# Patient Record
Sex: MALE | Race: WHITE | HISPANIC OR LATINO | Employment: OTHER | URBAN - METROPOLITAN AREA
[De-identification: names, ages, dates, MRNs, and addresses within clinical notes are randomized per-mention and may not be internally consistent; named-entity substitution may affect disease eponyms.]

---

## 2020-10-08 ENCOUNTER — HOSPITAL ENCOUNTER (EMERGENCY)
Facility: HOSPITAL | Age: 70
Discharge: HOME | End: 2020-10-09
Attending: EMERGENCY MEDICINE
Payer: MEDICAID

## 2020-10-08 DIAGNOSIS — J96.01 ACUTE RESPIRATORY FAILURE WITH HYPOXIA: ICD-10-CM

## 2020-10-08 DIAGNOSIS — R07.9 CHEST PAIN: ICD-10-CM

## 2020-10-08 DIAGNOSIS — J18.9 PNEUMONIA OF BOTH LOWER LOBES DUE TO INFECTIOUS ORGANISM: ICD-10-CM

## 2020-10-08 DIAGNOSIS — U07.1 COVID-19: Primary | ICD-10-CM

## 2020-10-08 DIAGNOSIS — E87.1 HYPONATREMIA: ICD-10-CM

## 2020-10-08 LAB
ALBUMIN SERPL BCP-MCNC: 3.3 G/DL (ref 3.5–5.2)
ALLENS TEST: ABNORMAL
ALP SERPL-CCNC: 56 U/L (ref 55–135)
ALT SERPL W/O P-5'-P-CCNC: 46 U/L (ref 10–44)
ANION GAP SERPL CALC-SCNC: 10 MMOL/L (ref 8–16)
AST SERPL-CCNC: 34 U/L (ref 10–40)
BASOPHILS # BLD AUTO: 0.01 K/UL (ref 0–0.2)
BASOPHILS NFR BLD: 0.2 % (ref 0–1.9)
BILIRUB SERPL-MCNC: 0.4 MG/DL (ref 0.1–1)
BILIRUB UR QL STRIP: NEGATIVE
BNP SERPL-MCNC: 29 PG/ML (ref 0–99)
BUN SERPL-MCNC: 18 MG/DL (ref 8–23)
CALCIUM SERPL-MCNC: 8.2 MG/DL (ref 8.7–10.5)
CHLORIDE SERPL-SCNC: 93 MMOL/L (ref 95–110)
CLARITY UR: CLEAR
CO2 SERPL-SCNC: 29 MMOL/L (ref 23–29)
COLOR UR: YELLOW
CREAT SERPL-MCNC: 0.9 MG/DL (ref 0.5–1.4)
DELSYS: ABNORMAL
DIFFERENTIAL METHOD: ABNORMAL
EOSINOPHIL # BLD AUTO: 0 K/UL (ref 0–0.5)
EOSINOPHIL NFR BLD: 0.2 % (ref 0–8)
ERYTHROCYTE [DISTWIDTH] IN BLOOD BY AUTOMATED COUNT: 14.5 % (ref 11.5–14.5)
EST. GFR  (AFRICAN AMERICAN): >60 ML/MIN/1.73 M^2
EST. GFR  (NON AFRICAN AMERICAN): >60 ML/MIN/1.73 M^2
FIO2: 21
GLUCOSE SERPL-MCNC: 105 MG/DL (ref 70–110)
GLUCOSE UR QL STRIP: NEGATIVE
HCO3 UR-SCNC: 26.5 MMOL/L (ref 24–28)
HCT VFR BLD AUTO: 46.5 % (ref 40–54)
HCV AB SERPL QL IA: NEGATIVE
HGB BLD-MCNC: 15.5 G/DL (ref 14–18)
HGB UR QL STRIP: NEGATIVE
IMM GRANULOCYTES # BLD AUTO: 0.05 K/UL (ref 0–0.04)
IMM GRANULOCYTES NFR BLD AUTO: 1 % (ref 0–0.5)
KETONES UR QL STRIP: NEGATIVE
LACTATE SERPL-SCNC: 1.3 MMOL/L (ref 0.5–2.2)
LEUKOCYTE ESTERASE UR QL STRIP: NEGATIVE
LYMPHOCYTES # BLD AUTO: 0.8 K/UL (ref 1–4.8)
LYMPHOCYTES NFR BLD: 16.1 % (ref 18–48)
MCH RBC QN AUTO: 30.6 PG (ref 27–31)
MCHC RBC AUTO-ENTMCNC: 33.3 G/DL (ref 32–36)
MCV RBC AUTO: 92 FL (ref 82–98)
MODE: ABNORMAL
MONOCYTES # BLD AUTO: 0.5 K/UL (ref 0.3–1)
MONOCYTES NFR BLD: 9.3 % (ref 4–15)
NEUTROPHILS # BLD AUTO: 3.6 K/UL (ref 1.8–7.7)
NEUTROPHILS NFR BLD: 73.2 % (ref 38–73)
NITRITE UR QL STRIP: NEGATIVE
NRBC BLD-RTO: 0 /100 WBC
PCO2 BLDA: 28.1 MMHG (ref 35–45)
PH SMN: 7.58 [PH] (ref 7.35–7.45)
PH UR STRIP: 7 [PH] (ref 5–8)
PLATELET # BLD AUTO: 131 K/UL (ref 150–350)
PMV BLD AUTO: 9.8 FL (ref 9.2–12.9)
PO2 BLDA: 48 MMHG (ref 80–100)
POC BE: 5 MMOL/L
POC SATURATED O2: 90 % (ref 95–100)
POTASSIUM SERPL-SCNC: 3.7 MMOL/L (ref 3.5–5.1)
PROT SERPL-MCNC: 7.1 G/DL (ref 6–8.4)
PROT UR QL STRIP: NEGATIVE
RBC # BLD AUTO: 5.06 M/UL (ref 4.6–6.2)
SAMPLE: ABNORMAL
SARS-COV-2 RDRP RESP QL NAA+PROBE: POSITIVE
SITE: ABNORMAL
SODIUM SERPL-SCNC: 132 MMOL/L (ref 136–145)
SP GR UR STRIP: 1.01 (ref 1–1.03)
TROPONIN I SERPL DL<=0.01 NG/ML-MCNC: 0.01 NG/ML (ref 0–0.03)
URN SPEC COLLECT METH UR: NORMAL
UROBILINOGEN UR STRIP-ACNC: NEGATIVE EU/DL
WBC # BLD AUTO: 4.96 K/UL (ref 3.9–12.7)

## 2020-10-08 PROCEDURE — 87040 BLOOD CULTURE FOR BACTERIA: CPT

## 2020-10-08 PROCEDURE — 99900035 HC TECH TIME PER 15 MIN (STAT)

## 2020-10-08 PROCEDURE — 25000003 PHARM REV CODE 250: Performed by: EMERGENCY MEDICINE

## 2020-10-08 PROCEDURE — 85025 COMPLETE CBC W/AUTO DIFF WBC: CPT

## 2020-10-08 PROCEDURE — 96367 TX/PROPH/DG ADDL SEQ IV INF: CPT

## 2020-10-08 PROCEDURE — 96365 THER/PROPH/DIAG IV INF INIT: CPT

## 2020-10-08 PROCEDURE — 96375 TX/PRO/DX INJ NEW DRUG ADDON: CPT

## 2020-10-08 PROCEDURE — 99291 CRITICAL CARE FIRST HOUR: CPT | Mod: 25

## 2020-10-08 PROCEDURE — 93010 ELECTROCARDIOGRAM REPORT: CPT | Mod: ,,, | Performed by: INTERNAL MEDICINE

## 2020-10-08 PROCEDURE — 63600175 PHARM REV CODE 636 W HCPCS: Performed by: EMERGENCY MEDICINE

## 2020-10-08 PROCEDURE — 82803 BLOOD GASES ANY COMBINATION: CPT

## 2020-10-08 PROCEDURE — 80053 COMPREHEN METABOLIC PANEL: CPT

## 2020-10-08 PROCEDURE — 96361 HYDRATE IV INFUSION ADD-ON: CPT

## 2020-10-08 PROCEDURE — 93010 EKG 12-LEAD: ICD-10-PCS | Mod: ,,, | Performed by: INTERNAL MEDICINE

## 2020-10-08 PROCEDURE — 86803 HEPATITIS C AB TEST: CPT

## 2020-10-08 PROCEDURE — 84484 ASSAY OF TROPONIN QUANT: CPT

## 2020-10-08 PROCEDURE — 81003 URINALYSIS AUTO W/O SCOPE: CPT

## 2020-10-08 PROCEDURE — U0002 COVID-19 LAB TEST NON-CDC: HCPCS

## 2020-10-08 PROCEDURE — 83605 ASSAY OF LACTIC ACID: CPT

## 2020-10-08 PROCEDURE — 83880 ASSAY OF NATRIURETIC PEPTIDE: CPT

## 2020-10-08 PROCEDURE — 93005 ELECTROCARDIOGRAM TRACING: CPT

## 2020-10-08 PROCEDURE — 36600 WITHDRAWAL OF ARTERIAL BLOOD: CPT

## 2020-10-08 RX ORDER — AMLODIPINE BESYLATE 5 MG/1
5 TABLET ORAL DAILY
COMMUNITY

## 2020-10-08 RX ORDER — BISOPROLOL FUMARATE 5 MG/1
5 TABLET, FILM COATED ORAL DAILY
Status: ON HOLD | COMMUNITY
End: 2022-01-11 | Stop reason: HOSPADM

## 2020-10-08 RX ORDER — ROSUVASTATIN CALCIUM 20 MG/1
20 TABLET, COATED ORAL DAILY
COMMUNITY

## 2020-10-08 RX ORDER — HYDROCHLOROTHIAZIDE 12.5 MG/1
12.5 CAPSULE ORAL DAILY
Status: ON HOLD | COMMUNITY
End: 2022-01-18 | Stop reason: HOSPADM

## 2020-10-08 RX ADMIN — SODIUM CHLORIDE 1000 ML: 0.9 INJECTION, SOLUTION INTRAVENOUS at 10:10

## 2020-10-08 RX ADMIN — AZITHROMYCIN MONOHYDRATE 500 MG: 500 INJECTION, POWDER, LYOPHILIZED, FOR SOLUTION INTRAVENOUS at 11:10

## 2020-10-08 RX ADMIN — CEFTRIAXONE 1 G: 1 INJECTION, SOLUTION INTRAVENOUS at 11:10

## 2020-10-09 VITALS
HEIGHT: 65 IN | BODY MASS INDEX: 30.81 KG/M2 | OXYGEN SATURATION: 94 % | RESPIRATION RATE: 20 BRPM | DIASTOLIC BLOOD PRESSURE: 57 MMHG | WEIGHT: 184.94 LBS | HEART RATE: 54 BPM | TEMPERATURE: 99 F | SYSTOLIC BLOOD PRESSURE: 119 MMHG

## 2020-10-09 PROCEDURE — 63600175 PHARM REV CODE 636 W HCPCS: Performed by: EMERGENCY MEDICINE

## 2020-10-09 PROCEDURE — 25000003 PHARM REV CODE 250: Performed by: EMERGENCY MEDICINE

## 2020-10-09 RX ORDER — DEXAMETHASONE SODIUM PHOSPHATE 4 MG/ML
6 INJECTION, SOLUTION INTRA-ARTICULAR; INTRALESIONAL; INTRAMUSCULAR; INTRAVENOUS; SOFT TISSUE
Status: COMPLETED | OUTPATIENT
Start: 2020-10-09 | End: 2020-10-09

## 2020-10-09 RX ORDER — IBUPROFEN 800 MG/1
800 TABLET ORAL
Status: COMPLETED | OUTPATIENT
Start: 2020-10-09 | End: 2020-10-09

## 2020-10-09 RX ORDER — AZITHROMYCIN 250 MG/1
250 TABLET, FILM COATED ORAL DAILY
Qty: 6 TABLET | Refills: 0 | Status: ON HOLD | OUTPATIENT
Start: 2020-10-09 | End: 2022-01-11 | Stop reason: HOSPADM

## 2020-10-09 RX ORDER — IBUPROFEN 800 MG/1
800 TABLET ORAL EVERY 6 HOURS PRN
Qty: 20 TABLET | Refills: 0 | Status: ON HOLD | OUTPATIENT
Start: 2020-10-09 | End: 2022-01-11 | Stop reason: HOSPADM

## 2020-10-09 RX ORDER — DEXAMETHASONE 6 MG/1
6 TABLET ORAL
Qty: 5 TABLET | Refills: 0 | Status: SHIPPED | OUTPATIENT
Start: 2020-10-09 | End: 2020-10-14

## 2020-10-09 RX ORDER — ONDANSETRON 4 MG/1
4 TABLET, FILM COATED ORAL EVERY 6 HOURS
Qty: 30 TABLET | Refills: 0 | Status: ON HOLD | OUTPATIENT
Start: 2020-10-09 | End: 2022-01-18 | Stop reason: HOSPADM

## 2020-10-09 RX ADMIN — DEXAMETHASONE SODIUM PHOSPHATE 6 MG: 4 INJECTION, SOLUTION INTRAMUSCULAR; INTRAVENOUS at 02:10

## 2020-10-09 RX ADMIN — IBUPROFEN 800 MG: 800 TABLET, FILM COATED ORAL at 12:10

## 2020-10-09 NOTE — ED PROVIDER NOTES
SCRIBE #1 NOTE: I, Lizzy Díaz, am scribing for, and in the presence of, Enriqueta Solis Do, MD. I have scribed the entire note.      History      Chief Complaint   Patient presents with    Shortness of Breath     States began this morning with fever.  States COVID 19 positive for about 1 week.  Also lower abdominal pain and difficulty urinating.       Review of patient's allergies indicates:  No Known Allergies     HPI   HPI    10/8/2020, 10:05 PM   History obtained from the patient and nephew      History of Present Illness: Lizz Alas is a 70 y.o. male patient who presents to the Emergency Department for evaluation of shortness of breath which onset this morning. Symptoms are constant and moderate in severity. No mitigating or exacerbating factors reported. Patient tested positive for COVID-19 x7 days ago, after being in contact with his nephew who tested positive a couple weeks prior. Patient presents here with his brother in whom he lives with also experiencing similar symptoms with a COVID diagnosis x1 week ago. Associated sxs include low grade fever. In addition, patient reports difficulty urinating and decrease in urine output today.  Patient denies any N/V, diarrhea, constipation, dysuria, hematuria, chest pain, palpitations, leg pain/swelling, and all other sxs at this time. No further complaints or concerns at this time.       Arrival mode: Personal vehicle      PCP: Primary Doctor No       Past Medical History:  Past Medical History:   Diagnosis Date    Hyperlipemia     Hypertension        Past Surgical History:  Past Surgical History:   Procedure Laterality Date    FRACTURE SURGERY      HERNIA REPAIR         Family History:  History reviewed. No pertinent family history.    Social History:  Social History     Tobacco Use    Smoking status: Current Every Day Smoker     Packs/day: 1.50   Substance and Sexual Activity    Alcohol use: Yes     Comment: occassional    Drug use: Unknown    Sexual  activity: Unknown       ROS   Review of Systems   Constitutional: Positive for fever.   HENT: Negative for sore throat.    Respiratory: Positive for shortness of breath.    Cardiovascular: Negative for chest pain, palpitations and leg swelling.   Gastrointestinal: Negative for constipation, diarrhea, nausea and vomiting.   Genitourinary: Positive for decreased urine volume and difficulty urinating. Negative for dysuria and hematuria.   Musculoskeletal: Negative for back pain.   Skin: Negative for rash.   Neurological: Negative for weakness.   Hematological: Does not bruise/bleed easily.   All other systems reviewed and are negative.    Physical Exam      Initial Vitals [10/08/20 2133]   BP Pulse Resp Temp SpO2   (!) 110/57 77 20 98.3 °F (36.8 °C) (!) 87 %      MAP       --          Physical Exam  Nursing Notes and Vital Signs Reviewed.  Constitutional: Patient is in no acute distress. Well-developed and well-nourished. Appears stated age.  Head: Atraumatic. Normocephalic.  Eyes: PERRL. EOM intact. Conjunctivae are not pale. No scleral icterus.  ENT: Mucous membranes are moist.   Neck: Supple. Full ROM.   Cardiovascular: Regular rate. Regular rhythm. No murmurs, rubs, or gallops. Distal pulses are 2+ and symmetric.  Pulmonary/Chest: No respiratory distress. Clear to auscultation bilaterally. No wheezing or rales.  Abdominal: There is mild suprapubic tenderness.  No rebound, guarding, or rigidity. Good bowel sounds.  Genitourinary: The bladder is distended. No CVA tenderness  Musculoskeletal: Moves all extremities. No obvious deformities. No edema.   Skin: Warm and dry.  Neurological:  Alert, awake, and appropriate.  Normal speech.  No acute focal neurological deficits are appreciated.  Psychiatric: Normal affect. Good eye contact. Appropriate in content.    ED Course    Critical Care    Date/Time: 10/9/2020 4:06 AM  Performed by: Enriqueta Solis Do, MD  Authorized by: Enriqueta Solis Do, MD   Direct patient critical  "care time: 20 minutes  Additional history critical care time: 10 minutes  Ordering / reviewing critical care time: 5 minutes  Documentation critical care time: 5 minutes  Consulting other physicians critical care time: 5 minutes  Total critical care time (exclusive of procedural time) : 45 minutes  Critical care time was exclusive of separately billable procedures and treating other patients and teaching time.  Critical care was necessary to treat or prevent imminent or life-threatening deterioration of the following conditions: Pneumonia, Hyponatremia, Acute respiratory failure with hypoxia.  Critical care was time spent personally by me on the following activities: blood draw for specimens, discussions with primary provider, interpretation of cardiac output measurements, evaluation of patient's response to treatment, discussions with consultants, examination of patient, obtaining history from patient or surrogate, ordering and performing treatments and interventions, ordering and review of laboratory studies, ordering and review of radiographic studies, pulse oximetry, re-evaluation of patient's condition and review of old charts.        ED Vital Signs:  Vitals:    10/08/20 2133 10/08/20 2150 10/08/20 2158 10/08/20 2213   BP: (!) 110/57 124/60     Pulse: 77 68 69 67   Resp: 20 (!) 21  18   Temp: 98.3 °F (36.8 °C)      TempSrc: Oral      SpO2: (!) 87% (!) 89%  (!) 92%   Weight: 83.9 kg (184 lb 15.5 oz)      Height: 5' 5" (1.651 m)       10/08/20 2217 10/08/20 2220 10/08/20 2230 10/08/20 2300   BP: 124/60 124/61 132/66 120/64   Pulse: 65 65 65 64   Resp: 19 (!) 21 (!) 21 (!) 24   Temp:       TempSrc:       SpO2: 97% 95% 95% (!) 94%   Weight:       Height:        10/08/20 2330 10/09/20 0000 10/09/20 0150 10/09/20 0200   BP: 124/77 (!) 110/57  (!) 97/53   Pulse: 73 70  (!) 57   Resp: (!) 22 18  20   Temp: 100.1 °F (37.8 °C)  98.6 °F (37 °C)    TempSrc: Oral  Oral    SpO2: (!) 94% 95%  95%   Weight:       Height:     "    10/09/20 0300   BP: (!) 119/57   Pulse: (!) 54   Resp: 20   Temp: 98.9 °F (37.2 °C)   TempSrc: Oral   SpO2: (!) 94%   Weight:    Height:        Abnormal Lab Results:  Labs Reviewed   CBC W/ AUTO DIFFERENTIAL - Abnormal; Notable for the following components:       Result Value    Platelets 131 (*)     Immature Granulocytes 1.0 (*)     Immature Grans (Abs) 0.05 (*)     Lymph # 0.8 (*)     Gran% 73.2 (*)     Lymph% 16.1 (*)     All other components within normal limits   COMPREHENSIVE METABOLIC PANEL - Abnormal; Notable for the following components:    Sodium 132 (*)     Chloride 93 (*)     Calcium 8.2 (*)     Albumin 3.3 (*)     ALT 46 (*)     All other components within normal limits   SARS-COV-2 RNA AMPLIFICATION, QUAL - Abnormal; Notable for the following components:    SARS-CoV-2 RNA, Amplification, Qual Positive (*)     All other components within normal limits   ISTAT PROCEDURE - Abnormal; Notable for the following components:    POC PH 7.582 (*)     POC PCO2 28.1 (*)     POC PO2 48 (*)     POC SATURATED O2 90 (*)     All other components within normal limits   CULTURE, BLOOD   CULTURE, BLOOD   LACTIC ACID, PLASMA   URINALYSIS, REFLEX TO URINE CULTURE    Narrative:     Specimen Source->Urine   B-TYPE NATRIURETIC PEPTIDE   TROPONIN I   HEPATITIS C ANTIBODY        All Lab Results:  Results for orders placed or performed during the hospital encounter of 10/08/20   CBC auto differential   Result Value Ref Range    WBC 4.96 3.90 - 12.70 K/uL    RBC 5.06 4.60 - 6.20 M/uL    Hemoglobin 15.5 14.0 - 18.0 g/dL    Hematocrit 46.5 40.0 - 54.0 %    Mean Corpuscular Volume 92 82 - 98 fL    Mean Corpuscular Hemoglobin 30.6 27.0 - 31.0 pg    Mean Corpuscular Hemoglobin Conc 33.3 32.0 - 36.0 g/dL    RDW 14.5 11.5 - 14.5 %    Platelets 131 (L) 150 - 350 K/uL    MPV 9.8 9.2 - 12.9 fL    Immature Granulocytes 1.0 (H) 0.0 - 0.5 %    Gran # (ANC) 3.6 1.8 - 7.7 K/uL    Immature Grans (Abs) 0.05 (H) 0.00 - 0.04 K/uL    Lymph # 0.8  (L) 1.0 - 4.8 K/uL    Mono # 0.5 0.3 - 1.0 K/uL    Eos # 0.0 0.0 - 0.5 K/uL    Baso # 0.01 0.00 - 0.20 K/uL    nRBC 0 0 /100 WBC    Gran% 73.2 (H) 38.0 - 73.0 %    Lymph% 16.1 (L) 18.0 - 48.0 %    Mono% 9.3 4.0 - 15.0 %    Eosinophil% 0.2 0.0 - 8.0 %    Basophil% 0.2 0.0 - 1.9 %    Differential Method Automated    Comprehensive metabolic panel   Result Value Ref Range    Sodium 132 (L) 136 - 145 mmol/L    Potassium 3.7 3.5 - 5.1 mmol/L    Chloride 93 (L) 95 - 110 mmol/L    CO2 29 23 - 29 mmol/L    Glucose 105 70 - 110 mg/dL    BUN, Bld 18 8 - 23 mg/dL    Creatinine 0.9 0.5 - 1.4 mg/dL    Calcium 8.2 (L) 8.7 - 10.5 mg/dL    Total Protein 7.1 6.0 - 8.4 g/dL    Albumin 3.3 (L) 3.5 - 5.2 g/dL    Total Bilirubin 0.4 0.1 - 1.0 mg/dL    Alkaline Phosphatase 56 55 - 135 U/L    AST 34 10 - 40 U/L    ALT 46 (H) 10 - 44 U/L    Anion Gap 10 8 - 16 mmol/L    eGFR if African American >60 >60 mL/min/1.73 m^2    eGFR if non African American >60 >60 mL/min/1.73 m^2   Lactic acid, plasma #1   Result Value Ref Range    Lactate (Lactic Acid) 1.3 0.5 - 2.2 mmol/L   Urinalysis, Reflex to Urine Culture Urine, Clean Catch    Specimen: Urine   Result Value Ref Range    Specimen UA Urine, Catheterized     Color, UA Yellow Yellow, Straw, Azra    Appearance, UA Clear Clear    pH, UA 7.0 5.0 - 8.0    Specific Gravity, UA 1.015 1.005 - 1.030    Protein, UA Negative Negative    Glucose, UA Negative Negative    Ketones, UA Negative Negative    Bilirubin (UA) Negative Negative    Occult Blood UA Negative Negative    Nitrite, UA Negative Negative    Urobilinogen, UA Negative <2.0 EU/dL    Leukocytes, UA Negative Negative   Brain natriuretic peptide   Result Value Ref Range    BNP 29 0 - 99 pg/mL   Troponin I   Result Value Ref Range    Troponin I 0.010 0.000 - 0.026 ng/mL   Hepatitis C antibody   Result Value Ref Range    Hepatitis C Ab Negative Negative   COVID-19 Rapid Screening   Result Value Ref Range    SARS-CoV-2 RNA, Amplification, Qual  Positive (A) Negative   ISTAT PROCEDURE   Result Value Ref Range    POC PH 7.582 (HH) 7.35 - 7.45    POC PCO2 28.1 (LL) 35 - 45 mmHg    POC PO2 48 (LL) 80 - 100 mmHg    POC HCO3 26.5 24 - 28 mmol/L    POC BE 5 -2 to 2 mmol/L    POC SATURATED O2 90 (L) 95 - 100 %    Sample ARTERIAL     Site RR     Allens Test Pass     DelSys Room Air     Mode SPONT     FiO2 21          Imaging Results:  Imaging Results          X-Ray Chest AP Portable (Final result)  Result time 10/08/20 22:30:42    Final result by Ivan Montes MD (10/08/20 22:30:42)                 Impression:      As above.      Electronically signed by: Ivan Montes  Date:    10/08/2020  Time:    22:30             Narrative:    EXAMINATION:  XR CHEST AP PORTABLE    CLINICAL HISTORY:  Sepsis;    TECHNIQUE:  Single frontal view of the chest was performed.    COMPARISON:  None    FINDINGS:  Bilateral subsolid peripheral opacities.  These may raise concern for COVID-19 in the appropriate clinical circumstance although developing atelectasis or other infection could appear similar.  Clinical correlation is advised.  No pleural fluid.  No pneumothorax.    The cardiac silhouette is normal in size. The hilar and mediastinal contours are unremarkable.    Bones are intact.                                    The EKG was ordered, reviewed, and independently interpreted by the ED provider.  Interpretation time: 21:51  Rate: 72 BPM  Rhythm: premature atrial contractions (PAC)  Interpretation: Incomplete RBBB. No STEMI.    The Emergency Provider reviewed the vital signs and test results, which are outlined above.    ED Discussion     3:01 AM: Reassessed pt at this time. Patient was given a liter of fluids in the ED with a posiitve response. Home oxygen was ordered and brought to the patient in the ED. Patient will be put on COVID-19 monitoring program. Discussed with pt and nephew all pertinent ED information and results. Discussed pt dx and plan of tx. Gave pt and nephew  all f/u and return to the ED instructions. All questions and concerns were addressed at this time. Pt and nephew express understanding of information and instructions, and is comfortable with plan to discharge. Pt is stable for discharge.    I discussed with patient and/or family/caretaker that evaluation in the ED does not suggest any emergent or life threatening medical conditions requiring immediate intervention beyond what was provided in the ED, and I believe patient is safe for discharge.  Regardless, an unremarkable evaluation in the ED does not preclude the development or presence of a serious of life threatening condition. As such, patient was instructed to return immediately for any worsening or change in current symptoms.           ED Medication(s):  Medications   sodium chloride 0.9% bolus 1,000 mL (0 mLs Intravenous Stopped 10/9/20 0016)   cefTRIAXone (ROCEPHIN) 1 g/50 mL D5W IVPB (0 g Intravenous Stopped 10/8/20 2337)   azithromycin 500 mg in dextrose 5 % 250 mL IVPB (ready to mix system) (0 mg Intravenous Stopped 10/9/20 0056)   ibuprofen tablet 800 mg (800 mg Oral Given 10/9/20 0023)   dexamethasone injection 6 mg (6 mg Intravenous Given 10/9/20 0222)     Discharge Medication List as of 10/9/2020  3:00 AM      START taking these medications    Details   azithromycin (Z-AJAY) 250 MG tablet Take 1 tablet (250 mg total) by mouth once daily. Take first 2 tablets together, then 1 every day until finished., Starting Fri 10/9/2020, Print      dexAMETHasone (DECADRON) 6 MG tablet Take 1 tablet (6 mg total) by mouth daily with breakfast. for 5 days, Starting Fri 10/9/2020, Until Wed 10/14/2020, Print      !! ibuprofen (ADVIL,MOTRIN) 800 MG tablet Take 1 tablet (800 mg total) by mouth every 6 (six) hours as needed for Pain., Starting Fri 10/9/2020, Print      !! ibuprofen (ADVIL,MOTRIN) 800 MG tablet Take 1 tablet (800 mg total) by mouth every 6 (six) hours as needed for Pain., Starting Fri 10/9/2020, Print       ondansetron (ZOFRAN) 4 MG tablet Take 1 tablet (4 mg total) by mouth every 6 (six) hours. For nausea, Starting Fri 10/9/2020, Print       !! - Potential duplicate medications found. Please discuss with provider.          Follow-up Information     Please follow up.    Contact information:  Follow-up your doctor 1-2 days for recheck                   Medical Decision Making    Medical Decision Making:   Clinical Tests:   Lab Tests: Ordered and Reviewed  Radiological Study: Ordered and Reviewed  Medical Tests: Ordered and Reviewed           Scribe Attestation:   Scribe #1: I performed the above scribed service and the documentation accurately describes the services I performed. I attest to the accuracy of the note.    Attending:   Physician Attestation Statement for Scribe #1: I, Enriqueta Solis Do, MD, personally performed the services described in this documentation, as scribed by Lizzy Díaz, in my presence, and it is both accurate and complete.          Clinical Impression       ICD-10-CM ICD-9-CM   1. COVID-19  U07.1 079.89   2. Chest pain  R07.9 786.50   3. Pneumonia of both lower lobes due to infectious organism  J18.9 486   4. Hyponatremia  E87.1 276.1   5. Acute respiratory failure with hypoxia  J96.01 518.81       Disposition:   Disposition: Discharged  Condition: Stable         Enriqueta Solis Do, MD  10/09/20 0522

## 2020-10-09 NOTE — DISCHARGE INSTRUCTIONS
Return for any worsening fever, shortness of breath, weakness confusion or for any concerns or complications at all.  Take the Zithromax antibiotic for the pneumonia.  You can take ibuprofen 800 mg every 6-8 hours for fever body aches and you can also take Tylenol 650 mg every 4-6 hours as needed for fever body aches..  Zofran has been prescribed for nausea and vomiting

## 2020-10-09 NOTE — RESPIRATORY THERAPY
Home Oxygen Evaluation    Date Performed: 10/9/2020    1) Patient's Home O2 Sat on room air, while at rest: 90        If O2 sats on room air at rest are 88% or below, patient qualifies. No additional testing needed. Document N/A in steps 2 and 3. If 89% or above, complete steps 2.      2) Patient's O2 Sat on room air while exercisin        If O2 sats on room air while exercising remain 89% or above patient does not qualify, no further testing needed Document N/A in step 3. If O2 sats on room air while exercising are 88% or below, continue to step 3.      3) Patient's O2 Sat while exercising on O2: 92 at 2 LPM         (Must show improvement from #2 for patients to qualify)    If O2 sats improve on oxygen, patient qualifies for portable oxygen. If not, the patient does not qualify.

## 2020-10-14 LAB
BACTERIA BLD CULT: NORMAL
BACTERIA BLD CULT: NORMAL

## 2021-12-27 ENCOUNTER — HOSPITAL ENCOUNTER (INPATIENT)
Facility: HOSPITAL | Age: 71
LOS: 15 days | Discharge: HOME | DRG: 870 | End: 2022-01-11
Attending: EMERGENCY MEDICINE | Admitting: EMERGENCY MEDICINE
Payer: MEDICAID

## 2021-12-27 DIAGNOSIS — R78.81 BACTEREMIA DUE TO GRAM-NEGATIVE BACTERIA: ICD-10-CM

## 2021-12-27 DIAGNOSIS — I48.0 PAROXYSMAL ATRIAL FIBRILLATION: Chronic | ICD-10-CM

## 2021-12-27 DIAGNOSIS — K83.09 ACUTE CHOLANGITIS: Primary | ICD-10-CM

## 2021-12-27 DIAGNOSIS — N17.9 AKI (ACUTE KIDNEY INJURY): ICD-10-CM

## 2021-12-27 DIAGNOSIS — R65.21 SEPTIC SHOCK: ICD-10-CM

## 2021-12-27 DIAGNOSIS — N17.9 ACUTE KIDNEY INJURY: ICD-10-CM

## 2021-12-27 DIAGNOSIS — J96.01 ACUTE HYPOXEMIC RESPIRATORY FAILURE: ICD-10-CM

## 2021-12-27 DIAGNOSIS — R65.20 SEVERE SEPSIS WITH ACUTE ORGAN DYSFUNCTION: ICD-10-CM

## 2021-12-27 DIAGNOSIS — E87.8 ELECTROLYTE IMBALANCE: ICD-10-CM

## 2021-12-27 DIAGNOSIS — I49.8 BIGEMINY: ICD-10-CM

## 2021-12-27 DIAGNOSIS — A41.9 SEPTIC SHOCK: ICD-10-CM

## 2021-12-27 DIAGNOSIS — A41.9 SEVERE SEPSIS WITH ACUTE ORGAN DYSFUNCTION: ICD-10-CM

## 2021-12-27 DIAGNOSIS — R10.13 EPIGASTRIC ABDOMINAL PAIN: ICD-10-CM

## 2021-12-27 DIAGNOSIS — F10.10 ALCOHOL ABUSE: Chronic | ICD-10-CM

## 2021-12-27 DIAGNOSIS — Z72.0 TOBACCO USE: Chronic | ICD-10-CM

## 2021-12-27 PROBLEM — I48.91 ATRIAL FIBRILLATION: Status: ACTIVE | Noted: 2021-12-27

## 2021-12-27 PROBLEM — K13.79 OTHER LESIONS OF ORAL MUCOSA: Status: ACTIVE | Noted: 2021-10-04

## 2021-12-27 PROBLEM — C04.8: Status: ACTIVE | Noted: 2021-10-04

## 2021-12-27 PROBLEM — K80.50 CHOLEDOCHOLITHIASIS: Status: ACTIVE | Noted: 2021-12-27

## 2021-12-27 PROBLEM — I10 PRIMARY HYPERTENSION: Chronic | Status: ACTIVE | Noted: 2021-12-27

## 2021-12-27 PROBLEM — I10 HYPERTENSION: Status: ACTIVE | Noted: 2021-12-27

## 2021-12-27 LAB
ALBUMIN SERPL BCP-MCNC: 4.1 G/DL (ref 3.5–5.2)
ALP SERPL-CCNC: 184 U/L (ref 55–135)
ALT SERPL W/O P-5'-P-CCNC: 226 U/L (ref 10–44)
ANION GAP SERPL CALC-SCNC: 15 MMOL/L (ref 8–16)
AST SERPL-CCNC: 150 U/L (ref 10–40)
BASOPHILS # BLD AUTO: 0.02 K/UL (ref 0–0.2)
BASOPHILS NFR BLD: 0.2 % (ref 0–1.9)
BILIRUB SERPL-MCNC: 5.1 MG/DL (ref 0.1–1)
BILIRUB UR QL STRIP: ABNORMAL
BUN SERPL-MCNC: 22 MG/DL (ref 8–23)
CALCIUM SERPL-MCNC: 9.5 MG/DL (ref 8.7–10.5)
CHLORIDE SERPL-SCNC: 94 MMOL/L (ref 95–110)
CLARITY UR: CLEAR
CO2 SERPL-SCNC: 25 MMOL/L (ref 23–29)
COLOR UR: YELLOW
CREAT SERPL-MCNC: 0.8 MG/DL (ref 0.5–1.4)
CTP QC/QA: YES
DIFFERENTIAL METHOD: ABNORMAL
EOSINOPHIL # BLD AUTO: 0 K/UL (ref 0–0.5)
EOSINOPHIL NFR BLD: 0 % (ref 0–8)
ERYTHROCYTE [DISTWIDTH] IN BLOOD BY AUTOMATED COUNT: 13.7 % (ref 11.5–14.5)
EST. GFR  (AFRICAN AMERICAN): >60 ML/MIN/1.73 M^2
EST. GFR  (NON AFRICAN AMERICAN): >60 ML/MIN/1.73 M^2
GLUCOSE SERPL-MCNC: 180 MG/DL (ref 70–110)
GLUCOSE UR QL STRIP: NEGATIVE
HCT VFR BLD AUTO: 45.6 % (ref 40–54)
HGB BLD-MCNC: 15.4 G/DL (ref 14–18)
HGB UR QL STRIP: ABNORMAL
IMM GRANULOCYTES # BLD AUTO: 0.04 K/UL (ref 0–0.04)
IMM GRANULOCYTES NFR BLD AUTO: 0.5 % (ref 0–0.5)
KETONES UR QL STRIP: ABNORMAL
LACTATE SERPL-SCNC: 1.5 MMOL/L (ref 0.5–2.2)
LEUKOCYTE ESTERASE UR QL STRIP: NEGATIVE
LIPASE SERPL-CCNC: 11 U/L (ref 4–60)
LYMPHOCYTES # BLD AUTO: 0.5 K/UL (ref 1–4.8)
LYMPHOCYTES NFR BLD: 5.5 % (ref 18–48)
MCH RBC QN AUTO: 30.9 PG (ref 27–31)
MCHC RBC AUTO-ENTMCNC: 33.8 G/DL (ref 32–36)
MCV RBC AUTO: 92 FL (ref 82–98)
MONOCYTES # BLD AUTO: 0.5 K/UL (ref 0.3–1)
MONOCYTES NFR BLD: 5.5 % (ref 4–15)
NEUTROPHILS # BLD AUTO: 7.2 K/UL (ref 1.8–7.7)
NEUTROPHILS NFR BLD: 88.3 % (ref 38–73)
NITRITE UR QL STRIP: NEGATIVE
NRBC BLD-RTO: 0 /100 WBC
PH UR STRIP: 6 [PH] (ref 5–8)
PLATELET # BLD AUTO: 119 K/UL (ref 150–450)
PMV BLD AUTO: 10.2 FL (ref 9.2–12.9)
POTASSIUM SERPL-SCNC: 3.9 MMOL/L (ref 3.5–5.1)
PROT SERPL-MCNC: 8.2 G/DL (ref 6–8.4)
PROT UR QL STRIP: ABNORMAL
RBC # BLD AUTO: 4.98 M/UL (ref 4.6–6.2)
SARS-COV-2 RDRP RESP QL NAA+PROBE: NEGATIVE
SODIUM SERPL-SCNC: 134 MMOL/L (ref 136–145)
SP GR UR STRIP: >=1.03 (ref 1–1.03)
TROPONIN I SERPL DL<=0.01 NG/ML-MCNC: <0.006 NG/ML (ref 0–0.03)
URN SPEC COLLECT METH UR: ABNORMAL
UROBILINOGEN UR STRIP-ACNC: ABNORMAL EU/DL
WBC # BLD AUTO: 8.18 K/UL (ref 3.9–12.7)

## 2021-12-27 PROCEDURE — 81003 URINALYSIS AUTO W/O SCOPE: CPT | Performed by: PHYSICIAN ASSISTANT

## 2021-12-27 PROCEDURE — U0002 COVID-19 LAB TEST NON-CDC: HCPCS | Performed by: EMERGENCY MEDICINE

## 2021-12-27 PROCEDURE — 25000003 PHARM REV CODE 250: Performed by: EMERGENCY MEDICINE

## 2021-12-27 PROCEDURE — 80053 COMPREHEN METABOLIC PANEL: CPT | Performed by: PHYSICIAN ASSISTANT

## 2021-12-27 PROCEDURE — G0378 HOSPITAL OBSERVATION PER HR: HCPCS

## 2021-12-27 PROCEDURE — 96376 TX/PRO/DX INJ SAME DRUG ADON: CPT

## 2021-12-27 PROCEDURE — 96361 HYDRATE IV INFUSION ADD-ON: CPT

## 2021-12-27 PROCEDURE — 93010 EKG 12-LEAD: ICD-10-PCS | Mod: ,,, | Performed by: INTERNAL MEDICINE

## 2021-12-27 PROCEDURE — 96375 TX/PRO/DX INJ NEW DRUG ADDON: CPT

## 2021-12-27 PROCEDURE — 63600175 PHARM REV CODE 636 W HCPCS: Performed by: PHYSICIAN ASSISTANT

## 2021-12-27 PROCEDURE — 87186 SC STD MICRODIL/AGAR DIL: CPT | Performed by: EMERGENCY MEDICINE

## 2021-12-27 PROCEDURE — 93005 ELECTROCARDIOGRAM TRACING: CPT

## 2021-12-27 PROCEDURE — 99291 CRITICAL CARE FIRST HOUR: CPT | Mod: 25

## 2021-12-27 PROCEDURE — 83690 ASSAY OF LIPASE: CPT | Performed by: PHYSICIAN ASSISTANT

## 2021-12-27 PROCEDURE — 96365 THER/PROPH/DIAG IV INF INIT: CPT

## 2021-12-27 PROCEDURE — 84484 ASSAY OF TROPONIN QUANT: CPT | Performed by: PHYSICIAN ASSISTANT

## 2021-12-27 PROCEDURE — 87040 BLOOD CULTURE FOR BACTERIA: CPT | Mod: 59 | Performed by: EMERGENCY MEDICINE

## 2021-12-27 PROCEDURE — 11000001 HC ACUTE MED/SURG PRIVATE ROOM

## 2021-12-27 PROCEDURE — 87077 CULTURE AEROBIC IDENTIFY: CPT | Mod: 59 | Performed by: EMERGENCY MEDICINE

## 2021-12-27 PROCEDURE — 25000003 PHARM REV CODE 250: Performed by: PHYSICIAN ASSISTANT

## 2021-12-27 PROCEDURE — 93010 ELECTROCARDIOGRAM REPORT: CPT | Mod: ,,, | Performed by: INTERNAL MEDICINE

## 2021-12-27 PROCEDURE — 25000003 PHARM REV CODE 250: Performed by: INTERNAL MEDICINE

## 2021-12-27 PROCEDURE — 63600175 PHARM REV CODE 636 W HCPCS: Performed by: EMERGENCY MEDICINE

## 2021-12-27 PROCEDURE — 83605 ASSAY OF LACTIC ACID: CPT | Performed by: EMERGENCY MEDICINE

## 2021-12-27 PROCEDURE — 85025 COMPLETE CBC W/AUTO DIFF WBC: CPT | Performed by: PHYSICIAN ASSISTANT

## 2021-12-27 RX ORDER — NALOXONE HCL 0.4 MG/ML
0.02 VIAL (ML) INJECTION
Status: DISCONTINUED | OUTPATIENT
Start: 2021-12-27 | End: 2022-01-11 | Stop reason: HOSPADM

## 2021-12-27 RX ORDER — SODIUM CHLORIDE 9 MG/ML
INJECTION, SOLUTION INTRAVENOUS CONTINUOUS
Status: ACTIVE | OUTPATIENT
Start: 2021-12-27 | End: 2021-12-29

## 2021-12-27 RX ORDER — IBUPROFEN 200 MG
1 TABLET ORAL DAILY
Status: DISCONTINUED | OUTPATIENT
Start: 2021-12-28 | End: 2022-01-11 | Stop reason: HOSPADM

## 2021-12-27 RX ORDER — AMLODIPINE BESYLATE 5 MG/1
5 TABLET ORAL DAILY
Status: DISCONTINUED | OUTPATIENT
Start: 2021-12-28 | End: 2021-12-28

## 2021-12-27 RX ORDER — MORPHINE SULFATE 4 MG/ML
4 INJECTION, SOLUTION INTRAMUSCULAR; INTRAVENOUS
Status: COMPLETED | OUTPATIENT
Start: 2021-12-27 | End: 2021-12-27

## 2021-12-27 RX ORDER — METOPROLOL TARTRATE 50 MG/1
50 TABLET ORAL 2 TIMES DAILY
Status: DISCONTINUED | OUTPATIENT
Start: 2021-12-27 | End: 2021-12-30

## 2021-12-27 RX ORDER — HYDROCHLOROTHIAZIDE 12.5 MG/1
12.5 TABLET ORAL DAILY
Status: DISCONTINUED | OUTPATIENT
Start: 2021-12-28 | End: 2021-12-28

## 2021-12-27 RX ORDER — OXYCODONE HYDROCHLORIDE 5 MG/1
10 TABLET ORAL EVERY 6 HOURS PRN
Status: DISCONTINUED | OUTPATIENT
Start: 2021-12-27 | End: 2022-01-01

## 2021-12-27 RX ORDER — MAG HYDROX/ALUMINUM HYD/SIMETH 200-200-20
30 SUSPENSION, ORAL (FINAL DOSE FORM) ORAL 4 TIMES DAILY PRN
Status: DISCONTINUED | OUTPATIENT
Start: 2021-12-27 | End: 2022-01-11 | Stop reason: HOSPADM

## 2021-12-27 RX ORDER — ONDANSETRON 2 MG/ML
4 INJECTION INTRAMUSCULAR; INTRAVENOUS
Status: COMPLETED | OUTPATIENT
Start: 2021-12-27 | End: 2021-12-27

## 2021-12-27 RX ORDER — ONDANSETRON 8 MG/1
8 TABLET, ORALLY DISINTEGRATING ORAL EVERY 8 HOURS PRN
Status: DISCONTINUED | OUTPATIENT
Start: 2021-12-27 | End: 2022-01-01

## 2021-12-27 RX ORDER — IBUPROFEN 600 MG/1
600 TABLET ORAL
Status: COMPLETED | OUTPATIENT
Start: 2021-12-27 | End: 2021-12-27

## 2021-12-27 RX ORDER — LORAZEPAM 2 MG/ML
1 INJECTION INTRAMUSCULAR
Status: DISCONTINUED | OUTPATIENT
Start: 2021-12-28 | End: 2022-01-05

## 2021-12-27 RX ORDER — PROMETHAZINE HYDROCHLORIDE 25 MG/1
25 TABLET ORAL EVERY 6 HOURS PRN
Status: DISCONTINUED | OUTPATIENT
Start: 2021-12-27 | End: 2022-01-11 | Stop reason: HOSPADM

## 2021-12-27 RX ORDER — IBUPROFEN 400 MG/1
400 TABLET ORAL EVERY 6 HOURS PRN
Status: DISCONTINUED | OUTPATIENT
Start: 2021-12-28 | End: 2021-12-30

## 2021-12-27 RX ORDER — OXYCODONE HYDROCHLORIDE 5 MG/1
5 TABLET ORAL EVERY 6 HOURS PRN
Status: DISCONTINUED | OUTPATIENT
Start: 2021-12-27 | End: 2022-01-01

## 2021-12-27 RX ORDER — SODIUM CHLORIDE 0.9 % (FLUSH) 0.9 %
10 SYRINGE (ML) INJECTION EVERY 12 HOURS PRN
Status: DISCONTINUED | OUTPATIENT
Start: 2021-12-27 | End: 2022-01-11 | Stop reason: HOSPADM

## 2021-12-27 RX ADMIN — PIPERACILLIN AND TAZOBACTAM 4.5 G: 4; .5 INJECTION, POWDER, LYOPHILIZED, FOR SOLUTION INTRAVENOUS; PARENTERAL at 09:12

## 2021-12-27 RX ADMIN — ONDANSETRON 4 MG: 2 INJECTION INTRAMUSCULAR; INTRAVENOUS at 05:12

## 2021-12-27 RX ADMIN — OXYCODONE 10 MG: 5 TABLET ORAL at 11:12

## 2021-12-27 RX ADMIN — IBUPROFEN 600 MG: 600 TABLET, FILM COATED ORAL at 10:12

## 2021-12-27 RX ADMIN — SODIUM CHLORIDE 1000 ML: 0.9 INJECTION, SOLUTION INTRAVENOUS at 09:12

## 2021-12-27 RX ADMIN — MORPHINE SULFATE 4 MG: 4 INJECTION INTRAVENOUS at 08:12

## 2021-12-27 RX ADMIN — SODIUM CHLORIDE 500 ML: 0.9 INJECTION, SOLUTION INTRAVENOUS at 05:12

## 2021-12-27 RX ADMIN — ONDANSETRON 8 MG: 8 TABLET, ORALLY DISINTEGRATING ORAL at 11:12

## 2021-12-27 RX ADMIN — ONDANSETRON 4 MG: 2 INJECTION INTRAMUSCULAR; INTRAVENOUS at 08:12

## 2021-12-27 NOTE — FIRST PROVIDER EVALUATION
Medical screening exam completed.  I have conducted a focused provider triage encounter, findings are as follows:    Brief history of present illness:  Epigastric pain, vomiting for 3 days.      There were no vitals filed for this visit.    Pertinent physical exam:  Holding emesis bag    Preliminary workup initiated; this workup will be continued and followed by the physician or advanced practice provider that is assigned to the patient when roomed.

## 2021-12-27 NOTE — Clinical Note
The right neck was prepped. The site was prepped with ChloraPrep. The site was clipped. The patient was draped. The patient was positioned supine.

## 2021-12-28 ENCOUNTER — ANESTHESIA (OUTPATIENT)
Dept: SURGERY | Facility: HOSPITAL | Age: 71
DRG: 870 | End: 2021-12-28
Payer: MEDICAID

## 2021-12-28 ENCOUNTER — ANESTHESIA EVENT (OUTPATIENT)
Dept: SURGERY | Facility: HOSPITAL | Age: 71
DRG: 870 | End: 2021-12-28
Payer: MEDICAID

## 2021-12-28 PROBLEM — J96.01 ACUTE HYPOXEMIC RESPIRATORY FAILURE: Status: ACTIVE | Noted: 2021-12-28

## 2021-12-28 PROBLEM — R78.81 BACTEREMIA DUE TO GRAM-NEGATIVE BACTERIA: Status: ACTIVE | Noted: 2021-12-28

## 2021-12-28 PROBLEM — A41.9 SEPTIC SHOCK: Status: ACTIVE | Noted: 2021-12-28

## 2021-12-28 PROBLEM — R65.21 SEPTIC SHOCK: Status: ACTIVE | Noted: 2021-12-28

## 2021-12-28 PROBLEM — E87.8 ELECTROLYTE IMBALANCE: Status: ACTIVE | Noted: 2021-12-28

## 2021-12-28 LAB
ALBUMIN SERPL BCP-MCNC: 3.3 G/DL (ref 3.5–5.2)
ALLENS TEST: ABNORMAL
ALP SERPL-CCNC: 170 U/L (ref 55–135)
ALT SERPL W/O P-5'-P-CCNC: 221 U/L (ref 10–44)
ANION GAP SERPL CALC-SCNC: 11 MMOL/L (ref 8–16)
AST SERPL-CCNC: 156 U/L (ref 10–40)
BASOPHILS # BLD AUTO: 0.02 K/UL (ref 0–0.2)
BASOPHILS NFR BLD: 0.2 % (ref 0–1.9)
BILIRUB SERPL-MCNC: 7.1 MG/DL (ref 0.1–1)
BUN SERPL-MCNC: 22 MG/DL (ref 8–23)
CALCIUM SERPL-MCNC: 8.4 MG/DL (ref 8.7–10.5)
CHLORIDE SERPL-SCNC: 99 MMOL/L (ref 95–110)
CO2 SERPL-SCNC: 23 MMOL/L (ref 23–29)
CREAT SERPL-MCNC: 0.8 MG/DL (ref 0.5–1.4)
DELSYS: ABNORMAL
DIFFERENTIAL METHOD: ABNORMAL
EOSINOPHIL # BLD AUTO: 0 K/UL (ref 0–0.5)
EOSINOPHIL NFR BLD: 0 % (ref 0–8)
ERYTHROCYTE [DISTWIDTH] IN BLOOD BY AUTOMATED COUNT: 14 % (ref 11.5–14.5)
ERYTHROCYTE [SEDIMENTATION RATE] IN BLOOD BY WESTERGREN METHOD: 20 MM/H
EST. GFR  (AFRICAN AMERICAN): >60 ML/MIN/1.73 M^2
EST. GFR  (NON AFRICAN AMERICAN): >60 ML/MIN/1.73 M^2
FIO2: 50
GLUCOSE SERPL-MCNC: 145 MG/DL (ref 70–110)
HCO3 UR-SCNC: 22.6 MMOL/L (ref 24–28)
HCT VFR BLD AUTO: 40.9 % (ref 40–54)
HGB BLD-MCNC: 14 G/DL (ref 14–18)
IMM GRANULOCYTES # BLD AUTO: 0.03 K/UL (ref 0–0.04)
IMM GRANULOCYTES NFR BLD AUTO: 0.3 % (ref 0–0.5)
INR PPP: 1 (ref 0.8–1.2)
LYMPHOCYTES # BLD AUTO: 0.4 K/UL (ref 1–4.8)
LYMPHOCYTES NFR BLD: 3.9 % (ref 18–48)
MAGNESIUM SERPL-MCNC: 1.5 MG/DL (ref 1.6–2.6)
MCH RBC QN AUTO: 30.8 PG (ref 27–31)
MCHC RBC AUTO-ENTMCNC: 34.2 G/DL (ref 32–36)
MCV RBC AUTO: 90 FL (ref 82–98)
MODE: ABNORMAL
MONOCYTES # BLD AUTO: 0.8 K/UL (ref 0.3–1)
MONOCYTES NFR BLD: 7.5 % (ref 4–15)
NEUTROPHILS # BLD AUTO: 9 K/UL (ref 1.8–7.7)
NEUTROPHILS NFR BLD: 88.1 % (ref 38–73)
NRBC BLD-RTO: 0 /100 WBC
PCO2 BLDA: 44.8 MMHG (ref 35–45)
PEEP: 5
PH SMN: 7.31 [PH] (ref 7.35–7.45)
PLATELET # BLD AUTO: 100 K/UL (ref 150–450)
PMV BLD AUTO: 10.4 FL (ref 9.2–12.9)
PO2 BLDA: 66 MMHG (ref 80–100)
POC BE: -4 MMOL/L
POC SATURATED O2: 91 % (ref 95–100)
POCT GLUCOSE: 131 MG/DL (ref 70–110)
POCT GLUCOSE: 247 MG/DL (ref 70–110)
POTASSIUM SERPL-SCNC: 3.2 MMOL/L (ref 3.5–5.1)
PROT SERPL-MCNC: 6.6 G/DL (ref 6–8.4)
PROTHROMBIN TIME: 11.5 SEC (ref 9–12.5)
RBC # BLD AUTO: 4.55 M/UL (ref 4.6–6.2)
SAMPLE: ABNORMAL
SITE: ABNORMAL
SODIUM SERPL-SCNC: 133 MMOL/L (ref 136–145)
VT: 400
WBC # BLD AUTO: 10.16 K/UL (ref 3.9–12.7)

## 2021-12-28 PROCEDURE — 80053 COMPREHEN METABOLIC PANEL: CPT | Performed by: EMERGENCY MEDICINE

## 2021-12-28 PROCEDURE — 36620 INSERTION CATHETER ARTERY: CPT | Mod: 59,,, | Performed by: NURSE PRACTITIONER

## 2021-12-28 PROCEDURE — 36556 PR INSERT NON-TUNNEL CV CATH 5+ YRS OLD: ICD-10-PCS | Mod: 59,,, | Performed by: NURSE PRACTITIONER

## 2021-12-28 PROCEDURE — 25000003 PHARM REV CODE 250: Performed by: INTERNAL MEDICINE

## 2021-12-28 PROCEDURE — 51702 INSERT TEMP BLADDER CATH: CPT

## 2021-12-28 PROCEDURE — 82803 BLOOD GASES ANY COMBINATION: CPT

## 2021-12-28 PROCEDURE — 25000242 PHARM REV CODE 250 ALT 637 W/ HCPCS: Performed by: SURGERY

## 2021-12-28 PROCEDURE — 43264 ERCP REMOVE DUCT CALCULI: CPT | Performed by: INTERNAL MEDICINE

## 2021-12-28 PROCEDURE — 36620 PR INSERT CATH,ART,PERCUT,SHORTTERM: ICD-10-PCS | Mod: 59,,, | Performed by: NURSE PRACTITIONER

## 2021-12-28 PROCEDURE — 99291 CRITICAL CARE FIRST HOUR: CPT | Mod: 25,,, | Performed by: NURSE PRACTITIONER

## 2021-12-28 PROCEDURE — 27000221 HC OXYGEN, UP TO 24 HOURS

## 2021-12-28 PROCEDURE — 27100108

## 2021-12-28 PROCEDURE — 83735 ASSAY OF MAGNESIUM: CPT | Performed by: EMERGENCY MEDICINE

## 2021-12-28 PROCEDURE — 63600175 PHARM REV CODE 636 W HCPCS: Performed by: INTERNAL MEDICINE

## 2021-12-28 PROCEDURE — 20000000 HC ICU ROOM

## 2021-12-28 PROCEDURE — 43264 ERCP REMOVE DUCT CALCULI: CPT | Mod: 51,,, | Performed by: INTERNAL MEDICINE

## 2021-12-28 PROCEDURE — 94761 N-INVAS EAR/PLS OXIMETRY MLT: CPT

## 2021-12-28 PROCEDURE — 31500 PR INSERT, EMERGENCY ENDOTRACH AIRWAY: ICD-10-PCS | Mod: ,,, | Performed by: NURSE PRACTITIONER

## 2021-12-28 PROCEDURE — 43752 NASAL/OROGASTRIC W/TUBE PLMT: CPT

## 2021-12-28 PROCEDURE — 36415 COLL VENOUS BLD VENIPUNCTURE: CPT | Performed by: EMERGENCY MEDICINE

## 2021-12-28 PROCEDURE — 99900035 HC TECH TIME PER 15 MIN (STAT)

## 2021-12-28 PROCEDURE — 36556 INSERT NON-TUNNEL CV CATH: CPT | Mod: 59,,, | Performed by: NURSE PRACTITIONER

## 2021-12-28 PROCEDURE — 37000008 HC ANESTHESIA 1ST 15 MINUTES: Performed by: INTERNAL MEDICINE

## 2021-12-28 PROCEDURE — 25000003 PHARM REV CODE 250: Performed by: NURSE PRACTITIONER

## 2021-12-28 PROCEDURE — 74328 X-RAY BILE DUCT ENDOSCOPY: CPT | Mod: 26,,, | Performed by: INTERNAL MEDICINE

## 2021-12-28 PROCEDURE — 25000003 PHARM REV CODE 250: Performed by: STUDENT IN AN ORGANIZED HEALTH CARE EDUCATION/TRAINING PROGRAM

## 2021-12-28 PROCEDURE — 27201674 HC SPHINCTERTOME: Performed by: INTERNAL MEDICINE

## 2021-12-28 PROCEDURE — 25000003 PHARM REV CODE 250: Performed by: EMERGENCY MEDICINE

## 2021-12-28 PROCEDURE — 25000003 PHARM REV CODE 250

## 2021-12-28 PROCEDURE — 87081 CULTURE SCREEN ONLY: CPT | Performed by: INTERNAL MEDICINE

## 2021-12-28 PROCEDURE — 99292 CRITICAL CARE ADDL 30 MIN: CPT | Mod: 25,,, | Performed by: NURSE PRACTITIONER

## 2021-12-28 PROCEDURE — 85025 COMPLETE CBC W/AUTO DIFF WBC: CPT | Performed by: EMERGENCY MEDICINE

## 2021-12-28 PROCEDURE — 43274 PR ERCP W/STENT PLCMNT BILIARY/PANCREATIC DUCT: ICD-10-PCS | Mod: ,,, | Performed by: INTERNAL MEDICINE

## 2021-12-28 PROCEDURE — 43274 ERCP DUCT STENT PLACEMENT: CPT | Mod: ,,, | Performed by: INTERNAL MEDICINE

## 2021-12-28 PROCEDURE — C9399 UNCLASSIFIED DRUGS OR BIOLOG: HCPCS

## 2021-12-28 PROCEDURE — 99292 PR CRITICAL CARE, ADDL 30 MIN: ICD-10-PCS | Mod: 25,,, | Performed by: NURSE PRACTITIONER

## 2021-12-28 PROCEDURE — 74328 PR  X-RAY FOR BILE DUCT ENDOSCOPY: ICD-10-PCS | Mod: 26,,, | Performed by: INTERNAL MEDICINE

## 2021-12-28 PROCEDURE — 25000242 PHARM REV CODE 250 ALT 637 W/ HCPCS: Performed by: NURSE PRACTITIONER

## 2021-12-28 PROCEDURE — 63600175 PHARM REV CODE 636 W HCPCS

## 2021-12-28 PROCEDURE — 36556 INSERT NON-TUNNEL CV CATH: CPT

## 2021-12-28 PROCEDURE — 63600175 PHARM REV CODE 636 W HCPCS: Performed by: NURSE PRACTITIONER

## 2021-12-28 PROCEDURE — 87205 SMEAR GRAM STAIN: CPT | Performed by: NURSE PRACTITIONER

## 2021-12-28 PROCEDURE — 99223 1ST HOSP IP/OBS HIGH 75: CPT | Mod: 25,,, | Performed by: INTERNAL MEDICINE

## 2021-12-28 PROCEDURE — 27202125 HC BALLOON, EXTRACTION (ANY): Performed by: INTERNAL MEDICINE

## 2021-12-28 PROCEDURE — 99291 PR CRITICAL CARE, E/M 30-74 MINUTES: ICD-10-PCS | Mod: 25,,, | Performed by: NURSE PRACTITIONER

## 2021-12-28 PROCEDURE — 85610 PROTHROMBIN TIME: CPT | Performed by: EMERGENCY MEDICINE

## 2021-12-28 PROCEDURE — 27200966 HC CLOSED SUCTION SYSTEM

## 2021-12-28 PROCEDURE — 37799 UNLISTED PX VASCULAR SURGERY: CPT

## 2021-12-28 PROCEDURE — 94002 VENT MGMT INPAT INIT DAY: CPT

## 2021-12-28 PROCEDURE — 43264 PR ERCP,W/REMOVAL STONE,BIL/PANCR DUCTS: ICD-10-PCS | Mod: 51,,, | Performed by: INTERNAL MEDICINE

## 2021-12-28 PROCEDURE — 43274 ERCP DUCT STENT PLACEMENT: CPT | Performed by: INTERNAL MEDICINE

## 2021-12-28 PROCEDURE — 94640 AIRWAY INHALATION TREATMENT: CPT

## 2021-12-28 PROCEDURE — 37000009 HC ANESTHESIA EA ADD 15 MINS: Performed by: INTERNAL MEDICINE

## 2021-12-28 PROCEDURE — 99223 PR INITIAL HOSPITAL CARE,LEVL III: ICD-10-PCS | Mod: 25,,, | Performed by: INTERNAL MEDICINE

## 2021-12-28 PROCEDURE — 94660 CPAP INITIATION&MGMT: CPT

## 2021-12-28 PROCEDURE — C2617 STENT, NON-COR, TEM W/O DEL: HCPCS | Performed by: INTERNAL MEDICINE

## 2021-12-28 PROCEDURE — 31500 INSERT EMERGENCY AIRWAY: CPT | Mod: ,,, | Performed by: NURSE PRACTITIONER

## 2021-12-28 PROCEDURE — 36620 INSERTION CATHETER ARTERY: CPT

## 2021-12-28 PROCEDURE — 87070 CULTURE OTHR SPECIMN AEROBIC: CPT | Performed by: NURSE PRACTITIONER

## 2021-12-28 PROCEDURE — 27000190 HC CPAP FULL FACE MASK W/VALVE

## 2021-12-28 PROCEDURE — C1769 GUIDE WIRE: HCPCS | Performed by: INTERNAL MEDICINE

## 2021-12-28 PROCEDURE — 27202127 HC STENT INTRODUCER: Performed by: INTERNAL MEDICINE

## 2021-12-28 RX ORDER — ARFORMOTEROL TARTRATE 15 UG/2ML
15 SOLUTION RESPIRATORY (INHALATION) 2 TIMES DAILY
Status: DISCONTINUED | OUTPATIENT
Start: 2021-12-28 | End: 2022-01-11 | Stop reason: HOSPADM

## 2021-12-28 RX ORDER — BUDESONIDE 0.5 MG/2ML
0.5 INHALANT ORAL EVERY 12 HOURS
Status: DISCONTINUED | OUTPATIENT
Start: 2021-12-28 | End: 2022-01-11 | Stop reason: HOSPADM

## 2021-12-28 RX ORDER — IPRATROPIUM BROMIDE AND ALBUTEROL SULFATE 2.5; .5 MG/3ML; MG/3ML
3 SOLUTION RESPIRATORY (INHALATION) EVERY 4 HOURS PRN
Status: DISCONTINUED | OUTPATIENT
Start: 2021-12-28 | End: 2022-01-11 | Stop reason: HOSPADM

## 2021-12-28 RX ORDER — DEXMEDETOMIDINE HYDROCHLORIDE 4 UG/ML
INJECTION INTRAVENOUS
Status: COMPLETED
Start: 2021-12-28 | End: 2021-12-28

## 2021-12-28 RX ORDER — SUCCINYLCHOLINE CHLORIDE 20 MG/ML
100 INJECTION INTRAMUSCULAR; INTRAVENOUS ONCE
Status: COMPLETED | OUTPATIENT
Start: 2021-12-28 | End: 2021-12-28

## 2021-12-28 RX ORDER — MAGNESIUM SULFATE HEPTAHYDRATE 40 MG/ML
2 INJECTION, SOLUTION INTRAVENOUS ONCE
Status: COMPLETED | OUTPATIENT
Start: 2021-12-28 | End: 2021-12-28

## 2021-12-28 RX ORDER — POTASSIUM CHLORIDE 7.45 MG/ML
10 INJECTION INTRAVENOUS
Status: DISCONTINUED | OUTPATIENT
Start: 2021-12-28 | End: 2021-12-28

## 2021-12-28 RX ORDER — MUPIROCIN 20 MG/G
OINTMENT TOPICAL 2 TIMES DAILY
Status: COMPLETED | OUTPATIENT
Start: 2021-12-28 | End: 2022-01-01

## 2021-12-28 RX ORDER — ETOMIDATE 2 MG/ML
INJECTION INTRAVENOUS
Status: COMPLETED
Start: 2021-12-28 | End: 2021-12-28

## 2021-12-28 RX ORDER — CHLORHEXIDINE GLUCONATE ORAL RINSE 1.2 MG/ML
15 SOLUTION DENTAL 2 TIMES DAILY
Status: DISCONTINUED | OUTPATIENT
Start: 2021-12-28 | End: 2022-01-03

## 2021-12-28 RX ORDER — FAMOTIDINE 10 MG/ML
20 INJECTION INTRAVENOUS 2 TIMES DAILY
Status: DISCONTINUED | OUTPATIENT
Start: 2021-12-28 | End: 2021-12-29

## 2021-12-28 RX ORDER — SUCCINYLCHOLINE CHLORIDE 20 MG/ML
INJECTION INTRAMUSCULAR; INTRAVENOUS
Status: COMPLETED
Start: 2021-12-28 | End: 2021-12-28

## 2021-12-28 RX ORDER — INSULIN ASPART 100 [IU]/ML
1-10 INJECTION, SOLUTION INTRAVENOUS; SUBCUTANEOUS EVERY 6 HOURS PRN
Status: DISCONTINUED | OUTPATIENT
Start: 2021-12-28 | End: 2021-12-30

## 2021-12-28 RX ORDER — GLUCAGON 1 MG
1 KIT INJECTION
Status: DISCONTINUED | OUTPATIENT
Start: 2021-12-28 | End: 2021-12-30

## 2021-12-28 RX ORDER — FENTANYL CITRATE-0.9 % NACL/PF 10 MCG/ML
0-250 PLASTIC BAG, INJECTION (ML) INTRAVENOUS CONTINUOUS
Status: DISCONTINUED | OUTPATIENT
Start: 2021-12-28 | End: 2022-01-01

## 2021-12-28 RX ORDER — POTASSIUM CHLORIDE 29.8 MG/ML
40 INJECTION INTRAVENOUS ONCE
Status: COMPLETED | OUTPATIENT
Start: 2021-12-28 | End: 2021-12-28

## 2021-12-28 RX ORDER — ACETAMINOPHEN 650 MG/20.3ML
650 LIQUID ORAL EVERY 6 HOURS PRN
Status: DISCONTINUED | OUTPATIENT
Start: 2021-12-28 | End: 2022-01-04

## 2021-12-28 RX ORDER — DEXMEDETOMIDINE HYDROCHLORIDE 4 UG/ML
0-1.4 INJECTION INTRAVENOUS CONTINUOUS
Status: DISCONTINUED | OUTPATIENT
Start: 2021-12-28 | End: 2021-12-30

## 2021-12-28 RX ORDER — ETOMIDATE 2 MG/ML
20 INJECTION INTRAVENOUS ONCE
Status: COMPLETED | OUTPATIENT
Start: 2021-12-28 | End: 2021-12-28

## 2021-12-28 RX ORDER — NOREPINEPHRINE BITARTRATE/D5W 4MG/250ML
0-3 PLASTIC BAG, INJECTION (ML) INTRAVENOUS CONTINUOUS
Status: DISCONTINUED | OUTPATIENT
Start: 2021-12-28 | End: 2021-12-28

## 2021-12-28 RX ORDER — LORAZEPAM 2 MG/ML
2 INJECTION INTRAMUSCULAR
Status: DISCONTINUED | OUTPATIENT
Start: 2021-12-28 | End: 2022-01-01

## 2021-12-28 RX ADMIN — POTASSIUM CHLORIDE 40 MEQ: 29.8 INJECTION, SOLUTION INTRAVENOUS at 12:12

## 2021-12-28 RX ADMIN — SODIUM CHLORIDE: 0.9 INJECTION, SOLUTION INTRAVENOUS at 12:12

## 2021-12-28 RX ADMIN — PIPERACILLIN SODIUM AND TAZOBACTAM SODIUM 4.5 G: 4; .5 INJECTION, POWDER, FOR SOLUTION INTRAVENOUS at 05:12

## 2021-12-28 RX ADMIN — BUDESONIDE 0.5 MG: 0.5 INHALANT ORAL at 08:12

## 2021-12-28 RX ADMIN — MAGNESIUM SULFATE HEPTAHYDRATE 2 G: 2 INJECTION, SOLUTION INTRAVENOUS at 02:12

## 2021-12-28 RX ADMIN — NOREPINEPHRINE BITARTRATE 0.7 MCG/KG/MIN: 1 INJECTION, SOLUTION, CONCENTRATE INTRAVENOUS at 05:12

## 2021-12-28 RX ADMIN — Medication 0.04 MCG/KG/MIN: at 11:12

## 2021-12-28 RX ADMIN — PIPERACILLIN SODIUM AND TAZOBACTAM SODIUM 4.5 G: 4; .5 INJECTION, POWDER, FOR SOLUTION INTRAVENOUS at 09:12

## 2021-12-28 RX ADMIN — NOREPINEPHRINE BITARTRATE 0.94 MCG/KG/MIN: 1 INJECTION, SOLUTION, CONCENTRATE INTRAVENOUS at 02:12

## 2021-12-28 RX ADMIN — FAMOTIDINE 20 MG: 10 INJECTION, SOLUTION INTRAVENOUS at 09:12

## 2021-12-28 RX ADMIN — Medication 0.8 MCG/KG/MIN: at 12:12

## 2021-12-28 RX ADMIN — ARFORMOTEROL TARTRATE 15 MCG: 15 SOLUTION RESPIRATORY (INHALATION) at 08:12

## 2021-12-28 RX ADMIN — NOREPINEPHRINE BITARTRATE 0.94 MCG/KG/MIN: 1 INJECTION, SOLUTION, CONCENTRATE INTRAVENOUS at 04:12

## 2021-12-28 RX ADMIN — NOREPINEPHRINE BITARTRATE 0.8 MCG/KG/MIN: 1 INJECTION, SOLUTION, CONCENTRATE INTRAVENOUS at 12:12

## 2021-12-28 RX ADMIN — IPRATROPIUM BROMIDE AND ALBUTEROL SULFATE 3 ML: .5; 3 SOLUTION RESPIRATORY (INHALATION) at 08:12

## 2021-12-28 RX ADMIN — LORAZEPAM 2 MG: 2 INJECTION INTRAMUSCULAR; INTRAVENOUS at 08:12

## 2021-12-28 RX ADMIN — SUCCINYLCHOLINE CHLORIDE 100 MG: 20 INJECTION INTRAMUSCULAR; INTRAVENOUS at 08:12

## 2021-12-28 RX ADMIN — PIPERACILLIN SODIUM AND TAZOBACTAM SODIUM 4.5 G: 4; .5 INJECTION, POWDER, FOR SOLUTION INTRAVENOUS at 01:12

## 2021-12-28 RX ADMIN — LORAZEPAM 2 MG: 2 INJECTION INTRAMUSCULAR; INTRAVENOUS at 05:12

## 2021-12-28 RX ADMIN — NOREPINEPHRINE BITARTRATE 1.4 MCG/KG/MIN: 1 INJECTION, SOLUTION, CONCENTRATE INTRAVENOUS at 11:12

## 2021-12-28 RX ADMIN — ETOMIDATE 20 MG: 2 INJECTION INTRAVENOUS at 08:12

## 2021-12-28 RX ADMIN — MUPIROCIN: 20 OINTMENT TOPICAL at 09:12

## 2021-12-28 RX ADMIN — NOREPINEPHRINE BITARTRATE 0.95 MCG/KG/MIN: 1 INJECTION, SOLUTION, CONCENTRATE INTRAVENOUS at 04:12

## 2021-12-28 RX ADMIN — DEXMEDETOMIDINE HYDROCHLORIDE 0.2 MCG/KG/HR: 4 INJECTION INTRAVENOUS at 08:12

## 2021-12-28 RX ADMIN — INSULIN ASPART 4 UNITS: 100 INJECTION, SOLUTION INTRAVENOUS; SUBCUTANEOUS at 06:12

## 2021-12-28 RX ADMIN — IPRATROPIUM BROMIDE AND ALBUTEROL SULFATE 3 ML: .5; 3 SOLUTION RESPIRATORY (INHALATION) at 07:12

## 2021-12-28 RX ADMIN — CHLORHEXIDINE GLUCONATE 0.12% ORAL RINSE 15 ML: 1.2 LIQUID ORAL at 09:12

## 2021-12-28 RX ADMIN — METOPROLOL TARTRATE 50 MG: 50 TABLET, FILM COATED ORAL at 09:12

## 2021-12-28 RX ADMIN — FOLIC ACID: 5 INJECTION, SOLUTION INTRAMUSCULAR; INTRAVENOUS; SUBCUTANEOUS at 04:12

## 2021-12-28 RX ADMIN — SODIUM CHLORIDE 500 ML: 0.9 INJECTION, SOLUTION INTRAVENOUS at 09:12

## 2021-12-28 RX ADMIN — VANCOMYCIN HYDROCHLORIDE 2000 MG: 10 INJECTION, POWDER, LYOPHILIZED, FOR SOLUTION INTRAVENOUS at 11:12

## 2021-12-28 RX ADMIN — Medication 250 MCG/HR: at 08:12

## 2021-12-28 RX ADMIN — SODIUM CHLORIDE: 0.9 INJECTION, SOLUTION INTRAVENOUS at 11:12

## 2021-12-28 RX ADMIN — SODIUM CHLORIDE: 0.9 INJECTION, SOLUTION INTRAVENOUS at 09:12

## 2021-12-28 RX ADMIN — ACETAMINOPHEN 650 MG: 160 SOLUTION ORAL at 09:12

## 2021-12-28 NOTE — HPI
Mr. Alas is a 71 year old male with PA not on anticoagulation, HLD, HTN, thrombocytopenia, alcohol abuse, and tobacco consulted for cholangitis.     He was admitted to the ER with intermitted abdominal pain for the last 3 days and fever of 102 F at home. Other symptoms include nausea and emesis. Denied any diarrhea, constipation, SOB, weakness, fever, chills, and all other sxs at this time.    Lab testing showed , , alk phos 184, T bili 5.1, lipase 11, glucose 180, plt 119, sodium 134, COVID negative. Abdominal US showed gallbladder distension with internal sludge but without definite stones, intra and extrahepatic biliary ductal dilation which may represent acute cholecystitis or choledocholithiasis. Normal WBC. Blood cultures obtained in the ED, and 1.5 L IV fluids and IV Zosyn given.     Plan for ERCP today but patient decompensated on the floor and sent to ICU. Currently on Bipap and tachycardic with .

## 2021-12-28 NOTE — PROGRESS NOTES
[facility] - Intensive Care [facility] Medicine  Progress Note    Patient Name: Lizz Alas  MRN: [id number]  Patient Class: IP- Inpatient   Admission Date: 12/27/2021  Length of Stay: 0 days  Attending Physician: Nitesh Lopez MD  Primary Care Provider: Primary Doctor No        Subjective:     Principal Problem:Septic shock        HPI:  Lizz Alas is a 71 y.o. Serbian speaking male patient with a PMHx of PAF, HLD, HTN, thrombocytopenia, alcohol abuse, and tobacco use who presents to the Emergency Department for intermittent epigastric abdominal pain which onset gradually 3 days PTA. Today, the pt reports that his pain has been constant. His symptoms are constant and moderate in severity. No mitigating or exacerbating factors reported. Associated sxs include fever (102 F), nausea, and vomiting. Patient denies any diarrhea, constipation, SOB, weakness, fever, chills, and all other sxs at this time. Initial work-up in the ED showed: Normal WBC and lactic, , , alk phos 184, T bili 5.1, lipase 11, glucose 180, plt 119, sodium 134, COVID negative. Abdominal US showed gallbladder distension with internal sludge but without definite stones, intra and extrahepatic biliary ductal dilation which may represent acute cholecystitis or choledocholithiasis. Blood cultures obtained in the ED, and 1.5 L IV fluids and IV Zosyn given. Case was discussed with GI per the ED, who recommend NPO, IV abx, and ERCP in AM. Hospital Medicine was contacted for admission and patient placed in Observation.       Overview/Hospital Course:  Patient 70 yo male admitted to hospital with suspected cholangitis. Patient initiated on IV abx, fluids, O2. Patient with a rapid decompensation shourtly after admit, rapid response called, patient hypoxic in the 60's placed on bipap with good effect. Patient transferred to ICU. Patient with a progressive decline, febrile episodes to 102.9. Severe septic shock, pressors and fluids on  board. Patient evaluated by GI who recommended ERCP - additional CT abdomen. Patient scheduled for CT and this has been delayed pending stability. Patient intubated for airway protection.      Interval History: Rapid decompensation Severe Septic Shock requiring ICU, Pressors, Intubation. ECP planned    Review of Systems   Unable to perform ROS: Intubated     Objective:     Vital Signs (Most Recent):  Temp: 99 °F (37.2 °C) (12/28/21 1129)  Pulse: 97 (12/28/21 1505)  Resp: (!) 28 (12/28/21 1505)  BP: 110/72 (12/28/21 1505)  SpO2: 96 % (12/28/21 1505) Vital Signs (24h Range):  Temp:  [97.7 °F (36.5 °C)-102.9 °F (39.4 °C)] 99 °F (37.2 °C)  Pulse:  [] 97  Resp:  [16-45] 28  SpO2:  [78 %-100 %] 96 %  BP: ()/(43-87) 110/72  Arterial Line BP: ()/(37-54) 122/54     Weight: 88 kg (194 lb)  Body mass index is 31.31 kg/m².    Intake/Output Summary (Last 24 hours) at 12/28/2021 1522  Last data filed at 12/28/2021 1223  Gross per 24 hour   Intake 1320.81 ml   Output 325 ml   Net 995.81 ml      Physical Exam  Vitals and nursing note reviewed.   Constitutional:       Appearance: He is well-developed and well-nourished. He is ill-appearing.      Interventions: He is sedated and intubated.       HENT:      Head: Normocephalic and atraumatic.   Eyes:      Extraocular Movements: EOM normal.      Pupils: Pupils are equal, round, and reactive to light.   Cardiovascular:      Rate and Rhythm: Normal rate and regular rhythm.      Pulses: Intact distal pulses.      Heart sounds: Normal heart sounds.   Pulmonary:      Effort: Tachypnea and accessory muscle usage present. No respiratory distress. He is intubated.      Breath sounds: Normal breath sounds. Decreased air movement present. No wheezing.   Abdominal:      General: Bowel sounds are normal. There is no distension.      Palpations: Abdomen is soft. There is no mass.      Tenderness: There is abdominal tenderness in the right upper quadrant.   Musculoskeletal:          General: No deformity. Normal range of motion.      Cervical back: Normal range of motion and neck supple.   Skin:     General: Skin is warm and dry.   Neurological:      Mental Status: He is alert and oriented to person, place, and time.      Deep Tendon Reflexes: Reflexes are normal and symmetric.   Psychiatric:         Mood and Affect: Mood and affect normal.         Behavior: Behavior normal.         Thought Content: Thought content normal.         Judgment: Judgment normal.         Significant Labs:   All pertinent labs within the past 24 hours have been reviewed.  ABGs:   Recent Labs   Lab 12/28/21  1505   PH 7.311*   PCO2 44.8   HCO3 22.6*   POCSATURATED 91*   BE -4   PO2 66*     BMP:   Recent Labs   Lab 12/28/21  0550   *   *   K 3.2*   CL 99   CO2 23   BUN 22   CREATININE 0.8   CALCIUM 8.4*   MG 1.5*     CBC:   Recent Labs   Lab 12/27/21  1754 12/28/21  0550   WBC 8.18 10.16   HGB 15.4 14.0   HCT 45.6 40.9   * 100*     CMP:   Recent Labs   Lab 12/27/21  1754 12/28/21  0550   * 133*   K 3.9 3.2*   CL 94* 99   CO2 25 23   * 145*   BUN 22 22   CREATININE 0.8 0.8   CALCIUM 9.5 8.4*   PROT 8.2 6.6   ALBUMIN 4.1 3.3*   BILITOT 5.1* 7.1*   ALKPHOS 184* 170*   * 156*   * 221*   ANIONGAP 15 11   EGFRNONAA >60 >60     Lactic Acid:   Recent Labs   Lab 12/27/21  2109   LACTATE 1.5     Urine Studies:   Recent Labs   Lab 12/27/21 2002   COLORU Yellow   APPEARANCEUA Clear   PHUR 6.0   SPECGRAV >=1.030*   PROTEINUA Trace*   GLUCUA Negative   KETONESU 2+*   BILIRUBINUA 2+*   OCCULTUA Trace*   NITRITE Negative   UROBILINOGEN 4.0-6.0*   LEUKOCYTESUR Negative       Significant Imaging: I have reviewed all pertinent imaging results/findings within the past 24 hours.      Assessment/Plan:      * Septic shock  Pressors  Fluids  ABX  ICU  Pulmonary CC  Intubated  Sedated      Bacteremia due to Gram-negative bacteria  ABX  ICU  Follow Blood Cultures  Pressors as  indicated      Electrolyte imbalance  Correct as indicated        Acute hypoxemic respiratory failure  Patient with Hypoxic Respiratory failure which is Acute.  he is not on home oxygen. Supplemental oxygen was provided and noted- Vent Mode: A/C  Oxygen Concentration (%):  [40-50] 50  Resp Rate Total:  [0 br/min-33 br/min] 26 br/min  Vt Set:  [400 mL] 400 mL  PEEP/CPAP:  [5 cmH20] 5 cmH20  Pressure Support:  [0 cmH20] 0 cmH20  Mean Airway Pressure:  [0 apY07-57 cmH20] 12 cmH20.   Signs/symptoms of respiratory failure include- tachypnea, increased work of breathing, respiratory distress, use of accessory muscles and cyanosis. Contributing diagnoses includes - COPD Labs and images were reviewed. Patient Has recent ABG, which has been reviewed. Will treat underlying causes and adjust management of respiratory failure as indicated    Alcohol abuse  - Monitor for withdrawal/DTs. IV Ativan if needed.     Tobacco use  - Assistance with smoking cessation was offered, including:  [x]  Medications  [x]  Counseling  [x]  Printed Information on Smoking Cessation  []  Referral to a Smoking Cessation Program  - Patient was counseled regarding smoking for 3-10 minutes.    Primary hypertension  - Continue home antihypertensives.     Paroxysmal atrial fibrillation  - Remains in sinus rhythm on admission. Monitor telemetry.  - Continue home BB.  - Patient is not on long-term AC. Will defer to his primary cardiologist.     Acute cholangitis  - Abdominal US showed: Gallbladder distension with internal sludge but without definite stones. Intra and extrahepatic biliary ductal dilation. Acute cholecystitis or choledocholithiasis not excluded.  - GI consult. Plans for ERCP in AM. Will keep NPO.  - Blood cultures obtained in the ED. Continue empiric IV Zosyn.   - IV hydration.  - Analgesics and antiemetics as needed.   - Follow labs.     12/28  ERCP planned  Await intervention per GI  Now Intubated      VTE Risk Mitigation (From admission,  onward)         Ordered     IP VTE HIGH RISK PATIENT  Once         12/27/21 2247     Place sequential compression device  Until discontinued         12/27/21 2247     Reason for No Pharmacological VTE Prophylaxis  Once        Question Answer Comment   Reasons: Risk of Bleeding    Reasons: Thrombocytopenia        12/27/21 2247                Discharge Planning   FLAQUITA:      Code Status: Full Code   Is the patient medically ready for discharge?:     Reason for patient still in hospital (select all that apply): Patient trending condition, Consult recommendations and Pending disposition  Discharge Plan A: Home with family            Critical care time spent on the evaluation and treatment of severe organ dysfunction, review of pertinent labs and imaging studies, discussions with consulting providers and discussions with patient/family: 79 minutes.      Nitesh Lopez MD  Department of Hospital Medicine   [facility] - Intensive Care ([facility])

## 2021-12-28 NOTE — PROVATION PATIENT INSTRUCTIONS
Discharge Summary/Instructions after an Endoscopic Procedure  Patient Name: Lizz Alas  Patient MRN: [id number]  Patient YOB: 1950  Tuesday, December 28, 2021 Melchor Sarmiento MD  Dear patient,  As a result of recent federal legislation (The Federal Cures Act), you may   receive lab or pathology results from your procedure in your [facility]   account before your physician is able to contact you. Your physician or   their representative will relay the results to you with their   recommendations at their soonest availability.  Thank you,  RESTRICTIONS:  During your procedure today, you received medications for sedation.  These   medications may affect your judgment, balance and coordination.  Therefore,   for 24 hours, you have the following restrictions:   - DO NOT drive a car, operate machinery, make legal/financial decisions,   sign important papers or drink alcohol.    ACTIVITY:  Today: no heavy lifting, straining or running due to procedural   sedation/anesthesia.  The following day: return to full activity including work.  DIET:  Eat and drink normally unless instructed otherwise.     TREATMENT FOR COMMON SIDE EFFECTS:  - Mild abdominal pain, nausea, belching, bloating or excessive gas:  rest,   eat lightly and use a heating pad.  - Sore Throat: treat with throat lozenges and/or gargle with warm salt   water.  - Because air was used during the procedure, expelling large amounts of air   from your rectum or belching is normal.  - If a bowel prep was taken, you may not have a bowel movement for 1-3 days.    This is normal.  SYMPTOMS TO WATCH FOR AND REPORT TO YOUR PHYSICIAN:  1. Abdominal pain or bloating, other than gas cramps.  2. Chest pain.  3. Back pain.  4. Signs of infection such as: chills or fever occurring within 24 hours   after the procedure.  5. Rectal bleeding, which would show as bright red, maroon, or black stools.   (A tablespoon of blood from the rectum is not serious,  especially if   hemorrhoids are present.)  6. Vomiting.  7. Weakness or dizziness.  GO DIRECTLY TO THE NEAREST EMERGENCY ROOM IF YOU HAVE ANY OF THE FOLLOWING:      Difficulty breathing              Chills and/or fever over 101 F   Persistent vomiting and/or vomiting blood   Severe abdominal pain   Severe chest pain   Black, tarry stools   Bleeding- more than one tablespoon   Any other symptom or condition that you feel may need urgent attention  Your doctor recommends these additional instructions:  If any biopsies were taken, your doctors clinic will contact you in 1 to 2   weeks with any results.  - Return patient to ICU for ongoing care.   - Continue present medications.   - Repeat ERCP in 2 months to remove stent.   - Refer to a surgeon tomorrow.   - Continue current antibiotic therapy.  For questions, problems or results please call your physician Melchor Sarmiento MD at Work:  [phone]  If you have any questions about the above instructions, call the GI   department at [phone] or call the endoscopy unit at [phone]   from 7am until 3 pm.  [facility], EMERGENCY ROOM PHONE NUMBER:   [phone]  IF A COMPLICATION OR EMERGENCY SITUATION ARISES AND YOU ARE UNABLE TO REACH   YOUR PHYSICIAN - GO DIRECTLY TO THE EMERGENCY ROOM.  I have read or have had read to me these discharge instructions for my   procedure and have received a written copy.  I understand these   instructions and will follow-up with my physician if I have any questions.     __________________________________       _____________________________________  Nurse Signature                                          Patient/Designated   Responsible Party Signature  MD Melchor Saenz MD  12/28/2021 5:45:27 PM  This report has been verified and signed electronically.  Dear patient,  As a result of recent federal legislation (The Federal Cures Act), you may   receive  lab or pathology results from your procedure in your [facility]   account before your physician is able to contact you. Your physician or   their representative will relay the results to you with their   recommendations at their soonest availability.  Thank you,  PROVATION

## 2021-12-28 NOTE — PROGRESS NOTES
Full consult note to follow. PT admitted with obstructive jaundice and Tbili of 5 with US RUQ showing biliary dilation with cholecystitis. Will plan for ERCP. Will need broad spectrum AB with zosyn and hemodynamic support with IVF. Keep NPO    Melchor Sarmiento MD  Gastroenterology  Director of Advanced Endoscopy at [facility]

## 2021-12-28 NOTE — HOSPITAL COURSE
Patient 70 yo male admitted to hospital with suspected cholangitis. Patient initiated on IV abx, fluids, O2. Patient with a rapid decompensation shourtly after admit, rapid response called, patient hypoxic in the 60's placed on bipap with good effect. Patient transferred to ICU. Patient with a progressive decline, febrile episodes to 102.9. Severe septic shock, pressors and fluids on board. Patient evaluated by GI who recommended ERCP - additional CT abdomen. Patient scheduled for CT and this has been delayed pending stability. Patient intubated for airway protection.  12/29- Patient remains intubated, sedated, on pressors. Discussed POC with family at bedside. Patient evaluated by surgery who recommend o/p surgical followup on recovery for elective cholecystectomy.. Repeat blood cultures in progress, abx infusing.  12/30 - Patient wbc declining but remains on pressors. He is intubated and sedated. Family at bedside. Worsening renal function with cr 4.4 today. Nephrology on consult. Discussed with CC Team  12/31: On sole pressor Norepinephrine 0.14mcg/kg/min, afebrile, leucocytosis is resolved, scR is bumped to 6.0. Patient is oliguric (118 cc urine in last 24 hour). On Fentanyl gtt and NaHCO3 gtt   Daughter Hortencia  is at bedside and was updated.   AC 26/450/40/5  1/1/22 patient seen, daughter Hortencia at bedside, currently on CRRT, Norepinephrine at 0.02mcg/kg/min, fentanyl 100mcg/Hr. AC 26/450/40/5. Patient more awake today, opens eyes spontaneously, is squeezing his daughter's hand  1/2 About afternoon yesterday he self extubated, did not require immediate re-intubation as he was doing rel well resp wise, he is currently on Vapo therm 35LPM 80% FIO2, precedex drip to help with agitation (history of ETOH misuse), getting CRRT, family at bedside, whilst confused he is oriented to selpf/family, conversing with family  1/3 patient seen, was sleeping calmly early this AM, family not at bedside, on Vapotherm  2oLPM at 45% FIO2, not dyspneic. CRRT off. Remains afebrile  1/4 patient seen, down to 4L oxygen, awake and alert, off Precedex gtt, been off pressors  1/5 seen in the ICU. Awake and alert. Not in distress.  PT/OT. D/C urethral catheter, place condom cath  1/6 Transferred from ICU, doing stable on the floor. Met on 4L this morning awake and alert, not particularly interested in his breakfast. (Apparently the dialect on [place] is different than his, therefore with communication gaps he was able to tell me directly today that he is not in any pain).  1/7 Patient seen, he;d just returned from HD, daughter at bedside, mentation and cognition fully restored per daughter. HD is now MWF per nephro.  D/W Nephro who recommend placement of tunneled catheter, so far though good UOP renal function is slow to return, patient will likely need to be on HD for some time goingforward. This MD explained this to daughter who interpreted for patient  PT rec Home with PT.

## 2021-12-28 NOTE — PLAN OF CARE
[facility] - Intensive Care (Hospital)  Initial Discharge Assessment       Primary Care Provider: Primary Doctor No    Admission Diagnosis: Acute cholangitis [K83.09]  Epigastric abdominal pain [R10.13]    Admission Date: 12/27/2021  Expected Discharge Date:     Discharge Barriers Identified: None    Payor: MEDICAID / Plan: PENDING MEDICAID / Product Type: Government /     Extended Emergency Contact Information  Primary Emergency Contact: Chuck Alasolas  Mobile Phone: [phone]  Relation: Relative  Secondary Emergency Contact: Mikael Alas  Mobile Phone: [phone]  Relation: Daughter    Discharge Plan A: Home with family       No Pharmacies Listed    Initial Assessment (most recent)     Adult Discharge Assessment - 12/28/21 1516        Discharge Assessment    Assessment Type Discharge Planning Assessment     Confirmed/corrected address, phone number and insurance Yes     Confirmed Demographics Correct on Facesheet     Source of Information family     Communicated FLAQUITA with patient/caregiver Date not available/Unable to determine     Reason For Admission Septic shock     Lives With child(timothy), adult     Facility Arrived From: home     Do you expect to return to your current living situation? Yes     Do you have help at home or someone to help you manage your care at home? Yes     Who are your caregiver(s) and their phone number(s)? daughter Mikael and nephew Brian     Prior to hospitilization cognitive status: Alert/Oriented     Current cognitive status: Coma/Sedated/Intubated     Walking or Climbing Stairs Difficulty none     Dressing/Bathing Difficulty none     Home Accessibility wheelchair accessible     Equipment Currently Used at Home none     Readmission within 30 days? No     Patient currently being followed by outpatient case management? No     Do you currently have service(s) that help you manage your care at home? No     Do you take prescription medications? No     Do you have prescription coverage? No      Who is going to help you get home at discharge? family     How do you get to doctors appointments? family or friend will provide     Are you on dialysis? No     Do you take coumadin? No     Discharge Plan A Home with family     DME Needed Upon Discharge  none     Discharge Plan discussed with: Adult children     Discharge Barriers Identified None        Relationship/Environment    Name(s) of Who Lives With Patient EvetteBrian (nephew) Mikael Alas (daughter)               CM spoke with patient's daughter to introduce role and discuss discharge planning. Patient independent with ADLs prior to hospital admission. Patient's currently staying with her and patient's nephew while they work on immigration. Emergency medicaid application submitted for hospital stay. Any post acute resources will be out of pocket. Patient not eligible for medicaid due to immigration status.   CM discharge needs depends on hospital progress. CM will continue following to assist with other needs.   CM provided a transitional care folder, information on advanced directives, information on pharmacy bedside delivery, and discharge planning begins on admission with contact information for any needs/questions.

## 2021-12-28 NOTE — EICU
Rounding (Video Assessment):  Yes    Intervention Initiated From:  Bedside    Al Communicated with Bedside Nurse regarding:  Time-Out    Nurse Notified: Corby Caballero NP verified privileges  Yes    Doctor Notified:  Yes    Comments: Called for time out for intubation for CT scan and then surgery  0823 Etomidate 20 mg IVP followed by Succinylcholine 50 mg IVP  0824 Ambu 100% Fi02 Mac 4 inserted followed by ETT 8.5 @ 23 lip. ETC02 color change  0825 Bilateral breath sounds ausculted. Portable CXR ordered.   0827 OG tube 14 Norwegian inserted left side of mouth by Thi Mercado RN  0833 16 Norwegian hernandez inserted by Thi Mir RN sterile tech.Secured to leg side of thigh. VSS.

## 2021-12-28 NOTE — ASSESSMENT & PLAN NOTE
- Remains in sinus rhythm on admission. Monitor telemetry.  - Continue home BB.  - Patient is not on long-term AC. Will defer to his primary cardiologist.

## 2021-12-28 NOTE — CONSULTS
[facility] - Intensive Care ([facility])  Gastroenterology  Consult Note    Patient Name: Lizz Alas  MRN: [id number]  Admission Date: 12/27/2021  Hospital Length of Stay: 0 days  Code Status: Full Code   Attending Provider: Nitesh Lopez MD   Consulting Provider: Melchor Sarmiento MD  Primary Care Physician: Primary Doctor No  Principal Problem:Septic shock    Consults  Subjective:     HPI:  Mr. Alas is a 71 year old male with PA not on anticoagulation, HLD, HTN, thrombocytopenia, alcohol abuse, and tobacco consulted for cholangitis.     He was admitted to the ER with intermitted abdominal pain for the last 3 days and fever of 102 F at home. Other symptoms include nausea and emesis. Denied any diarrhea, constipation, SOB, weakness, fever, chills, and all other sxs at this time.    Lab testing showed , , alk phos 184, T bili 5.1, lipase 11, glucose 180, plt 119, sodium 134, COVID negative. Abdominal US showed gallbladder distension with internal sludge but without definite stones, intra and extrahepatic biliary ductal dilation which may represent acute cholecystitis or choledocholithiasis. Normal WBC. Blood cultures obtained in the ED, and 1.5 L IV fluids and IV Zosyn given.     Plan for ERCP today but patient decompensated on the floor and sent to ICU. Currently on Bipap and tachycardic with .      Past Medical History:   Diagnosis Date    Hyperlipemia     Hypertension     Other lesions of oral mucosa     PAF (paroxysmal atrial fibrillation)     Thrombocytopenia        Past Surgical History:   Procedure Laterality Date    FRACTURE SURGERY      HERNIA REPAIR         Review of patient's allergies indicates:  No Known Allergies  Family History    None       Tobacco Use    Smoking status: Current Every Day Smoker     Packs/day: 1.50     Years: 45.00     Pack years: 67.50     Types: Cigarettes    Smokeless tobacco: Never Used   Substance and Sexual Activity    Alcohol use: Yes      Comment: 2-3 glasses of whiskey daily    Drug use: Never    Sexual activity: Not on file     Review of Systems   Constitutional: Negative for activity change, appetite change and chills.   Respiratory: Negative for apnea, choking and chest tightness.    Gastrointestinal: Positive for abdominal pain and nausea. Negative for abdominal distention, anal bleeding, blood in stool, constipation, diarrhea and rectal pain.   Musculoskeletal: Negative for arthralgias, back pain, gait problem and joint swelling.   Neurological: Positive for dizziness. Negative for facial asymmetry and headaches.   Hematological: Negative for adenopathy.     Objective:     Vital Signs (Most Recent):  Temp: (!) 102.9 °F (39.4 °C) (12/28/21 0748)  Pulse: (!) 120 (12/28/21 0748)  Resp: (!) 45 (12/28/21 0748)  BP: 123/71 (12/28/21 0020)  SpO2: 100 % (12/28/21 0748) Vital Signs (24h Range):  Temp:  [97.9 °F (36.6 °C)-102.9 °F (39.4 °C)] 102.9 °F (39.4 °C)  Pulse:  [] 120  Resp:  [16-45] 45  SpO2:  [81 %-100 %] 100 %  BP: (120-174)/(55-85) 123/71     Weight: 88 kg (194 lb) (12/28/21 0300)  Body mass index is 31.31 kg/m².      Intake/Output Summary (Last 24 hours) at 12/28/2021 0818  Last data filed at 12/27/2021 1855  Gross per 24 hour   Intake 500 ml   Output --   Net 500 ml       Lines/Drains/Airways     Peripheral Intravenous Line                 Peripheral IV - Single Lumen 12/28/21 0745 20 G Left;Posterior Hand <1 day         Peripheral IV - Single Lumen 12/28/21 0745 20 G Posterior;Right Hand <1 day                Physical Exam  Constitutional:       Appearance: Normal appearance. He is obese.   HENT:      Head: Normocephalic and atraumatic.      Mouth/Throat:      Mouth: Mucous membranes are moist.   Eyes:      Comments: Icteric sclera   Pulmonary:      Effort: Respiratory distress present.      Breath sounds: Normal breath sounds.   Abdominal:      General: Abdomen is flat. Bowel sounds are normal.      Palpations: Abdomen is soft.    Skin:     General: Skin is warm and dry.   Neurological:      General: No focal deficit present.      Mental Status: He is alert and oriented to person, place, and time.         Significant Labs:  Acute Hepatitis Panel: No results for input(s): HEPBSAG, HEPAIGM, HEPCAB in the last 48 hours.    Invalid input(s): HAPBIGM  Amylase: No results for input(s): AMYLASE in the last 48 hours.  Blood Culture:   Recent Labs   Lab 12/27/21 2055 12/27/21 2110   LABBLOO Gram stain aer bottle: Gram negative rods  Gram stain derek bottle: Gram negative rods   Results called to and read back by: Holly Glover RN 12/28/2021    14:45 Gram stain aer bottle: Gram negative rods  Gram stain derek bottle: Gram negative rods   Results called to and read back by: Holly Glover RN 12/28/2021    14:45     CBC:   Recent Labs   Lab 12/27/21  1754 12/28/21  0550   WBC 8.18 10.16   HGB 15.4 14.0   HCT 45.6 40.9   * 100*     BMP:   Recent Labs   Lab 12/28/21  0550   *   *   K 3.2*   CL 99   CO2 23   BUN 22   CREATININE 0.8   CALCIUM 8.4*   MG 1.5*     CMP:   Recent Labs   Lab 12/28/21  0550   *   CALCIUM 8.4*   ALBUMIN 3.3*   PROT 6.6   *   K 3.2*   CO2 23   CL 99   BUN 22   CREATININE 0.8   ALKPHOS 170*   *   *   BILITOT 7.1*     Coagulation:   Recent Labs   Lab 12/28/21  0550   INR 1.0     CRP: No results for input(s): CRP in the last 48 hours.    Significant Imaging:  Imaging results within the past 24 hours have been reviewed.    Assessment/Plan:     Acute cholangitis  Acute cholangitis based on elevated biliurbin, abdominal pain and biliary dilation. ERCP indicated and he has received the indicated zosyn and expanded to Vancomycin in addition given his decompensation. Most likely secondary to sludge or stones. Blood cultures are pending.   I discussed with Dr. Lopez to intubate and plan for ERCP in the OR as he is decompensating.     ERCP is currently recommended. The risks, benefits  and alternatives of the procedure were discussed with the patient in detail. Benefits include removal of stones, stents, debri, sludge; dilation; placement of a stent; biopsies. Risks include bleeding (0.3-2%), pancreatitis (3%), cholangitis (0.5-3%), perforation (0.08-0.6%), cholecystitis (0.5%), sedation related adverse events. This discussion was had in the presence of his duaghter. Educational material of the biliary anatomy has been provided today for educational purposes. Other risks include, risks of adverse reaction to sedation requiring the use of reversal agents, bleeding requiring blood transfusion, perforation requiring surgical intervention, technical failure, aspiration leading to respiratory distress and respiratory failure resulting in endotracheal intubation and mechanical ventilation including death. Anesthesia is utilized for this procedure, it is up to the anesthesiologist to determine airway safety including elective endotracheal intubation. Questions were answered, the patient and daughter agree to proceed. There was no language barriers. Discussion was performed in Portuguese.           Thank you for your consult. I will follow-up with patient. Please contact us if you have any additional questions.    Melchor Sarmiento MD  Gastroenterology  '[place] - Intensive Care ([facility])

## 2021-12-28 NOTE — NURSING
Telemetry called and told me pt HR was at 183 and to check on pt. Went in pt room #558 , pt seemed to be in distress, mouth breathing , O2 was reading 73% pt was shaking and shivering. Called TOM due to pt speaking Spanish [id number], # number [id number], pt was stating he could not breath.  Checked pt temp reading was 98.4 oral .Pt was on 3L then moved to 5L, then  Respiratory came in and put him on breathing treatment albuterol, still couldn't get breathing controlled, called Dr. Lopez and he stated to transfer pt to room 17 ICU.

## 2021-12-28 NOTE — ASSESSMENT & PLAN NOTE
Patient with Hypoxic Respiratory failure which is Acute.  he is not on home oxygen. Supplemental oxygen was provided and noted- Vent Mode: A/C  Oxygen Concentration (%):  [40-50] 50  Resp Rate Total:  [0 br/min-33 br/min] 26 br/min  Vt Set:  [400 mL] 400 mL  PEEP/CPAP:  [5 cmH20] 5 cmH20  Pressure Support:  [0 cmH20] 0 cmH20  Mean Airway Pressure:  [0 dzX85-30 cmH20] 12 cmH20.   Signs/symptoms of respiratory failure include- tachypnea, increased work of breathing, respiratory distress, use of accessory muscles and cyanosis. Contributing diagnoses includes - COPD Labs and images were reviewed. Patient Has recent ABG, which has been reviewed. Will treat underlying causes and adjust management of respiratory failure as indicated

## 2021-12-28 NOTE — ASSESSMENT & PLAN NOTE
- Abdominal US showed: Gallbladder distension with internal sludge but without definite stones. Intra and extrahepatic biliary ductal dilation. Acute cholecystitis or choledocholithiasis not excluded.  - GI consult. Plans for ERCP in AM. Will keep NPO.  - Blood cultures obtained in the ED. Continue empiric IV Zosyn.   - IV hydration.  - Analgesics and antiemetics as needed.   - Follow labs.

## 2021-12-28 NOTE — ASSESSMENT & PLAN NOTE
Likely secondary to sepsis and atelectasis  Cont fully vent support  Check ABG and repeat in AM  Vent settings reviewed and adjusted  VAP prophylaxis  SAT/SBT in AM

## 2021-12-28 NOTE — INTERVAL H&P NOTE
The patient has been examined and the H&P has been reviewed:    I concur with the findings and no changes have occurred since H&P was written.    Pt now on pressors and started fentanyl for sedation.     Procedure risks, benefits and alternative options discussed and understood by patient/family.          Active Hospital Problems    Diagnosis  POA    *Septic shock [A41.9, R65.21]  Yes    Acute hypoxemic respiratory failure [J96.01]  Yes    Electrolyte imbalance [E87.8]  Yes    Bacteremia due to Gram-negative bacteria [R78.81]  Yes    Acute cholangitis [K83.09]  Yes    Tobacco use [Z72.0]  Yes     Chronic    Alcohol abuse [F10.10]  Yes     Chronic    Primary hypertension [I10]  Yes     Chronic    Paroxysmal atrial fibrillation [I48.0]  Yes     Chronic      Resolved Hospital Problems   No resolved problems to display.

## 2021-12-28 NOTE — PROCEDURES
"Lizz Alas is a 71 y.o. male patient.    Temp: (!) 102.9 °F (39.4 °C) (12/28/21 0748)  Pulse: 92 (12/28/21 1058)  Resp: (!) 22 (12/28/21 1058)  BP: (!) 71/48 (12/28/21 0935)  SpO2: 95 % (12/28/21 1058)  Weight: 88 kg (194 lb) (12/28/21 0300)  Height: 5' 6" (167.6 cm) (12/28/21 0300)       Central Line    Date/Time: 12/28/2021 10:30 AM  Performed by: Nishant Caballero NP  Authorized by: Nishant Caballero NP     Location procedure was performed:  [facility] INTENSIVE CARE UNIT  Pre-operative diagnosis:  Shock  Post-operative diagnosis:  Shock  Consent Done ?:  Yes  Time out complete?: Verified correct patient, procedure, equipment, staff, and site/side    Indications:  Med administration and vascular access  Anesthesia:  Local infiltration  Local anesthetic:  Lidocaine 1% without epinephrine  Anesthetic total (ml):  2  Preparation:  Skin prepped with ChloraPrep  Skin prep agent dried: Skin prep agent completely dried prior to procedure    Sterile barriers: All five maximal sterile barriers used - gloves, gown, cap, mask and large sterile sheet    Hand hygiene: Hand hygiene performed immediately prior to central venous catheter insertion    Location:  Left internal jugular  Catheter type:  Triple lumen  Catheter size:  7 Fr  Inserted Catheter Length (cm):  16  Ultrasound guidance: Yes    Vessel Caliber:  Small   patent  Comprressibility:  Normal  Doppler:  Not done  Needle advanced into vessel with real time ultrasound guidance.    Guidewire confirmed in vessel.    Steril sheath on probe.    Sterile gel used.  Manometry: No    Number of attempts:  2  Securement:  Line sutured, chlorhexidine patch, sterile dressing applied and blood return through all ports  Complications: No    Estimated blood loss (mL):  1  Specimens: No    Implants: No    XRay:  Placement verified by x-ray and successful placement  Adverse Events:  NoneTermination Site: superior vena cava        12/28/2021  "

## 2021-12-28 NOTE — CONSULTS
[facility] - Intensive Care ([facility])  Critical Care Medicine  Consult Note    Patient Name: Lizz Alas  MRN: [id number]  Admission Date: 12/27/2021  Hospital Length of Stay: 0 days  Code Status: Full Code  Attending Physician: Nitesh Lopez MD   Primary Care Provider: Primary Doctor No   Principal Problem: Septic shock    [unfilled]  Subjective:     HPI:  Mr Alas is a 72 yo Pashto male with a PMH of HLD, etoh abuse, tobacco abuse, HTN and PAF.  He presented to [facility] ED yesterday evening via personal vehicle with complaint of abd pain over 3 days and N/V X 1 day.  He does not speak English and was accompanied by family.  In ED Glucose 180, TBili 5.1, LA 1.5 and Abd US with GB distention and sludging.  He had temp 102.9.  He as admitted to Tele and GI consulted diagnosed with acute cholecystitis.  This AM Telemetry reported HR was at 183 and upon assessment patient seemed to be in distress, mouth breathing , O2 was reading 73% pt was shaking and shivering. Called TOM due to pt speaking [place] [id number], # number [id number], pt was stating he could not breath.  Checked pt temp reading was 98.4 oral .Pt was on 3L then moved to 5L, then  Respiratory came in and put him on breathing treatment albuterol, still couldn't get breathing controlled, called Dr. Lopez and he stated to transfer pt to ICU.      Hospital/ICU Course:  12/28 - In ICU he was awake and alert on BiPAP .40 FiO2 with mild work of breathing but no distress and hemodynamically stable.  GI requested patient intubated and stabilized more so from pulm standpoint then obtain CT Abd and plan to OR for ERCP.  TBili up to 7.1.  He was intubated and supported on [facility] ventilation post explaining all this to daughter at bed side.  Post intubation and sedation became hypotensive requiring CL and AL placements and starting Levophed infusion.       Past Medical History:   Diagnosis Date    Hyperlipemia     Hypertension     Other lesions of oral mucosa     PAF  (paroxysmal atrial fibrillation)     Thrombocytopenia        Past Surgical History:   Procedure Laterality Date    FRACTURE SURGERY      HERNIA REPAIR         Review of patient's allergies indicates:  No Known Allergies    Family History    None       Tobacco Use    Smoking status: Current Every Day Smoker     Packs/day: 1.50     Years: 45.00     Pack years: 67.50     Types: Cigarettes    Smokeless tobacco: Never Used   Substance and Sexual Activity    Alcohol use: Yes     Comment: 2-3 glasses of whiskey daily    Drug use: Never    Sexual activity: Not on file         Review of Systems   Unable to perform ROS: Intubated     Objective:     Vital Signs (Most Recent):  Temp: 99 °F (37.2 °C) (12/28/21 1129)  Pulse: 99 (12/28/21 1329)  Resp: 19 (12/28/21 1329)  BP: (!) 71/52 (12/28/21 1200)  SpO2: 95 % (12/28/21 1329) Vital Signs (24h Range):  Temp:  [97.7 °F (36.5 °C)-102.9 °F (39.4 °C)] 99 °F (37.2 °C)  Pulse:  [] 99  Resp:  [16-45] 19  SpO2:  [78 %-100 %] 95 %  BP: ()/(43-87) 71/52  Arterial Line BP: ()/(37-54) 122/54     Weight: 88 kg (194 lb)  Body mass index is 31.31 kg/m².      Intake/Output Summary (Last 24 hours) at 12/28/2021 1413  Last data filed at 12/28/2021 1223  Gross per 24 hour   Intake 1320.81 ml   Output 325 ml   Net 995.81 ml       Physical Exam  Constitutional:       General: He is awake. He is not in acute distress.Vital signs are normal.      Appearance: Normal appearance. He is well-developed and well-nourished. He is not ill-appearing, toxic-appearing or sickly-appearing.      Interventions: He is sedated, intubated and restrained.      Comments: Awake and alert pre intubation   HENT:      Head: Normocephalic and atraumatic.      Mouth/Throat:      Mouth: Oropharynx is clear and moist. Mucous membranes are dry.   Eyes:      General: Lids are normal.      Extraocular Movements: EOM normal.      Pupils: Pupils are equal, round, and reactive to light.   Neck:      Vascular:  No carotid bruit.      Trachea: Trachea normal.     Cardiovascular:      Rate and Rhythm: Regular rhythm. Tachycardia present.      Pulses:           Radial pulses are 2+ on the right side and 2+ on the left side.        Dorsalis pedis pulses are 1+ on the right side and 1+ on the left side.      Heart sounds: Normal heart sounds.   Pulmonary:      Effort: Tachypnea (mild) and accessory muscle usage (mild) present. No respiratory distress. He is intubated.      Breath sounds: Examination of the right-lower field reveals decreased breath sounds. Examination of the left-lower field reveals decreased breath sounds. Decreased breath sounds present.   Chest:      Chest wall: No deformity or tenderness.   Abdominal:      General: Bowel sounds are decreased. There is no distension.      Palpations: Abdomen is soft.      Tenderness: There is no abdominal tenderness.   Genitourinary:     Comments: Eng in place  Musculoskeletal:         General: Normal range of motion.      Cervical back: Normal range of motion.      Right lower leg: Edema (trace) present.      Left lower leg: Edema (trace) present.      Right foot: No deformity.      Left foot: No deformity.   Lymphadenopathy:      Cervical: No cervical adenopathy.   Skin:     General: Skin is warm, dry and intact.      Capillary Refill: Capillary refill takes less than 2 seconds.      Findings: No rash.      Nails: There is no cyanosis.          Neurological:      Comments: Sedated and intubation   Psychiatric:         Mood and Affect: Mood and affect normal.         Cognition and Memory: Cognition and memory normal.         Vents:  Vent Mode: A/C (12/28/21 1329)  Ventilator Initiated: Yes (12/28/21 0824)  Set Rate: 20 BPM (12/28/21 1329)  Vt Set: 400 mL (12/28/21 1329)  Pressure Support: 0 cmH20 (12/28/21 0824)  PEEP/CPAP: 5 cmH20 (12/28/21 1329)  Oxygen Concentration (%): 50 (12/28/21 1329)  Peak Airway Pressure: 9.5 cmH2O (12/28/21 1329)  Plateau Pressure: 0 cmH20  (12/28/21 1329)  Total Ve: 10.9 mL (12/28/21 1329)  Negative Inspiratory Force (cm H2O): 0 (12/28/21 1329)  F/VT Ratio<105 (RSBI): (!) 30.55 (12/28/21 1329)    Lines/Drains/Airways     Central Venous Catheter Line            Percutaneous Central Line Insertion/Assessment - Triple Lumen  12/28/21 1052 left internal jugular <1 day          Drain                 NG/OG Tube 12/28/21 0827 [place] sump 14 Fr. Left mouth <1 day         Urethral Catheter 12/28/21 0830 <1 day          Airway                 Airway - Non-Surgical 12/28/21 0824 Endotracheal Tube <1 day          Arterial Line            Arterial Line 12/28/21 1045 Left Radial <1 day          Peripheral Intravenous Line                 Peripheral IV - Single Lumen 12/28/21 0745 20 G Posterior;Right Hand <1 day                Significant Labs:    CBC/Anemia Profile:  Recent Labs   Lab 12/27/21  1754 12/28/21  0550   WBC 8.18 10.16   HGB 15.4 14.0   HCT 45.6 40.9   * 100*   MCV 92 90   RDW 13.7 14.0        Chemistries:  Recent Labs   Lab 12/27/21  1754 12/28/21  0550   * 133*   K 3.9 3.2*   CL 94* 99   CO2 25 23   BUN 22 22   CREATININE 0.8 0.8   CALCIUM 9.5 8.4*   ALBUMIN 4.1 3.3*   PROT 8.2 6.6   BILITOT 5.1* 7.1*   ALKPHOS 184* 170*   * 221*   * 156*   MG  --  1.5*     Lactic Acid:   Recent Labs   Lab 12/27/21  2109   LACTATE 1.5     Troponin:   Recent Labs   Lab 12/27/21  1754   TROPONINI <0.006     All pertinent labs within the past 24 hours have been reviewed.    Significant Imaging:   I have reviewed all pertinent imaging results/findings within the past 24 hours.  CXR: I have reviewed all pertinent results/findings within the past 24 hours and my personal findings are:  Markedly low lung volumes with diffuse vascular crowding or atelectasis throughout the lungs.  Interstitial pulmonary edema versus interstitial infectious process difficult to exclude in the right clinical setting.   Abd US: Gallbladder distension with internal  sludge but without definite stones.  Intra and extrahepatic biliary ductal dilation.  Consider further evaluation with contrast enhanced CT.  Acute cholecystitis or choledocholithiasis not excluded.   Pancreas is largely obscured by bowel gas.      Assessment/Plan:     Psychiatric  Alcohol abuse  Add Banana bag daily    Pulmonary  Acute hypoxemic respiratory failure  Likely secondary to sepsis and atelectasis  Cont fully vent support  Check ABG and repeat in AM  Vent settings reviewed and adjusted  VAP prophylaxis  SAT/SBT in AM    Cardiac/Vascular  Paroxysmal atrial fibrillation  Not on anticoagulants or any antiarrythmics on home med review  Currently in NSR    Renal/  Electrolyte imbalance  Replace K+ and Mg+  Repeat labs in AM    ID  * Septic shock  Cont IVFs   Titrate Levophed infusion for MAP > 65  Check sputum culture  Blood cultures X 2 NGTD  Repeat LA and CBC in AM  Follow fever curve  Likely source acute cholecystitis    Acute cholangitis  IVAB and IVFs  GI following  CT Abd pending then likely ERCP  CMP in AM      Other  Tobacco use  Will encourage tobacco cessation post extubation and once awake/alert  Add LABA and ICS nebs  On Nicotine patch           Preventive Measures and Monitoring:   Stress Ulcer: Pepcid  Nutrition: NPO  Glucose control: SSI  Bowel prophylaxis: none due to acute cholecystitis  DVT prophylaxis: SCDs  Hx CAD on B-Blocker: no hx CAD and in shock  Head of Bed/Reposition: Elevate HOB and turn Q1-2 hours   Early Mobility: bed rest  SAT/SBT: in AM  RASS goal: -2  Vent Day: #1  OG Day: #1  Central Line Left IJ Day: #1  Left Radial Arterial Line Day: #1  Eng Day: #1  IVAB Day: #2  Code Status: Full    Counseling/Consultation:I have discussed the care of this patient in detail with the bedside nursing staff and Dr. Jameson and Dr. Lopez    Critical Care Time: 84 minutes  Critical secondary to Patient has a condition that poses threat to life and bodily function: Acute Resp Failure  intubated on [facility] ventilation, Septic shock  Patient is currently on drug therapy requiring intensive monitoring for toxicity: Levophed infusion  Patient is currently receiving parenteral controlled substances: Fentanyl infusion     Critical care was time spent personally by me on the following activities: development of treatment plan with patient or surrogate and bedside caregivers, discussions with consultants, evaluation of patient's response to treatment, examination of patient, ordering and performing treatments and interventions, ordering and review of laboratory studies, ordering and review of radiographic studies, pulse oximetry, re-evaluation of patient's condition. This critical care time did not overlap with that of any other provider or involve time for any procedures.    Thank you for your consult. I will follow-up with patient. Please contact us if you have any additional questions.     Nishant Caballero NP  Critical Care Medicine  [facility] - Intensive Care ([facility])

## 2021-12-28 NOTE — ASSESSMENT & PLAN NOTE
Cont IVFs   Titrate Levophed infusion for MAP > 65  Check sputum culture  Blood cultures X 2 NGTD  Repeat LA and CBC in AM  Follow fever curve  Likely source acute cholecystitis

## 2021-12-28 NOTE — HPI
Lizz Alas is a 71 y.o. Serbian speaking male patient with a PMHx of PAF, HLD, HTN, thrombocytopenia, alcohol abuse, and tobacco use who presents to the Emergency Department for intermittent epigastric abdominal pain which onset gradually 3 days PTA. Today, the pt reports that his pain has been constant. His symptoms are constant and moderate in severity. No mitigating or exacerbating factors reported. Associated sxs include fever (102 F), nausea, and vomiting. Patient denies any diarrhea, constipation, SOB, weakness, fever, chills, and all other sxs at this time. Initial work-up in the ED showed: Normal WBC and lactic, , , alk phos 184, T bili 5.1, lipase 11, glucose 180, plt 119, sodium 134, COVID negative. Abdominal US showed gallbladder distension with internal sludge but without definite stones, intra and extrahepatic biliary ductal dilation which may represent acute cholecystitis or choledocholithiasis. Blood cultures obtained in the ED, and 1.5 L IV fluids and IV Zosyn given. Case was discussed with GI per the ED, who recommend NPO, IV abx, and ERCP in AM. Hospital Medicine was contacted for admission and patient placed in Observation.

## 2021-12-28 NOTE — SUBJECTIVE & OBJECTIVE
Past Medical History:   Diagnosis Date    Hyperlipemia     Hypertension     Other lesions of oral mucosa     PAF (paroxysmal atrial fibrillation)     Thrombocytopenia        Past Surgical History:   Procedure Laterality Date    FRACTURE SURGERY      HERNIA REPAIR         Review of patient's allergies indicates:  No Known Allergies    Family History    None       Tobacco Use    Smoking status: Current Every Day Smoker     Packs/day: 1.50     Years: 45.00     Pack years: 67.50     Types: Cigarettes    Smokeless tobacco: Never Used   Substance and Sexual Activity    Alcohol use: Yes     Comment: 2-3 glasses of whiskey daily    Drug use: Never    Sexual activity: Not on file         Review of Systems   Unable to perform ROS: Intubated     Objective:     Vital Signs (Most Recent):  Temp: 99 °F (37.2 °C) (12/28/21 1129)  Pulse: 99 (12/28/21 1329)  Resp: 19 (12/28/21 1329)  BP: (!) 71/52 (12/28/21 1200)  SpO2: 95 % (12/28/21 1329) Vital Signs (24h Range):  Temp:  [97.7 °F (36.5 °C)-102.9 °F (39.4 °C)] 99 °F (37.2 °C)  Pulse:  [] 99  Resp:  [16-45] 19  SpO2:  [78 %-100 %] 95 %  BP: ()/(43-87) 71/52  Arterial Line BP: ()/(37-54) 122/54     Weight: 88 kg (194 lb)  Body mass index is 31.31 kg/m².      Intake/Output Summary (Last 24 hours) at 12/28/2021 1413  Last data filed at 12/28/2021 1223  Gross per 24 hour   Intake 1320.81 ml   Output 325 ml   Net 995.81 ml       Physical Exam  Constitutional:       General: He is awake. He is not in acute distress.Vital signs are normal.      Appearance: Normal appearance. He is well-developed and well-nourished. He is not ill-appearing, toxic-appearing or sickly-appearing.      Interventions: He is sedated, intubated and restrained.      Comments: Awake and alert pre intubation   HENT:      Head: Normocephalic and atraumatic.      Mouth/Throat:      Mouth: Oropharynx is clear and moist. Mucous membranes are dry.   Eyes:      General: Lids are normal.       Extraocular Movements: EOM normal.      Pupils: Pupils are equal, round, and reactive to light.   Neck:      Vascular: No carotid bruit.      Trachea: Trachea normal.     Cardiovascular:      Rate and Rhythm: Regular rhythm. Tachycardia present.      Pulses:           Radial pulses are 2+ on the right side and 2+ on the left side.        Dorsalis pedis pulses are 1+ on the right side and 1+ on the left side.      Heart sounds: Normal heart sounds.   Pulmonary:      Effort: Tachypnea (mild) and accessory muscle usage (mild) present. No respiratory distress. He is intubated.      Breath sounds: Examination of the right-lower field reveals decreased breath sounds. Examination of the left-lower field reveals decreased breath sounds. Decreased breath sounds present.   Chest:      Chest wall: No deformity or tenderness.   Abdominal:      General: Bowel sounds are decreased. There is no distension.      Palpations: Abdomen is soft.      Tenderness: There is no abdominal tenderness.   Genitourinary:     Comments: Eng in place  Musculoskeletal:         General: Normal range of motion.      Cervical back: Normal range of motion.      Right lower leg: Edema (trace) present.      Left lower leg: Edema (trace) present.      Right foot: No deformity.      Left foot: No deformity.   Lymphadenopathy:      Cervical: No cervical adenopathy.   Skin:     General: Skin is warm, dry and intact.      Capillary Refill: Capillary refill takes less than 2 seconds.      Findings: No rash.      Nails: There is no cyanosis.          Neurological:      Comments: Sedated and intubation   Psychiatric:         Mood and Affect: Mood and affect normal.         Cognition and Memory: Cognition and memory normal.         Vents:  Vent Mode: A/C (12/28/21 1329)  Ventilator Initiated: Yes (12/28/21 0824)  Set Rate: 20 BPM (12/28/21 1329)  Vt Set: 400 mL (12/28/21 1329)  Pressure Support: 0 cmH20 (12/28/21 0824)  PEEP/CPAP: 5 cmH20 (12/28/21 1329)  Oxygen  Concentration (%): 50 (12/28/21 1329)  Peak Airway Pressure: 9.5 cmH2O (12/28/21 1329)  Plateau Pressure: 0 cmH20 (12/28/21 1329)  Total Ve: 10.9 mL (12/28/21 1329)  Negative Inspiratory Force (cm H2O): 0 (12/28/21 1329)  F/VT Ratio<105 (RSBI): (!) 30.55 (12/28/21 1329)    Lines/Drains/Airways     Central Venous Catheter Line            Percutaneous Central Line Insertion/Assessment - Triple Lumen  12/28/21 1052 left internal jugular <1 day          Drain                 NG/OG Tube 12/28/21 0827 Font[place] sump 14 Fr. Left mouth <1 day         Urethral Catheter 12/28/21 0830 <1 day          Airway                 Airway - Non-Surgical 12/28/21 0824 Endotracheal Tube <1 day          Arterial Line            Arterial Line 12/28/21 1045 Left Radial <1 day          Peripheral Intravenous Line                 Peripheral IV - Single Lumen 12/28/21 0745 20 G Posterior;Right Hand <1 day                Significant Labs:    CBC/Anemia Profile:  Recent Labs   Lab 12/27/21  1754 12/28/21  0550   WBC 8.18 10.16   HGB 15.4 14.0   HCT 45.6 40.9   * 100*   MCV 92 90   RDW 13.7 14.0        Chemistries:  Recent Labs   Lab 12/27/21  1754 12/28/21  0550   * 133*   K 3.9 3.2*   CL 94* 99   CO2 25 23   BUN 22 22   CREATININE 0.8 0.8   CALCIUM 9.5 8.4*   ALBUMIN 4.1 3.3*   PROT 8.2 6.6   BILITOT 5.1* 7.1*   ALKPHOS 184* 170*   * 221*   * 156*   MG  --  1.5*     Lactic Acid:   Recent Labs   Lab 12/27/21  2109   LACTATE 1.5     Troponin:   Recent Labs   Lab 12/27/21 1754   TROPONINI <0.006     All pertinent labs within the past 24 hours have been reviewed.    Significant Imaging:   I have reviewed all pertinent imaging results/findings within the past 24 hours.  CXR: I have reviewed all pertinent results/findings within the past 24 hours and my personal findings are:  Markedly low lung volumes with diffuse vascular crowding or atelectasis throughout the lungs.  Interstitial pulmonary edema versus interstitial  infectious process difficult to exclude in the right clinical setting.   Abd US: Gallbladder distension with internal sludge but without definite stones.  Intra and extrahepatic biliary ductal dilation.  Consider further evaluation with contrast enhanced CT.  Acute cholecystitis or choledocholithiasis not excluded.   Pancreas is largely obscured by bowel gas.

## 2021-12-28 NOTE — ASSESSMENT & PLAN NOTE
- Assistance with smoking cessation was offered, including:  [x]  Medications  [x]  Counseling  [x]  Printed Information on Smoking Cessation  []  Referral to a Smoking Cessation Program  - Patient was counseled regarding smoking for 3-10 minutes.

## 2021-12-28 NOTE — HOSPITAL COURSE
12/28 - In ICU he was awake and alert on BiPAP .40 FiO2 with mild work of breathing but no distress and hemodynamically stable.  GI requested patient intubated and stabilized more so from pulm standpoint then obtain CT Abd and plan to OR for ERCP.  TBili up to 7.1.  He was intubated and supported on mech ventilation post explaining all this to daughter at bed side.  Post intubation and sedation became hypotensive requiring CL and AL placements and starting Levophed infusion.   12/29 - ERCP yesterday afternoon.  Increased vasopressor and O2 requirements overnight.  This AM still intubated on mech vent and sedated on Fentanyl infusion.  Still on Vasopressin and Levophed infusions also.  Oliguria with LISET and Tmax last 24 hours up to 102.9.  Blood cultures X 2 + E.Coli.  Discussed care with daughter at bed side and all questions answered.   12/30 - remains intubated and sedated on mech vent. Temp and wbc trend down. Urine output, creatinine, and metabolic acidosis worsening  12/31 - remains intubated and sedated on mech vent with minimal oxygen demand but moderate pressor requirement. Urine output 118ml last 24 hours, creatinine 6.0. continued hyperglycemia 197-237  1/1/2022 - remains intubated and sedated on mechanical vent with support requirement unchanged but pO2 trending down, +18L since admission. Renal function without improvement, only 160ml urine output last 24 hours. Trialysis cath placed for initiation of CRRT, goal filtration and fluid removal  1/2 - self extubated yesterday, tolerating HFNC support. Confusion/Delirium requiring precedex infusion for safety. CRRT initiated but clotted around 0100 after about 3L removed, remains oliguric, electrolytes stable  1/3 - supplemental oxygen weaned to now 8L NC; CRRT clotted again around 2300 but urine output 220 ml last 24 hours; precedex off this morning, delirium improving; HTN requiring treatment  1/4 - off precedex, continued mild confusion without agitation  now. Supplemental oxygen weaned to 4L NC. Urine output up to 315ml last 24 hours, creatinine up 6.7, K stable  1/5 patient seen and examined.  Afebrile.  Urine output 645 mL last 24 hours.  Weak.  Poor appetite.  1/6 seen and examined.  Afebrile.  O2 sat 94% on room air.  Urine output 1.7 L last 24 hours.  BM 1/5. creatinine 6.   1/7 O2 sat 95% on 3 to per minute.  Afebrile.  Having dialysis today.  HD for clearance.  Creatinine 8  1/8 O 2 sat 90% on 2 liter/minute.  Afebrile.  Urine output 100 mL last 24 hours.  Status post HD yesterday for clearance   1/9 O2 sat 96% on 2 liter/minute.  Afebrile.  Last HD was Friday.  Nephrology input noted.

## 2021-12-28 NOTE — SUBJECTIVE & OBJECTIVE
Interval History: Rapid decompensation Severe Septic Shock requiring ICU, Pressors, Intubation. ECP planned    Review of Systems   Unable to perform ROS: Intubated     Objective:     Vital Signs (Most Recent):  Temp: 99 °F (37.2 °C) (12/28/21 1129)  Pulse: 97 (12/28/21 1505)  Resp: (!) 28 (12/28/21 1505)  BP: 110/72 (12/28/21 1505)  SpO2: 96 % (12/28/21 1505) Vital Signs (24h Range):  Temp:  [97.7 °F (36.5 °C)-102.9 °F (39.4 °C)] 99 °F (37.2 °C)  Pulse:  [] 97  Resp:  [16-45] 28  SpO2:  [78 %-100 %] 96 %  BP: ()/(43-87) 110/72  Arterial Line BP: ()/(37-54) 122/54     Weight: 88 kg (194 lb)  Body mass index is 31.31 kg/m².    Intake/Output Summary (Last 24 hours) at 12/28/2021 1522  Last data filed at 12/28/2021 1223  Gross per 24 hour   Intake 1320.81 ml   Output 325 ml   Net 995.81 ml      Physical Exam  Vitals and nursing note reviewed.   Constitutional:       Appearance: He is well-developed and well-nourished. He is ill-appearing.      Interventions: He is sedated and intubated.       HENT:      Head: Normocephalic and atraumatic.   Eyes:      Extraocular Movements: EOM normal.      Pupils: Pupils are equal, round, and reactive to light.   Cardiovascular:      Rate and Rhythm: Normal rate and regular rhythm.      Pulses: Intact distal pulses.      Heart sounds: Normal heart sounds.   Pulmonary:      Effort: Tachypnea and accessory muscle usage present. No respiratory distress. He is intubated.      Breath sounds: Normal breath sounds. Decreased air movement present. No wheezing.   Abdominal:      General: Bowel sounds are normal. There is no distension.      Palpations: Abdomen is soft. There is no mass.      Tenderness: There is abdominal tenderness in the right upper quadrant.   Musculoskeletal:         General: No deformity. Normal range of motion.      Cervical back: Normal range of motion and neck supple.   Skin:     General: Skin is warm and dry.   Neurological:      Mental Status: He is  alert and oriented to person, place, and time.      Deep Tendon Reflexes: Reflexes are normal and symmetric.   Psychiatric:         Mood and Affect: Mood and affect normal.         Behavior: Behavior normal.         Thought Content: Thought content normal.         Judgment: Judgment normal.         Significant Labs:   All pertinent labs within the past 24 hours have been reviewed.  ABGs:   Recent Labs   Lab 12/28/21  1505   PH 7.311*   PCO2 44.8   HCO3 22.6*   POCSATURATED 91*   BE -4   PO2 66*     BMP:   Recent Labs   Lab 12/28/21  0550   *   *   K 3.2*   CL 99   CO2 23   BUN 22   CREATININE 0.8   CALCIUM 8.4*   MG 1.5*     CBC:   Recent Labs   Lab 12/27/21  1754 12/28/21  0550   WBC 8.18 10.16   HGB 15.4 14.0   HCT 45.6 40.9   * 100*     CMP:   Recent Labs   Lab 12/27/21  1754 12/28/21  0550   * 133*   K 3.9 3.2*   CL 94* 99   CO2 25 23   * 145*   BUN 22 22   CREATININE 0.8 0.8   CALCIUM 9.5 8.4*   PROT 8.2 6.6   ALBUMIN 4.1 3.3*   BILITOT 5.1* 7.1*   ALKPHOS 184* 170*   * 156*   * 221*   ANIONGAP 15 11   EGFRNONAA >60 >60     Lactic Acid:   Recent Labs   Lab 12/27/21  2109   LACTATE 1.5     Urine Studies:   Recent Labs   Lab 12/27/21 2002   COLORU Yellow   APPEARANCEUA Clear   PHUR 6.0   SPECGRAV >=1.030*   PROTEINUA Trace*   GLUCUA Negative   KETONESU 2+*   BILIRUBINUA 2+*   OCCULTUA Trace*   NITRITE Negative   UROBILINOGEN 4.0-6.0*   LEUKOCYTESUR Negative       Significant Imaging: I have reviewed all pertinent imaging results/findings within the past 24 hours.

## 2021-12-28 NOTE — ASSESSMENT & PLAN NOTE
- Abdominal US showed: Gallbladder distension with internal sludge but without definite stones. Intra and extrahepatic biliary ductal dilation. Acute cholecystitis or choledocholithiasis not excluded.  - GI consult. Plans for ERCP in AM. Will keep NPO.  - Blood cultures obtained in the ED. Continue empiric IV Zosyn.   - IV hydration.  - Analgesics and antiemetics as needed.   - Follow labs.     12/28  ERCP planned  Await intervention per GI  Now Intubated

## 2021-12-28 NOTE — SUBJECTIVE & OBJECTIVE
Past Medical History:   Diagnosis Date    Hyperlipemia     Hypertension     Other lesions of oral mucosa     PAF (paroxysmal atrial fibrillation)     Thrombocytopenia        Past Surgical History:   Procedure Laterality Date    FRACTURE SURGERY      HERNIA REPAIR         Review of patient's allergies indicates:  No Known Allergies    No current facility-administered medications on file prior to encounter.     Current Outpatient Medications on File Prior to Encounter   Medication Sig    amLODIPine (NORVASC) 5 MG tablet Take 5 mg by mouth once daily.    azithromycin (Z-AJAY) 250 MG tablet Take 1 tablet (250 mg total) by mouth once daily. Take first 2 tablets together, then 1 every day until finished.    bisoprolol (ZEBETA) 5 MG tablet Take 5 mg by mouth once daily.    hydroCHLOROthiazide (MICROZIDE) 12.5 mg capsule Take 12.5 mg by mouth once daily.    ibuprofen (ADVIL,MOTRIN) 800 MG tablet Take 1 tablet (800 mg total) by mouth every 6 (six) hours as needed for Pain.    ibuprofen (ADVIL,MOTRIN) 800 MG tablet Take 1 tablet (800 mg total) by mouth every 6 (six) hours as needed for Pain.    ondansetron (ZOFRAN) 4 MG tablet Take 1 tablet (4 mg total) by mouth every 6 (six) hours. For nausea    rosuvastatin (CRESTOR) 20 MG tablet Take 20 mg by mouth once daily.     Family History    Reviewed and not pertinent.        Tobacco Use    Smoking status: Current Every Day Smoker     Packs/day: 1.50     Years: 45.00     Pack years: 67.50     Types: Cigarettes    Smokeless tobacco: Never Used   Substance and Sexual Activity    Alcohol use: Yes     Comment: 2-3 glasses of whiskey daily    Drug use: Never    Sexual activity: Not on file     Review of Systems   Constitutional: Positive for fever. Negative for appetite change, chills, diaphoresis and fatigue.   Respiratory: Negative for cough, shortness of breath and wheezing.    Cardiovascular: Negative for chest pain, palpitations and leg swelling.    Gastrointestinal: Positive for abdominal pain, nausea and vomiting. Negative for abdominal distention, blood in stool, constipation and diarrhea.   Genitourinary: Negative for dysuria and hematuria.   Musculoskeletal: Negative for arthralgias, back pain and myalgias.   Skin: Negative for color change, pallor and rash.   Neurological: Negative for dizziness, syncope, weakness, light-headedness, numbness and headaches.   Psychiatric/Behavioral: Negative for confusion.   All other systems reviewed and are negative.    Objective:     Vital Signs (Most Recent):  Temp: (!) 102.9 °F (39.4 °C) (12/27/21 2230)  Pulse: 100 (12/27/21 2230)  Resp: (!) 26 (12/27/21 2230)  BP: (!) 174/85 (12/27/21 2230)  SpO2: (!) 92 % (12/27/21 2230) Vital Signs (24h Range):  Temp:  [97.9 °F (36.6 °C)-102.9 °F (39.4 °C)] 102.9 °F (39.4 °C)  Pulse:  [] 100  Resp:  [16-26] 26  SpO2:  [81 %-96 %] 92 %  BP: (120-174)/(55-85) 174/85        Body mass index is 29.85 kg/m².    Physical Exam  Vitals and nursing note reviewed.   Constitutional:       General: He is awake. He is not in acute distress.     Appearance: Normal appearance. He is well-developed. He is not diaphoretic.   HENT:      Head: Normocephalic and atraumatic.   Eyes:      Conjunctiva/sclera: Conjunctivae normal.   Cardiovascular:      Rate and Rhythm: Normal rate and regular rhythm.  No extrasystoles are present.     Heart sounds: S1 normal and S2 normal. No murmur heard.      Pulmonary:      Effort: Pulmonary effort is normal. No tachypnea.      Breath sounds: Normal breath sounds and air entry. No wheezing, rhonchi or rales.   Abdominal:      General: Bowel sounds are normal. There is no distension.      Palpations: Abdomen is soft. There is no hepatomegaly.      Tenderness: There is no abdominal tenderness. There is no guarding or rebound. Negative signs include Flores's sign.   Musculoskeletal:         General: No tenderness or deformity. Normal range of motion.      Cervical  back: Normal range of motion and neck supple.      Right lower leg: No edema.      Left lower leg: No edema.   Skin:     General: Skin is warm and dry.      Capillary Refill: Capillary refill takes less than 2 seconds.      Coloration: Skin is jaundiced.      Findings: No erythema or rash.   Neurological:      General: No focal deficit present.      Mental Status: He is alert and oriented to person, place, and time.   Psychiatric:         Mood and Affect: Mood and affect normal.         Behavior: Behavior normal. Behavior is cooperative.             Significant Labs:  Results for orders placed or performed during the hospital encounter of 12/27/21   CBC W/ AUTO DIFFERENTIAL   Result Value Ref Range    WBC 8.18 3.90 - 12.70 K/uL    RBC 4.98 4.60 - 6.20 M/uL    Hemoglobin 15.4 14.0 - 18.0 g/dL    Hematocrit 45.6 40.0 - 54.0 %    MCV 92 82 - 98 fL    MCH 30.9 27.0 - 31.0 pg    MCHC 33.8 32.0 - 36.0 g/dL    RDW 13.7 11.5 - 14.5 %    Platelets 119 (L) 150 - 450 K/uL    MPV 10.2 9.2 - 12.9 fL    Immature Granulocytes 0.5 0.0 - 0.5 %    Gran # (ANC) 7.2 1.8 - 7.7 K/uL    Immature Grans (Abs) 0.04 0.00 - 0.04 K/uL    Lymph # 0.5 (L) 1.0 - 4.8 K/uL    Mono # 0.5 0.3 - 1.0 K/uL    Eos # 0.0 0.0 - 0.5 K/uL    Baso # 0.02 0.00 - 0.20 K/uL    nRBC 0 0 /100 WBC    Gran % 88.3 (H) 38.0 - 73.0 %    Lymph % 5.5 (L) 18.0 - 48.0 %    Mono % 5.5 4.0 - 15.0 %    Eosinophil % 0.0 0.0 - 8.0 %    Basophil % 0.2 0.0 - 1.9 %    Differential Method Automated    Comp. Metabolic Panel   Result Value Ref Range    Sodium 134 (L) 136 - 145 mmol/L    Potassium 3.9 3.5 - 5.1 mmol/L    Chloride 94 (L) 95 - 110 mmol/L    CO2 25 23 - 29 mmol/L    Glucose 180 (H) 70 - 110 mg/dL    BUN 22 8 - 23 mg/dL    Creatinine 0.8 0.5 - 1.4 mg/dL    Calcium 9.5 8.7 - 10.5 mg/dL    Total Protein 8.2 6.0 - 8.4 g/dL    Albumin 4.1 3.5 - 5.2 g/dL    Total Bilirubin 5.1 (H) 0.1 - 1.0 mg/dL    Alkaline Phosphatase 184 (H) 55 - 135 U/L     (H) 10 - 40 U/L      (H) 10 - 44 U/L    Anion Gap 15 8 - 16 mmol/L    eGFR if African American >60 >60 mL/min/1.73 m^2    eGFR if non African American >60 >60 mL/min/1.73 m^2   Lipase   Result Value Ref Range    Lipase 11 4 - 60 U/L   Urinalysis, Reflex to Urine Culture Urine, Clean Catch    Specimen: Urine   Result Value Ref Range    Specimen UA Urine, Clean Catch     Color, UA Yellow Yellow, Straw, Azra    Appearance, UA Clear Clear    pH, UA 6.0 5.0 - 8.0    Specific Gravity, UA >=1.030 (A) 1.005 - 1.030    Protein, UA Trace (A) Negative    Glucose, UA Negative Negative    Ketones, UA 2+ (A) Negative    Bilirubin (UA) 2+ (A) Negative    Occult Blood UA Trace (A) Negative    Nitrite, UA Negative Negative    Urobilinogen, UA 4.0-6.0 (A) <2.0 EU/dL    Leukocytes, UA Negative Negative   Troponin I   Result Value Ref Range    Troponin I <0.006 0.000 - 0.026 ng/mL   Lactic acid, plasma   Result Value Ref Range    Lactate (Lactic Acid) 1.5 0.5 - 2.2 mmol/L   POCT COVID-19 Rapid Screening   Result Value Ref Range    POC Rapid COVID Negative Negative     Acceptable Yes       All pertinent labs within the past 24 hours have been reviewed.    Significant Imaging:  Imaging Results           US Abdomen Limited (Final result)  Result time 12/27/21 21:10:46    Final result by Ivan Montes MD (12/27/21 21:10:46)                 Impression:      Gallbladder distension with internal sludge but without definite stones.  Intra and extrahepatic biliary ductal dilation.  Consider further evaluation with contrast enhanced CT.  Acute cholecystitis or choledocholithiasis not excluded.    Pancreas is largely obscured by bowel gas.    This report was flagged in Epic as abnormal.      Electronically signed by: Ivan Montes  Date:    12/27/2021  Time:    21:10             Narrative:    EXAMINATION:  US ABDOMEN LIMITED    CLINICAL HISTORY:  RUQ tenderness, liver enzymes elevated;    TECHNIQUE:  Limited ultrasound of the right upper quadrant  of the abdomen (including pancreas, liver, gallbladder, common bile duct, and Right kidney) was performed.    COMPARISON:  None.    FINDINGS:  Pancreas: No definite abnormality noting the pancreas is largely obscured by bowel gas.    Liver: Normal in size, measuring 14.1 cm. Homogeneous echotexture. No focal hepatic lesions.    Gallbladder: Gallbladder is distended.  No definite internal stones.  There is sludge.  Gallbladder wall thickening.    Biliary system: The common duct is dilated, measuring 14 mm.  There is intrahepatic biliary ductal dilation.    Right kidney: Normal in size and echotexture, measuring 13 cm.    Miscellaneous: No upper abdominal ascites.                               I have reviewed all pertinent imaging results/findings within the past 24 hours.

## 2021-12-28 NOTE — PROCEDURES
"Lizz Alas is a 71 y.o. male patient.    Temp: (!) 102.9 °F (39.4 °C) (12/28/21 0748)  Pulse: 92 (12/28/21 1058)  Resp: (!) 22 (12/28/21 1058)  BP: (!) 71/48 (12/28/21 0935)  SpO2: 95 % (12/28/21 1058)  Weight: 88 kg (194 lb) (12/28/21 0300)  Height: 5' 6" (167.6 cm) (12/28/21 0300)       Arterial Line    Date/Time: 12/28/2021 10:45 AM  Location procedure was performed: [facility] INTENSIVE CARE UNIT  Performed by: Nishant Caballero NP  Authorized by: Nishant Caballero NP   Pre-op Diagnosis: shock  Post-operative diagnosis: shock  Consent Done: Yes  Risks and benefits: risks, benefits and alternatives were discussed  Time out: Immediately prior to procedure a "time out" was called to verify the correct patient, procedure, equipment, support staff and site/side marked as required.  Preparation: Patient was prepped and draped in the usual sterile fashion.  Indications: multiple ABGs, respiratory failure and hemodynamic monitoring  Location: left radial    Patient sedated: no  Mohan's test normal: yes  Needle gauge: 20  Seldinger technique: Seldinger technique used  Number of attempts: 2  Complications: No  Estimated blood loss (mL): 0.5  Specimens: No  Implants: No  Post-procedure: line sutured and dressing applied  Post-procedure CMS: unchanged  Patient tolerance: Patient tolerated the procedure well with no immediate complications          12/28/2021  "

## 2021-12-28 NOTE — HPI
Mr Alas is a 70 yo Polish male with a PMH of HLD, etoh abuse, tobacco abuse, HTN and PAF.  He presented to [facility] ED yesterday evening via personal vehicle with complaint of abd pain over 3 days and N/V X 1 day.  He does not speak English and was accompanied by family.  In ED Glucose 180, TBili 5.1, LA 1.5 and Abd US with GB distention and sludging.  He had temp 102.9.  He as admitted to Tele and GI consulted diagnosed with acute cholecystitis.  This AM Telemetry reported HR was at 183 and upon assessment patient seemed to be in distress, mouth breathing , O2 was reading 73% pt was shaking and shivering. Called TOM due to pt speaking Frisian [id number], # number [id number], pt was stating he could not breath.  Checked pt temp reading was 98.4 oral .Pt was on 3L then moved to 5L, then  Respiratory came in and put him on breathing treatment albuterol, still couldn't get breathing controlled, called Dr. Lopez and he stated to transfer pt to ICU.

## 2021-12-28 NOTE — ED NOTES
Patient identifiers verified and correct for Rostnaomy Evette.    LOC: The patient is awake, alert and aware of environment with an appropriate affect, the patient is oriented x 3 and speaking appropriately.  APPEARANCE: Patient resting comfortably and in no acute distress, patient is clean and well groomed, patient's clothing is properly fastened.  SKIN: The skin is warm and dry, color consistent with ethnicity, patient has normal skin turgor and moist mucus membranes, skin intact, no breakdown or bruising noted.  MUSCULOSKELETAL: Patient moving all extremities spontaneously.  RESPIRATORY: Airway is open and patent, respirations are spontaneous.  CARDIAC: Patient has a normal rate, no periphreal edema noted, capillary refill < 3 seconds.  ABDOMEN: Soft and non tender to palpation. Pt c/o nausea, vomiting, and abd pain x3 days.

## 2021-12-28 NOTE — H&P (VIEW-ONLY)
[facility] - Intensive Care ([facility])  Gastroenterology  Consult Note    Patient Name: Lizz Alas  MRN: [id number]  Admission Date: 12/27/2021  Hospital Length of Stay: 0 days  Code Status: Full Code   Attending Provider: Nitesh Lopez MD   Consulting Provider: Melchor Sarmiento MD  Primary Care Physician: Primary Doctor No  Principal Problem:Septic shock    Consults  Subjective:     HPI:  Mr. Alas is a 71 year old male with PA not on anticoagulation, HLD, HTN, thrombocytopenia, alcohol abuse, and tobacco consulted for cholangitis.     He was admitted to the ER with intermitted abdominal pain for the last 3 days and fever of 102 F at home. Other symptoms include nausea and emesis. Denied any diarrhea, constipation, SOB, weakness, fever, chills, and all other sxs at this time.    Lab testing showed , , alk phos 184, T bili 5.1, lipase 11, glucose 180, plt 119, sodium 134, COVID negative. Abdominal US showed gallbladder distension with internal sludge but without definite stones, intra and extrahepatic biliary ductal dilation which may represent acute cholecystitis or choledocholithiasis. Normal WBC. Blood cultures obtained in the ED, and 1.5 L IV fluids and IV Zosyn given.     Plan for ERCP today but patient decompensated on the floor and sent to ICU. Currently on Bipap and tachycardic with .      Past Medical History:   Diagnosis Date    Hyperlipemia     Hypertension     Other lesions of oral mucosa     PAF (paroxysmal atrial fibrillation)     Thrombocytopenia        Past Surgical History:   Procedure Laterality Date    FRACTURE SURGERY      HERNIA REPAIR         Review of patient's allergies indicates:  No Known Allergies  Family History    None       Tobacco Use    Smoking status: Current Every Day Smoker     Packs/day: 1.50     Years: 45.00     Pack years: 67.50     Types: Cigarettes    Smokeless tobacco: Never Used   Substance and Sexual Activity    Alcohol use: Yes      Comment: 2-3 glasses of whiskey daily    Drug use: Never    Sexual activity: Not on file     Review of Systems   Constitutional: Negative for activity change, appetite change and chills.   Respiratory: Negative for apnea, choking and chest tightness.    Gastrointestinal: Positive for abdominal pain and nausea. Negative for abdominal distention, anal bleeding, blood in stool, constipation, diarrhea and rectal pain.   Musculoskeletal: Negative for arthralgias, back pain, gait problem and joint swelling.   Neurological: Positive for dizziness. Negative for facial asymmetry and headaches.   Hematological: Negative for adenopathy.     Objective:     Vital Signs (Most Recent):  Temp: (!) 102.9 °F (39.4 °C) (12/28/21 0748)  Pulse: (!) 120 (12/28/21 0748)  Resp: (!) 45 (12/28/21 0748)  BP: 123/71 (12/28/21 0020)  SpO2: 100 % (12/28/21 0748) Vital Signs (24h Range):  Temp:  [97.9 °F (36.6 °C)-102.9 °F (39.4 °C)] 102.9 °F (39.4 °C)  Pulse:  [] 120  Resp:  [16-45] 45  SpO2:  [81 %-100 %] 100 %  BP: (120-174)/(55-85) 123/71     Weight: 88 kg (194 lb) (12/28/21 0300)  Body mass index is 31.31 kg/m².      Intake/Output Summary (Last 24 hours) at 12/28/2021 0818  Last data filed at 12/27/2021 1855  Gross per 24 hour   Intake 500 ml   Output --   Net 500 ml       Lines/Drains/Airways     Peripheral Intravenous Line                 Peripheral IV - Single Lumen 12/28/21 0745 20 G Left;Posterior Hand <1 day         Peripheral IV - Single Lumen 12/28/21 0745 20 G Posterior;Right Hand <1 day                Physical Exam  Constitutional:       Appearance: Normal appearance. He is obese.   HENT:      Head: Normocephalic and atraumatic.      Mouth/Throat:      Mouth: Mucous membranes are moist.   Eyes:      Comments: Icteric sclera   Pulmonary:      Effort: Respiratory distress present.      Breath sounds: Normal breath sounds.   Abdominal:      General: Abdomen is flat. Bowel sounds are normal.      Palpations: Abdomen is soft.    Skin:     General: Skin is warm and dry.   Neurological:      General: No focal deficit present.      Mental Status: He is alert and oriented to person, place, and time.         Significant Labs:  Acute Hepatitis Panel: No results for input(s): HEPBSAG, HEPAIGM, HEPCAB in the last 48 hours.    Invalid input(s): HAPBIGM  Amylase: No results for input(s): AMYLASE in the last 48 hours.  Blood Culture:   Recent Labs   Lab 12/27/21 2055 12/27/21 2110   LABBLOO Gram stain aer bottle: Gram negative rods  Gram stain derek bottle: Gram negative rods   Results called to and read back by: Holly Glover RN 12/28/2021    14:45 Gram stain aer bottle: Gram negative rods  Gram stain derek bottle: Gram negative rods   Results called to and read back by: Holly Glover RN 12/28/2021    14:45     CBC:   Recent Labs   Lab 12/27/21  1754 12/28/21  0550   WBC 8.18 10.16   HGB 15.4 14.0   HCT 45.6 40.9   * 100*     BMP:   Recent Labs   Lab 12/28/21  0550   *   *   K 3.2*   CL 99   CO2 23   BUN 22   CREATININE 0.8   CALCIUM 8.4*   MG 1.5*     CMP:   Recent Labs   Lab 12/28/21  0550   *   CALCIUM 8.4*   ALBUMIN 3.3*   PROT 6.6   *   K 3.2*   CO2 23   CL 99   BUN 22   CREATININE 0.8   ALKPHOS 170*   *   *   BILITOT 7.1*     Coagulation:   Recent Labs   Lab 12/28/21  0550   INR 1.0     CRP: No results for input(s): CRP in the last 48 hours.    Significant Imaging:  Imaging results within the past 24 hours have been reviewed.    Assessment/Plan:     Acute cholangitis  Acute cholangitis based on elevated biliurbin, abdominal pain and biliary dilation. ERCP indicated and he has received the indicated zosyn and expanded to Vancomycin in addition given his decompensation. Most likely secondary to sludge or stones. Blood cultures are pending.   I discussed with Dr. Lopez to intubate and plan for ERCP in the OR as he is decompensating.     ERCP is currently recommended. The risks, benefits  and alternatives of the procedure were discussed with the patient in detail. Benefits include removal of stones, stents, debri, sludge; dilation; placement of a stent; biopsies. Risks include bleeding (0.3-2%), pancreatitis (3%), cholangitis (0.5-3%), perforation (0.08-0.6%), cholecystitis (0.5%), sedation related adverse events. This discussion was had in the presence of his duaghter. Educational material of the biliary anatomy has been provided today for educational purposes. Other risks include, risks of adverse reaction to sedation requiring the use of reversal agents, bleeding requiring blood transfusion, perforation requiring surgical intervention, technical failure, aspiration leading to respiratory distress and respiratory failure resulting in endotracheal intubation and mechanical ventilation including death. Anesthesia is utilized for this procedure, it is up to the anesthesiologist to determine airway safety including elective endotracheal intubation. Questions were answered, the patient and daughter agree to proceed. There was no language barriers. Discussion was performed in Estonian.           Thank you for your consult. I will follow-up with patient. Please contact us if you have any additional questions.    Melchor Sarmiento MD  Gastroenterology  '[facility] - Intensive Care ([facility])

## 2021-12-28 NOTE — ED PROVIDER NOTES
SCRIBE #1 NOTE: I, Reyna Kimball, am scribing for, and in the presence of, Frank Gresham MD. I have scribed the entire note.      History      Chief Complaint   Patient presents with    Abdominal Pain     Abd pain x 3 days, points to gastric area, started vomiting today,        Review of patient's allergies indicates:  No Known Allergies     HPI   HPI    12/27/2021, 9:57 PM   History obtained from the patient      History of Present Illness: Lizz Alas is a 71 y.o. male patient with a PMHx of HLD and HTN who presents to the Emergency Department for intermittent epigastric abdominal pain which onset gradually 3 days PTA. Today, the pt reports that his pain has been constant. His symptoms are constant and moderate in severity. No mitigating or exacerbating factors reported. Associated sxs include fever (t now 101), nausea, and vomiting. Patient denies any diarrhea, constipation, SOB, weakness, numbness, chills, and all other sxs at this time. No prior Tx reported. No further complaints or concerns at this time.         Arrival mode: Personal vehicle     PCP: Primary Doctor No       Past Medical History:  Past Medical History:   Diagnosis Date    Hyperlipemia     Hypertension        Past Surgical History:  Past Surgical History:   Procedure Laterality Date    FRACTURE SURGERY      HERNIA REPAIR           Family History:  No family history on file.    Social History:  Social History     Tobacco Use    Smoking status: Current Every Day Smoker     Packs/day: 1.50    Smokeless tobacco: Not on file   Substance and Sexual Activity    Alcohol use: Yes     Comment: occassional    Drug use: Not on file    Sexual activity: Not on file       ROS   Review of Systems   Constitutional: Positive for fever (t now 101). Negative for chills.   HENT: Negative for sore throat.    Respiratory: Negative for shortness of breath.    Cardiovascular: Negative for chest pain.   Gastrointestinal: Positive for abdominal pain  (epigastric), nausea and vomiting. Negative for constipation and diarrhea.   Genitourinary: Negative for dysuria.   Musculoskeletal: Negative for back pain.   Skin: Negative for rash.   Neurological: Negative for weakness and numbness.   Hematological: Does not bruise/bleed easily.   All other systems reviewed and are negative.    Physical Exam      Initial Vitals [12/27/21 1531]   BP Pulse Resp Temp SpO2   (!) 125/55 62 16 97.9 °F (36.6 °C) 95 %      MAP       --          Physical Exam  Nursing Notes and Vital Signs Reviewed.  Constitutional: Patient is in no acute distress. Well-developed and well-nourished.  Head: Atraumatic. Normocephalic.  Eyes: PERRL. EOM intact. Conjunctivae are not pale. No scleral icterus.  ENT: Mucous membranes are moist. Oropharynx is clear and symmetric.    Neck: Supple. Full ROM. No lymphadenopathy.  Cardiovascular: Regular rate. Regular rhythm. No murmurs, rubs, or gallops. Distal pulses are 2+ and symmetric.  Pulmonary/Chest: No respiratory distress. Clear to auscultation bilaterally. No wheezing or rales.  Abdominal: Soft and non-distended.  There is RUQ and epigastric tenderness.  No rebound, guarding, or rigidity.  Musculoskeletal: Moves all extremities. No obvious deformities. No edema.  Skin: Warm and dry. Jaundice.  Neurological:  Alert, awake, and appropriate.  Normal speech.  No acute focal neurological deficits are appreciated.  Psychiatric: Normal affect. Good eye contact. Appropriate in content.    ED Course    Critical Care    Date/Time: 12/27/2021 10:18 PM  Performed by: Frank Gresham MD  Authorized by: Frank Gresham MD   Direct patient critical care time: 13 minutes  Additional history critical care time: 4 minutes  Ordering / reviewing critical care time: 7 minutes  Documentation critical care time: 6 minutes  Consulting other physicians critical care time: 11 minutes  Total critical care time (exclusive of procedural time) : 41 minutes  Critical care time was  "exclusive of separately billable procedures and treating other patients and teaching time.  Critical care was necessary to treat or prevent imminent or life-threatening deterioration of the following conditions: acute cholangitis.  Critical care was time spent personally by me on the following activities: blood draw for specimens, development of treatment plan with patient or surrogate, discussions with consultants, interpretation of cardiac output measurements, evaluation of patient's response to treatment, examination of patient, obtaining history from patient or surrogate, ordering and performing treatments and interventions, ordering and review of laboratory studies, ordering and review of radiographic studies, pulse oximetry, re-evaluation of patient's condition and review of old charts.        ED Vital Signs:  Vitals:    12/27/21 1531 12/27/21 1800 12/27/21 2009 12/27/21 2013   BP: (!) 125/55 (!) 120/56  (!) 161/72   Pulse: 62 67  104   Resp: 16 18 (!) 24 (!) 24   Temp: 97.9 °F (36.6 °C) 98 °F (36.7 °C)  (!) 102 °F (38.9 °C)   TempSrc: Oral Oral  Oral   SpO2: 95% 96%  (!) 81%   Height: 5' 6" (1.676 m)       12/27/21 2130   BP: (!) 167/84   Pulse: 97   Resp: 18   Temp:    TempSrc:    SpO2: (!) 91%   Height:        Abnormal Lab Results:  Labs Reviewed   CBC W/ AUTO DIFFERENTIAL - Abnormal; Notable for the following components:       Result Value    Platelets 119 (*)     Lymph # 0.5 (*)     Gran % 88.3 (*)     Lymph % 5.5 (*)     All other components within normal limits   COMPREHENSIVE METABOLIC PANEL - Abnormal; Notable for the following components:    Sodium 134 (*)     Chloride 94 (*)     Glucose 180 (*)     Total Bilirubin 5.1 (*)     Alkaline Phosphatase 184 (*)      (*)      (*)     All other components within normal limits   URINALYSIS, REFLEX TO URINE CULTURE - Abnormal; Notable for the following components:    Specific Gravity, UA >=1.030 (*)     Protein, UA Trace (*)     Ketones, UA 2+ " (*)     Bilirubin (UA) 2+ (*)     Occult Blood UA Trace (*)     Urobilinogen, UA 4.0-6.0 (*)     All other components within normal limits    Narrative:     Specimen Source->Urine   CULTURE, BLOOD   CULTURE, BLOOD   LIPASE   TROPONIN I   LACTIC ACID, PLASMA   SARS-COV-2 RDRP GENE        All Lab Results:  Results for orders placed or performed during the hospital encounter of 12/27/21   CBC W/ AUTO DIFFERENTIAL   Result Value Ref Range    WBC 8.18 3.90 - 12.70 K/uL    RBC 4.98 4.60 - 6.20 M/uL    Hemoglobin 15.4 14.0 - 18.0 g/dL    Hematocrit 45.6 40.0 - 54.0 %    MCV 92 82 - 98 fL    MCH 30.9 27.0 - 31.0 pg    MCHC 33.8 32.0 - 36.0 g/dL    RDW 13.7 11.5 - 14.5 %    Platelets 119 (L) 150 - 450 K/uL    MPV 10.2 9.2 - 12.9 fL    Immature Granulocytes 0.5 0.0 - 0.5 %    Gran # (ANC) 7.2 1.8 - 7.7 K/uL    Immature Grans (Abs) 0.04 0.00 - 0.04 K/uL    Lymph # 0.5 (L) 1.0 - 4.8 K/uL    Mono # 0.5 0.3 - 1.0 K/uL    Eos # 0.0 0.0 - 0.5 K/uL    Baso # 0.02 0.00 - 0.20 K/uL    nRBC 0 0 /100 WBC    Gran % 88.3 (H) 38.0 - 73.0 %    Lymph % 5.5 (L) 18.0 - 48.0 %    Mono % 5.5 4.0 - 15.0 %    Eosinophil % 0.0 0.0 - 8.0 %    Basophil % 0.2 0.0 - 1.9 %    Differential Method Automated    Comp. Metabolic Panel   Result Value Ref Range    Sodium 134 (L) 136 - 145 mmol/L    Potassium 3.9 3.5 - 5.1 mmol/L    Chloride 94 (L) 95 - 110 mmol/L    CO2 25 23 - 29 mmol/L    Glucose 180 (H) 70 - 110 mg/dL    BUN 22 8 - 23 mg/dL    Creatinine 0.8 0.5 - 1.4 mg/dL    Calcium 9.5 8.7 - 10.5 mg/dL    Total Protein 8.2 6.0 - 8.4 g/dL    Albumin 4.1 3.5 - 5.2 g/dL    Total Bilirubin 5.1 (H) 0.1 - 1.0 mg/dL    Alkaline Phosphatase 184 (H) 55 - 135 U/L     (H) 10 - 40 U/L     (H) 10 - 44 U/L    Anion Gap 15 8 - 16 mmol/L    eGFR if African American >60 >60 mL/min/1.73 m^2    eGFR if non African American >60 >60 mL/min/1.73 m^2   Lipase   Result Value Ref Range    Lipase 11 4 - 60 U/L   Urinalysis, Reflex to Urine Culture Urine, Clean Catch     Specimen: Urine   Result Value Ref Range    Specimen UA Urine, Clean Catch     Color, UA Yellow Yellow, Straw, Azra    Appearance, UA Clear Clear    pH, UA 6.0 5.0 - 8.0    Specific Gravity, UA >=1.030 (A) 1.005 - 1.030    Protein, UA Trace (A) Negative    Glucose, UA Negative Negative    Ketones, UA 2+ (A) Negative    Bilirubin (UA) 2+ (A) Negative    Occult Blood UA Trace (A) Negative    Nitrite, UA Negative Negative    Urobilinogen, UA 4.0-6.0 (A) <2.0 EU/dL    Leukocytes, UA Negative Negative   Troponin I   Result Value Ref Range    Troponin I <0.006 0.000 - 0.026 ng/mL   Lactic acid, plasma   Result Value Ref Range    Lactate (Lactic Acid) 1.5 0.5 - 2.2 mmol/L         Imaging Results:  Imaging Results           US Abdomen Limited (Final result)  Result time 12/27/21 21:10:46    Final result by Ivan Montes MD (12/27/21 21:10:46)                 Impression:      Gallbladder distension with internal sludge but without definite stones.  Intra and extrahepatic biliary ductal dilation.  Consider further evaluation with contrast enhanced CT.  Acute cholecystitis or choledocholithiasis not excluded.    Pancreas is largely obscured by bowel gas.    This report was flagged in Epic as abnormal.      Electronically signed by: Ivan Montes  Date:    12/27/2021  Time:    21:10             Narrative:    EXAMINATION:  US ABDOMEN LIMITED    CLINICAL HISTORY:  RUQ tenderness, liver enzymes elevated;    TECHNIQUE:  Limited ultrasound of the right upper quadrant of the abdomen (including pancreas, liver, gallbladder, common bile duct, and Right kidney) was performed.    COMPARISON:  None.    FINDINGS:  Pancreas: No definite abnormality noting the pancreas is largely obscured by bowel gas.    Liver: Normal in size, measuring 14.1 cm. Homogeneous echotexture. No focal hepatic lesions.    Gallbladder: Gallbladder is distended.  No definite internal stones.  There is sludge.  Gallbladder wall thickening.    Biliary system:  The common duct is dilated, measuring 14 mm.  There is intrahepatic biliary ductal dilation.    Right kidney: Normal in size and echotexture, measuring 13 cm.    Miscellaneous: No upper abdominal ascites.                               The EKG was ordered, reviewed, and independently interpreted by the ED provider.  Interpretation time: 15:30  Rate: 64 BPM  Rhythm: Sinus rhythm with Fusion complexes and Premature atrial complexes  Interpretation: Incomplete RBBB. No STEMI.           The Emergency Provider reviewed the vital signs and test results, which are outlined above.    ED Discussion   9:46 PM: Discussed pt's case with Dr. Pelaez (Gastroenterology) who recommends ERCP, start zosyn, NPO after midnight, IVFs, and admission to hospital medicine.    10:09 PM: Discussed case with Paige Whitaker NP (Hospital Medicine). Dr. Pike agrees with current care and management of pt and accepts admission.   Admitting Service: Hospital Medicine  Admitting Physician: Dr. Pike  Admit to: Obs Med Tele    10:10 PM: Re-evaluated pt. I have discussed test results, shared treatment plan, and the need for admission with patient and family at bedside. Pt and family express understanding at this time and agree with all information. All questions answered. Pt and family have no further questions or concerns at this time. Pt is ready for admit.               ED Medication(s):  Medications   sodium chloride 0.9% bolus 1,000 mL (1,000 mLs Intravenous New Bag 12/27/21 2132)   ondansetron injection 4 mg (4 mg Intravenous Given 12/27/21 1756)   sodium chloride 0.9% bolus 500 mL (0 mLs Intravenous Stopped 12/27/21 1855)   morphine injection 4 mg (4 mg Intravenous Given 12/27/21 2009)   ondansetron injection 4 mg (4 mg Intravenous Given 12/27/21 2008)   piperacillin-tazobactam 4.5 g in dextrose 5 % 100 mL IVPB (ready to mix system) (0 g Intravenous Stopped 12/27/21 2215)             Medical Decision Making    Medical Decision Making:    Clinical Tests:   Lab Tests: Ordered and Reviewed  Radiological Study: Ordered and Reviewed  Medical Tests: Ordered and Reviewed           Scribe Attestation:   Scribe #1: I performed the above scribed service and the documentation accurately describes the services I performed. I attest to the accuracy of the note.    Attending:   Physician Attestation Statement for Scribe #1: I, Frank Gresham MD, personally performed the services described in this documentation, as scribed by Reyna Kimball, in my presence, and it is both accurate and complete.          Clinical Impression       ICD-10-CM ICD-9-CM   1. Acute cholangitis  K83.09 576.1   2. Epigastric abdominal pain  R10.13 789.06       Disposition:   Disposition: Placed in Observation  Condition: Fair         Frank Gresham MD  12/28/21 0337

## 2021-12-28 NOTE — SUBJECTIVE & OBJECTIVE
Past Medical History:   Diagnosis Date    Hyperlipemia     Hypertension     Other lesions of oral mucosa     PAF (paroxysmal atrial fibrillation)     Thrombocytopenia        Past Surgical History:   Procedure Laterality Date    FRACTURE SURGERY      HERNIA REPAIR         Review of patient's allergies indicates:  No Known Allergies  Family History    None       Tobacco Use    Smoking status: Current Every Day Smoker     Packs/day: 1.50     Years: 45.00     Pack years: 67.50     Types: Cigarettes    Smokeless tobacco: Never Used   Substance and Sexual Activity    Alcohol use: Yes     Comment: 2-3 glasses of whiskey daily    Drug use: Never    Sexual activity: Not on file     Review of Systems   Constitutional: Negative for activity change, appetite change and chills.   Respiratory: Negative for apnea, choking and chest tightness.    Gastrointestinal: Positive for abdominal pain and nausea. Negative for abdominal distention, anal bleeding, blood in stool, constipation, diarrhea and rectal pain.   Musculoskeletal: Negative for arthralgias, back pain, gait problem and joint swelling.   Neurological: Positive for dizziness. Negative for facial asymmetry and headaches.   Hematological: Negative for adenopathy.     Objective:     Vital Signs (Most Recent):  Temp: (!) 102.9 °F (39.4 °C) (12/28/21 0748)  Pulse: (!) 120 (12/28/21 0748)  Resp: (!) 45 (12/28/21 0748)  BP: 123/71 (12/28/21 0020)  SpO2: 100 % (12/28/21 0748) Vital Signs (24h Range):  Temp:  [97.9 °F (36.6 °C)-102.9 °F (39.4 °C)] 102.9 °F (39.4 °C)  Pulse:  [] 120  Resp:  [16-45] 45  SpO2:  [81 %-100 %] 100 %  BP: (120-174)/(55-85) 123/71     Weight: 88 kg (194 lb) (12/28/21 0300)  Body mass index is 31.31 kg/m².      Intake/Output Summary (Last 24 hours) at 12/28/2021 0818  Last data filed at 12/27/2021 1855  Gross per 24 hour   Intake 500 ml   Output --   Net 500 ml       Lines/Drains/Airways     Peripheral Intravenous Line                  Peripheral IV - Single Lumen 12/28/21 0745 20 G Left;Posterior Hand <1 day         Peripheral IV - Single Lumen 12/28/21 0745 20 G Posterior;Right Hand <1 day                Physical Exam  Constitutional:       Appearance: Normal appearance. He is obese.   HENT:      Head: Normocephalic and atraumatic.      Mouth/Throat:      Mouth: Mucous membranes are moist.   Eyes:      Comments: Icteric sclera   Pulmonary:      Effort: Respiratory distress present.      Breath sounds: Normal breath sounds.   Abdominal:      General: Abdomen is flat. Bowel sounds are normal.      Palpations: Abdomen is soft.   Skin:     General: Skin is warm and dry.   Neurological:      General: No focal deficit present.      Mental Status: He is alert and oriented to person, place, and time.         Significant Labs:  Acute Hepatitis Panel: No results for input(s): HEPBSAG, HEPAIGM, HEPCAB in the last 48 hours.    Invalid input(s): HAPBIGM  Amylase: No results for input(s): AMYLASE in the last 48 hours.  Blood Culture:   Recent Labs   Lab 12/27/21 2055 12/27/21 2110   LABBLOO Gram stain aer bottle: Gram negative rods  Gram stain derek bottle: Gram negative rods   Results called to and read back by: Holly Glover RN 12/28/2021    14:45 Gram stain aer bottle: Gram negative rods  Gram stain derek bottle: Gram negative rods   Results called to and read back by: Holly Glover RN 12/28/2021    14:45     CBC:   Recent Labs   Lab 12/27/21  1754 12/28/21  0550   WBC 8.18 10.16   HGB 15.4 14.0   HCT 45.6 40.9   * 100*     BMP:   Recent Labs   Lab 12/28/21  0550   *   *   K 3.2*   CL 99   CO2 23   BUN 22   CREATININE 0.8   CALCIUM 8.4*   MG 1.5*     CMP:   Recent Labs   Lab 12/28/21  0550   *   CALCIUM 8.4*   ALBUMIN 3.3*   PROT 6.6   *   K 3.2*   CO2 23   CL 99   BUN 22   CREATININE 0.8   ALKPHOS 170*   *   *   BILITOT 7.1*     Coagulation:   Recent Labs   Lab 12/28/21  0550   INR 1.0     CRP: No  results for input(s): CRP in the last 48 hours.    Significant Imaging:  Imaging results within the past 24 hours have been reviewed.

## 2021-12-28 NOTE — ASSESSMENT & PLAN NOTE
Acute cholangitis based on elevated biliurbin, abdominal pain and biliary dilation. ERCP indicated and he has received the indicated zosyn and expanded to Vancomycin in addition given his decompensation. Most likely secondary to sludge or stones. Blood cultures are pending.   I discussed with Dr. Lopez to intubate and plan for ERCP in the OR as he is decompensating.     ERCP is currently recommended. The risks, benefits and alternatives of the procedure were discussed with the patient in detail. Benefits include removal of stones, stents, debri, sludge; dilation; placement of a stent; biopsies. Risks include bleeding (0.3-2%), pancreatitis (3%), cholangitis (0.5-3%), perforation (0.08-0.6%), cholecystitis (0.5%), sedation related adverse events. This discussion was had in the presence of his duaghter. Educational material of the biliary anatomy has been provided today for educational purposes. Other risks include, risks of adverse reaction to sedation requiring the use of reversal agents, bleeding requiring blood transfusion, perforation requiring surgical intervention, technical failure, aspiration leading to respiratory distress and respiratory failure resulting in endotracheal intubation and mechanical ventilation including death. Anesthesia is utilized for this procedure, it is up to the anesthesiologist to determine airway safety including elective endotracheal intubation. Questions were answered, the patient and daughter agree to proceed. There was no language barriers. Discussion was performed in [place].

## 2021-12-28 NOTE — H&P
[facility] Medicine  History & Physical    Patient Name: Lizz Alas  MRN: [id number]  Patient Class: OP- Observation  Admission Date: 12/27/2021  Attending Physician: Jeremiah Pike MD   Primary Care Provider: Primary Doctor No         Patient information was obtained from patient and ER records.     Subjective:     Principal Problem:Acute cholangitis    Chief Complaint:   Chief Complaint   Patient presents with    Abdominal Pain     Abd pain x 3 days, points to gastric area, started vomiting today,         HPI: Lizz Alas is a 71 y.o. [place]ian speaking male patient with a PMHx of PAF, HLD, HTN, thrombocytopenia, alcohol abuse, and tobacco use who presents to the Emergency Department for intermittent epigastric abdominal pain which onset gradually 3 days PTA. Today, the pt reports that his pain has been constant. His symptoms are constant and moderate in severity. No mitigating or exacerbating factors reported. Associated sxs include fever (102 F), nausea, and vomiting. Patient denies any diarrhea, constipation, SOB, weakness, fever, chills, and all other sxs at this time. Initial work-up in the ED showed: Normal WBC and lactic, , , alk phos 184, T bili 5.1, lipase 11, glucose 180, plt 119, sodium 134, COVID negative. Abdominal US showed gallbladder distension with internal sludge but without definite stones, intra and extrahepatic biliary ductal dilation which may represent acute cholecystitis or choledocholithiasis. Blood cultures obtained in the ED, and 1.5 L IV fluids and IV Zosyn given. Case was discussed with GI per the ED, who recommend NPO, IV abx, and ERCP in AM. Hospital Medicine was contacted for admission and patient placed in Observation.       Past Medical History:   Diagnosis Date    Hyperlipemia     Hypertension     Other lesions of oral mucosa     PAF (paroxysmal atrial fibrillation)     Thrombocytopenia        Past Surgical History:   Procedure Laterality Date     FRACTURE SURGERY      HERNIA REPAIR         Review of patient's allergies indicates:  No Known Allergies    No current facility-administered medications on file prior to encounter.     Current Outpatient Medications on File Prior to Encounter   Medication Sig    amLODIPine (NORVASC) 5 MG tablet Take 5 mg by mouth once daily.    azithromycin (Z-AJAY) 250 MG tablet Take 1 tablet (250 mg total) by mouth once daily. Take first 2 tablets together, then 1 every day until finished.    bisoprolol (ZEBETA) 5 MG tablet Take 5 mg by mouth once daily.    hydroCHLOROthiazide (MICROZIDE) 12.5 mg capsule Take 12.5 mg by mouth once daily.    ibuprofen (ADVIL,MOTRIN) 800 MG tablet Take 1 tablet (800 mg total) by mouth every 6 (six) hours as needed for Pain.    ibuprofen (ADVIL,MOTRIN) 800 MG tablet Take 1 tablet (800 mg total) by mouth every 6 (six) hours as needed for Pain.    ondansetron (ZOFRAN) 4 MG tablet Take 1 tablet (4 mg total) by mouth every 6 (six) hours. For nausea    rosuvastatin (CRESTOR) 20 MG tablet Take 20 mg by mouth once daily.     Family History    Reviewed and not pertinent.        Tobacco Use    Smoking status: Current Every Day Smoker     Packs/day: 1.50     Years: 45.00     Pack years: 67.50     Types: Cigarettes    Smokeless tobacco: Never Used   Substance and Sexual Activity    Alcohol use: Yes     Comment: 2-3 glasses of whiskey daily    Drug use: Never    Sexual activity: Not on file     Review of Systems   Constitutional: Positive for fever. Negative for appetite change, chills, diaphoresis and fatigue.   Respiratory: Negative for cough, shortness of breath and wheezing.    Cardiovascular: Negative for chest pain, palpitations and leg swelling.   Gastrointestinal: Positive for abdominal pain, nausea and vomiting. Negative for abdominal distention, blood in stool, constipation and diarrhea.   Genitourinary: Negative for dysuria and hematuria.   Musculoskeletal: Negative for arthralgias,  back pain and myalgias.   Skin: Negative for color change, pallor and rash.   Neurological: Negative for dizziness, syncope, weakness, light-headedness, numbness and headaches.   Psychiatric/Behavioral: Negative for confusion.   All other systems reviewed and are negative.    Objective:     Vital Signs (Most Recent):  Temp: (!) 102.9 °F (39.4 °C) (12/27/21 2230)  Pulse: 100 (12/27/21 2230)  Resp: (!) 26 (12/27/21 2230)  BP: (!) 174/85 (12/27/21 2230)  SpO2: (!) 92 % (12/27/21 2230) Vital Signs (24h Range):  Temp:  [97.9 °F (36.6 °C)-102.9 °F (39.4 °C)] 102.9 °F (39.4 °C)  Pulse:  [] 100  Resp:  [16-26] 26  SpO2:  [81 %-96 %] 92 %  BP: (120-174)/(55-85) 174/85        Body mass index is 29.85 kg/m².    Physical Exam  Vitals and nursing note reviewed.   Constitutional:       General: He is awake. He is not in acute distress.     Appearance: Normal appearance. He is well-developed. He is not diaphoretic.   HENT:      Head: Normocephalic and atraumatic.   Eyes:      Conjunctiva/sclera: Conjunctivae normal.   Cardiovascular:      Rate and Rhythm: Normal rate and regular rhythm.  No extrasystoles are present.     Heart sounds: S1 normal and S2 normal. No murmur heard.      Pulmonary:      Effort: Pulmonary effort is normal. No tachypnea.      Breath sounds: Normal breath sounds and air entry. No wheezing, rhonchi or rales.   Abdominal:      General: Bowel sounds are normal. There is no distension.      Palpations: Abdomen is soft. There is no hepatomegaly.      Tenderness: There is no abdominal tenderness. There is no guarding or rebound. Negative signs include Flores's sign.   Musculoskeletal:         General: No tenderness or deformity. Normal range of motion.      Cervical back: Normal range of motion and neck supple.      Right lower leg: No edema.      Left lower leg: No edema.   Skin:     General: Skin is warm and dry.      Capillary Refill: Capillary refill takes less than 2 seconds.      Coloration: Skin is  jaundiced.      Findings: No erythema or rash.   Neurological:      General: No focal deficit present.      Mental Status: He is alert and oriented to person, place, and time.   Psychiatric:         Mood and Affect: Mood and affect normal.         Behavior: Behavior normal. Behavior is cooperative.             Significant Labs:  Results for orders placed or performed during the hospital encounter of 12/27/21   CBC W/ AUTO DIFFERENTIAL   Result Value Ref Range    WBC 8.18 3.90 - 12.70 K/uL    RBC 4.98 4.60 - 6.20 M/uL    Hemoglobin 15.4 14.0 - 18.0 g/dL    Hematocrit 45.6 40.0 - 54.0 %    MCV 92 82 - 98 fL    MCH 30.9 27.0 - 31.0 pg    MCHC 33.8 32.0 - 36.0 g/dL    RDW 13.7 11.5 - 14.5 %    Platelets 119 (L) 150 - 450 K/uL    MPV 10.2 9.2 - 12.9 fL    Immature Granulocytes 0.5 0.0 - 0.5 %    Gran # (ANC) 7.2 1.8 - 7.7 K/uL    Immature Grans (Abs) 0.04 0.00 - 0.04 K/uL    Lymph # 0.5 (L) 1.0 - 4.8 K/uL    Mono # 0.5 0.3 - 1.0 K/uL    Eos # 0.0 0.0 - 0.5 K/uL    Baso # 0.02 0.00 - 0.20 K/uL    nRBC 0 0 /100 WBC    Gran % 88.3 (H) 38.0 - 73.0 %    Lymph % 5.5 (L) 18.0 - 48.0 %    Mono % 5.5 4.0 - 15.0 %    Eosinophil % 0.0 0.0 - 8.0 %    Basophil % 0.2 0.0 - 1.9 %    Differential Method Automated    Comp. Metabolic Panel   Result Value Ref Range    Sodium 134 (L) 136 - 145 mmol/L    Potassium 3.9 3.5 - 5.1 mmol/L    Chloride 94 (L) 95 - 110 mmol/L    CO2 25 23 - 29 mmol/L    Glucose 180 (H) 70 - 110 mg/dL    BUN 22 8 - 23 mg/dL    Creatinine 0.8 0.5 - 1.4 mg/dL    Calcium 9.5 8.7 - 10.5 mg/dL    Total Protein 8.2 6.0 - 8.4 g/dL    Albumin 4.1 3.5 - 5.2 g/dL    Total Bilirubin 5.1 (H) 0.1 - 1.0 mg/dL    Alkaline Phosphatase 184 (H) 55 - 135 U/L     (H) 10 - 40 U/L     (H) 10 - 44 U/L    Anion Gap 15 8 - 16 mmol/L    eGFR if African American >60 >60 mL/min/1.73 m^2    eGFR if non African American >60 >60 mL/min/1.73 m^2   Lipase   Result Value Ref Range    Lipase 11 4 - 60 U/L   Urinalysis, Reflex to Urine  Culture Urine, Clean Catch    Specimen: Urine   Result Value Ref Range    Specimen UA Urine, Clean Catch     Color, UA Yellow Yellow, Straw, Azra    Appearance, UA Clear Clear    pH, UA 6.0 5.0 - 8.0    Specific Gravity, UA >=1.030 (A) 1.005 - 1.030    Protein, UA Trace (A) Negative    Glucose, UA Negative Negative    Ketones, UA 2+ (A) Negative    Bilirubin (UA) 2+ (A) Negative    Occult Blood UA Trace (A) Negative    Nitrite, UA Negative Negative    Urobilinogen, UA 4.0-6.0 (A) <2.0 EU/dL    Leukocytes, UA Negative Negative   Troponin I   Result Value Ref Range    Troponin I <0.006 0.000 - 0.026 ng/mL   Lactic acid, plasma   Result Value Ref Range    Lactate (Lactic Acid) 1.5 0.5 - 2.2 mmol/L   POCT COVID-19 Rapid Screening   Result Value Ref Range    POC Rapid COVID Negative Negative     Acceptable Yes       All pertinent labs within the past 24 hours have been reviewed.    Significant Imaging:  Imaging Results           US Abdomen Limited (Final result)  Result time 12/27/21 21:10:46    Final result by Ivan Montes MD (12/27/21 21:10:46)                 Impression:      Gallbladder distension with internal sludge but without definite stones.  Intra and extrahepatic biliary ductal dilation.  Consider further evaluation with contrast enhanced CT.  Acute cholecystitis or choledocholithiasis not excluded.    Pancreas is largely obscured by bowel gas.    This report was flagged in Epic as abnormal.      Electronically signed by: Ivan Montes  Date:    12/27/2021  Time:    21:10             Narrative:    EXAMINATION:  US ABDOMEN LIMITED    CLINICAL HISTORY:  RUQ tenderness, liver enzymes elevated;    TECHNIQUE:  Limited ultrasound of the right upper quadrant of the abdomen (including pancreas, liver, gallbladder, common bile duct, and Right kidney) was performed.    COMPARISON:  None.    FINDINGS:  Pancreas: No definite abnormality noting the pancreas is largely obscured by bowel gas.    Liver:  Normal in size, measuring 14.1 cm. Homogeneous echotexture. No focal hepatic lesions.    Gallbladder: Gallbladder is distended.  No definite internal stones.  There is sludge.  Gallbladder wall thickening.    Biliary system: The common duct is dilated, measuring 14 mm.  There is intrahepatic biliary ductal dilation.    Right kidney: Normal in size and echotexture, measuring 13 cm.    Miscellaneous: No upper abdominal ascites.                               I have reviewed all pertinent imaging results/findings within the past 24 hours.               Assessment/Plan:     * Acute cholangitis  - Abdominal US showed: Gallbladder distension with internal sludge but without definite stones. Intra and extrahepatic biliary ductal dilation. Acute cholecystitis or choledocholithiasis not excluded.  - GI consult. Plans for ERCP in AM. Will keep NPO.  - Blood cultures obtained in the ED. Continue empiric IV Zosyn.   - IV hydration.  - Analgesics and antiemetics as needed.   - Follow labs.     Alcohol abuse  - Monitor for withdrawal/DTs. IV Ativan if needed.     Tobacco use  - Assistance with smoking cessation was offered, including:  [x]  Medications  [x]  Counseling  [x]  Printed Information on Smoking Cessation  []  Referral to a Smoking Cessation Program  - Patient was counseled regarding smoking for 3-10 minutes.    Primary hypertension  - Continue home antihypertensives.     Paroxysmal atrial fibrillation  - Remains in sinus rhythm on admission. Monitor telemetry.  - Continue home BB.  - Patient is not on long-term AC. Will defer to his primary cardiologist.       VTE Risk Mitigation (From admission, onward)         Ordered     IP VTE HIGH RISK PATIENT  Once         12/27/21 2247     Place sequential compression device  Until discontinued         12/27/21 2247     Reason for No Pharmacological VTE Prophylaxis  Once        Question Answer Comment   Reasons: Risk of Bleeding    Reasons: Thrombocytopenia        12/27/21 2247                    Paige Whitaker NP  Department of Hospital Medicine   [facility]

## 2021-12-28 NOTE — PROGRESS NOTES
ERCP performed. The biliary tree was significantly dilated.  Multiple stones and sludge removed from the bile duct with pus draining after sphincterotomy and balloon sweep. Two plastic biliary stents were placed.    Please continue broad spectrum antibiotics and observe.   Will need repeat ERCP in 2 months to remove the stents.   Surgical consult in the AM to plan cholecystectomy after resolution of cholangitis.    Melchor Sarmiento MD  Gastroenterology  Director of Advanced Endoscopy at [facility]

## 2021-12-28 NOTE — ASSESSMENT & PLAN NOTE
Will encourage tobacco cessation post extubation and once awake/alert  Add LABA and ICS nebs  On Nicotine patch

## 2021-12-28 NOTE — PROCEDURES
"Lizz Alas is a 71 y.o. male patient.    Temp: (!) 102.9 °F (39.4 °C) (12/28/21 0748)  Pulse: 92 (12/28/21 1058)  Resp: (!) 22 (12/28/21 1058)  BP: (!) 71/48 (12/28/21 0935)  SpO2: 95 % (12/28/21 1058)  Weight: 88 kg (194 lb) (12/28/21 0300)  Height: 5' 6" (167.6 cm) (12/28/21 0300)       Intubation    Date/Time: 12/28/2021 11:09 AM  Location procedure was performed: [facility] INTENSIVE CARE UNIT  Performed by: Nishant Caballero NP  Authorized by: Nishant Caballero NP   Pre-operative diagnosis: Hypoxic Resp Failure  Post-operative diagnosis: Hypoxic Resp Failure  Consent Done: Yes  Time out: Immediately prior to procedure a "time out" was called to verify the correct patient, procedure, equipment, support staff and site/side marked as required.  Indications: respiratory failure and  hypoxemia  Intubation method: oral  Patient status: awake  Preoxygenation: bag valve mask  Sedatives: etomidate  Paralytic: succinylcholine  Laryngoscope size: Mac 4  Tube size: 8.5 mm  Tube type: cuffed  Number of attempts: 1  Cricoid pressure: no  Cords visualized: yes  Post-procedure assessment: chest rise and CO2 detector  Breath sounds: equal and absent over the epigastrium  Cuff inflated: yes  ETT to lip: 26 cm  Tube secured with: ETT calero  Chest x-ray interpreted by me.  Chest x-ray findings: endotracheal tube in appropriate position  Patient tolerance: Patient tolerated the procedure well with no immediate complications  Complications: No  Estimated blood loss (mL): 0  Specimens: No  Implants: No          12/28/2021  "

## 2021-12-29 PROBLEM — N17.9 AKI (ACUTE KIDNEY INJURY): Status: ACTIVE | Noted: 2021-12-29

## 2021-12-29 PROBLEM — B96.20 E COLI BACTEREMIA: Status: ACTIVE | Noted: 2021-12-28

## 2021-12-29 PROBLEM — J96.02 ACUTE RESPIRATORY FAILURE WITH HYPOXIA AND HYPERCARBIA: Status: ACTIVE | Noted: 2021-12-28

## 2021-12-29 PROBLEM — R73.9 ACUTE HYPERGLYCEMIA: Status: ACTIVE | Noted: 2021-12-29

## 2021-12-29 PROBLEM — D69.6 THROMBOCYTOPENIA: Status: ACTIVE | Noted: 2021-12-29

## 2021-12-29 LAB
ALBUMIN SERPL BCP-MCNC: 2.7 G/DL (ref 3.5–5.2)
ALBUMIN SERPL BCP-MCNC: 2.9 G/DL (ref 3.5–5.2)
ALLENS TEST: ABNORMAL
ALLENS TEST: ABNORMAL
ALP SERPL-CCNC: 162 U/L (ref 55–135)
ALP SERPL-CCNC: 179 U/L (ref 55–135)
ALT SERPL W/O P-5'-P-CCNC: 161 U/L (ref 10–44)
ALT SERPL W/O P-5'-P-CCNC: 188 U/L (ref 10–44)
ANION GAP SERPL CALC-SCNC: 12 MMOL/L (ref 8–16)
ANION GAP SERPL CALC-SCNC: 12 MMOL/L (ref 8–16)
AST SERPL-CCNC: 100 U/L (ref 10–40)
AST SERPL-CCNC: 117 U/L (ref 10–40)
BASOPHILS # BLD AUTO: 0.12 K/UL (ref 0–0.2)
BASOPHILS NFR BLD: 0.7 % (ref 0–1.9)
BILIRUB SERPL-MCNC: 11.5 MG/DL (ref 0.1–1)
BILIRUB SERPL-MCNC: 11.8 MG/DL (ref 0.1–1)
BUN SERPL-MCNC: 42 MG/DL (ref 8–23)
BUN SERPL-MCNC: 48 MG/DL (ref 8–23)
CALCIUM SERPL-MCNC: 7.8 MG/DL (ref 8.7–10.5)
CALCIUM SERPL-MCNC: 7.9 MG/DL (ref 8.7–10.5)
CHLORIDE SERPL-SCNC: 100 MMOL/L (ref 95–110)
CHLORIDE SERPL-SCNC: 101 MMOL/L (ref 95–110)
CO2 SERPL-SCNC: 18 MMOL/L (ref 23–29)
CO2 SERPL-SCNC: 19 MMOL/L (ref 23–29)
CREAT SERPL-MCNC: 2.7 MG/DL (ref 0.5–1.4)
CREAT SERPL-MCNC: 3.3 MG/DL (ref 0.5–1.4)
DELSYS: ABNORMAL
DELSYS: ABNORMAL
DIFFERENTIAL METHOD: ABNORMAL
EOSINOPHIL # BLD AUTO: 0.1 K/UL (ref 0–0.5)
EOSINOPHIL NFR BLD: 0.3 % (ref 0–8)
ERYTHROCYTE [DISTWIDTH] IN BLOOD BY AUTOMATED COUNT: 14.8 % (ref 11.5–14.5)
ERYTHROCYTE [SEDIMENTATION RATE] IN BLOOD BY WESTERGREN METHOD: 20 MM/H
ERYTHROCYTE [SEDIMENTATION RATE] IN BLOOD BY WESTERGREN METHOD: 26 MM/H
EST. GFR  (AFRICAN AMERICAN): 20 ML/MIN/1.73 M^2
EST. GFR  (AFRICAN AMERICAN): 26 ML/MIN/1.73 M^2
EST. GFR  (NON AFRICAN AMERICAN): 18 ML/MIN/1.73 M^2
EST. GFR  (NON AFRICAN AMERICAN): 22 ML/MIN/1.73 M^2
FIO2: 100
FIO2: 60
GLUCOSE SERPL-MCNC: 141 MG/DL (ref 70–110)
GLUCOSE SERPL-MCNC: 141 MG/DL (ref 70–110)
GLUCOSE SERPL-MCNC: 143 MG/DL (ref 70–110)
HCO3 UR-SCNC: 19.5 MMOL/L (ref 24–28)
HCO3 UR-SCNC: 20.5 MMOL/L (ref 24–28)
HCT VFR BLD AUTO: 41.9 % (ref 40–54)
HCT VFR BLD CALC: 40 %PCV (ref 36–54)
HGB BLD-MCNC: 13.9 G/DL (ref 14–18)
IMM GRANULOCYTES # BLD AUTO: 0.23 K/UL (ref 0–0.04)
IMM GRANULOCYTES NFR BLD AUTO: 1.3 % (ref 0–0.5)
LACTATE SERPL-SCNC: 1 MMOL/L (ref 0.5–2.2)
LYMPHOCYTES # BLD AUTO: 0.9 K/UL (ref 1–4.8)
LYMPHOCYTES NFR BLD: 4.8 % (ref 18–48)
MAGNESIUM SERPL-MCNC: 2.1 MG/DL (ref 1.6–2.6)
MCH RBC QN AUTO: 31.1 PG (ref 27–31)
MCHC RBC AUTO-ENTMCNC: 33.2 G/DL (ref 32–36)
MCV RBC AUTO: 94 FL (ref 82–98)
MODE: ABNORMAL
MODE: ABNORMAL
MONOCYTES # BLD AUTO: 1.6 K/UL (ref 0.3–1)
MONOCYTES NFR BLD: 9 % (ref 4–15)
NEUTROPHILS # BLD AUTO: 15.2 K/UL (ref 1.8–7.7)
NEUTROPHILS NFR BLD: 83.9 % (ref 38–73)
NRBC BLD-RTO: 0 /100 WBC
PCO2 BLDA: 43.9 MMHG (ref 35–45)
PCO2 BLDA: 49.4 MMHG (ref 35–45)
PEEP: 5
PEEP: 5
PH SMN: 7.23 [PH] (ref 7.35–7.45)
PH SMN: 7.26 [PH] (ref 7.35–7.45)
PLATELET # BLD AUTO: 88 K/UL (ref 150–450)
PLATELET BLD QL SMEAR: ABNORMAL
PMV BLD AUTO: 10.9 FL (ref 9.2–12.9)
PO2 BLDA: 75 MMHG (ref 80–100)
PO2 BLDA: 95 MMHG (ref 80–100)
POC BE: -7 MMOL/L
POC BE: -8 MMOL/L
POC IONIZED CALCIUM: 1.05 MMOL/L (ref 1.06–1.42)
POC SATURATED O2: 92 % (ref 95–100)
POC SATURATED O2: 96 % (ref 95–100)
POCT GLUCOSE: 119 MG/DL (ref 70–110)
POCT GLUCOSE: 125 MG/DL (ref 70–110)
POCT GLUCOSE: 139 MG/DL (ref 70–110)
POCT GLUCOSE: 167 MG/DL (ref 70–110)
POCT GLUCOSE: 185 MG/DL (ref 70–110)
POIKILOCYTOSIS BLD QL SMEAR: SLIGHT
POTASSIUM BLD-SCNC: 4.3 MMOL/L (ref 3.5–5.1)
POTASSIUM SERPL-SCNC: 4.4 MMOL/L (ref 3.5–5.1)
POTASSIUM SERPL-SCNC: 4.5 MMOL/L (ref 3.5–5.1)
PROT SERPL-MCNC: 6.1 G/DL (ref 6–8.4)
PROT SERPL-MCNC: 6.2 G/DL (ref 6–8.4)
RBC # BLD AUTO: 4.47 M/UL (ref 4.6–6.2)
SAMPLE: ABNORMAL
SAMPLE: ABNORMAL
SITE: ABNORMAL
SITE: ABNORMAL
SODIUM BLD-SCNC: 129 MMOL/L (ref 136–145)
SODIUM SERPL-SCNC: 131 MMOL/L (ref 136–145)
SODIUM SERPL-SCNC: 131 MMOL/L (ref 136–145)
VT: 400
VT: 450
WBC # BLD AUTO: 18.06 K/UL (ref 3.9–12.7)

## 2021-12-29 PROCEDURE — 25000003 PHARM REV CODE 250: Performed by: NURSE PRACTITIONER

## 2021-12-29 PROCEDURE — 20000000 HC ICU ROOM

## 2021-12-29 PROCEDURE — 93010 EKG 12-LEAD: ICD-10-PCS | Mod: ,,, | Performed by: INTERNAL MEDICINE

## 2021-12-29 PROCEDURE — 99900035 HC TECH TIME PER 15 MIN (STAT)

## 2021-12-29 PROCEDURE — 87040 BLOOD CULTURE FOR BACTERIA: CPT | Performed by: INTERNAL MEDICINE

## 2021-12-29 PROCEDURE — 99292 PR CRITICAL CARE, ADDL 30 MIN: ICD-10-PCS | Mod: ,,, | Performed by: NURSE PRACTITIONER

## 2021-12-29 PROCEDURE — 93005 ELECTROCARDIOGRAM TRACING: CPT

## 2021-12-29 PROCEDURE — 63600175 PHARM REV CODE 636 W HCPCS: Mod: JG | Performed by: STUDENT IN AN ORGANIZED HEALTH CARE EDUCATION/TRAINING PROGRAM

## 2021-12-29 PROCEDURE — 94003 VENT MGMT INPAT SUBQ DAY: CPT

## 2021-12-29 PROCEDURE — 94761 N-INVAS EAR/PLS OXIMETRY MLT: CPT

## 2021-12-29 PROCEDURE — 82803 BLOOD GASES ANY COMBINATION: CPT

## 2021-12-29 PROCEDURE — P9045 ALBUMIN (HUMAN), 5%, 250 ML: HCPCS | Mod: JG | Performed by: STUDENT IN AN ORGANIZED HEALTH CARE EDUCATION/TRAINING PROGRAM

## 2021-12-29 PROCEDURE — 99292 CRITICAL CARE ADDL 30 MIN: CPT | Mod: ,,, | Performed by: NURSE PRACTITIONER

## 2021-12-29 PROCEDURE — 36415 COLL VENOUS BLD VENIPUNCTURE: CPT | Performed by: INTERNAL MEDICINE

## 2021-12-29 PROCEDURE — 25000003 PHARM REV CODE 250: Performed by: INTERNAL MEDICINE

## 2021-12-29 PROCEDURE — 27000221 HC OXYGEN, UP TO 24 HOURS

## 2021-12-29 PROCEDURE — 99222 PR INITIAL HOSPITAL CARE,LEVL II: ICD-10-PCS | Mod: ,,, | Performed by: SURGERY

## 2021-12-29 PROCEDURE — 99900026 HC AIRWAY MAINTENANCE (STAT)

## 2021-12-29 PROCEDURE — 80053 COMPREHEN METABOLIC PANEL: CPT | Mod: 91 | Performed by: INTERNAL MEDICINE

## 2021-12-29 PROCEDURE — 27200966 HC CLOSED SUCTION SYSTEM

## 2021-12-29 PROCEDURE — 85025 COMPLETE CBC W/AUTO DIFF WBC: CPT | Performed by: NURSE PRACTITIONER

## 2021-12-29 PROCEDURE — 99291 PR CRITICAL CARE, E/M 30-74 MINUTES: ICD-10-PCS | Mod: ,,, | Performed by: NURSE PRACTITIONER

## 2021-12-29 PROCEDURE — 83605 ASSAY OF LACTIC ACID: CPT | Performed by: NURSE PRACTITIONER

## 2021-12-29 PROCEDURE — 93010 ELECTROCARDIOGRAM REPORT: CPT | Mod: ,,, | Performed by: INTERNAL MEDICINE

## 2021-12-29 PROCEDURE — 63600175 PHARM REV CODE 636 W HCPCS: Performed by: INTERNAL MEDICINE

## 2021-12-29 PROCEDURE — 99222 1ST HOSP IP/OBS MODERATE 55: CPT | Mod: ,,, | Performed by: SURGERY

## 2021-12-29 PROCEDURE — 25000242 PHARM REV CODE 250 ALT 637 W/ HCPCS: Performed by: NURSE PRACTITIONER

## 2021-12-29 PROCEDURE — 37799 UNLISTED PX VASCULAR SURGERY: CPT

## 2021-12-29 PROCEDURE — 63600175 PHARM REV CODE 636 W HCPCS: Performed by: NURSE PRACTITIONER

## 2021-12-29 PROCEDURE — 94640 AIRWAY INHALATION TREATMENT: CPT

## 2021-12-29 PROCEDURE — 80053 COMPREHEN METABOLIC PANEL: CPT | Performed by: NURSE PRACTITIONER

## 2021-12-29 PROCEDURE — 83735 ASSAY OF MAGNESIUM: CPT | Performed by: NURSE PRACTITIONER

## 2021-12-29 PROCEDURE — 99291 CRITICAL CARE FIRST HOUR: CPT | Mod: ,,, | Performed by: NURSE PRACTITIONER

## 2021-12-29 RX ORDER — ALBUMIN HUMAN 50 G/1000ML
25 SOLUTION INTRAVENOUS ONCE
Status: COMPLETED | OUTPATIENT
Start: 2021-12-29 | End: 2021-12-29

## 2021-12-29 RX ORDER — SODIUM CHLORIDE 9 MG/ML
INJECTION, SOLUTION INTRAVENOUS CONTINUOUS
Status: DISCONTINUED | OUTPATIENT
Start: 2021-12-29 | End: 2021-12-29

## 2021-12-29 RX ORDER — VASOPRESSIN IN DEXTROSE 5 % 25/250 ML
0.04 PLASTIC BAG, INJECTION (ML) INTRAVENOUS CONTINUOUS
Status: DISCONTINUED | OUTPATIENT
Start: 2021-12-29 | End: 2021-12-31

## 2021-12-29 RX ORDER — FAMOTIDINE 10 MG/ML
20 INJECTION INTRAVENOUS DAILY
Status: DISCONTINUED | OUTPATIENT
Start: 2021-12-29 | End: 2022-01-03

## 2021-12-29 RX ADMIN — INSULIN ASPART 1 UNITS: 100 INJECTION, SOLUTION INTRAVENOUS; SUBCUTANEOUS at 11:12

## 2021-12-29 RX ADMIN — SODIUM CHLORIDE: 0.9 INJECTION, SOLUTION INTRAVENOUS at 11:12

## 2021-12-29 RX ADMIN — BUDESONIDE 0.5 MG: 0.5 INHALANT ORAL at 07:12

## 2021-12-29 RX ADMIN — CHLORHEXIDINE GLUCONATE 0.12% ORAL RINSE 15 ML: 1.2 LIQUID ORAL at 09:12

## 2021-12-29 RX ADMIN — CHLORHEXIDINE GLUCONATE 0.12% ORAL RINSE 15 ML: 1.2 LIQUID ORAL at 08:12

## 2021-12-29 RX ADMIN — FAMOTIDINE 20 MG: 10 INJECTION, SOLUTION INTRAVENOUS at 08:12

## 2021-12-29 RX ADMIN — PIPERACILLIN SODIUM AND TAZOBACTAM SODIUM 4.5 G: 4; .5 INJECTION, POWDER, FOR SOLUTION INTRAVENOUS at 09:12

## 2021-12-29 RX ADMIN — ALBUMIN (HUMAN) 25 G: 12.5 SOLUTION INTRAVENOUS at 05:12

## 2021-12-29 RX ADMIN — INSULIN ASPART 2 UNITS: 100 INJECTION, SOLUTION INTRAVENOUS; SUBCUTANEOUS at 05:12

## 2021-12-29 RX ADMIN — METOPROLOL TARTRATE 50 MG: 50 TABLET, FILM COATED ORAL at 09:12

## 2021-12-29 RX ADMIN — Medication 0.04 UNITS/MIN: at 07:12

## 2021-12-29 RX ADMIN — NOREPINEPHRINE BITARTRATE 1.4 MCG/KG/MIN: 1 INJECTION, SOLUTION, CONCENTRATE INTRAVENOUS at 03:12

## 2021-12-29 RX ADMIN — NOREPINEPHRINE BITARTRATE 0.92 MCG/KG/MIN: 1 INJECTION, SOLUTION, CONCENTRATE INTRAVENOUS at 09:12

## 2021-12-29 RX ADMIN — Medication 0.04 UNITS/MIN: at 02:12

## 2021-12-29 RX ADMIN — MUPIROCIN: 20 OINTMENT TOPICAL at 09:12

## 2021-12-29 RX ADMIN — Medication 0.04 UNITS/MIN: at 10:12

## 2021-12-29 RX ADMIN — LORAZEPAM 2 MG: 2 INJECTION INTRAMUSCULAR; INTRAVENOUS at 01:12

## 2021-12-29 RX ADMIN — LORAZEPAM 2 MG: 2 INJECTION INTRAMUSCULAR; INTRAVENOUS at 05:12

## 2021-12-29 RX ADMIN — ARFORMOTEROL TARTRATE 15 MCG: 15 SOLUTION RESPIRATORY (INHALATION) at 09:12

## 2021-12-29 RX ADMIN — Medication 100 MCG/HR: at 01:12

## 2021-12-29 RX ADMIN — BUDESONIDE 0.5 MG: 0.5 INHALANT ORAL at 08:12

## 2021-12-29 RX ADMIN — FOLIC ACID: 5 INJECTION, SOLUTION INTRAMUSCULAR; INTRAVENOUS; SUBCUTANEOUS at 04:12

## 2021-12-29 RX ADMIN — NOREPINEPHRINE BITARTRATE 0.58 MCG/KG/MIN: 1 INJECTION, SOLUTION, CONCENTRATE INTRAVENOUS at 05:12

## 2021-12-29 RX ADMIN — METOPROLOL TARTRATE 50 MG: 50 TABLET, FILM COATED ORAL at 08:12

## 2021-12-29 RX ADMIN — MUPIROCIN: 20 OINTMENT TOPICAL at 08:12

## 2021-12-29 RX ADMIN — SODIUM BICARBONATE: 84 INJECTION, SOLUTION INTRAVENOUS at 03:12

## 2021-12-29 RX ADMIN — PIPERACILLIN SODIUM AND TAZOBACTAM SODIUM 4.5 G: 4; .5 INJECTION, POWDER, FOR SOLUTION INTRAVENOUS at 04:12

## 2021-12-29 RX ADMIN — PIPERACILLIN SODIUM AND TAZOBACTAM SODIUM 4.5 G: 4; .5 INJECTION, POWDER, FOR SOLUTION INTRAVENOUS at 02:12

## 2021-12-29 RX ADMIN — ARFORMOTEROL TARTRATE 15 MCG: 15 SOLUTION RESPIRATORY (INHALATION) at 08:12

## 2021-12-29 NOTE — ASSESSMENT & PLAN NOTE
Source: GB and Bacteremia  Cont IVFs   Cont Vasopressin infusion and titrate Levophed infusion for MAP > 65  Sputum culture NGTD  Blood cultures X 2 E.Coli with sensitivity pending  Repeat LA and CBC in AM  Follow fever curve  Zosyn Day #2

## 2021-12-29 NOTE — ASSESSMENT & PLAN NOTE
NSR this AM with frequent extrasystoles  Not on anticoagulants or any antiarrythmics on home med review

## 2021-12-29 NOTE — CONSULTS
[facility] - Intensive Care (Hospital)  Adult Nutrition  Consult Note    SUMMARY     Recommendations    Recommendation/Intervention:     1. When medically appropriate, initiate Peptamen AF:   -Goal rate @ 40mL/hr.   -Provides 1152 calories, 72g protein, and 779mL H2O.   -100mL H2O flush q 4-6 hours or per MD/NP.   -Check Mg, K+, Na, Phos, and Glucose before and during initiation;Correct as indicated.     2. Weekly weights per RD follow up. 3. RD to provide recommendations accordingly.    Goals: 1. Enteral Nutrition initiation within 24 hours.    Nutrition Goal Status: new    Communication of RD Recs: other (comment) (Plan of care;Sticky Note)    Assessment and Plan    Nutrition Problem  Inadequate oral intake     Related to (etiology):   Decreased ability to consume sufficient energy     Signs and Symptoms (as evidenced by):   NPO, Intubated      Interventions/Recommendations (treatment strategy):  Enteral Nutrition initiation   Collaboration with Medical Providers   Weekly Weights     Nutrition Diagnosis Status:   New         Malnutrition Assessment                                       Reason for Assessment    Reason For Assessment: consult  Diagnosis: infection/sepsis  Relevant Medical History: HTN, HLD  Interdisciplinary Rounds: attended  General Information Comments:     12/29:Patient consulted for TF recommendations. Patient was admitted with abdominal pain, yellowing of skin, N/V. Patient is NPO on vent. Patient was intubated 12/28. ERCP was performed 12/28. No edema noted per EMR. Patients weight is stable. Patients last BM 12/24. Will continue to monitor.    Nutrition Discharge Planning: pending medical course    Nutrition Risk Screen    Nutrition Risk Screen: tube feeding or parenteral nutrition    Nutrition/Diet History    Patient Reported Diet/Restrictions/Preferences: other (see comments) (unknown patient on vent)  Spiritual, Cultural Beliefs, Faith Practices, Values that Affect Care: no  Food  "Allergies: NKFA  Factors Affecting Nutritional Intake: NPO,on mechanical ventilation    Anthropometrics    Temp: 99.5 °F (37.5 °C)  Height Method: Stated  Height: 5' 6" (167.6 cm)  Height (inches): 66 in  Weight Method: Bed Scale  Weight: 90 kg (198 lb 6.6 oz)  Weight (lb): 198.42 lb  Ideal Body Weight (IBW), Male: 142 lb  % Ideal Body Weight, Male (lb): 139.73 %  BMI (Calculated): 32  BMI Grade: 30 - 34.9- obesity - grade I       Lab/Procedures/Meds  BMP  Lab Results   Component Value Date     (L) 12/29/2021    K 4.4 12/29/2021     12/29/2021    CO2 18 (L) 12/29/2021    BUN 42 (H) 12/29/2021    CREATININE 2.7 (H) 12/29/2021    CALCIUM 7.9 (L) 12/29/2021    ANIONGAP 12 12/29/2021    ESTGFRAFRICA 26 (A) 12/29/2021    EGFRNONAA 22 (A) 12/29/2021     Lab Results   Component Value Date     (L) 12/29/2021    K 4.4 12/29/2021     12/29/2021    CO2 18 (L) 12/29/2021     Lab Results   Component Value Date    LABPROT 11.5 12/28/2021    ALBUMIN 2.7 (L) 12/29/2021     Lab Results   Component Value Date    CALCIUM 7.9 (L) 12/29/2021     Pertinent Labs Reviewed: reviewed  Pertinent Medications Reviewed: reviewed  Scheduled Meds:   arformoteroL  15 mcg Nebulization BID    budesonide  0.5 mg Nebulization Q12H    chlorhexidine  15 mL Mouth/Throat BID    famotidine (PF)  20 mg Intravenous Daily    metoprolol tartrate  50 mg Oral BID    mupirocin   Nasal BID    nicotine  1 patch Transdermal Daily    piperacillin-tazobactam (ZOSYN) IVPB  4.5 g Intravenous Q8H    Banana bag   Intravenous Daily     Continuous Infusions:   sodium chloride 0.9% 75 mL/hr at 12/29/21 1200    dexmedetomidine (PRECEDEX) infusion Stopped (12/28/21 1134)    fentanyl 100 mcg/hr (12/29/21 1200)    NORepinephrine bitartrate-D5W 0.88 mcg/kg/min (12/29/21 1200)    vasopressin 0.04 Units/min (12/29/21 1200)     PRN Meds:.acetaminophen, albuterol-ipratropium, aluminum-magnesium hydroxide-simethicone, dextrose 50%, glucagon (human " recombinant), ibuprofen, insulin aspart U-100, lorazepam, lorazepam, naloxone, ondansetron, oxyCODONE, oxyCODONE, promethazine, sodium chloride 0.9%    Physical Findings/Assessment         Estimated/Assessed Needs    Weight Used For Calorie Calculations: 90 kg (198 lb 6.6 oz)  Energy Calorie Requirements (kcal): 990-1260 (11-14kcal/kg, permissive underfeeding, ICU, Vent, BMI >30)  Energy Need Method: Kcal/kg  Protein Requirements: 51-64 (0.8-1.0g/kg, renal labs)  Weight Used For Protein Calculations: 64.5 kg (142 lb 3.2 oz) (IBW)  Fluid Requirements (mL): 990-1260  Estimated Fluid Requirement Method: RDA Method  RDA Method (mL): 990  CHO Requirement: 123-157      Nutrition Prescription Ordered    Current Diet Order: NPO    Evaluation of Received Nutrient/Fluid Intake    % Kcal Needs: 0  % Protein Needs: 0  Energy Calories Required: not meeting needs  Protein Required: not meeting needs  Fluid Required: not meeting needs  Tolerance: other (see comments) (Patient NPO)  % Intake of Estimated Energy Needs: 0 - 25 %  % Meal Intake: NPO    Nutrition Risk    Level of Risk/Frequency of Follow-up: moderate - high       Monitor and Evaluation    Food and Nutrient Intake: energy intake,enteral nutrition intake  Food and Nutrient Adminstration: enteral and parenteral nutrition administration  Anthropometric Measurements: weight,weight change,body mass index  Biochemical Data, Medical Tests and Procedures: glucose/endocrine profile,inflammatory profile,lipid profile,gastrointestinal profile,electrolyte and renal panel  Nutrition-Focused Physical Findings: overall appearance       Nutrition Follow-Up    RD Follow-up?: Yes   Jade Drake RD,[place]

## 2021-12-29 NOTE — PROGRESS NOTES
Patient in bigeminy on monitor, 4L positive, minimal UOP. ekg and albumin ordered. Significant increase in cr.

## 2021-12-29 NOTE — PLAN OF CARE
Recommendation/Intervention: 12/29     1. When medically appropriate, initiate Peptamen AF:   -Goal rate @ 40mL/hr.   -Provides 1152 calories, 72g protein, and 779mL H2O.   -100mL H2O flush q 4-6 hours or per MD/NP.   -Check Mg, K+, Na, Phos, and Glucose before and during initiation;Correct as indicated.      2. Weekly weights per RD follow up. 3. RD to provide recommendations accordingly.    Jade Drake RD,LDN

## 2021-12-29 NOTE — EICU
Notified of temp 101    GNR bacteremia secondary to cholangitis s/p ERCP stent  On piperacilin tazobactam    · Ordered Tylenol prn

## 2021-12-29 NOTE — SUBJECTIVE & OBJECTIVE
Interval History: Patient remains intubated / sedated on pressors. Worsening WBC. Await studies    Review of Systems   Unable to perform ROS: Intubated     Objective:     Vital Signs (Most Recent):  Temp: 98.3 °F (36.8 °C) (12/29/21 1500)  Pulse: 71 (12/29/21 1602)  Resp: (!) 26 (12/29/21 1602)  BP: 115/65 (12/29/21 1602)  SpO2: 100 % (12/29/21 1602) Vital Signs (24h Range):  Temp:  [98.3 °F (36.8 °C)-101.2 °F (38.4 °C)] 98.3 °F (36.8 °C)  Pulse:  [] 71  Resp:  [0-28] 26  SpO2:  [65 %-100 %] 100 %  BP: ()/(48-75) 115/65  Arterial Line BP: ()/(23-67) 118/61     Weight: 90 kg (198 lb 6.6 oz)  Body mass index is 32.02 kg/m².    Intake/Output Summary (Last 24 hours) at 12/29/2021 1617  Last data filed at 12/29/2021 1600  Gross per 24 hour   Intake 4432.21 ml   Output 265 ml   Net 4167.21 ml      Physical Exam  Vitals and nursing note reviewed.   Constitutional:       Appearance: He is well-developed. He is ill-appearing.      Interventions: He is sedated and intubated.       HENT:      Head: Normocephalic and atraumatic.   Eyes:      Pupils: Pupils are equal, round, and reactive to light.   Cardiovascular:      Rate and Rhythm: Normal rate and regular rhythm.      Heart sounds: Normal heart sounds.   Pulmonary:      Effort: Tachypnea and accessory muscle usage present. No respiratory distress. He is intubated.      Breath sounds: Normal breath sounds. Decreased air movement present. No wheezing.   Abdominal:      General: Bowel sounds are normal. There is no distension.      Palpations: Abdomen is soft. There is no mass.      Tenderness: There is abdominal tenderness in the right upper quadrant.   Musculoskeletal:         General: No deformity. Normal range of motion.      Cervical back: Normal range of motion and neck supple.   Skin:     General: Skin is warm and dry.   Neurological:      Mental Status: He is alert and oriented to person, place, and time.      Deep Tendon Reflexes: Reflexes are normal  and symmetric.   Psychiatric:         Behavior: Behavior normal.         Thought Content: Thought content normal.         Judgment: Judgment normal.         Significant Labs:   All pertinent labs within the past 24 hours have been reviewed.  ABGs:   Recent Labs   Lab 12/28/21  1505 12/29/21  0408 12/29/21  1325   PH 7.311* 7.226* 7.256*   PCO2 44.8 49.4* 43.9   HCO3 22.6* 20.5* 19.5*   POCSATURATED 91* 96 92*   BE -4 -7 -8   PO2 66* 95 75*     Blood Culture:   Recent Labs   Lab 12/27/21 2055 12/27/21 2110   LABBLOO Gram stain aer bottle: Gram negative rods  Gram stain derek bottle: Gram negative rods   Results called to and read back by: Holly Glover RN 12/28/2021    14:45  ESCHERICHIA COLI  Susceptibility pending  * Gram stain aer bottle: Gram negative rods  Gram stain derek bottle: Gram negative rods   Results called to and read back by: Holly Glover RN 12/28/2021    14:45  ESCHERICHIA COLI  For susceptibility see order #[id number]  *     BMP:   Recent Labs   Lab 12/29/21  0416 12/29/21  0416 12/29/21  1232   *   < > 141*   *   < > 131*   K 4.4   < > 4.5      < > 100   CO2 18*   < > 19*   BUN 42*   < > 48*   CREATININE 2.7*   < > 3.3*   CALCIUM 7.9*   < > 7.8*   MG 2.1  --   --     < > = values in this interval not displayed.     CBC:   Recent Labs   Lab 12/27/21  1754 12/27/21  1754 12/28/21  0550 12/29/21  0416 12/29/21  1325   WBC 8.18  --  10.16 18.06*  --    HGB 15.4  --  14.0 13.9*  --    HCT 45.6   < > 40.9 41.9 40   *  --  100* 88*  --     < > = values in this interval not displayed.     CMP:   Recent Labs   Lab 12/28/21  0550 12/29/21  0416 12/29/21  1232   * 131* 131*   K 3.2* 4.4 4.5   CL 99 101 100   CO2 23 18* 19*   * 141* 141*   BUN 22 42* 48*   CREATININE 0.8 2.7* 3.3*   CALCIUM 8.4* 7.9* 7.8*   PROT 6.6 6.2 6.1   ALBUMIN 3.3* 2.7* 2.9*   BILITOT 7.1* 11.8* 11.5*   ALKPHOS 170* 162* 179*   * 117* 100*   * 188* 161*   ANIONGAP 11 12  12   EGFRNONAA >60 22* 18*     Troponin:   Recent Labs   Lab 12/27/21  1754   TROPONINI <0.006     Urine Studies:   Recent Labs   Lab 12/27/21 2002   COLORU Yellow   APPEARANCEUA Clear   PHUR 6.0   SPECGRAV >=1.030*   PROTEINUA Trace*   GLUCUA Negative   KETONESU 2+*   BILIRUBINUA 2+*   OCCULTUA Trace*   NITRITE Negative   UROBILINOGEN 4.0-6.0*   LEUKOCYTESUR Negative       Significant Imaging: I have reviewed all pertinent imaging results/findings within the past 24 hours.

## 2021-12-29 NOTE — ASSESSMENT & PLAN NOTE
Will encourage tobacco cessation post extubation and once awake/alert  Cont LABA and ICS nebs  On Nicotine patch

## 2021-12-29 NOTE — CONSULTS
[facility] - Intensive Care ([facility])  General Surgery  Consult Note    Patient Name: Lizz Alas  MRN: [id number]  Code Status: Full Code  Admission Date: 12/27/2021  Hospital Length of Stay: 1 days  Attending Physician: Nitesh Lopez MD  Primary Care Provider: Primary Doctor No    Patient information was obtained from relative(s), ER records and primary team.     Inpatient consult to General Surgery  Consult performed by: Jaycee Gonzalez MD  Consult ordered by: Nitesh Lopez MD        Subjective:     Principal Problem: Septic shock    History of Present Illness: History obtained from the patient's daughter as he is currently intubated sedated.    71-year-old male presented to the emergency department with worsening abdominal pain.  Per his daughter he has been experiencing postprandial abdominal pain for over 1 year now.  He had acute onset of yellowing of the skin which was noted just prior to presentation.  He was admitted for cholangitis and underwent ERCP with sphincterotomy and stent placement yesterday.  He is currently recovering in the ICU, intubated and sedated. Surgery was consulted for evaluation.      No current facility-administered medications on file prior to encounter.     Current Outpatient Medications on File Prior to Encounter   Medication Sig    amLODIPine (NORVASC) 5 MG tablet Take 5 mg by mouth once daily.    azithromycin (Z-AJAY) 250 MG tablet Take 1 tablet (250 mg total) by mouth once daily. Take first 2 tablets together, then 1 every day until finished.    bisoprolol (ZEBETA) 5 MG tablet Take 5 mg by mouth once daily.    hydroCHLOROthiazide (MICROZIDE) 12.5 mg capsule Take 12.5 mg by mouth once daily.    ibuprofen (ADVIL,MOTRIN) 800 MG tablet Take 1 tablet (800 mg total) by mouth every 6 (six) hours as needed for Pain.    ibuprofen (ADVIL,MOTRIN) 800 MG tablet Take 1 tablet (800 mg total) by mouth every 6 (six) hours as needed for Pain.    ondansetron (ZOFRAN) 4 MG tablet  Take 1 tablet (4 mg total) by mouth every 6 (six) hours. For nausea    rosuvastatin (CRESTOR) 20 MG tablet Take 20 mg by mouth once daily.       Review of patient's allergies indicates:  No Known Allergies    Past Medical History:   Diagnosis Date    Hyperlipemia     Hypertension     Other lesions of oral mucosa     PAF (paroxysmal atrial fibrillation)     Thrombocytopenia      Past Surgical History:   Procedure Laterality Date    FRACTURE SURGERY      HERNIA REPAIR       Family History    None       Tobacco Use    Smoking status: Current Every Day Smoker     Packs/day: 1.50     Years: 45.00     Pack years: 67.50     Types: Cigarettes    Smokeless tobacco: Never Used   Substance and Sexual Activity    Alcohol use: Yes     Comment: 2-3 glasses of whiskey daily    Drug use: Never    Sexual activity: Not on file     Review of Systems   Unable to perform ROS: Intubated     Objective:     Vital Signs (Most Recent):  Temp: 98.5 °F (36.9 °C) (12/29/21 0315)  Pulse: (!) 116 (12/29/21 0941)  Resp: (!) 6 (12/29/21 0941)  BP: (!) 129/58 (12/29/21 0839)  SpO2: 100 % (12/29/21 0941) Vital Signs (24h Range):  Temp:  [97.7 °F (36.5 °C)-101.2 °F (38.4 °C)] 98.5 °F (36.9 °C)  Pulse:  [] 116  Resp:  [6-28] 6  SpO2:  [65 %-100 %] 100 %  BP: ()/(44-75) 129/58  Arterial Line BP: ()/(23-67) 125/61     Weight: 90 kg (198 lb 6.6 oz)  Body mass index is 32.02 kg/m².    Physical Exam  Constitutional:       Interventions: He is sedated and intubated.   HENT:      Mouth/Throat:      Comments: ET tube in place  Pulmonary:      Effort: He is intubated.   Abdominal:      General: There is distension (Generalized).      Tenderness: There is no abdominal tenderness. There is no guarding or rebound.      Hernia: No hernia is present.   Skin:     Coloration: Skin is jaundiced.         Significant Labs:  CBC:   Recent Labs   Lab 12/29/21  0416   WBC 18.06*   RBC 4.47*   HGB 13.9*   HCT 41.9   PLT 88*   MCV 94   MCH 31.1*    MCHC 33.2     CMP:   Recent Labs   Lab 12/29/21  0416   *   CALCIUM 7.9*   ALBUMIN 2.7*   PROT 6.2   *   K 4.4   CO2 18*      BUN 42*   CREATININE 2.7*   ALKPHOS 162*   *   *   BILITOT 11.8*       Significant Diagnostics:  I have reviewed all pertinent imaging results/findings within the past 24 hours.   Ultrasound from 12/27/2021 in the ER - gallbladder distention with sludge, intra and extrahepatic biliary ductal dilatation.     Assessment/Plan:     Bacteremia due to Gram-negative bacteria  Management per ICU    Acute cholangitis  Patient is status post ERCP with biliary decompression with stent  No acute surgical intervention is warranted at this time.  Discussed with the family that the stent will remain in for 6-8 weeks to allow for continued decompression of the gallbladder.  During this time I will allow for recovery and he will see General surgery as an outpatient to schedule an elective cholecystectomy.   Re-consult with questions as needed      VTE Risk Mitigation (From admission, onward)         Ordered     IP VTE HIGH RISK PATIENT  Once         12/27/21 2247     Place sequential compression device  Until discontinued         12/27/21 2247     Reason for No Pharmacological VTE Prophylaxis  Once        Question Answer Comment   Reasons: Risk of Bleeding    Reasons: Thrombocytopenia        12/27/21 2247                Thank you for your consult. I will sign off. Please contact us if you have any additional questions.    Jaycee Gonzalez MD  General Surgery  [facility] - Intensive Care ([facility]

## 2021-12-29 NOTE — SUBJECTIVE & OBJECTIVE
No current facility-administered medications on file prior to encounter.     Current Outpatient Medications on File Prior to Encounter   Medication Sig    amLODIPine (NORVASC) 5 MG tablet Take 5 mg by mouth once daily.    azithromycin (Z-AJAY) 250 MG tablet Take 1 tablet (250 mg total) by mouth once daily. Take first 2 tablets together, then 1 every day until finished.    bisoprolol (ZEBETA) 5 MG tablet Take 5 mg by mouth once daily.    hydroCHLOROthiazide (MICROZIDE) 12.5 mg capsule Take 12.5 mg by mouth once daily.    ibuprofen (ADVIL,MOTRIN) 800 MG tablet Take 1 tablet (800 mg total) by mouth every 6 (six) hours as needed for Pain.    ibuprofen (ADVIL,MOTRIN) 800 MG tablet Take 1 tablet (800 mg total) by mouth every 6 (six) hours as needed for Pain.    ondansetron (ZOFRAN) 4 MG tablet Take 1 tablet (4 mg total) by mouth every 6 (six) hours. For nausea    rosuvastatin (CRESTOR) 20 MG tablet Take 20 mg by mouth once daily.       Review of patient's allergies indicates:  No Known Allergies    Past Medical History:   Diagnosis Date    Hyperlipemia     Hypertension     Other lesions of oral mucosa     PAF (paroxysmal atrial fibrillation)     Thrombocytopenia      Past Surgical History:   Procedure Laterality Date    FRACTURE SURGERY      HERNIA REPAIR       Family History    None       Tobacco Use    Smoking status: Current Every Day Smoker     Packs/day: 1.50     Years: 45.00     Pack years: 67.50     Types: Cigarettes    Smokeless tobacco: Never Used   Substance and Sexual Activity    Alcohol use: Yes     Comment: 2-3 glasses of whiskey daily    Drug use: Never    Sexual activity: Not on file     Review of Systems   Unable to perform ROS: Intubated     Objective:     Vital Signs (Most Recent):  Temp: 98.5 °F (36.9 °C) (12/29/21 0315)  Pulse: (!) 116 (12/29/21 0941)  Resp: (!) 6 (12/29/21 0941)  BP: (!) 129/58 (12/29/21 0839)  SpO2: 100 % (12/29/21 0941) Vital Signs (24h Range):  Temp:  [97.7 °F  (36.5 °C)-101.2 °F (38.4 °C)] 98.5 °F (36.9 °C)  Pulse:  [] 116  Resp:  [6-28] 6  SpO2:  [65 %-100 %] 100 %  BP: ()/(44-75) 129/58  Arterial Line BP: ()/(23-67) 125/61     Weight: 90 kg (198 lb 6.6 oz)  Body mass index is 32.02 kg/m².    Physical Exam  Constitutional:       Interventions: He is sedated and intubated.   HENT:      Mouth/Throat:      Comments: ET tube in place  Pulmonary:      Effort: He is intubated.   Abdominal:      General: There is distension (Generalized).      Tenderness: There is no abdominal tenderness. There is no guarding or rebound.      Hernia: No hernia is present.   Skin:     Coloration: Skin is jaundiced.         Significant Labs:  CBC:   Recent Labs   Lab 12/29/21  0416   WBC 18.06*   RBC 4.47*   HGB 13.9*   HCT 41.9   PLT 88*   MCV 94   MCH 31.1*   MCHC 33.2     CMP:   Recent Labs   Lab 12/29/21  0416   *   CALCIUM 7.9*   ALBUMIN 2.7*   PROT 6.2   *   K 4.4   CO2 18*      BUN 42*   CREATININE 2.7*   ALKPHOS 162*   *   *   BILITOT 11.8*       Significant Diagnostics:  I have reviewed all pertinent imaging results/findings within the past 24 hours.   Ultrasound from 12/27/2021 in the ER - gallbladder distention with sludge, intra and extrahepatic biliary ductal dilatation.

## 2021-12-29 NOTE — PROGRESS NOTES
[facility] - Intensive Care ([facility])  Critical Care Medicine  Progress Note    Patient Name: Lizz Alas  MRN: [id number]  Admission Date: 12/27/2021  Hospital Length of Stay: 1 days  Code Status: Full Code  Attending Provider: Nitesh Lopez MD  Primary Care Provider: Primary Doctor No   Principal Problem: Septic shock    Subjective:     HPI:  Mr Alas is a 72 yo Chinese male with a PMH of HLD, etoh abuse, tobacco abuse, HTN and PAF.  He presented to [facility] ED yesterday evening via personal vehicle with complaint of abd pain over 3 days and N/V X 1 day.  He does not speak English and was accompanied by family.  In ED Glucose 180, TBili 5.1, LA 1.5 and Abd US with GB distention and sludging.  He had temp 102.9.  He as admitted to Tele and GI consulted diagnosed with acute cholecystitis.  This AM Telemetry reported HR was at 183 and upon assessment patient seemed to be in distress, mouth breathing , O2 was reading 73% pt was shaking and shivering. Called TOM due to pt speaking Occitan [id number], # number [id number], pt was stating he could not breath.  Checked pt temp reading was 98.4 oral .Pt was on 3L then moved to 5L, then  Respiratory came in and put him on breathing treatment albuterol, still couldn't get breathing controlled, called Dr. Lopez and he stated to transfer pt to ICU.      Hospital/ICU Course:  12/28 - In ICU he was awake and alert on BiPAP .40 FiO2 with mild work of breathing but no distress and hemodynamically stable.  GI requested patient intubated and stabilized more so from pulm standpoint then obtain CT Abd and plan to OR for ERCP.  TBili up to 7.1.  He was intubated and supported on mech ventilation post explaining all this to daughter at bed side.  Post intubation and sedation became hypotensive requiring CL and AL placements and starting Levophed infusion.   12/29 - ERCP yesterday afternoon.  Increased vasopressor and O2 requirements overnight.  This AM still intubated on mech vent and sedated  on Fentanyl infusion.  Still on Vasopressin and Levophed infusions also.  Oliguria with LISET and Tmax last 24 hours up to 102.9.  Blood cultures X 2 + E.Coli.  Discussed care with daughter at bed side and all questions answered.       Review of Systems   Unable to perform ROS: Intubated         Objective:     Vital Signs (Most Recent):  Temp: 99.4 °F (37.4 °C) (12/29/21 0700)  Pulse: 82 (12/29/21 1100)  Resp: (!) 25 (12/29/21 1100)  BP: (!) 129/58 (12/29/21 0839)  SpO2: 100 % (12/29/21 1100) Vital Signs (24h Range):  Temp:  [97.7 °F (36.5 °C)-101.2 °F (38.4 °C)] 99.4 °F (37.4 °C)  Pulse:  [] 82  Resp:  [0-28] 25  SpO2:  [65 %-100 %] 100 %  BP: ()/(48-75) 129/58  Arterial Line BP: ()/(23-67) 112/61     Weight: 90 kg (198 lb 6.6 oz)  Body mass index is 32.02 kg/m².      Intake/Output Summary (Last 24 hours) at 12/29/2021 1157  Last data filed at 12/29/2021 1100  Gross per 24 hour   Intake 5349.39 ml   Output 555 ml   Net 4794.39 ml       Physical Exam  Vitals and nursing note reviewed.   Constitutional:       General: He is not in acute distress.     Appearance: He is well-developed and overweight. He is ill-appearing. He is not toxic-appearing.      Interventions: He is sedated, intubated and restrained.   HENT:      Head: Normocephalic and atraumatic.      Mouth/Throat:      Mouth: Mucous membranes are moist.   Eyes:      General: Lids are normal.      Comments: Pupils pinpoint   Neck:      Vascular: No carotid bruit.      Trachea: Trachea normal.     Cardiovascular:      Rate and Rhythm: Normal rate and regular rhythm.  Extrasystoles are present.     Pulses:           Radial pulses are 1+ on the right side and 1+ on the left side.        Dorsalis pedis pulses are 1+ on the right side and 1+ on the left side.      Heart sounds: Normal heart sounds.   Pulmonary:      Effort: No tachypnea, accessory muscle usage or respiratory distress. He is intubated.      Breath sounds: Examination of the  right-lower field reveals decreased breath sounds. Examination of the left-lower field reveals decreased breath sounds. Decreased breath sounds present.   Chest:      Chest wall: No deformity or tenderness.   Abdominal:      General: Bowel sounds are absent. There is no distension.      Palpations: Abdomen is soft.      Tenderness: There is no abdominal tenderness.   Genitourinary:     Penis: Normal.       Comments: Eng in place  Musculoskeletal:         General: Normal range of motion.      Cervical back: Neck supple.      Right lower leg: No edema.      Left lower leg: No edema.      Right foot: No deformity.      Left foot: No deformity.   Lymphadenopathy:      Cervical: No cervical adenopathy.   Skin:     General: Skin is cool and dry.      Capillary Refill: Capillary refill takes 2 to 3 seconds.      Findings: No rash.          Neurological:      Comments: Sedated and intubated         Vents:  Vent Mode: A/C (12/29/21 0941)  Ventilator Initiated: Yes (12/28/21 0824)  Set Rate: 26 BPM (12/29/21 0941)  Vt Set: 400 mL (12/29/21 0941)  Pressure Support: 0 cmH20 (12/28/21 0824)  PEEP/CPAP: 5 cmH20 (12/29/21 0941)  Oxygen Concentration (%): 80 (12/29/21 1100)  Peak Airway Pressure: 17 cmH2O (12/29/21 0941)  Plateau Pressure: 0 cmH20 (12/29/21 0941)  Total Ve: 10.9 mL (12/29/21 0941)  Negative Inspiratory Force (cm H2O): 0 (12/29/21 0941)  F/VT Ratio<105 (RSBI): (!) 14.6 (12/29/21 0941)    Lines/Drains/Airways     Central Venous Catheter Line            Percutaneous Central Line Insertion/Assessment - Triple Lumen  12/28/21 1052 left internal jugular 1 day          Drain                 NG/OG Tube 12/28/21 0827 [place] sump 14 Fr. Left mouth 1 day         Urethral Catheter 12/28/21 0830 1 day          Airway                 Airway - Non-Surgical 12/28/21 0824 Endotracheal Tube 1 day          Arterial Line            Arterial Line 12/28/21 1045 Left Radial 1 day          Peripheral Intravenous Line                  Peripheral IV - Single Lumen 12/28/21 0745 20 G Posterior;Right Hand 1 day                Significant Labs:    CBC/Anemia Profile:  Recent Labs   Lab 12/27/21  1754 12/28/21  0550 12/29/21  0416   WBC 8.18 10.16 18.06*   HGB 15.4 14.0 13.9*   HCT 45.6 40.9 41.9   * 100* 88*   MCV 92 90 94   RDW 13.7 14.0 14.8*        Chemistries:  Recent Labs   Lab 12/27/21  1754 12/28/21  0550 12/29/21  0416   * 133* 131*   K 3.9 3.2* 4.4   CL 94* 99 101   CO2 25 23 18*   BUN 22 22 42*   CREATININE 0.8 0.8 2.7*   CALCIUM 9.5 8.4* 7.9*   ALBUMIN 4.1 3.3* 2.7*   PROT 8.2 6.6 6.2   BILITOT 5.1* 7.1* 11.8*   ALKPHOS 184* 170* 162*   * 221* 188*   * 156* 117*   MG  --  1.5* 2.1       ABGs:   Recent Labs   Lab 12/29/21  0408   PH 7.226*   PCO2 49.4*   HCO3 20.5*   POCSATURATED 96   BE -7     Blood Culture:   Recent Labs   Lab 12/27/21 2055 12/27/21 2110   LABBLOO Gram stain aer bottle: Gram negative rods  Gram stain derek bottle: Gram negative rods   Results called to and read back by: Holly Glover RN 12/28/2021    14:45  ESCHERICHIA COLI  Susceptibility pending  * Gram stain aer bottle: Gram negative rods  Gram stain derek bottle: Gram negative rods   Results called to and read back by: Holly Glover RN 12/28/2021    14:45  ESCHERICHIA COLI  For susceptibility see order #[id number]  *     POCT Glucose:   Recent Labs   Lab 12/28/21 2143 12/29/21 0051 12/29/21  0522   POCTGLUCOSE 131* 119* 125*     All pertinent labs within the past 24 hours have been reviewed.    Significant Imaging:  I have reviewed all pertinent imaging results/findings within the past 24 hours.  CXR: I have reviewed all pertinent results/findings within the past 24 hours and my personal findings are:  Tubes and lines are stable. Small right pleural effusion.  Bibasilar infiltrates suspected.  No left pleural fluid or airspace opacity. The cardiac silhouette is enlarged.       ABG  Recent Labs   Lab 12/29/21  0408   PH 7.226*    PO2 95   PCO2 49.4*   HCO3 20.5*   BE -7     Assessment/Plan:     Psychiatric  Alcohol abuse  Banana bag daily  Monitor for withdrawals, sedated on Fentanyl infusion    Pulmonary  Acute respiratory failure with hypoxia and hypercarbia  Likely secondary to sepsis and atelectasis  Cont fully vent support  Daily ABG  Vent settings reviewed and adjusted, increase Vt and RR due to acidosis and PIP only 19 cm H2O  VAP prophylaxis  SAT/SBT once more stable    Cardiac/Vascular  Paroxysmal atrial fibrillation  NSR this AM with frequent extrasystoles  Not on anticoagulants or any antiarrythmics on home med review    Renal/  Electrolyte imbalance  ICU cardiac monitoring  Repeat labs daily  Check K+ with POCT    LISET (acute kidney injury)  W/ met acidosis  Repeat ABG if met acidosis worsening will add Bicarb infusion  Accurate I/Os  Support BP and renal perfusion  IVFs  Renal dose meds and IVAB  Daily CMP    ID  * Septic shock  Source: GB and Bacteremia  Cont IVFs   Cont Vasopressin infusion and titrate Levophed infusion for MAP > 65  Sputum culture NGTD  Blood cultures X 2 E.Coli with sensitivity pending  Repeat LA and CBC in AM  Follow fever curve  Zosyn Day #2    E coli bacteremia  Cont Zosyn Day #2  Await sensitivity results    Acute cholangitis  IVAB and IVFs  POD #1 S/P ERCP  Will need Cholecystectomy as outpt once clinically improved  CMP daily      Hematology  Thrombocytopenia  No active bleeding  Conservative transfusion protocol  Rx sepsis  Daily CBC    Endocrine  Acute hyperglycemia  Range 119-247  Cont SSI    Other  Tobacco use  Will encourage tobacco cessation post extubation and once awake/alert  Cont LABA and ICS nebs  On Nicotine patch        Preventive Measures and Monitoring:   Stress Ulcer: Pepcid  Nutrition: start TF  Glucose control: SSI  Bowel prophylaxis: none due to acute cholecystitis  DVT prophylaxis: SCDs  Hx CAD on B-Blocker: no hx CAD and in shock  Head of Bed/Reposition: Elevate HOB and turn  Q1-2 hours   Early Mobility: bed rest  SAT/SBT: once more stable  RASS goal: -2  Vent Day: #2  OG Day: #2  Central Line Left IJ Day: #2  Left Radial Arterial Line Day: #2  Eng Day: #3  IVAB Day: #3  Code Status: Full     Counseling/Consultation:I have discussed the care of this patient in detail with the bedside nursing staff and Dr. Jameson and Dr. Lopez     Critical Care Time: 75 minutes  Critical secondary to Patient has a condition that poses threat to life and bodily function: Acute Resp [facility], Septic shock and Bacteremia and LISET  Patient is currently on drug therapy requiring intensive monitoring for toxicity: Levophed and Vasopressin infusions  Patient is currently receiving parenteral controlled substances: Fentanyl infusion     Critical care was time spent personally by me on the following activities: development of treatment plan with patient or surrogate and bedside caregivers, discussions with consultants, evaluation of patient's response to treatment, examination of patient, ordering and performing treatments and interventions, ordering and review of laboratory studies, ordering and review of radiographic studies, pulse oximetry, re-evaluation of patient's condition. This critical care time did not overlap with that of any other provider or involve time for any procedures.     Nishant Caballero NP  Critical Care Medicine  '[facility] - Intensive Care ([facility])

## 2021-12-29 NOTE — PLAN OF CARE
Withdraws/responds to pain. NSR, Maps > 65 with levo and vaso. T-max 99.5. Remains vented @ 50 Fio2, 5 peep. Sats 100%. OG with TF initiated @ 20, goal of 40. No BM. Eng with minimal output 10-20/hr. Team aware.L radial a-line, left TLC IJ and right hand peripheral iv site without s/sx of infiltration. GTTS: levophed, vaso, fentanyl, Sodium Bicarb @ 75, Banana/Multivit Bag @ 200. Plan of care discussed with daughter. Continue to monitor.

## 2021-12-29 NOTE — ASSESSMENT & PLAN NOTE
- Abdominal US showed: Gallbladder distension with internal sludge but without definite stones. Intra and extrahepatic biliary ductal dilation. Acute cholecystitis or choledocholithiasis not excluded.  - GI consult. Plans for ERCP in AM. Will keep NPO.  - Blood cultures obtained in the ED. Continue empiric IV Zosyn.   - IV hydration.  - Analgesics and antiemetics as needed.   - Follow labs.     12/28  ERCP planned  Await intervention per GI  Now Intubated    12/29  S/p ERCP  GI on consult  Stent placed per GI  Abx  Follow Cultures

## 2021-12-29 NOTE — ASSESSMENT & PLAN NOTE
Patient is status post ERCP with biliary decompression with stent  No acute surgical intervention is warranted at this time.  Discussed with the family that the stent will remain in for 6-8 weeks to allow for continued decompression of the gallbladder.  During this time I will allow for recovery and he will see General surgery as an outpatient to schedule an elective cholecystectomy.   Re-consult with questions as needed

## 2021-12-29 NOTE — ASSESSMENT & PLAN NOTE
Patient with Hypoxic Respiratory failure which is Acute.  he is not on home oxygen. Supplemental oxygen was provided and noted- Vent Mode: A/C  Oxygen Concentration (%):  [] 60  Resp Rate Total:  [20 br/min-29 br/min] 26 br/min  Vt Set:  [400 mL-450 mL] 450 mL  PEEP/CPAP:  [5 cmH20] 5 cmH20  Mean Airway Pressure:  [6.7 kwU25-76 cmH20] 9.7 cmH20.   Signs/symptoms of respiratory failure include- tachypnea, increased work of breathing, respiratory distress, use of accessory muscles and cyanosis. Contributing diagnoses includes - COPD Labs and images were reviewed. Patient Has recent ABG, which has been reviewed. Will treat underlying causes and adjust management of respiratory failure as indicated

## 2021-12-29 NOTE — PROGRESS NOTES
Upon assessment of patient. Patient no longer has OG tube. MD Maya with GI contacted MD stated to place new OG tube. CXR for line placement ordered.

## 2021-12-29 NOTE — SUBJECTIVE & OBJECTIVE
Subjective:     Interval History: Labs improving with Tbili decreased from 11.8 to 7.2. AST and ALT continue to trend downward. WBC count normalized. Patient remains intubated with pressors and sedation.     Review of Systems   Unable to perform ROS: Intubated     Objective:     Vital Signs (Most Recent):  Temp: 99.4 °F (37.4 °C) (12/29/21 0700)  Pulse: 82 (12/29/21 1100)  Resp: (!) 25 (12/29/21 1100)  BP: (!) 129/58 (12/29/21 0839)  SpO2: 100 % (12/29/21 1100) Vital Signs (24h Range):  Temp:  [97.7 °F (36.5 °C)-101.2 °F (38.4 °C)] 99.4 °F (37.4 °C)  Pulse:  [] 82  Resp:  [0-28] 25  SpO2:  [65 %-100 %] 100 %  BP: ()/(48-75) 129/58  Arterial Line BP: ()/(23-67) 112/61     Weight: 90 kg (198 lb 6.6 oz) (12/29/21 0450)  Body mass index is 32.02 kg/m².      Intake/Output Summary (Last 24 hours) at 12/29/2021 1141  Last data filed at 12/29/2021 1100  Gross per 24 hour   Intake 5349.39 ml   Output 555 ml   Net 4794.39 ml       Lines/Drains/Airways     Central Venous Catheter Line            Percutaneous Central Line Insertion/Assessment - Triple Lumen  12/28/21 1052 left internal jugular 1 day          Drain                 NG/OG Tube 12/28/21 0827 [place] sump 14 Fr. Left mouth 1 day         Urethral Catheter 12/28/21 0830 1 day          Airway                 Airway - Non-Surgical 12/28/21 0824 Endotracheal Tube 1 day          Arterial Line            Arterial Line 12/28/21 1045 Left Radial 1 day          Peripheral Intravenous Line                 Peripheral IV - Single Lumen 12/28/21 0745 20 G Posterior;Right Hand 1 day                Physical Exam  Constitutional:       Appearance: He is ill-appearing and diaphoretic.   HENT:      Head: Normocephalic and atraumatic.   Cardiovascular:      Rate and Rhythm: Normal rate.   Pulmonary:      Comments: Intubated  Abdominal:      General: Bowel sounds are decreased. There is distension (improved).      Palpations: Abdomen is soft.   Skin:     General: Skin  is warm.      Coloration: Skin is jaundiced.         Significant Labs:  CBC:   Recent Labs   Lab 12/27/21  1754 12/28/21  0550 12/29/21  0416   WBC 8.18 10.16 18.06*   HGB 15.4 14.0 13.9*   HCT 45.6 40.9 41.9   * 100* 88*     CMP:   Recent Labs   Lab 12/29/21  0416   *   CALCIUM 7.9*   ALBUMIN 2.7*   PROT 6.2   *   K 4.4   CO2 18*      BUN 42*   CREATININE 2.7*   ALKPHOS 162*   *   *   BILITOT 11.8*     Coagulation:   Recent Labs   Lab 12/28/21  0550   INR 1.0         Significant Imaging:  Imaging results within the past 24 hours have been reviewed.

## 2021-12-29 NOTE — HPI
History obtained from the patient's daughter as he is currently intubated sedated.    71-year-old male presented to the emergency department with worsening abdominal pain.  Per his daughter he has been experiencing postprandial abdominal pain for over 1 year now.  He had acute onset of yellowing of the skin which was noted just prior to presentation.  He was admitted for cholangitis and underwent ERCP with sphincterotomy and stent placement yesterday.  He is currently recovering in the ICU, intubated and sedated. Surgery was consulted for evaluation.

## 2021-12-29 NOTE — SUBJECTIVE & OBJECTIVE
Review of Systems   Unable to perform ROS: Intubated         Objective:     Vital Signs (Most Recent):  Temp: 99.4 °F (37.4 °C) (12/29/21 0700)  Pulse: 82 (12/29/21 1100)  Resp: (!) 25 (12/29/21 1100)  BP: (!) 129/58 (12/29/21 0839)  SpO2: 100 % (12/29/21 1100) Vital Signs (24h Range):  Temp:  [97.7 °F (36.5 °C)-101.2 °F (38.4 °C)] 99.4 °F (37.4 °C)  Pulse:  [] 82  Resp:  [0-28] 25  SpO2:  [65 %-100 %] 100 %  BP: ()/(48-75) 129/58  Arterial Line BP: ()/(23-67) 112/61     Weight: 90 kg (198 lb 6.6 oz)  Body mass index is 32.02 kg/m².      Intake/Output Summary (Last 24 hours) at 12/29/2021 1157  Last data filed at 12/29/2021 1100  Gross per 24 hour   Intake 5349.39 ml   Output 555 ml   Net 4794.39 ml       Physical Exam  Vitals and nursing note reviewed.   Constitutional:       General: He is not in acute distress.     Appearance: He is well-developed and overweight. He is ill-appearing. He is not toxic-appearing.      Interventions: He is sedated, intubated and restrained.   HENT:      Head: Normocephalic and atraumatic.      Mouth/Throat:      Mouth: Mucous membranes are moist.   Eyes:      General: Lids are normal.      Comments: Pupils pinpoint   Neck:      Vascular: No carotid bruit.      Trachea: Trachea normal.     Cardiovascular:      Rate and Rhythm: Normal rate and regular rhythm.  Extrasystoles are present.     Pulses:           Radial pulses are 1+ on the right side and 1+ on the left side.        Dorsalis pedis pulses are 1+ on the right side and 1+ on the left side.      Heart sounds: Normal heart sounds.   Pulmonary:      Effort: No tachypnea, accessory muscle usage or respiratory distress. He is intubated.      Breath sounds: Examination of the right-lower field reveals decreased breath sounds. Examination of the left-lower field reveals decreased breath sounds. Decreased breath sounds present.   Chest:      Chest wall: No deformity or tenderness.   Abdominal:      General: Bowel  sounds are absent. There is no distension.      Palpations: Abdomen is soft.      Tenderness: There is no abdominal tenderness.   Genitourinary:     Penis: Normal.       Comments: Eng in place  Musculoskeletal:         General: Normal range of motion.      Cervical back: Neck supple.      Right lower leg: No edema.      Left lower leg: No edema.      Right foot: No deformity.      Left foot: No deformity.   Lymphadenopathy:      Cervical: No cervical adenopathy.   Skin:     General: Skin is cool and dry.      Capillary Refill: Capillary refill takes 2 to 3 seconds.      Findings: No rash.          Neurological:      Comments: Sedated and intubated         Vents:  Vent Mode: A/C (12/29/21 0941)  Ventilator Initiated: Yes (12/28/21 0824)  Set Rate: 26 BPM (12/29/21 0941)  Vt Set: 400 mL (12/29/21 0941)  Pressure Support: 0 cmH20 (12/28/21 0824)  PEEP/CPAP: 5 cmH20 (12/29/21 0941)  Oxygen Concentration (%): 80 (12/29/21 1100)  Peak Airway Pressure: 17 cmH2O (12/29/21 0941)  Plateau Pressure: 0 cmH20 (12/29/21 0941)  Total Ve: 10.9 mL (12/29/21 0941)  Negative Inspiratory Force (cm H2O): 0 (12/29/21 0941)  F/VT Ratio<105 (RSBI): (!) 14.6 (12/29/21 0941)    Lines/Drains/Airways     Central Venous Catheter Line            Percutaneous Central Line Insertion/Assessment - Triple Lumen  12/28/21 1052 left internal jugular 1 day          Drain                 NG/OG Tube 12/28/21 0827 [place] sump 14 Fr. Left mouth 1 day         Urethral Catheter 12/28/21 0830 1 day          Airway                 Airway - Non-Surgical 12/28/21 0824 Endotracheal Tube 1 day          Arterial Line            Arterial Line 12/28/21 1045 Left Radial 1 day          Peripheral Intravenous Line                 Peripheral IV - Single Lumen 12/28/21 0745 20 G Posterior;Right Hand 1 day                Significant Labs:    CBC/Anemia Profile:  Recent Labs   Lab 12/27/21  1754 12/28/21  0550 12/29/21  0416   WBC 8.18 10.16 18.06*   HGB 15.4 14.0 13.9*    HCT 45.6 40.9 41.9   * 100* 88*   MCV 92 90 94   RDW 13.7 14.0 14.8*        Chemistries:  Recent Labs   Lab 12/27/21  1754 12/28/21  0550 12/29/21  0416   * 133* 131*   K 3.9 3.2* 4.4   CL 94* 99 101   CO2 25 23 18*   BUN 22 22 42*   CREATININE 0.8 0.8 2.7*   CALCIUM 9.5 8.4* 7.9*   ALBUMIN 4.1 3.3* 2.7*   PROT 8.2 6.6 6.2   BILITOT 5.1* 7.1* 11.8*   ALKPHOS 184* 170* 162*   * 221* 188*   * 156* 117*   MG  --  1.5* 2.1       ABGs:   Recent Labs   Lab 12/29/21  0408   PH 7.226*   PCO2 49.4*   HCO3 20.5*   POCSATURATED 96   BE -7     Blood Culture:   Recent Labs   Lab 12/27/21 2055 12/27/21 2110   LABBLOO Gram stain aer bottle: Gram negative rods  Gram stain derek bottle: Gram negative rods   Results called to and read back by: Holly Glover RN 12/28/2021    14:45  ESCHERICHIA COLI  Susceptibility pending  * Gram stain aer bottle: Gram negative rods  Gram stain derek bottle: Gram negative rods   Results called to and read back by: Holly Glover RN 12/28/2021    14:45  ESCHERICHIA COLI  For susceptibility see order #[id number]  *     POCT Glucose:   Recent Labs   Lab 12/28/21  2143 12/29/21  0051 12/29/21  0522   POCTGLUCOSE 131* 119* 125*     All pertinent labs within the past 24 hours have been reviewed.    Significant Imaging:  I have reviewed all pertinent imaging results/findings within the past 24 hours.  CXR: I have reviewed all pertinent results/findings within the past 24 hours and my personal findings are:  Tubes and lines are stable. Small right pleural effusion.  Bibasilar infiltrates suspected.  No left pleural fluid or airspace opacity. The cardiac silhouette is enlarged.

## 2021-12-29 NOTE — PLAN OF CARE
S/p ercp. Surgical consult for Cholecystectomy this am. Levo, fent, antibiotics infusing. Pt having frequent pvcs/pacs on monitor. Ekg paced on chart. Levo increased overnight. Tmax 101.2. tylenol given, ice packs applied, ice water lavages. Temp this am 98.5. Family at bedside overnight. Daughter to arrive this am with questions regarding possible future procedures. Total  for shift. MD aware. See previous notes.

## 2021-12-29 NOTE — ASSESSMENT & PLAN NOTE
IVAB and IVFs  POD #1 S/P ERCP  Will need Cholecystectomy as outpt once clinically improved  CMP daily

## 2021-12-29 NOTE — PROGRESS NOTES
Pharmacist Renal Dose Adjustment Note    Lizz Alas is a 71 y.o. male being treated with the medication Famotidine    Patient Data:    Vital Signs (Most Recent):  Temp: 98.5 °F (36.9 °C) (12/29/21 0315)  Pulse: (!) 117 (12/29/21 0615)  Resp: 10 (12/29/21 0615)  BP: (!) 90/54 (12/29/21 0400)  SpO2: 100 % (12/29/21 0615)   Vital Signs (72h Range):  Temp:  [97.7 °F (36.5 °C)-102.9 °F (39.4 °C)]   Pulse:  []   Resp:  [9-45]   BP: ()/(43-87)   SpO2:  [65 %-100 %]   Arterial Line BP: ()/(23-67)      Recent Labs   Lab 12/27/21  1754 12/28/21  0550 12/29/21  0416   CREATININE 0.8 0.8 2.7*     Serum creatinine: 2.7 mg/dL (H) 12/29/21 0416  Estimated creatinine clearance: 25.6 mL/min (A)    Medication: Famotidine 20 mg IV BID will be changed to Famotidine 20 mg IV daily per pharmacy protocol.    Pharmacist's Name: Sheila No, PharmD  Pharmacist's Extension: [phone]

## 2021-12-29 NOTE — ASSESSMENT & PLAN NOTE
W/ met acidosis  Repeat ABG if met acidosis worsening will add Bicarb infusion  Accurate I/Os  Support BP and renal perfusion  IVFs  Renal dose meds and IVAB  Daily CMP

## 2021-12-29 NOTE — ASSESSMENT & PLAN NOTE
Likely secondary to sepsis and atelectasis  Cont fully vent support  Daily ABG  Vent settings reviewed and adjusted, increase Vt and RR due to acidosis and PIP only 19 cm H2O  VAP prophylaxis  SAT/SBT once more stable

## 2021-12-29 NOTE — EICU
Notified of ABG result     12/28/2021 15:05 12/29/2021 04:08   POC PH 7.311 (L) 7.226 (LL)   POC PCO2 44.8 49.4 (H)   POC PO2 66 (L) 95     Creatinine 2.7, Na 131, K 4.4, CO2 18, lactic acid 1  Increased norepinephrine requirement  Completed banana bag and is now 4L positive  Oliguric  Febrile earlier    · Ordered a trial of albumin 5%

## 2021-12-29 NOTE — PROGRESS NOTES
[facility] - Intensive Care [facility] Medicine  Progress Note    Patient Name: Lizz Alas  MRN: [id number]  Patient Class: IP- Inpatient   Admission Date: 12/27/2021  Length of Stay: 1 days  Attending Physician: Nitesh Lopez MD  Primary Care Provider: Primary Doctor No        Subjective:     Principal Problem:Septic shock        HPI:  Lizz Alas is a 71 y.o. Lao speaking male patient with a PMHx of PAF, HLD, HTN, thrombocytopenia, alcohol abuse, and tobacco use who presents to the Emergency Department for intermittent epigastric abdominal pain which onset gradually 3 days PTA. Today, the pt reports that his pain has been constant. His symptoms are constant and moderate in severity. No mitigating or exacerbating factors reported. Associated sxs include fever (102 F), nausea, and vomiting. Patient denies any diarrhea, constipation, SOB, weakness, fever, chills, and all other sxs at this time. Initial work-up in the ED showed: Normal WBC and lactic, , , alk phos 184, T bili 5.1, lipase 11, glucose 180, plt 119, sodium 134, COVID negative. Abdominal US showed gallbladder distension with internal sludge but without definite stones, intra and extrahepatic biliary ductal dilation which may represent acute cholecystitis or choledocholithiasis. Blood cultures obtained in the ED, and 1.5 L IV fluids and IV Zosyn given. Case was discussed with GI per the ED, who recommend NPO, IV abx, and ERCP in AM. Hospital Medicine was contacted for admission and patient placed in Observation.       Overview/Hospital Course:  Patient 72 yo male admitted to hospital with suspected cholangitis. Patient initiated on IV abx, fluids, O2. Patient with a rapid decompensation shourtly after admit, rapid response called, patient hypoxic in the 60's placed on bipap with good effect. Patient transferred to ICU. Patient with a progressive decline, febrile episodes to 102.9. Severe septic shock, pressors and fluids on  board. Patient evaluated by GI who recommended ERCP - additional CT abdomen. Patient scheduled for CT and this has been delayed pending stability. Patient intubated for airway protection.  12/29- Patient remains intubated, sedated, on pressors. Discussed POC with family at bedside. Patient evaluated by surgery who recommend o/p surgical followup on recovery for elective cholecystectomy.. Repeat blood cultures in progress, abx infusing.      Interval History: Patient remains intubated / sedated on pressors. Worsening WBC. Await studies    Review of Systems   Unable to perform ROS: Intubated     Objective:     Vital Signs (Most Recent):  Temp: 98.3 °F (36.8 °C) (12/29/21 1500)  Pulse: 71 (12/29/21 1602)  Resp: (!) 26 (12/29/21 1602)  BP: 115/65 (12/29/21 1602)  SpO2: 100 % (12/29/21 1602) Vital Signs (24h Range):  Temp:  [98.3 °F (36.8 °C)-101.2 °F (38.4 °C)] 98.3 °F (36.8 °C)  Pulse:  [] 71  Resp:  [0-28] 26  SpO2:  [65 %-100 %] 100 %  BP: ()/(48-75) 115/65  Arterial Line BP: ()/(23-67) 118/61     Weight: 90 kg (198 lb 6.6 oz)  Body mass index is 32.02 kg/m².    Intake/Output Summary (Last 24 hours) at 12/29/2021 1617  Last data filed at 12/29/2021 1600  Gross per 24 hour   Intake 4432.21 ml   Output 265 ml   Net 4167.21 ml      Physical Exam  Vitals and nursing note reviewed.   Constitutional:       Appearance: He is well-developed. He is ill-appearing.      Interventions: He is sedated and intubated.       HENT:      Head: Normocephalic and atraumatic.   Eyes:      Pupils: Pupils are equal, round, and reactive to light.   Cardiovascular:      Rate and Rhythm: Normal rate and regular rhythm.      Heart sounds: Normal heart sounds.   Pulmonary:      Effort: Tachypnea and accessory muscle usage present. No respiratory distress. He is intubated.      Breath sounds: Normal breath sounds. Decreased air movement present. No wheezing.   Abdominal:      General: Bowel sounds are normal. There is no  distension.      Palpations: Abdomen is soft. There is no mass.      Tenderness: There is abdominal tenderness in the right upper quadrant.   Musculoskeletal:         General: No deformity. Normal range of motion.      Cervical back: Normal range of motion and neck supple.   Skin:     General: Skin is warm and dry.   Neurological:      Mental Status: He is alert and oriented to person, place, and time.      Deep Tendon Reflexes: Reflexes are normal and symmetric.   Psychiatric:         Behavior: Behavior normal.         Thought Content: Thought content normal.         Judgment: Judgment normal.         Significant Labs:   All pertinent labs within the past 24 hours have been reviewed.  ABGs:   Recent Labs   Lab 12/28/21  1505 12/29/21  0408 12/29/21  1325   PH 7.311* 7.226* 7.256*   PCO2 44.8 49.4* 43.9   HCO3 22.6* 20.5* 19.5*   POCSATURATED 91* 96 92*   BE -4 -7 -8   PO2 66* 95 75*     Blood Culture:   Recent Labs   Lab 12/27/21 2055 12/27/21 2110   LABBLOO Gram stain aer bottle: Gram negative rods  Gram stain derek bottle: Gram negative rods   Results called to and read back by: Holly Glover RN 12/28/2021    14:45  ESCHERICHIA COLI  Susceptibility pending  * Gram stain aer bottle: Gram negative rods  Gram stain derek bottle: Gram negative rods   Results called to and read back by: Holly Glover RN 12/28/2021    14:45  ESCHERICHIA COLI  For susceptibility see order #[id number]  *     BMP:   Recent Labs   Lab 12/29/21  0416 12/29/21  0416 12/29/21  1232   *   < > 141*   *   < > 131*   K 4.4   < > 4.5      < > 100   CO2 18*   < > 19*   BUN 42*   < > 48*   CREATININE 2.7*   < > 3.3*   CALCIUM 7.9*   < > 7.8*   MG 2.1  --   --     < > = values in this interval not displayed.     CBC:   Recent Labs   Lab 12/27/21  1754 12/27/21  1754 12/28/21  0550 12/29/21  0416 12/29/21  1325   WBC 8.18  --  10.16 18.06*  --    HGB 15.4  --  14.0 13.9*  --    HCT 45.6   < > 40.9 41.9 40   *  --   100* 88*  --     < > = values in this interval not displayed.     CMP:   Recent Labs   Lab 12/28/21  0550 12/29/21  0416 12/29/21  1232   * 131* 131*   K 3.2* 4.4 4.5   CL 99 101 100   CO2 23 18* 19*   * 141* 141*   BUN 22 42* 48*   CREATININE 0.8 2.7* 3.3*   CALCIUM 8.4* 7.9* 7.8*   PROT 6.6 6.2 6.1   ALBUMIN 3.3* 2.7* 2.9*   BILITOT 7.1* 11.8* 11.5*   ALKPHOS 170* 162* 179*   * 117* 100*   * 188* 161*   ANIONGAP 11 12 12   EGFRNONAA >60 22* 18*     Troponin:   Recent Labs   Lab 12/27/21  1754   TROPONINI <0.006     Urine Studies:   Recent Labs   Lab 12/27/21 2002   COLORU Yellow   APPEARANCEUA Clear   PHUR 6.0   SPECGRAV >=1.030*   PROTEINUA Trace*   GLUCUA Negative   KETONESU 2+*   BILIRUBINUA 2+*   OCCULTUA Trace*   NITRITE Negative   UROBILINOGEN 4.0-6.0*   LEUKOCYTESUR Negative       Significant Imaging: I have reviewed all pertinent imaging results/findings within the past 24 hours.      Assessment/Plan:      * Septic shock  Pressors  Fluids  ABX  ICU  Pulmonary CC  Intubated  Sedated      Acute hyperglycemia        Thrombocytopenia        LISET (acute kidney injury)  Worsening  Cr 3.3  Monitor      E coli bacteremia  ABX  ICU  Follow Blood Cultures  Pressors as indicated      Electrolyte imbalance  Correct as indicated        Acute respiratory failure with hypoxia and hypercarbia  Patient with Hypoxic Respiratory failure which is Acute.  he is not on home oxygen. Supplemental oxygen was provided and noted- Vent Mode: A/C  Oxygen Concentration (%):  [] 60  Resp Rate Total:  [20 br/min-29 br/min] 26 br/min  Vt Set:  [400 mL-450 mL] 450 mL  PEEP/CPAP:  [5 cmH20] 5 cmH20  Mean Airway Pressure:  [6.7 mdC45-42 cmH20] 9.7 cmH20.   Signs/symptoms of respiratory failure include- tachypnea, increased work of breathing, respiratory distress, use of accessory muscles and cyanosis. Contributing diagnoses includes - COPD Labs and images were reviewed. Patient Has recent ABG, which has been  reviewed. Will treat underlying causes and adjust management of respiratory failure as indicated    Alcohol abuse  - Monitor for withdrawal/DTs. IV Ativan if needed.     Tobacco use  - Assistance with smoking cessation was offered, including:  [x]  Medications  [x]  Counseling  [x]  Printed Information on Smoking Cessation  []  Referral to a Smoking Cessation Program  - Patient was counseled regarding smoking for 3-10 minutes.    Primary hypertension  - Continue home antihypertensives.     Paroxysmal atrial fibrillation  - Remains in sinus rhythm on admission. Monitor telemetry.  - Continue home BB.  - Patient is not on long-term AC. Will defer to his primary cardiologist.     Acute cholangitis  - Abdominal US showed: Gallbladder distension with internal sludge but without definite stones. Intra and extrahepatic biliary ductal dilation. Acute cholecystitis or choledocholithiasis not excluded.  - GI consult. Plans for ERCP in AM. Will keep NPO.  - Blood cultures obtained in the ED. Continue empiric IV Zosyn.   - IV hydration.  - Analgesics and antiemetics as needed.   - Follow labs.     12/28  ERCP planned  Await intervention per GI  Now Intubated    12/29  S/p ERCP  GI on consult  Stent placed per GI  Abx  Follow Cultures      VTE Risk Mitigation (From admission, onward)         Ordered     IP VTE HIGH RISK PATIENT  Once         12/27/21 2247     Place sequential compression device  Until discontinued         12/27/21 2247     Reason for No Pharmacological VTE Prophylaxis  Once        Question Answer Comment   Reasons: Risk of Bleeding    Reasons: Thrombocytopenia        12/27/21 2247                Discharge Planning   FLAQUITA:      Code Status: Full Code   Is the patient medically ready for discharge?:     Reason for patient still in hospital (select all that apply): Patient unstable, Patient trending condition, Treatment, Consult recommendations and Pending disposition  Discharge Plan A: Home with family             Critical care time spent on the evaluation and treatment of severe organ dysfunction, review of pertinent labs and imaging studies, discussions with consulting providers and discussions with patient/family: 45 minutes.      Nitesh Lopez MD  Department of Hospital Medicine   [facility] - Intensive Care [facility])

## 2021-12-30 PROBLEM — Z99.11 ON MECHANICALLY ASSISTED VENTILATION: Status: ACTIVE | Noted: 2021-12-30

## 2021-12-30 LAB
ALBUMIN SERPL BCP-MCNC: 2.4 G/DL (ref 3.5–5.2)
ALLENS TEST: ABNORMAL
ALP SERPL-CCNC: 336 U/L (ref 55–135)
ALT SERPL W/O P-5'-P-CCNC: 127 U/L (ref 10–44)
ANION GAP SERPL CALC-SCNC: 16 MMOL/L (ref 8–16)
AST SERPL-CCNC: 84 U/L (ref 10–40)
BACTERIA BLD CULT: ABNORMAL
BASOPHILS # BLD AUTO: 0.03 K/UL (ref 0–0.2)
BASOPHILS NFR BLD: 0.3 % (ref 0–1.9)
BILIRUB SERPL-MCNC: 9.2 MG/DL (ref 0.1–1)
BUN SERPL-MCNC: 62 MG/DL (ref 8–23)
CALCIUM SERPL-MCNC: 7.2 MG/DL (ref 8.7–10.5)
CHLORIDE SERPL-SCNC: 97 MMOL/L (ref 95–110)
CO2 SERPL-SCNC: 18 MMOL/L (ref 23–29)
CREAT SERPL-MCNC: 4.4 MG/DL (ref 0.5–1.4)
DELSYS: ABNORMAL
DIFFERENTIAL METHOD: ABNORMAL
EOSINOPHIL # BLD AUTO: 0.1 K/UL (ref 0–0.5)
EOSINOPHIL NFR BLD: 1.4 % (ref 0–8)
ERYTHROCYTE [DISTWIDTH] IN BLOOD BY AUTOMATED COUNT: 14.6 % (ref 11.5–14.5)
ERYTHROCYTE [SEDIMENTATION RATE] IN BLOOD BY WESTERGREN METHOD: 26 MM/H
EST. GFR  (AFRICAN AMERICAN): 14 ML/MIN/1.73 M^2
EST. GFR  (NON AFRICAN AMERICAN): 12 ML/MIN/1.73 M^2
FIO2: 40
GLUCOSE SERPL-MCNC: 219 MG/DL (ref 70–110)
HCO3 UR-SCNC: 19.8 MMOL/L (ref 24–28)
HCT VFR BLD AUTO: 35.1 % (ref 40–54)
HGB BLD-MCNC: 11.9 G/DL (ref 14–18)
IMM GRANULOCYTES # BLD AUTO: 0.04 K/UL (ref 0–0.04)
IMM GRANULOCYTES NFR BLD AUTO: 0.5 % (ref 0–0.5)
INR PPP: 1 (ref 0.8–1.2)
LYMPHOCYTES # BLD AUTO: 0.7 K/UL (ref 1–4.8)
LYMPHOCYTES NFR BLD: 7.9 % (ref 18–48)
MAGNESIUM SERPL-MCNC: 2.1 MG/DL (ref 1.6–2.6)
MCH RBC QN AUTO: 30.4 PG (ref 27–31)
MCHC RBC AUTO-ENTMCNC: 33.9 G/DL (ref 32–36)
MCV RBC AUTO: 90 FL (ref 82–98)
MODE: ABNORMAL
MONOCYTES # BLD AUTO: 0.8 K/UL (ref 0.3–1)
MONOCYTES NFR BLD: 9.5 % (ref 4–15)
MRSA SPEC QL CULT: NORMAL
NEUTROPHILS # BLD AUTO: 7 K/UL (ref 1.8–7.7)
NEUTROPHILS NFR BLD: 80.4 % (ref 38–73)
NRBC BLD-RTO: 0 /100 WBC
PCO2 BLDA: 32.4 MMHG (ref 35–45)
PEEP: 5
PH SMN: 7.39 [PH] (ref 7.35–7.45)
PHOSPHATE SERPL-MCNC: 3.4 MG/DL (ref 2.7–4.5)
PLATELET # BLD AUTO: 75 K/UL (ref 150–450)
PMV BLD AUTO: 11.6 FL (ref 9.2–12.9)
PO2 BLDA: 64 MMHG (ref 80–100)
POC BE: -5 MMOL/L
POC SATURATED O2: 92 % (ref 95–100)
POCT GLUCOSE: 196 MG/DL (ref 70–110)
POCT GLUCOSE: 201 MG/DL (ref 70–110)
POCT GLUCOSE: 218 MG/DL (ref 70–110)
POCT GLUCOSE: 237 MG/DL (ref 70–110)
POTASSIUM SERPL-SCNC: 3.9 MMOL/L (ref 3.5–5.1)
PROT SERPL-MCNC: 4.9 G/DL (ref 6–8.4)
PROTHROMBIN TIME: 10.8 SEC (ref 9–12.5)
RBC # BLD AUTO: 3.91 M/UL (ref 4.6–6.2)
SAMPLE: ABNORMAL
SITE: ABNORMAL
SODIUM SERPL-SCNC: 131 MMOL/L (ref 136–145)
VT: 450
WBC # BLD AUTO: 8.74 K/UL (ref 3.9–12.7)

## 2021-12-30 PROCEDURE — 94640 AIRWAY INHALATION TREATMENT: CPT

## 2021-12-30 PROCEDURE — 99223 PR INITIAL HOSPITAL CARE,LEVL III: ICD-10-PCS | Mod: ,,, | Performed by: INTERNAL MEDICINE

## 2021-12-30 PROCEDURE — 25000242 PHARM REV CODE 250 ALT 637 W/ HCPCS: Performed by: NURSE PRACTITIONER

## 2021-12-30 PROCEDURE — 94003 VENT MGMT INPAT SUBQ DAY: CPT

## 2021-12-30 PROCEDURE — 85610 PROTHROMBIN TIME: CPT | Performed by: NURSE PRACTITIONER

## 2021-12-30 PROCEDURE — 99900035 HC TECH TIME PER 15 MIN (STAT)

## 2021-12-30 PROCEDURE — 80053 COMPREHEN METABOLIC PANEL: CPT | Performed by: NURSE PRACTITIONER

## 2021-12-30 PROCEDURE — 99231 PR SUBSEQUENT HOSPITAL CARE,LEVL I: ICD-10-PCS | Mod: ,,, | Performed by: PHYSICIAN ASSISTANT

## 2021-12-30 PROCEDURE — S4991 NICOTINE PATCH NONLEGEND: HCPCS | Performed by: INTERNAL MEDICINE

## 2021-12-30 PROCEDURE — 99900026 HC AIRWAY MAINTENANCE (STAT)

## 2021-12-30 PROCEDURE — 25000003 PHARM REV CODE 250: Performed by: NURSE PRACTITIONER

## 2021-12-30 PROCEDURE — 63600175 PHARM REV CODE 636 W HCPCS: Performed by: NURSE PRACTITIONER

## 2021-12-30 PROCEDURE — 85025 COMPLETE CBC W/AUTO DIFF WBC: CPT | Performed by: NURSE PRACTITIONER

## 2021-12-30 PROCEDURE — 25000003 PHARM REV CODE 250: Performed by: INTERNAL MEDICINE

## 2021-12-30 PROCEDURE — 20000000 HC ICU ROOM

## 2021-12-30 PROCEDURE — 63600175 PHARM REV CODE 636 W HCPCS: Performed by: INTERNAL MEDICINE

## 2021-12-30 PROCEDURE — 99231 SBSQ HOSP IP/OBS SF/LOW 25: CPT | Mod: ,,, | Performed by: PHYSICIAN ASSISTANT

## 2021-12-30 PROCEDURE — 99223 1ST HOSP IP/OBS HIGH 75: CPT | Mod: ,,, | Performed by: INTERNAL MEDICINE

## 2021-12-30 PROCEDURE — 99291 CRITICAL CARE FIRST HOUR: CPT | Mod: ,,, | Performed by: NURSE PRACTITIONER

## 2021-12-30 PROCEDURE — 99291 PR CRITICAL CARE, E/M 30-74 MINUTES: ICD-10-PCS | Mod: ,,, | Performed by: NURSE PRACTITIONER

## 2021-12-30 PROCEDURE — 37799 UNLISTED PX VASCULAR SURGERY: CPT

## 2021-12-30 PROCEDURE — 83735 ASSAY OF MAGNESIUM: CPT | Performed by: NURSE PRACTITIONER

## 2021-12-30 PROCEDURE — 82803 BLOOD GASES ANY COMBINATION: CPT

## 2021-12-30 PROCEDURE — 84100 ASSAY OF PHOSPHORUS: CPT | Performed by: NURSE PRACTITIONER

## 2021-12-30 PROCEDURE — 94761 N-INVAS EAR/PLS OXIMETRY MLT: CPT

## 2021-12-30 RX ORDER — GLUCAGON 1 MG
1 KIT INJECTION
Status: DISCONTINUED | OUTPATIENT
Start: 2021-12-30 | End: 2022-01-08

## 2021-12-30 RX ORDER — INSULIN ASPART 100 [IU]/ML
1-10 INJECTION, SOLUTION INTRAVENOUS; SUBCUTANEOUS EVERY 4 HOURS PRN
Status: DISCONTINUED | OUTPATIENT
Start: 2021-12-30 | End: 2022-01-03

## 2021-12-30 RX ORDER — POLYETHYLENE GLYCOL 3350 17 G/17G
17 POWDER, FOR SOLUTION ORAL DAILY
Status: DISCONTINUED | OUTPATIENT
Start: 2021-12-30 | End: 2021-12-31

## 2021-12-30 RX ADMIN — ARFORMOTEROL TARTRATE 15 MCG: 15 SOLUTION RESPIRATORY (INHALATION) at 09:12

## 2021-12-30 RX ADMIN — Medication 0.04 UNITS/MIN: at 07:12

## 2021-12-30 RX ADMIN — MUPIROCIN: 20 OINTMENT TOPICAL at 09:12

## 2021-12-30 RX ADMIN — FOLIC ACID: 5 INJECTION, SOLUTION INTRAMUSCULAR; INTRAVENOUS; SUBCUTANEOUS at 08:12

## 2021-12-30 RX ADMIN — Medication 0.04 UNITS/MIN: at 11:12

## 2021-12-30 RX ADMIN — Medication 0.04 UNITS/MIN: at 03:12

## 2021-12-30 RX ADMIN — PIPERACILLIN SODIUM AND TAZOBACTAM SODIUM 4.5 G: 4; .5 INJECTION, POWDER, FOR SOLUTION INTRAVENOUS at 05:12

## 2021-12-30 RX ADMIN — BUDESONIDE 0.5 MG: 0.5 INHALANT ORAL at 07:12

## 2021-12-30 RX ADMIN — NICOTINE 1 PATCH: 21 PATCH, EXTENDED RELEASE TRANSDERMAL at 09:12

## 2021-12-30 RX ADMIN — CHLORHEXIDINE GLUCONATE 0.12% ORAL RINSE 15 ML: 1.2 LIQUID ORAL at 09:12

## 2021-12-30 RX ADMIN — POLYETHYLENE GLYCOL 3350 17 G: 17 POWDER, FOR SOLUTION ORAL at 01:12

## 2021-12-30 RX ADMIN — Medication 125 MCG/HR: at 01:12

## 2021-12-30 RX ADMIN — ARFORMOTEROL TARTRATE 15 MCG: 15 SOLUTION RESPIRATORY (INHALATION) at 08:12

## 2021-12-30 RX ADMIN — INSULIN ASPART 4 UNITS: 100 INJECTION, SOLUTION INTRAVENOUS; SUBCUTANEOUS at 09:12

## 2021-12-30 RX ADMIN — SODIUM BICARBONATE: 84 INJECTION, SOLUTION INTRAVENOUS at 07:12

## 2021-12-30 RX ADMIN — INSULIN ASPART 2 UNITS: 100 INJECTION, SOLUTION INTRAVENOUS; SUBCUTANEOUS at 08:12

## 2021-12-30 RX ADMIN — FAMOTIDINE 20 MG: 10 INJECTION, SOLUTION INTRAVENOUS at 09:12

## 2021-12-30 RX ADMIN — BUDESONIDE 0.5 MG: 0.5 INHALANT ORAL at 08:12

## 2021-12-30 RX ADMIN — NOREPINEPHRINE BITARTRATE 0.32 MCG/KG/MIN: 1 INJECTION, SOLUTION, CONCENTRATE INTRAVENOUS at 07:12

## 2021-12-30 RX ADMIN — INSULIN ASPART 2 UNITS: 100 INJECTION, SOLUTION INTRAVENOUS; SUBCUTANEOUS at 06:12

## 2021-12-30 RX ADMIN — INSULIN ASPART 4 UNITS: 100 INJECTION, SOLUTION INTRAVENOUS; SUBCUTANEOUS at 01:12

## 2021-12-30 RX ADMIN — INSULIN ASPART 4 UNITS: 100 INJECTION, SOLUTION INTRAVENOUS; SUBCUTANEOUS at 06:12

## 2021-12-30 RX ADMIN — CEFTRIAXONE 1 G: 1 INJECTION, SOLUTION INTRAVENOUS at 09:12

## 2021-12-30 RX ADMIN — SODIUM BICARBONATE: 84 INJECTION, SOLUTION INTRAVENOUS at 11:12

## 2021-12-30 NOTE — ASSESSMENT & PLAN NOTE
No active bleeding  Conservative transfusion protocol, holding pharmacologic anticoagulation  Rx sepsis  Monitor trend

## 2021-12-30 NOTE — ASSESSMENT & PLAN NOTE
SB - SR last 24 hours,  Extrasystoles noted  Not on anticoagulants on home med review  Hold metoprolol with pressor support

## 2021-12-30 NOTE — ASSESSMENT & PLAN NOTE
Likely secondary to sepsis and atelectasis  Cont fully vent support  Vent settings reviewed and adjusted to optimize gas exchange  daily and prn ABG to assess therapy  SAT daily / SBT once hemodynamically stable

## 2021-12-30 NOTE — ASSESSMENT & PLAN NOTE
Source: GB and Bacteremia  Cont IVFs   Cont Vasopressin infusion and titrate Levophed infusion for MAP > 65  Sputum culture NGTD  Blood cultures X 2 E.Coli, Zosyn change to rocephin as above  Temp and wbc trend down

## 2021-12-30 NOTE — ASSESSMENT & PLAN NOTE
Range 139-219  Escalate SSI, continue monitoring for glucose control and prevention of insulin toxicity

## 2021-12-30 NOTE — ASSESSMENT & PLAN NOTE
- Abdominal US showed: Gallbladder distension with internal sludge but without definite stones. Intra and extrahepatic biliary ductal dilation. Acute cholecystitis or choledocholithiasis not excluded.  - GI consult. Plans for ERCP in AM. Will keep NPO.  - Blood cultures obtained in the ED. Continue empiric IV Zosyn.   - IV hydration.  - Analgesics and antiemetics as needed.   - Follow labs.     12/28  ERCP planned  Await intervention per GI  Now Intubated    12/29  S/p ERCP  GI on consult  Stent placed per GI  Abx  Follow Cultures    12/30  GI on board  S/p ERCP  Abx  Improving clinically  Remains on Pressors

## 2021-12-30 NOTE — SUBJECTIVE & OBJECTIVE
Review of Systems   Unable to perform ROS: Intubated         Objective:     Vital Signs (Most Recent):  Temp: 100 °F (37.8 °C) (12/30/21 0400)  Pulse: 75 (12/30/21 0615)  Resp: (!) 26 (12/30/21 0615)  BP: (!) 94/56 (12/30/21 0400)  SpO2: 97 % (12/30/21 0615) Vital Signs (24h Range):  Temp:  [98.3 °F (36.8 °C)-100 °F (37.8 °C)] 100 °F (37.8 °C)  Pulse:  [] 75  Resp:  [0-26] 26  SpO2:  [91 %-100 %] 97 %  BP: ()/(53-72) 94/56  Arterial Line BP: ()/(47-70) 102/53     Weight: 90 kg (198 lb 6.6 oz)  Body mass index is 32.02 kg/m².      Intake/Output Summary (Last 24 hours) at 12/30/2021 0659  Last data filed at 12/30/2021 0600  Gross per 24 hour   Intake 4657.53 ml   Output 247 ml   Net 4410.53 ml      dexmedetomidine (PRECEDEX) infusion Stopped (12/28/21 1134)    fentanyl 50 mcg/hr (12/30/21 0600)    NORepinephrine bitartrate-D5W 0.32 mcg/kg/min (12/30/21 0600)    sodium bicarbonate drip 75 mL/hr at 12/30/21 0600    vasopressin 0.04 Units/min (12/30/21 0600)       Physical Exam  Vitals and nursing note reviewed.   Constitutional:       Appearance: He is obese. He is ill-appearing.      Interventions: He is sedated, intubated and restrained.   Eyes:      General: No scleral icterus.     Conjunctiva/sclera: Conjunctivae normal.   Neck:      Vascular: No JVD.   Cardiovascular:      Rate and Rhythm: Normal rate and regular rhythm.      Pulses:           Radial pulses are 1+ on the right side and 1+ on the left side.        Dorsalis pedis pulses are 1+ on the right side and 1+ on the left side.   Pulmonary:      Effort: He is intubated.      Breath sounds: Decreased breath sounds present.   Abdominal:      General: Bowel sounds are decreased. There is no distension.      Palpations: Abdomen is soft.   Musculoskeletal:      Right lower leg: No edema.      Left lower leg: No edema.   Skin:     General: Skin is warm and dry.      Capillary Refill: Capillary refill takes less than 2 seconds.          Vents:  Vent Mode: A/C (12/30/21 0507)  Ventilator Initiated: Yes (12/28/21 0824)  Set Rate: 26 BPM (12/30/21 0507)  Vt Set: 450 mL (12/30/21 0507)  Pressure Support: 0 cmH20 (12/28/21 0824)  PEEP/CPAP: 5 cmH20 (12/30/21 0507)  Oxygen Concentration (%): 40 (12/30/21 0615)  Peak Airway Pressure: 19 cmH2O (12/30/21 0507)  Plateau Pressure: 0 cmH20 (12/30/21 0507)  Total Ve: 12.2 mL (12/30/21 0507)  Negative Inspiratory Force (cm H2O): 0 (12/30/21 0507)  F/VT Ratio<105 (RSBI): (!) 54.74 (12/30/21 0507)    Lines/Drains/Airways     Central Venous Catheter Line            Percutaneous Central Line Insertion/Assessment - Triple Lumen  12/28/21 1052 left internal jugular 1 day          Drain                 NG/OG Tube 12/28/21 0827 Lawrence sump 14 Fr. Left mouth 1 day         Urethral Catheter 12/28/21 0830 1 day          Airway                 Airway - Non-Surgical 12/28/21 0824 Endotracheal Tube 1 day          Arterial Line            Arterial Line 12/28/21 1045 Left Radial 1 day          Peripheral Intravenous Line                 Peripheral IV - Single Lumen 12/28/21 0745 20 G Posterior;Right Hand 1 day                Significant Labs:    CBC/Anemia Profile:  Recent Labs   Lab 12/29/21  0416 12/29/21  1325 12/30/21  0358   WBC 18.06*  --  8.74   HGB 13.9*  --  11.9*   HCT 41.9 40 35.1*   PLT 88*  --  75*   MCV 94  --  90   RDW 14.8*  --  14.6*        Chemistries:  Recent Labs   Lab 12/29/21  0416 12/29/21  1232 12/30/21  0358   * 131* 131*   K 4.4 4.5 3.9    100 97   CO2 18* 19* 18*   BUN 42* 48* 62*   CREATININE 2.7* 3.3* 4.4*   CALCIUM 7.9* 7.8* 7.2*   ALBUMIN 2.7* 2.9* 2.4*   PROT 6.2 6.1 4.9*   BILITOT 11.8* 11.5* 9.2*   ALKPHOS 162* 179* 336*   * 161* 127*   * 100* 84*   MG 2.1  --  2.1       All pertinent labs within the past 24 hours have been reviewed.    Significant Imaging:  I have reviewed all pertinent imaging results/findings within the past 24 hours.

## 2021-12-30 NOTE — PROGRESS NOTES
[facility] - Intensive Care ([facility])  Critical Care Medicine  Progress Note    Patient Name: Lizz Alas  MRN: [id number]  Admission Date: 12/27/2021  Hospital Length of Stay: 2 days  Code Status: Full Code  Attending Provider: Nitesh Lopez MD  Primary Care Provider: Primary Doctor No   Principal Problem: Septic shock    Subjective:     HPI:  Mr Alas is a 72 yo Amharic male with a PMH of HLD, etoh abuse, tobacco abuse, HTN and PAF.  He presented to [facility] ED yesterday evening via personal vehicle with complaint of abd pain over 3 days and N/V X 1 day.  He does not speak English and was accompanied by family.  In ED Glucose 180, TBili 5.1, LA 1.5 and Abd US with GB distention and sludging.  He had temp 102.9.  He as admitted to Tele and GI consulted diagnosed with acute cholecystitis.  This AM Telemetry reported HR was at 183 and upon assessment patient seemed to be in distress, mouth breathing , O2 was reading 73% pt was shaking and shivering. Called TOM due to pt speaking Telugu [phone], # number [phone], pt was stating he could not breath.  Checked pt temp reading was 98.4 oral .Pt was on 3L then moved to 5L, then  Respiratory came in and put him on breathing treatment albuterol, still couldn't get breathing controlled, called Dr. Lopez and he stated to transfer pt to ICU.      Hospital/ICU Course:  12/28 - In ICU he was awake and alert on BiPAP .40 FiO2 with mild work of breathing but no distress and hemodynamically stable.  GI requested patient intubated and stabilized more so from pulm standpoint then obtain CT Abd and plan to OR for ERCP.  TBili up to 7.1.  He was intubated and supported on mech ventilation post explaining all this to daughter at bed side.  Post intubation and sedation became hypotensive requiring CL and AL placements and starting Levophed infusion.   12/29 - ERCP yesterday afternoon.  Increased vasopressor and O2 requirements overnight.  This AM still intubated on mech vent and sedated  on Fentanyl infusion.  Still on Vasopressin and Levophed infusions also.  Oliguria with LISET and Tmax last 24 hours up to 102.9.  Blood cultures X 2 + E.Coli.  Discussed care with daughter at bed side and all questions answered.   12/30 - [facility] and [facility]. Temp and wbc trend down. Urine output, creatinine, and metabolic acidosis worsening      Review of Systems   Unable to perform ROS: Intubated         Objective:     Vital Signs (Most Recent):  Temp: 100 °F (37.8 °C) (12/30/21 0400)  Pulse: 75 (12/30/21 0615)  Resp: (!) 26 (12/30/21 0615)  BP: (!) 94/56 (12/30/21 0400)  SpO2: 97 % (12/30/21 0615) Vital Signs (24h Range):  Temp:  [98.3 °F (36.8 °C)-100 °F (37.8 °C)] 100 °F (37.8 °C)  Pulse:  [] 75  Resp:  [0-26] 26  SpO2:  [91 %-100 %] 97 %  BP: ()/(53-72) 94/56  Arterial Line BP: ()/(47-70) 102/53     Weight: 90 kg (198 lb 6.6 oz)  Body mass index is 32.02 kg/m².      Intake/Output Summary (Last 24 hours) at 12/30/2021 0659  Last data filed at 12/30/2021 0600  Gross per 24 hour   Intake 4657.53 ml   Output 247 ml   Net 4410.53 ml      dexmedetomidine (PRECEDEX) infusion Stopped (12/28/21 1134)    fentanyl 50 mcg/hr (12/30/21 0600)    NORepinephrine bitartrate-D5W 0.32 mcg/kg/min (12/30/21 0600)    sodium bicarbonate drip 75 mL/hr at 12/30/21 0600    vasopressin 0.04 Units/min (12/30/21 0600)       Physical Exam  Vitals and nursing note reviewed.   Constitutional:       Appearance: He is obese. He is ill-appearing.      Interventions: He is sedated, intubated and restrained.   Eyes:      General: No scleral icterus.     Conjunctiva/sclera: Conjunctivae normal.   Neck:      Vascular: No JVD.   Cardiovascular:      Rate and Rhythm: Normal rate and regular rhythm.      Pulses:           Radial pulses are 1+ on the right side and 1+ on the left side.        Dorsalis pedis pulses are 1+ on the right side and 1+ on the left side.   Pulmonary:      Effort: He is intubated.       Breath sounds: Decreased breath sounds present.   Abdominal:      General: Bowel sounds are decreased. There is no distension.      Palpations: Abdomen is soft.   Musculoskeletal:      Right lower leg: No edema.      Left lower leg: No edema.   Skin:     General: Skin is warm and dry.      Capillary Refill: Capillary refill takes less than 2 seconds.         Vents:  Vent Mode: A/C (12/30/21 0507)  Ventilator Initiated: Yes (12/28/21 0824)  Set Rate: 26 BPM (12/30/21 0507)  Vt Set: 450 mL (12/30/21 0507)  Pressure Support: 0 cmH20 (12/28/21 0824)  PEEP/CPAP: 5 cmH20 (12/30/21 0507)  Oxygen Concentration (%): 40 (12/30/21 0615)  Peak Airway Pressure: 19 cmH2O (12/30/21 0507)  Plateau Pressure: 0 cmH20 (12/30/21 0507)  Total Ve: 12.2 mL (12/30/21 0507)  Negative Inspiratory Force (cm H2O): 0 (12/30/21 0507)  F/VT Ratio<105 (RSBI): (!) 54.74 (12/30/21 0507)    Lines/Drains/Airways     Central Venous Catheter Line            Percutaneous Central Line Insertion/Assessment - Triple Lumen  12/28/21 1052 left internal jugular 1 day          Drain                 NG/OG Tube 12/28/21 0827 [place] sump 14 Fr. Left mouth 1 day         Urethral Catheter 12/28/21 0830 1 day          Airway                 Airway - Non-Surgical 12/28/21 0824 Endotracheal Tube 1 day          Arterial Line            Arterial Line 12/28/21 1045 Left Radial 1 day          Peripheral Intravenous Line                 Peripheral IV - Single Lumen 12/28/21 0745 20 G Posterior;Right Hand 1 day                Significant Labs:    CBC/Anemia Profile:  Recent Labs   Lab 12/29/21  0416 12/29/21  1325 12/30/21  0358   WBC 18.06*  --  8.74   HGB 13.9*  --  11.9*   HCT 41.9 40 35.1*   PLT 88*  --  75*   MCV 94  --  90   RDW 14.8*  --  14.6*        Chemistries:  Recent Labs   Lab 12/29/21  0416 12/29/21  1232 12/30/21  0358   * 131* 131*   K 4.4 4.5 3.9    100 97   CO2 18* 19* 18*   BUN 42* 48* 62*   CREATININE 2.7* 3.3* 4.4*   CALCIUM 7.9* 7.8* 7.2*    ALBUMIN 2.7* 2.9* 2.4*   PROT 6.2 6.1 4.9*   BILITOT 11.8* 11.5* 9.2*   ALKPHOS 162* 179* 336*   * 161* 127*   * 100* 84*   MG 2.1  --  2.1       All pertinent labs within the past 24 hours have been reviewed.    Significant Imaging:  I have reviewed all pertinent imaging results/findings within the past 24 hours.      ABG  Recent Labs   Lab 12/30/21  0412   PH 7.395   PO2 64*   PCO2 32.4*   HCO3 19.8*   BE -5     Assessment/Plan:     Psychiatric  Alcohol abuse  Banana bag daily #3  Monitor for withdrawals once awakening    Pulmonary  On mechanically assisted ventilation  VAP prophylaxis  Daily SAT    Acute respiratory failure with hypoxia and hypercarbia  Likely secondary to sepsis and atelectasis  Cont fully vent support  Vent settings reviewed and adjusted to optimize gas exchange  daily and prn ABG to assess therapy  SAT daily / SBT once hemodynamically stable    Cardiac/Vascular  Paroxysmal atrial fibrillation  SB - SR last 24 hours,  Extrasystoles noted  Not on anticoagulants on home med review  Hold metoprolol with pressor support    Renal/  LISET (acute kidney injury)  W/ met acidosis despite bicarb infusion  Accurate I/Os, urine output decreasing (247ml out last 24 hours)  Support MAP for renal perfusion  Renal dose meds and IVAB  Consult nephrology    Electrolyte imbalance  ICU cardiac monitoring  Repeat labs daily    ID  * Septic shock  Source: GB and Bacteremia  Cont IVFs   Cont Vasopressin infusion and titrate Levophed infusion for MAP > 65  Sputum culture NGTD  Blood cultures X 2 E.Coli, Zosyn change to rocephin as above  Temp and wbc trend down      E coli bacteremia  Cont Zosyn Day #3, de-escalate to rocephin per sensitivities    Acute cholangitis  IVAB and IVFs  POD #2 S/P ERCP  Will need Cholecystectomy as outpt once clinically improved      Hematology  Thrombocytopenia  No active bleeding  Conservative transfusion protocol, holding pharmacologic anticoagulation  Rx sepsis  Monitor  trend    Endocrine  Acute hyperglycemia  Range 139-219  Escalate SSI, continue monitoring for glucose control and prevention of insulin toxicity    Other  Tobacco use  Will encourage tobacco cessation post extubation and once awake/alert  Cont LABA and ICS nebs  On Nicotine patch       Critical Care Daily Checklist:    A: Awake: RASS Goal/Actual Goal: RASS Goal: -2-->light sedation  Actual: [place] Agitation Sedation Scale (RASS): Light sedation   B: Spontaneous Breathing Trial Performed?     C: SAT & SBT Coordinated?  Not SBT candidate today                      D: Delirium: CAM-ICU Overall CAM-ICU: Positive   E: Early Mobility Performed? Yes   F: Feeding Goal: Goals: 1. Enteral Nutrition initiation within 24 hours.  Status: Nutrition Goal Status: new   Current Diet Order   Procedures    Diet NPO      AS: Analgesia/Sedation fentanyl   T: Thromboembolic Prophylaxis SCD   H: HOB > 300 Yes   U: Stress Ulcer Prophylaxis (if needed) pepcid   G: Glucose Control SSI   B: Bowel Function     I: Indwelling Catheter (Lines & Eng) Necessity reviewed   D: De-escalation of Antimicrobials/Pharmacotherapies reviewed    Plan for the day/ETD As above    Code Status:  Family/Goals of Care: Full Code  Pending hospital course; daughter updated on status and plan at bedside   I have discussed case and plan of care in detail with Dr Jameson; Status and plan of care were discussed with team on multidisciplinary rounds.    Critical Care Time: 66 minutes  Critical secondary to septic shock, bacteremia, LISET, cholangitis on mechanical ventilation and pressor support   Critical care was time spent personally by me on the following activities: development of treatment plan with patient or surrogate and bedside caregivers, discussions with consultants, evaluation of patient's response to treatment, examination of patient, ordering and performing treatments and interventions, ordering and review of laboratory studies, ordering and review of  radiographic studies, pulse oximetry, re-evaluation of patient's condition. This critical care time did not overlap with that of any other provider or involve time for any procedures.     GREG Dawn-BC  Critical Care Medicine  [facility] - Intensive Care ([facility])

## 2021-12-30 NOTE — PLAN OF CARE
Pt remains intubated and lightly sedated on fentanyl ggt. Pt does not cough when deep suctioned by grimaces/bites down and reaches for ETT when teeth are brushed/oral airway suctioned. He does not follow commands or trach. He remains on Vasopressin and Levophed. Levo was titrated down throughout the shift but towards the end of shift, pt began to require more pressor support report, resulting in uptitrating a small amount. Pt was turned q2 hours and oral care and suctioning performed q4 hours. Pt remains on bicarb ggt and tube feeds ([place] AF) is currently infusing at goal of 40ml/hr. BUN/Crea continue to elevate. All other labs reviewed. Family was present at bedside, yesterday evening, and all questions answered. Cardiac rhythm observed to be an incomplete BBB. UO was margical throughout shift. All fall and safety precautions adhered to.

## 2021-12-30 NOTE — ASSESSMENT & PLAN NOTE
Acute cholangitis based on elevated biliurbin, abdominal pain and biliary dilation. ERCP indicated and he has received the indicated zosyn and expanded to Vancomycin in addition given his decompensation. Most likely secondary to sludge or stones. Blood cultures are pending.   I discussed with Dr. Lopez to intubate and plan for ERCP in the OR as he is decompensating.     ERCP is currently recommended. The risks, benefits and alternatives of the procedure were discussed with the patient in detail. Benefits include removal of stones, stents, debri, sludge; dilation; placement of a stent; biopsies. Risks include bleeding (0.3-2%), pancreatitis (3%), cholangitis (0.5-3%), perforation (0.08-0.6%), cholecystitis (0.5%), sedation related adverse events. This discussion was had in the presence of his duaghter. Educational material of the biliary anatomy has been provided today for educational purposes. Other risks include, risks of adverse reaction to sedation requiring the use of reversal agents, bleeding requiring blood transfusion, perforation requiring surgical intervention, technical failure, aspiration leading to respiratory distress and respiratory failure resulting in endotracheal intubation and mechanical ventilation including death. Anesthesia is utilized for this procedure, it is up to the anesthesiologist to determine airway safety including elective endotracheal intubation. Questions were answered, the patient and daughter agree to proceed. There was no language barriers. Discussion was performed in Tajik.     12/30:  -Patient continues to improve. WBC count normalized, Tbili decreasing, AST and ALT continue to trend downward.  -Continue abx and to monitor labs.

## 2021-12-30 NOTE — ASSESSMENT & PLAN NOTE
Patient with Hypoxic Respiratory failure which is Acute.  he is not on home oxygen. Supplemental oxygen was provided and noted- Vent Mode: A/C  Oxygen Concentration (%):  [40-60] 40  Resp Rate Total:  [26 br/min-27 br/min] 26 br/min  Vt Set:  [450 mL] 450 mL  PEEP/CPAP:  [5 cmH20] 5 cmH20  Mean Airway Pressure:  [9.5 hpG63-20 cmH20] 9.5 cmH20.   Signs/symptoms of respiratory failure include- tachypnea, increased work of breathing, respiratory distress, use of accessory muscles and cyanosis. Contributing diagnoses includes - COPD Labs and images were reviewed. Patient Has recent ABG, which has been reviewed. Will treat underlying causes and adjust management of respiratory failure as indicated

## 2021-12-30 NOTE — PROGRESS NOTES
[facility] - Intensive Care ([facility])  Gastroenterology  Progress Note    Patient Name: Lizz Alas  MRN: [id number]  Admission Date: 12/27/2021  Hospital Length of Stay: 2 days  Code Status: Full Code   Attending Provider: Nitesh Lopez MD  Consulting Provider: Abigail Murillo PA-C  Primary Care Physician: Primary Doctor No  Principal Problem: Septic shock      Subjective:     Interval History: Labs improving with Tbili decreased from 11.8 to 7.2. AST and ALT continue to trend downward. WBC count normalized. Patient remains intubated with pressors and sedation.     Review of Systems   Unable to perform ROS: Intubated     Objective:     Vital Signs (Most Recent):  Temp: 99.4 °F (37.4 °C) (12/29/21 0700)  Pulse: 82 (12/29/21 1100)  Resp: (!) 25 (12/29/21 1100)  BP: (!) 129/58 (12/29/21 0839)  SpO2: 100 % (12/29/21 1100) Vital Signs (24h Range):  Temp:  [97.7 °F (36.5 °C)-101.2 °F (38.4 °C)] 99.4 °F (37.4 °C)  Pulse:  [] 82  Resp:  [0-28] 25  SpO2:  [65 %-100 %] 100 %  BP: ()/(48-75) 129/58  Arterial Line BP: ()/(23-67) 112/61     Weight: 90 kg (198 lb 6.6 oz) (12/29/21 0450)  Body mass index is 32.02 kg/m².      Intake/Output Summary (Last 24 hours) at 12/29/2021 1141  Last data filed at 12/29/2021 1100  Gross per 24 hour   Intake 5349.39 ml   Output 555 ml   Net 4794.39 ml       Lines/Drains/Airways     Central Venous Catheter Line            Percutaneous Central Line Insertion/Assessment - Triple Lumen  12/28/21 1052 left internal jugular 1 day          Drain                 NG/OG Tube 12/28/21 0827 [place] sump 14 Fr. Left mouth 1 day         Urethral Catheter 12/28/21 0830 1 day          Airway                 Airway - Non-Surgical 12/28/21 0824 Endotracheal Tube 1 day          Arterial Line            Arterial Line 12/28/21 1045 Left Radial 1 day          Peripheral Intravenous Line                 Peripheral IV - Single Lumen 12/28/21 0745 20 G Posterior;Right Hand 1 day                Physical  Exam  Constitutional:       Appearance: He is ill-appearing and diaphoretic.   HENT:      Head: Normocephalic and atraumatic.   Cardiovascular:      Rate and Rhythm: Normal rate.   Pulmonary:      Comments: Intubated  Abdominal:      General: Bowel sounds are decreased. There is distension (improved).      Palpations: Abdomen is soft.   Skin:     General: Skin is warm.      Coloration: Skin is jaundiced.         Significant Labs:  CBC:   Recent Labs   Lab 12/27/21  1754 12/28/21  0550 12/29/21  0416   WBC 8.18 10.16 18.06*   HGB 15.4 14.0 13.9*   HCT 45.6 40.9 41.9   * 100* 88*     CMP:   Recent Labs   Lab 12/29/21  0416   *   CALCIUM 7.9*   ALBUMIN 2.7*   PROT 6.2   *   K 4.4   CO2 18*      BUN 42*   CREATININE 2.7*   ALKPHOS 162*   *   *   BILITOT 11.8*     Coagulation:   Recent Labs   Lab 12/28/21  0550   INR 1.0         Significant Imaging:  Imaging results within the past 24 hours have been reviewed.    Assessment/Plan:     Acute cholangitis  Acute cholangitis based on elevated biliurbin, abdominal pain and biliary dilation. ERCP indicated and he has received the indicated zosyn and expanded to Vancomycin in addition given his decompensation. Most likely secondary to sludge or stones. Blood cultures are pending.   I discussed with Dr. Lopez to intubate and plan for ERCP in the OR as he is decompensating.     ERCP is currently recommended. The risks, benefits and alternatives of the procedure were discussed with the patient in detail. Benefits include removal of stones, stents, debri, sludge; dilation; placement of a stent; biopsies. Risks include bleeding (0.3-2%), pancreatitis (3%), cholangitis (0.5-3%), perforation (0.08-0.6%), cholecystitis (0.5%), sedation related adverse events. This discussion was had in the presence of his brent. Educational material of the biliary anatomy has been provided today for educational purposes. Other risks include, risks of adverse  reaction to sedation requiring the use of reversal agents, bleeding requiring blood transfusion, perforation requiring surgical intervention, technical failure, aspiration leading to respiratory distress and respiratory failure resulting in endotracheal intubation and mechanical ventilation including death. Anesthesia is utilized for this procedure, it is up to the anesthesiologist to determine airway safety including elective endotracheal intubation. Questions were answered, the patient and daughter agree to proceed. There was no language barriers. Discussion was performed in Yakut.     12/30:  -Patient continues to improve. WBC count normalized, Tbili decreasing, AST and ALT continue to trend downward.  -Continue abx and to monitor labs.        Thank you for your consult. I will follow-up with patient. Please contact us if you have any additional questions.    Abigail Murillo PA-C  Gastroenterology  [facility] - Intensive Care ([facility])

## 2021-12-30 NOTE — SUBJECTIVE & OBJECTIVE
Interval History: Intubated, Sedated - Family at bedside. Worsening renal function. Nephrology on consult    Review of Systems   Unable to perform ROS: Intubated     Objective:     Vital Signs (Most Recent):  Temp: 99.2 °F (37.3 °C) (12/30/21 0700)  Pulse: 70 (12/30/21 1303)  Resp: (!) 26 (12/30/21 1303)  BP: 94/62 (12/30/21 1100)  SpO2: 98 % (12/30/21 1303) Vital Signs (24h Range):  Temp:  [98.3 °F (36.8 °C)-100 °F (37.8 °C)] 99.2 °F (37.3 °C)  Pulse:  [62-80] 70  Resp:  [19-26] 26  SpO2:  [91 %-100 %] 98 %  BP: ()/(56-73) 94/62  Arterial Line BP: ()/(47-70) 101/56     Weight: 90 kg (198 lb 6.6 oz)  Body mass index is 32.02 kg/m².    Intake/Output Summary (Last 24 hours) at 12/30/2021 1454  Last data filed at 12/30/2021 1100  Gross per 24 hour   Intake 4513.52 ml   Output 142 ml   Net 4371.52 ml      Physical Exam  Vitals and nursing note reviewed.   Constitutional:       Appearance: He is well-developed. He is ill-appearing.      Interventions: He is sedated and intubated.       HENT:      Head: Normocephalic and atraumatic.   Eyes:      Pupils: Pupils are equal, round, and reactive to light.   Cardiovascular:      Rate and Rhythm: Normal rate and regular rhythm.      Heart sounds: Normal heart sounds.   Pulmonary:      Effort: Tachypnea and accessory muscle usage present. No respiratory distress. He is intubated.      Breath sounds: Normal breath sounds. Decreased air movement present. No wheezing.   Abdominal:      General: Bowel sounds are normal. There is no distension.      Palpations: Abdomen is soft. There is no mass.      Tenderness: There is abdominal tenderness in the right upper quadrant.   Musculoskeletal:         General: No deformity. Normal range of motion.      Cervical back: Normal range of motion and neck supple.   Skin:     General: Skin is warm and dry.   Neurological:      Mental Status: He is alert and oriented to person, place, and time.      Deep Tendon Reflexes: Reflexes are  normal and symmetric.   Psychiatric:         Behavior: Behavior normal.         Thought Content: Thought content normal.         Judgment: Judgment normal.         Significant Labs:   All pertinent labs within the past 24 hours have been reviewed.  BMP:   Recent Labs   Lab 12/30/21  0358   *   *   K 3.9   CL 97   CO2 18*   BUN 62*   CREATININE 4.4*   CALCIUM 7.2*   MG 2.1     CBC:   Recent Labs   Lab 12/29/21  0416 12/29/21  1325 12/30/21  0358   WBC 18.06*  --  8.74   HGB 13.9*  --  11.9*   HCT 41.9 40 35.1*   PLT 88*  --  75*     CMP:   Recent Labs   Lab 12/29/21  0416 12/29/21  1232 12/30/21  0358   * 131* 131*   K 4.4 4.5 3.9    100 97   CO2 18* 19* 18*   * 141* 219*   BUN 42* 48* 62*   CREATININE 2.7* 3.3* 4.4*   CALCIUM 7.9* 7.8* 7.2*   PROT 6.2 6.1 4.9*   ALBUMIN 2.7* 2.9* 2.4*   BILITOT 11.8* 11.5* 9.2*   ALKPHOS 162* 179* 336*   * 100* 84*   * 161* 127*   ANIONGAP 12 12 16   EGFRNONAA 22* 18* 12*       Significant Imaging: I have reviewed all pertinent imaging results/findings within the past 24 hours.

## 2021-12-30 NOTE — PROGRESS NOTES
[facility] - Intensive Care [facility] Medicine  Progress Note    Patient Name: Lizz Alas  MRN: [id number]  Patient Class: IP- Inpatient   Admission Date: 12/27/2021  Length of Stay: 2 days  Attending Physician: Nitesh Lopez MD  Primary Care Provider: Primary Doctor No        Subjective:     Principal Problem:Septic shock        HPI:  Lizz Alas is a 71 y.o. Syriac speaking male patient with a PMHx of PAF, HLD, HTN, thrombocytopenia, alcohol abuse, and tobacco use who presents to the Emergency Department for intermittent epigastric abdominal pain which onset gradually 3 days PTA. Today, the pt reports that his pain has been constant. His symptoms are constant and moderate in severity. No mitigating or exacerbating factors reported. Associated sxs include fever (102 F), nausea, and vomiting. Patient denies any diarrhea, constipation, SOB, weakness, fever, chills, and all other sxs at this time. Initial work-up in the ED showed: Normal WBC and lactic, , , alk phos 184, T bili 5.1, lipase 11, glucose 180, plt 119, sodium 134, COVID negative. Abdominal US showed gallbladder distension with internal sludge but without definite stones, intra and extrahepatic biliary ductal dilation which may represent acute cholecystitis or choledocholithiasis. Blood cultures obtained in the ED, and 1.5 L IV fluids and IV Zosyn given. Case was discussed with GI per the ED, who recommend NPO, IV abx, and ERCP in AM. Hospital Medicine was contacted for admission and patient placed in Observation.       Overview/Hospital Course:  Patient 72 yo male admitted to hospital with suspected cholangitis. Patient initiated on IV abx, fluids, O2. Patient with a rapid decompensation shourtly after admit, rapid response called, patient hypoxic in the 60's placed on bipap with good effect. Patient transferred to ICU. Patient with a progressive decline, febrile episodes to 102.9. Severe septic shock, pressors and fluids on  board. Patient evaluated by GI who recommended ERCP - additional CT abdomen. Patient scheduled for CT and this has been delayed pending stability. Patient intubated for airway protection.  12/29- Patient remains intubated, sedated, on pressors. Discussed POC with family at bedside. Patient evaluated by surgery who recommend o/p surgical followup on recovery for elective cholecystectomy.. Repeat blood cultures in progress, abx infusing.  12/30 - Patient wbc declining but remains on pressors. He is intubated and sedated. Family at bedside. Worsening renal function with cr 4.4 today. Nephrology on consult. Discussed with CC Team      Interval History: Intubated, Sedated - Family at bedside. Worsening renal function. Nephrology on consult    Review of Systems   Unable to perform ROS: Intubated     Objective:     Vital Signs (Most Recent):  Temp: 99.2 °F (37.3 °C) (12/30/21 0700)  Pulse: 70 (12/30/21 1303)  Resp: (!) 26 (12/30/21 1303)  BP: 94/62 (12/30/21 1100)  SpO2: 98 % (12/30/21 1303) Vital Signs (24h Range):  Temp:  [98.3 °F (36.8 °C)-100 °F (37.8 °C)] 99.2 °F (37.3 °C)  Pulse:  [62-80] 70  Resp:  [19-26] 26  SpO2:  [91 %-100 %] 98 %  BP: ()/(56-73) 94/62  Arterial Line BP: ()/(47-70) 101/56     Weight: 90 kg (198 lb 6.6 oz)  Body mass index is 32.02 kg/m².    Intake/Output Summary (Last 24 hours) at 12/30/2021 1454  Last data filed at 12/30/2021 1100  Gross per 24 hour   Intake 4513.52 ml   Output 142 ml   Net 4371.52 ml      Physical Exam  Vitals and nursing note reviewed.   Constitutional:       Appearance: He is well-developed. He is ill-appearing.      Interventions: He is sedated and intubated.       HENT:      Head: Normocephalic and atraumatic.   Eyes:      Pupils: Pupils are equal, round, and reactive to light.   Cardiovascular:      Rate and Rhythm: Normal rate and regular rhythm.      Heart sounds: Normal heart sounds.   Pulmonary:      Effort: Tachypnea and accessory muscle usage present. No  respiratory distress. He is intubated.      Breath sounds: Normal breath sounds. Decreased air movement present. No wheezing.   Abdominal:      General: Bowel sounds are normal. There is no distension.      Palpations: Abdomen is soft. There is no mass.      Tenderness: There is abdominal tenderness in the right upper quadrant.   Musculoskeletal:         General: No deformity. Normal range of motion.      Cervical back: Normal range of motion and neck supple.   Skin:     General: Skin is warm and dry.   Neurological:      Mental Status: He is alert and oriented to person, place, and time.      Deep Tendon Reflexes: Reflexes are normal and symmetric.   Psychiatric:         Behavior: Behavior normal.         Thought Content: Thought content normal.         Judgment: Judgment normal.         Significant Labs:   All pertinent labs within the past 24 hours have been reviewed.  BMP:   Recent Labs   Lab 12/30/21  0358   *   *   K 3.9   CL 97   CO2 18*   BUN 62*   CREATININE 4.4*   CALCIUM 7.2*   MG 2.1     CBC:   Recent Labs   Lab 12/29/21  0416 12/29/21  1325 12/30/21  0358   WBC 18.06*  --  8.74   HGB 13.9*  --  11.9*   HCT 41.9 40 35.1*   PLT 88*  --  75*     CMP:   Recent Labs   Lab 12/29/21  0416 12/29/21  1232 12/30/21  0358   * 131* 131*   K 4.4 4.5 3.9    100 97   CO2 18* 19* 18*   * 141* 219*   BUN 42* 48* 62*   CREATININE 2.7* 3.3* 4.4*   CALCIUM 7.9* 7.8* 7.2*   PROT 6.2 6.1 4.9*   ALBUMIN 2.7* 2.9* 2.4*   BILITOT 11.8* 11.5* 9.2*   ALKPHOS 162* 179* 336*   * 100* 84*   * 161* 127*   ANIONGAP 12 12 16   EGFRNONAA 22* 18* 12*       Significant Imaging: I have reviewed all pertinent imaging results/findings within the past 24 hours.      Assessment/Plan:      * Septic shock  Pressors  Fluids  ABX  ICU  Pulmonary CC  Intubated  Sedated      Acute hyperglycemia        Thrombocytopenia        LISET (acute kidney injury)  Worsening  Cr 3.3  Monitor      E coli  bacteremia  ABX  ICU  Follow Blood Cultures  Pressors as indicated      Electrolyte imbalance  Correct as indicated        Acute respiratory failure with hypoxia and hypercarbia  Patient with Hypoxic Respiratory failure which is Acute.  he is not on home oxygen. Supplemental oxygen was provided and noted- Vent Mode: A/C  Oxygen Concentration (%):  [40-60] 40  Resp Rate Total:  [26 br/min-27 br/min] 26 br/min  Vt Set:  [450 mL] 450 mL  PEEP/CPAP:  [5 cmH20] 5 cmH20  Mean Airway Pressure:  [9.5 mmK11-64 cmH20] 9.5 cmH20.   Signs/symptoms of respiratory failure include- tachypnea, increased work of breathing, respiratory distress, use of accessory muscles and cyanosis. Contributing diagnoses includes - COPD Labs and images were reviewed. Patient Has recent ABG, which has been reviewed. Will treat underlying causes and adjust management of respiratory failure as indicated    Alcohol abuse  - Monitor for withdrawal/DTs. IV Ativan if needed.     Tobacco use  - Assistance with smoking cessation was offered, including:  [x]  Medications  [x]  Counseling  [x]  Printed Information on Smoking Cessation  []  Referral to a Smoking Cessation Program  - Patient was counseled regarding smoking for 3-10 minutes.    Primary hypertension  - Continue home antihypertensives.     Paroxysmal atrial fibrillation  - Remains in sinus rhythm on admission. Monitor telemetry.  - Continue home BB.  - Patient is not on long-term AC. Will defer to his primary cardiologist.     Acute cholangitis  - Abdominal US showed: Gallbladder distension with internal sludge but without definite stones. Intra and extrahepatic biliary ductal dilation. Acute cholecystitis or choledocholithiasis not excluded.  - GI consult. Plans for ERCP in AM. Will keep NPO.  - Blood cultures obtained in the ED. Continue empiric IV Zosyn.   - IV hydration.  - Analgesics and antiemetics as needed.   - Follow labs.     12/28  ERCP planned  Await intervention per GI  Now  Intubated    12/29  S/p ERCP  GI on consult  Stent placed per GI  Abx  Follow Cultures    12/30  GI on board  S/p ERCP  Abx  Improving clinically  Remains on Pressors      VTE Risk Mitigation (From admission, onward)         Ordered     IP VTE HIGH RISK PATIENT  Once         12/27/21 2247     Place sequential compression device  Until discontinued         12/27/21 2247     Reason for No Pharmacological VTE Prophylaxis  Once        Question Answer Comment   Reasons: Risk of Bleeding    Reasons: Thrombocytopenia        12/27/21 2247                Discharge Planning   FLAQUITA:      Code Status: Full Code   Is the patient medically ready for discharge?:     Reason for patient still in hospital (select all that apply): Patient unstable, Patient trending condition, Consult recommendations and Pending disposition  Discharge Plan A: Home with family            Critical care time spent on the evaluation and treatment of severe organ dysfunction, review of pertinent labs and imaging studies, discussions with consulting providers and discussions with patient/family: 75 minutes.      Nitesh Lopez MD  Department of Hospital Medicine   '[place] - Intensive Care ([facility])

## 2021-12-30 NOTE — ASSESSMENT & PLAN NOTE
W/ met acidosis despite bicarb infusion  Accurate I/Os, urine output decreasing (247ml out last 24 hours)  Support MAP for renal perfusion  Renal dose meds and IVAB  Consult nephrology

## 2021-12-30 NOTE — CONSULTS
..  Lizz Alas is a 71 y.o. male for whom nephrology consult has been requested to evaluate and give opinion.     HPI: 71 year old [place]ian male who is intubated and sedate on vent and is s/p ERCP POD # 2 due to biliary sepsis; his biliary tree was dilated with multiple stones and sludge removed; he had sphincterotomy and balloon sweep on 12/28/21; notes reviewed; Scr 0.8 on 12/28/21; this cathy to 2.7>>3.3>> 4.4 today with minimal/poor UOP. TB was 5.1 and peaked at 11.8; dropping now down to 9.2; unfortunately, UOP is poor and Nephrology consultation requested for oligo-anuric LISET due to presumed biliary sepsis; history obtained from daughter at bedside this morning.  EHR reviewed in detail; case d/w nursing at bedside as well. He remains on Bicarb gtt at present time as well as dual pressors (Vaso and Levo).      PAST MEDICAL HISTORY:  He  has a past medical history of Hyperlipemia, Hypertension, Other lesions of oral mucosa, PAF (paroxysmal atrial fibrillation), and Thrombocytopenia.    PAST SURGICAL HISTORY:  He  has a past surgical history that includes Hernia repair; Fracture surgery; and ERCP (N/A, 12/28/2021).    SOCIAL HISTORY:  He  reports that he has been smoking cigarettes. He has a 67.50 pack-year smoking history. He has never used smokeless tobacco. He reports current alcohol use. He reports that he does not use drugs.      FAMILY MEDICAL HISTORY:  His family history is not on file.    Review of patient's allergies indicates:  No Known Allergies     arformoteroL  15 mcg Nebulization BID    budesonide  0.5 mg Nebulization Q12H    cefTRIAXone (ROCEPHIN) IVPB  1 g Intravenous Q12H    chlorhexidine  15 mL Mouth/Throat BID    famotidine (PF)  20 mg Intravenous Daily    mupirocin   Nasal BID    nicotine  1 patch Transdermal Daily    polyethylene glycol  17 g Per NG tube Daily    Banana bag   Intravenous Daily       Prior to Admission medications    Medication Sig Start Date End Date Taking?  "Authorizing Provider   amLODIPine (NORVASC) 5 MG tablet Take 5 mg by mouth once daily.    Historical Provider   azithromycin (Z-AJAY) 250 MG tablet Take 1 tablet (250 mg total) by mouth once daily. Take first 2 tablets together, then 1 every day until finished. 10/9/20   Enriqueta Solis Do, MD   bisoprolol (ZEBETA) 5 MG tablet Take 5 mg by mouth once daily.    Historical Provider   hydroCHLOROthiazide (MICROZIDE) 12.5 mg capsule Take 12.5 mg by mouth once daily.    Historical Provider   ibuprofen (ADVIL,MOTRIN) 800 MG tablet Take 1 tablet (800 mg total) by mouth every 6 (six) hours as needed for Pain. 10/9/20   Enriqueta Solis Do, MD   ibuprofen (ADVIL,MOTRIN) 800 MG tablet Take 1 tablet (800 mg total) by mouth every 6 (six) hours as needed for Pain. 10/9/20   Enriqueta Solis Do, MD   ondansetron (ZOFRAN) 4 MG tablet Take 1 tablet (4 mg total) by mouth every 6 (six) hours. For nausea 10/9/20   Enriqueta Solis Do, MD   rosuvastatin (CRESTOR) 20 MG tablet Take 20 mg by mouth once daily.    Historical Provider        REVIEW OF SYSTEMS:  Unobtainable due to intubated status.        PHYSICAL EXAM:   height is 5' 6" (1.676 m) and weight is 90 kg (198 lb 6.6 oz). His oral temperature is 99.2 °F (37.3 °C). His blood pressure is 94/62 and his pulse is 69. His respiration is 26 (abnormal) and oxygen saturation is 99%.   Gen: WDWN male in no apparent distress; intubated and sedate  Skin: No rashes or ulcers  Eyes: Normal conjunctiva and lids, PERRLA  ENT: ETT in place   Neck:No JVD  Chest: Clear with no rales, rhonchi, wheezing with normal effort  CV: Regular with no murmurs, gallops or rubs  Abd: Soft; distended, weak bowel sounds  Ext: No cyanosis, clubbing or edema    Results for JUNIOR ALONZO (MRN [id number]) as of 12/30/2021 11:12   Ref. Range 12/29/2021 04:16 12/29/2021 12:32 12/30/2021 03:58   Sodium Latest Ref Range: 136 - 145 mmol/L 131 (L) 131 (L) 131 (L)   Potassium Latest Ref Range: 3.5 - 5.1 mmol/L 4.4 4.5 3.9   Chloride " Latest Ref Range: 95 - 110 mmol/L 101 100 97   CO2 Latest Ref Range: 23 - 29 mmol/L 18 (L) 19 (L) 18 (L)   Anion Gap Latest Ref Range: 8 - 16 mmol/L 12 12 16   BUN Latest Ref Range: 8 - 23 mg/dL 42 (H) 48 (H) 62 (H)   Creatinine Latest Ref Range: 0.5 - 1.4 mg/dL 2.7 (H) 3.3 (H) 4.4 (H)   eGFR if non African American Latest Ref Range: >60 mL/min/1.73 m^2 22 (A) 18 (A) 12 (A)   eGFR if African American Latest Ref Range: >60 mL/min/1.73 m^2 26 (A) 20 (A) 14 (A)   Glucose Latest Ref Range: 70 - 110 mg/dL 141 (H) 141 (H) 219 (H)   Calcium Latest Ref Range: 8.7 - 10.5 mg/dL 7.9 (L) 7.8 (L) 7.2 (L)   Magnesium Latest Ref Range: 1.6 - 2.6 mg/dL 2.1  2.1   Alkaline Phosphatase Latest Ref Range: 55 - 135 U/L 162 (H) 179 (H) 336 (H)   PROTEIN TOTAL Latest Ref Range: 6.0 - 8.4 g/dL 6.2 6.1 4.9 (L)   Albumin Latest Ref Range: 3.5 - 5.2 g/dL 2.7 (L) 2.9 (L) 2.4 (L)   BILIRUBIN TOTAL Latest Ref Range: 0.1 - 1.0 mg/dL 11.8 (H) 11.5 (H) 9.2 (H)   AST Latest Ref Range: 10 - 40 U/L 117 (H) 100 (H) 84 (H)   ALT Latest Ref Range: 10 - 44 U/L 188 (H) 161 (H) 127 (H)     TECHNIQUE:  Limited ultrasound of the right upper quadrant of the abdomen (including pancreas, liver, gallbladder, common bile duct, and Right kidney) was performed.     COMPARISON:  None.     FINDINGS:  Pancreas: No definite abnormality noting the pancreas is largely obscured by bowel gas.     Liver: Normal in size, measuring 14.1 cm. Homogeneous echotexture. No focal hepatic lesions.     Gallbladder: Gallbladder is distended.  No definite internal stones.  There is sludge.  Gallbladder wall thickening.     Biliary system: The common duct is dilated, measuring 14 mm.  There is intrahepatic biliary ductal dilation.     Right kidney: Normal in size and echotexture, measuring 13 cm.     Miscellaneous: No upper abdominal ascites.     Impression:     Gallbladder distension with internal sludge but without definite stones.  Intra and extrahepatic biliary ductal dilation.   Consider further evaluation with contrast enhanced CT.  Acute cholecystitis or choledocholithiasis not excluded.     Pancreas is largely obscured by bowel gas.    IMPRESSION AND RECOMMENDATIONS:    1) Multi-factorial LISET/ATN - due to biliary sepsis; Sepsis-associated LISET)- compounded by pre-hospital NSAID use and thiazide diuretic use. High risk patient setting due to antecedent history of alcohol abuse.  2) Respiratory failure  S/p ERCP for biliary sludge and dilated biliary tree; cholangitis.  3) E. Coli bacteremia  4) Hx ETOH abuse  5) Metabolic acidosis  6) Thrombocytopenia      Plan:     Patient very high risk for ongoing LISET/ATN requiring RRT/CRRT/; theoretically due to liver failure, may benefit from CRRT; no urgent indications for Rx today; I and O tally noted; on bicarb gtt for resuscitation; on dual pressors, so CRRT will be favored for hemodynamic reasons.  Hgb in good range.  K is acceptable; pH 7.395/PCO2 32/PO2 64.  Remaining electrolytes in good range.  Absent any abrupt turnaround in UOP, I suspect GFR will worsen before any improvement seen. For access, trialysis, non-cuffed/temp HD catheter favored;  My colleagues assume care in am; please call with interim concerns; [phone].      Lauri Strange MD

## 2021-12-31 LAB
ALBUMIN SERPL BCP-MCNC: 2 G/DL (ref 3.5–5.2)
ALLENS TEST: ABNORMAL
ALP SERPL-CCNC: 323 U/L (ref 55–135)
ALT SERPL W/O P-5'-P-CCNC: 87 U/L (ref 10–44)
ANION GAP SERPL CALC-SCNC: 15 MMOL/L (ref 8–16)
AST SERPL-CCNC: 42 U/L (ref 10–40)
BACTERIA SPEC AEROBE CULT: NO GROWTH
BASOPHILS # BLD AUTO: 0.04 K/UL (ref 0–0.2)
BASOPHILS NFR BLD: 0.5 % (ref 0–1.9)
BILIRUB DIRECT SERPL-MCNC: 2.4 MG/DL (ref 0.1–0.3)
BILIRUB SERPL-MCNC: 2.9 MG/DL (ref 0.1–1)
BUN SERPL-MCNC: 75 MG/DL (ref 8–23)
CALCIUM SERPL-MCNC: 7.3 MG/DL (ref 8.7–10.5)
CHLORIDE SERPL-SCNC: 94 MMOL/L (ref 95–110)
CO2 SERPL-SCNC: 21 MMOL/L (ref 23–29)
CREAT SERPL-MCNC: 6 MG/DL (ref 0.5–1.4)
DELSYS: ABNORMAL
DIFFERENTIAL METHOD: ABNORMAL
EOSINOPHIL # BLD AUTO: 0.1 K/UL (ref 0–0.5)
EOSINOPHIL NFR BLD: 1 % (ref 0–8)
ERYTHROCYTE [DISTWIDTH] IN BLOOD BY AUTOMATED COUNT: 14.4 % (ref 11.5–14.5)
ERYTHROCYTE [SEDIMENTATION RATE] IN BLOOD BY WESTERGREN METHOD: 26 MM/H
EST. GFR  (AFRICAN AMERICAN): 10 ML/MIN/1.73 M^2
EST. GFR  (NON AFRICAN AMERICAN): 9 ML/MIN/1.73 M^2
FIO2: 30
GLUCOSE SERPL-MCNC: 197 MG/DL (ref 70–110)
GRAM STN SPEC: NORMAL
GRAM STN SPEC: NORMAL
HCO3 UR-SCNC: 24.1 MMOL/L (ref 24–28)
HCT VFR BLD AUTO: 31.9 % (ref 40–54)
HGB BLD-MCNC: 11.2 G/DL (ref 14–18)
IMM GRANULOCYTES # BLD AUTO: 0.02 K/UL (ref 0–0.04)
IMM GRANULOCYTES NFR BLD AUTO: 0.2 % (ref 0–0.5)
LYMPHOCYTES # BLD AUTO: 0.7 K/UL (ref 1–4.8)
LYMPHOCYTES NFR BLD: 8.1 % (ref 18–48)
MCH RBC QN AUTO: 31.1 PG (ref 27–31)
MCHC RBC AUTO-ENTMCNC: 35.1 G/DL (ref 32–36)
MCV RBC AUTO: 89 FL (ref 82–98)
MODE: ABNORMAL
MONOCYTES # BLD AUTO: 0.8 K/UL (ref 0.3–1)
MONOCYTES NFR BLD: 8.5 % (ref 4–15)
NEUTROPHILS # BLD AUTO: 7.2 K/UL (ref 1.8–7.7)
NEUTROPHILS NFR BLD: 81.7 % (ref 38–73)
NRBC BLD-RTO: 0 /100 WBC
PCO2 BLDA: 41.6 MMHG (ref 35–45)
PEEP: 5
PH SMN: 7.37 [PH] (ref 7.35–7.45)
PHOSPHATE SERPL-MCNC: 3.1 MG/DL (ref 2.7–4.5)
PLATELET # BLD AUTO: 70 K/UL (ref 150–450)
PMV BLD AUTO: 11.5 FL (ref 9.2–12.9)
PO2 BLDA: 53 MMHG (ref 80–100)
POC BE: -1 MMOL/L
POC SATURATED O2: 86 % (ref 95–100)
POCT GLUCOSE: 172 MG/DL (ref 70–110)
POCT GLUCOSE: 195 MG/DL (ref 70–110)
POCT GLUCOSE: 202 MG/DL (ref 70–110)
POCT GLUCOSE: 226 MG/DL (ref 70–110)
POTASSIUM SERPL-SCNC: 3.8 MMOL/L (ref 3.5–5.1)
PROT SERPL-MCNC: 4.8 G/DL (ref 6–8.4)
RBC # BLD AUTO: 3.6 M/UL (ref 4.6–6.2)
SAMPLE: ABNORMAL
SITE: ABNORMAL
SODIUM SERPL-SCNC: 130 MMOL/L (ref 136–145)
VT: 450
WBC # BLD AUTO: 8.84 K/UL (ref 3.9–12.7)

## 2021-12-31 PROCEDURE — 99291 PR CRITICAL CARE, E/M 30-74 MINUTES: ICD-10-PCS | Mod: ,,, | Performed by: NURSE PRACTITIONER

## 2021-12-31 PROCEDURE — 25000242 PHARM REV CODE 250 ALT 637 W/ HCPCS: Performed by: INTERNAL MEDICINE

## 2021-12-31 PROCEDURE — 99291 CRITICAL CARE FIRST HOUR: CPT | Mod: ,,, | Performed by: NURSE PRACTITIONER

## 2021-12-31 PROCEDURE — 80076 HEPATIC FUNCTION PANEL: CPT | Performed by: INTERNAL MEDICINE

## 2021-12-31 PROCEDURE — 63600175 PHARM REV CODE 636 W HCPCS: Performed by: NURSE PRACTITIONER

## 2021-12-31 PROCEDURE — 99900035 HC TECH TIME PER 15 MIN (STAT)

## 2021-12-31 PROCEDURE — 99900026 HC AIRWAY MAINTENANCE (STAT)

## 2021-12-31 PROCEDURE — 82803 BLOOD GASES ANY COMBINATION: CPT

## 2021-12-31 PROCEDURE — S4991 NICOTINE PATCH NONLEGEND: HCPCS | Performed by: INTERNAL MEDICINE

## 2021-12-31 PROCEDURE — 99233 PR SUBSEQUENT HOSPITAL CARE,LEVL III: ICD-10-PCS | Mod: ,,, | Performed by: INTERNAL MEDICINE

## 2021-12-31 PROCEDURE — 37799 UNLISTED PX VASCULAR SURGERY: CPT

## 2021-12-31 PROCEDURE — C9399 UNCLASSIFIED DRUGS OR BIOLOG: HCPCS | Performed by: NURSE PRACTITIONER

## 2021-12-31 PROCEDURE — 20000000 HC ICU ROOM

## 2021-12-31 PROCEDURE — 85025 COMPLETE CBC W/AUTO DIFF WBC: CPT | Performed by: NURSE PRACTITIONER

## 2021-12-31 PROCEDURE — 63600175 PHARM REV CODE 636 W HCPCS: Performed by: INTERNAL MEDICINE

## 2021-12-31 PROCEDURE — 94640 AIRWAY INHALATION TREATMENT: CPT

## 2021-12-31 PROCEDURE — 99233 SBSQ HOSP IP/OBS HIGH 50: CPT | Mod: ,,, | Performed by: INTERNAL MEDICINE

## 2021-12-31 PROCEDURE — 25000242 PHARM REV CODE 250 ALT 637 W/ HCPCS: Performed by: NURSE PRACTITIONER

## 2021-12-31 PROCEDURE — 94003 VENT MGMT INPAT SUBQ DAY: CPT

## 2021-12-31 PROCEDURE — 25000003 PHARM REV CODE 250: Performed by: INTERNAL MEDICINE

## 2021-12-31 PROCEDURE — 99232 PR SUBSEQUENT HOSPITAL CARE,LEVL II: ICD-10-PCS | Mod: ,,, | Performed by: INTERNAL MEDICINE

## 2021-12-31 PROCEDURE — 99232 SBSQ HOSP IP/OBS MODERATE 35: CPT | Mod: ,,, | Performed by: INTERNAL MEDICINE

## 2021-12-31 PROCEDURE — 80048 BASIC METABOLIC PNL TOTAL CA: CPT | Performed by: NURSE PRACTITIONER

## 2021-12-31 PROCEDURE — 25000003 PHARM REV CODE 250: Performed by: NURSE PRACTITIONER

## 2021-12-31 PROCEDURE — 84100 ASSAY OF PHOSPHORUS: CPT | Performed by: NURSE PRACTITIONER

## 2021-12-31 RX ORDER — POLYETHYLENE GLYCOL 3350 17 G/17G
17 POWDER, FOR SOLUTION ORAL EVERY OTHER DAY
Status: DISCONTINUED | OUTPATIENT
Start: 2022-01-01 | End: 2022-01-03

## 2021-12-31 RX ORDER — NOREPINEPHRINE BITARTRATE/D5W 4MG/250ML
0-3 PLASTIC BAG, INJECTION (ML) INTRAVENOUS CONTINUOUS
Status: DISCONTINUED | OUTPATIENT
Start: 2021-12-31 | End: 2021-12-31

## 2021-12-31 RX ORDER — FUROSEMIDE 10 MG/ML
100 INJECTION INTRAMUSCULAR; INTRAVENOUS ONCE
Status: COMPLETED | OUTPATIENT
Start: 2021-12-31 | End: 2021-12-31

## 2021-12-31 RX ADMIN — MUPIROCIN: 20 OINTMENT TOPICAL at 11:12

## 2021-12-31 RX ADMIN — BUDESONIDE 0.5 MG: 0.5 INHALANT ORAL at 08:12

## 2021-12-31 RX ADMIN — CHLORHEXIDINE GLUCONATE 0.12% ORAL RINSE 15 ML: 1.2 LIQUID ORAL at 09:12

## 2021-12-31 RX ADMIN — FAMOTIDINE 20 MG: 10 INJECTION, SOLUTION INTRAVENOUS at 11:12

## 2021-12-31 RX ADMIN — ARFORMOTEROL TARTRATE 15 MCG: 15 SOLUTION RESPIRATORY (INHALATION) at 08:12

## 2021-12-31 RX ADMIN — INSULIN DETEMIR 6 UNITS: 100 INJECTION, SOLUTION SUBCUTANEOUS at 09:12

## 2021-12-31 RX ADMIN — INSULIN ASPART 2 UNITS: 100 INJECTION, SOLUTION INTRAVENOUS; SUBCUTANEOUS at 05:12

## 2021-12-31 RX ADMIN — INSULIN ASPART 1 UNITS: 100 INJECTION, SOLUTION INTRAVENOUS; SUBCUTANEOUS at 09:12

## 2021-12-31 RX ADMIN — CEFTRIAXONE 1 G: 1 INJECTION, SOLUTION INTRAVENOUS at 11:12

## 2021-12-31 RX ADMIN — FUROSEMIDE 100 MG: 10 INJECTION, SOLUTION INTRAVENOUS at 02:12

## 2021-12-31 RX ADMIN — INSULIN ASPART 4 UNITS: 100 INJECTION, SOLUTION INTRAVENOUS; SUBCUTANEOUS at 05:12

## 2021-12-31 RX ADMIN — INSULIN ASPART 2 UNITS: 100 INJECTION, SOLUTION INTRAVENOUS; SUBCUTANEOUS at 11:12

## 2021-12-31 RX ADMIN — INSULIN DETEMIR 6 UNITS: 100 INJECTION, SOLUTION SUBCUTANEOUS at 07:12

## 2021-12-31 RX ADMIN — IPRATROPIUM BROMIDE AND ALBUTEROL SULFATE 3 ML: .5; 3 SOLUTION RESPIRATORY (INHALATION) at 08:12

## 2021-12-31 RX ADMIN — CHLORHEXIDINE GLUCONATE 0.12% ORAL RINSE 15 ML: 1.2 LIQUID ORAL at 11:12

## 2021-12-31 RX ADMIN — INSULIN ASPART 4 UNITS: 100 INJECTION, SOLUTION INTRAVENOUS; SUBCUTANEOUS at 07:12

## 2021-12-31 RX ADMIN — SODIUM BICARBONATE: 84 INJECTION, SOLUTION INTRAVENOUS at 03:12

## 2021-12-31 RX ADMIN — INSULIN ASPART 2 UNITS: 100 INJECTION, SOLUTION INTRAVENOUS; SUBCUTANEOUS at 12:12

## 2021-12-31 RX ADMIN — MUPIROCIN: 20 OINTMENT TOPICAL at 09:12

## 2021-12-31 RX ADMIN — NICOTINE 1 PATCH: 21 PATCH, EXTENDED RELEASE TRANSDERMAL at 10:12

## 2021-12-31 RX ADMIN — CEFTRIAXONE 1 G: 1 INJECTION, SOLUTION INTRAVENOUS at 09:12

## 2021-12-31 RX ADMIN — LORAZEPAM 1 MG: 2 INJECTION INTRAMUSCULAR; INTRAVENOUS at 12:12

## 2021-12-31 RX ADMIN — FOLIC ACID: 5 INJECTION, SOLUTION INTRAMUSCULAR; INTRAVENOUS; SUBCUTANEOUS at 05:12

## 2021-12-31 RX ADMIN — Medication 50 MCG/HR: at 09:12

## 2021-12-31 NOTE — ASSESSMENT & PLAN NOTE
ABX  ICU  Follow Blood Cultures  Pressors as indicated      12/31  Sensitive E coli bacteremia on ceftriaxone IV  Afebrile  Resolved leucocytosis  Repeat blood culture on 12/29 NGTC x 2 days  Appropriate source control achieved S/P ERCP, stone retrieval and sphincterectomy.  Panculture as needed

## 2021-12-31 NOTE — SUBJECTIVE & OBJECTIVE
Interval History: worsening LISET    Review of Systems   Unable to perform ROS: Intubated     Objective:     Vital Signs (Most Recent):  Temp: 98.4 °F (36.9 °C) (12/31/21 0700)  Pulse: 65 (12/31/21 0943)  Resp: (!) 26 (12/31/21 0943)  BP: (!) 70/45 (12/31/21 0715)  SpO2: 98 % (12/31/21 0943) Vital Signs (24h Range):  Temp:  [97.5 °F (36.4 °C)-99.1 °F (37.3 °C)] 98.4 °F (36.9 °C)  Pulse:  [54-75] 65  Resp:  [23-30] 26  SpO2:  [88 %-100 %] 98 %  BP: ()/(45-69) 70/45  Arterial Line BP: ()/(40-76) 115/61     Weight: 90 kg (198 lb 6.6 oz)  Body mass index is 32.02 kg/m².    Intake/Output Summary (Last 24 hours) at 12/31/2021 1035  Last data filed at 12/31/2021 0701  Gross per 24 hour   Intake 4120.41 ml   Output 108 ml   Net 4012.41 ml      Physical Exam  Vitals reviewed.   Constitutional:       General: He is not in acute distress.     Appearance: He is ill-appearing. He is not toxic-appearing.      Comments: Intubated     HENT:      Head: Normocephalic.   Eyes:      Pupils: Pupils are equal, round, and reactive to light.      Comments: Sedated and intubated   Cardiovascular:      Rate and Rhythm: Normal rate and regular rhythm.      Pulses: Normal pulses.   Pulmonary:      Effort: Pulmonary effort is normal.      Comments: Reduced entry at bases  Not in distress  Intubated  Abdominal:      General: Bowel sounds are normal. There is distension.      Palpations: Abdomen is soft.      Comments: Tenderness cannot be assesed  Patient sedated   Genitourinary:     Comments: Eng  Musculoskeletal:      Comments: Bilat upper extremity edema   Skin:     General: Skin is warm and dry.      Capillary Refill: Capillary refill takes less than 2 seconds.   Neurological:      Comments: Intubated and sedated   Psychiatric:      Comments: Cannot be assessed  Sedated         Significant Labs:   All pertinent labs within the past 24 hours have been reviewed.  Blood Culture: No results for input(s): FABRICIO in the last 48  hours.  CBC:   Recent Labs   Lab 12/29/21  1325 12/30/21  0358 12/31/21  0421   WBC  --  8.74 8.84   HGB  --  11.9* 11.2*   HCT 40 35.1* 31.9*   PLT  --  75* 70*     CMP:   Recent Labs   Lab 12/29/21  1232 12/30/21  0358 12/31/21  0421   * 131* 130*   K 4.5 3.9 3.8    97 94*   CO2 19* 18* 21*   * 219* 197*   BUN 48* 62* 75*   CREATININE 3.3* 4.4* 6.0*   CALCIUM 7.8* 7.2* 7.3*   PROT 6.1 4.9*  --    ALBUMIN 2.9* 2.4*  --    BILITOT 11.5* 9.2*  --    ALKPHOS 179* 336*  --    * 84*  --    * 127*  --    ANIONGAP 12 16 15   EGFRNONAA 18* 12* 9*       Significant Imaging: I have reviewed all pertinent imaging results/findings within the past 24 hours.   Stable mild cardiomegaly.  Stable bilateral lower lobe infiltrate and or atelectasis.    Stable position of the supporting tubes and lines.  No acute osseous findings demonstrated.    Impression:       No change.

## 2021-12-31 NOTE — ASSESSMENT & PLAN NOTE
Worsening  Cr 3.3  Monitor    12/31  2 to shock  Nephro on board  Avoid nephrotoxins  Cont NaHCo3 gtt

## 2021-12-31 NOTE — ASSESSMENT & PLAN NOTE
W/ met acidosis controlled on bicarb infusion  Accurate I/Os, urine output decreasing (118ml out last 24 hours)  Support MAP for renal perfusion  Renal dose meds and IVAB  Nephrology following, may need RRT, no urgent indication now. Will discuss further with nephro

## 2021-12-31 NOTE — PLAN OF CARE
Pt remains intubated and sedated on Fentanyl. One episode of agitation/combativeness where pt sat straight up and was kicking resulting in one dose of PRN Ativan being given as he was at risk of self extubation and harm. He remains restrained. Levo was titrated down and Vaso and Bicarb ggt remain infusing. Pt turned q2 hours and oral care/suction provided q4 hours. 4 moderate liquid BM during shift. All safety and fall precautions adhered to

## 2021-12-31 NOTE — PROGRESS NOTES
[facility]ensive [facility] ([facility])  Critical Care Medicine  Progress Note    Patient Name: Lizz Alas  MRN: [id number]  Admission Date: 12/27/2021  Hospital Length of Stay: 3 days  Code Status: Full Code  Attending Provider: Julissa Thompson MD  Primary Care Provider: Primary Doctor No   Principal Problem: Septic shock    Subjective:     HPI:  Mr Alas is a 70 yo Turkish male with a PMH of HLD, etoh abuse, tobacco abuse, HTN and PAF.  He presented to [facility] ED yesterday evening via personal vehicle with complaint of abd pain over 3 days and N/V X 1 day.  He does not speak English and was accompanied by family.  In ED Glucose 180, TBili 5.1, LA 1.5 and Abd US with GB distention and sludging.  He had temp 102.9.  He as admitted to Tele and GI consulted diagnosed with acute cholecystitis.  This AM Telemetry reported HR was at 183 and upon assessment patient seemed to be in distress, mouth breathing , O2 was reading 73% pt was shaking and shivering. Called TOM due to pt speaking Persian [phone], # number [phone], pt was stating he could not breath.  Checked pt temp reading was 98.4 oral .Pt was on 3L then moved to 5L, then  Respiratory came in and put him on breathing treatment albuterol, still couldn't get breathing controlled, called Dr. Lopez and he stated to transfer pt to ICU.      Hospital/ICU Course:  12/28 - In ICU he was awake and alert on BiPAP .40 FiO2 with mild work of breathing but no distress and hemodynamically stable.  GI requested patient intubated and stabilized more so from pulm standpoint then obtain CT Abd and plan to OR for ERCP.  TBili up to 7.1.  He was intubated and supported on [facility] ventilation post explaining all this to daughter at bed side.  Post intubation and sedation became hypotensive requiring CL and AL placements and starting Levophed infusion.   12/29 - ERCP yesterday afternoon.  Increased vasopressor and O2 requirements overnight.  This AM still intubated on mech vent and  sedated on Fentanyl infusion.  Still on Vasopressin and Levophed infusions also.  Oliguria with LISET and Tmax last 24 hours up to 102.9.  Blood cultures X 2 + E.Coli.  Discussed care with daughter at bed side and all questions answered.   12/30 - remains intubated and sedated on mech vent. Temp and wbc trend down. Urine output, creatinine, and metabolic acidosis worsening  12/31 - remains intubated and sedated on mech vent with minimal oxygen demand but moderate pressor requirement. Urine output 118ml last 24 hours, creatinine 6.0. continued hyperglycemia 197-237      Review of Systems   Unable to perform ROS: Intubated     Objective:     Vital Signs (Most Recent):  Temp: 97.5 °F (36.4 °C) (12/31/21 0400)  Pulse: 61 (12/31/21 0600)  Resp: (!) 26 (12/31/21 0600)  BP: (!) 92/55 (12/31/21 0600)  SpO2: (!) 92 % (12/31/21 0600) Vital Signs (24h Range):  Temp:  [97.5 °F (36.4 °C)-99.1 °F (37.3 °C)] 97.5 °F (36.4 °C)  Pulse:  [54-80] 61  Resp:  [23-30] 26  SpO2:  [88 %-100 %] 92 %  BP: ()/(51-73) 92/55  Arterial Line BP: ()/(51-76) 107/63     Weight: 90 kg (198 lb 6.6 oz)  Body mass index is 32.02 kg/m².      Intake/Output Summary (Last 24 hours) at 12/31/2021 0703  Last data filed at 12/31/2021 0600  Gross per 24 hour   Intake 4431.26 ml   Output 118 ml   Net 4313.26 ml      fentanyl 62.5 mcg/hr (12/31/21 0600)    NORepinephrine bitartrate-D5W 0.08 mcg/kg/min (12/31/21 0600)    sodium bicarbonate drip 75 mL/hr at 12/31/21 0600    vasopressin 0.04 Units/min (12/31/21 0600)       Physical Exam  Vitals and nursing note reviewed.   Constitutional:       Appearance: He is obese. He is ill-appearing.      Interventions: He is sedated, intubated and restrained.   Eyes:      General: No scleral icterus.     Conjunctiva/sclera: Conjunctivae normal.   Neck:      Vascular: No JVD.   Cardiovascular:      Rate and Rhythm: Normal rate and regular rhythm.      Pulses:           Radial pulses are 1+ on the right side and  1+ on the left side.        Dorsalis pedis pulses are 1+ on the right side and 1+ on the left side.   Pulmonary:      Effort: He is intubated.      Breath sounds: Decreased breath sounds present.   Abdominal:      General: Bowel sounds are decreased. There is no distension.      Palpations: Abdomen is soft.   Musculoskeletal:      Right lower leg: No edema.      Left lower leg: No edema.   Skin:     General: Skin is warm and dry.      Capillary Refill: Capillary refill takes less than 2 seconds.         Vents:  Vent Mode: A/C (12/31/21 0421)  Ventilator Initiated: Yes (12/28/21 0824)  Set Rate: 26 BPM (12/31/21 0421)  Vt Set: 450 mL (12/31/21 0421)  Pressure Support: 0 cmH20 (12/28/21 0824)  PEEP/CPAP: 5 cmH20 (12/31/21 0421)  Oxygen Concentration (%): (S) 40 (12/31/21 0603)  Peak Airway Pressure: 20 cmH2O (12/31/21 0421)  Plateau Pressure: 16 cmH20 (12/31/21 0421)  Total Ve: 12.1 mL (12/31/21 0421)  Negative Inspiratory Force (cm H2O): 0 (12/31/21 0421)  F/VT Ratio<105 (RSBI): (!) 55.91 (12/31/21 0421)    Lines/Drains/Airways     Central Venous Catheter Line            Percutaneous Central Line Insertion/Assessment - Triple Lumen  12/28/21 1052 left internal jugular 2 days          Drain                 NG/OG Tube 12/28/21 0827 [place] sump 14 Fr. Left mouth 2 days         Urethral Catheter 12/28/21 0830 2 days          Airway                 Airway - Non-Surgical 12/28/21 0824 Endotracheal Tube 2 days          Arterial Line            Arterial Line 12/28/21 1045 Left Radial 2 days          Peripheral Intravenous Line                 Peripheral IV - Single Lumen 12/28/21 0745 20 G Posterior;Right Hand 2 days                Significant Labs:    CBC/Anemia Profile:  Recent Labs   Lab 12/29/21  1325 12/30/21  0358 12/31/21 0421   WBC  --  8.74 8.84   HGB  --  11.9* 11.2*   HCT 40 35.1* 31.9*   PLT  --  75* 70*   MCV  --  90 89   RDW  --  14.6* 14.4        Chemistries:  Recent Labs   Lab 12/29/21  1232 12/30/21  0359  12/31/21 0421   * 131* 130*   K 4.5 3.9 3.8    97 94*   CO2 19* 18* 21*   BUN 48* 62* 75*   CREATININE 3.3* 4.4* 6.0*   CALCIUM 7.8* 7.2* 7.3*   ALBUMIN 2.9* 2.4*  --    PROT 6.1 4.9*  --    BILITOT 11.5* 9.2*  --    ALKPHOS 179* 336*  --    * 127*  --    * 84*  --    MG  --  2.1  --    PHOS  --  3.4  --        All pertinent labs within the past 24 hours have been reviewed.    Significant Imaging:  I have reviewed all pertinent imaging results/findings within the past 24 hours.      ABG  Recent Labs   Lab 12/31/21 0421   PH 7.370   PO2 53*   PCO2 41.6   HCO3 24.1   BE -1     Assessment/Plan:     Psychiatric  Alcohol abuse  Banana bag daily #4  Monitor for withdrawals once awakening    Pulmonary  On mechanically assisted ventilation  VAP prophylaxis  Daily SAT    Acute respiratory failure with hypoxia and hypercarbia  Likely secondary to sepsis and atelectasis  Cont fully vent support  Vent settings reviewed and adjusted to optimize gas exchange  daily and prn ABG to assess therapy  SAT daily / SBT once hemodynamically stable    Cardiac/Vascular  Paroxysmal atrial fibrillation  SB - SR last 24 hours,  Extrasystoles noted  Not on anticoagulants on home med review  Hold metoprolol with pressor support    Renal/  LISET (acute kidney injury)  W/ met acidosis controlled on bicarb infusion  Accurate I/Os, urine output decreasing (118ml out last 24 hours)  Support MAP for renal perfusion  Renal dose meds and IVAB  Nephrology following, may need RRT, no urgent indication now. Will discuss further with nephro    Electrolyte imbalance  ICU cardiac monitoring  Repeat labs daily    ID  * Septic shock  Source: GB and Bacteremia  Cont IVFs   stop Vasopressin infusion and titrate Levophed infusion for MAP > 65  Sputum culture NGTD  Blood cultures X 2 E.Coli, continue rocephin  Temp and wbc trend down      E coli bacteremia  Continue rocephin, abx coverage day 4    Acute cholangitis  IVAB and IVFs  POD #3  S/P ERCP  Will need Cholecystectomy as outpt once clinically improved      Hematology  Thrombocytopenia  No active bleeding  Conservative transfusion protocol, holding pharmacologic anticoagulation  Rx sepsis  Monitor trend    Endocrine  Acute hyperglycemia  Range 197-237  Add long acting insulin, continue SSI prn with monitoring for glucose control and prevention of insulin toxicity    Other  Tobacco use  Will encourage tobacco cessation post extubation and once awake/alert  Cont LABA and ICS nebs  On Nicotine patch     Critical Care Daily Checklist:    A: Awake: RASS Goal/Actual Goal: RASS Goal: -2-->light sedation  Actual: [place] Agitation Sedation Scale (RASS): Light sedation   B: Spontaneous Breathing Trial Performed?     C: SAT & SBT Coordinated?  Not SBT candidate today                      D: Delirium: CAM-ICU Overall CAM-ICU: Positive   E: Early Mobility Performed? Yes   F: Feeding Goal: Goals: 1. Enteral Nutrition initiation within 24 hours.  Status: Nutrition Goal Status: new   Current Diet Order   Procedures    Diet NPO      AS: Analgesia/Sedation fentanyl   T: Thromboembolic Prophylaxis SCD   H: HOB > 300 Yes   U: Stress Ulcer Prophylaxis (if needed) pepcid   G: Glucose Control SSI   B: Bowel Function Stool Occurrence: 0   I: Indwelling Catheter (Lines & Eng) Necessity reviewed   D: De-escalation of Antimicrobials/Pharmacotherapies reviewed    Plan for the day/ETD As above    Code Status:  Family/Goals of Care: Full Code  Pending hospital course; daughter updated on status and plan at bedside   I have discussed case and plan of care in detail with Dr Jameson; Status and plan of care were discussed with team on multidisciplinary rounds.    Critical Care Time: 60 minutes  Critical secondary to septic shock, bacteremia, LISET, cholangitis on mechanical ventilation and pressor support   Critical care was time spent personally by me on the following activities: development of treatment plan with patient  or surrogate and bedside caregivers, discussions with consultants, evaluation of patient's response to treatment, examination of patient, ordering and performing treatments and interventions, ordering and review of laboratory studies, ordering and review of radiographic studies, pulse oximetry, re-evaluation of patient's condition. This critical care time did not overlap with that of any other provider or involve time for any procedures.     GREG Dawn-BC  Critical Care Medicine  [facility] ([facility])

## 2021-12-31 NOTE — SUBJECTIVE & OBJECTIVE
Review of Systems   Unable to perform ROS: Intubated     Objective:     Vital Signs (Most Recent):  Temp: 97.5 °F (36.4 °C) (12/31/21 0400)  Pulse: 61 (12/31/21 0600)  Resp: (!) 26 (12/31/21 0600)  BP: (!) 92/55 (12/31/21 0600)  SpO2: (!) 92 % (12/31/21 0600) Vital Signs (24h Range):  Temp:  [97.5 °F (36.4 °C)-99.1 °F (37.3 °C)] 97.5 °F (36.4 °C)  Pulse:  [54-80] 61  Resp:  [23-30] 26  SpO2:  [88 %-100 %] 92 %  BP: ()/(51-73) 92/55  Arterial Line BP: ()/(51-76) 107/63     Weight: 90 kg (198 lb 6.6 oz)  Body mass index is 32.02 kg/m².      Intake/Output Summary (Last 24 hours) at 12/31/2021 0703  Last data filed at 12/31/2021 0600  Gross per 24 hour   Intake 4431.26 ml   Output 118 ml   Net 4313.26 ml      fentanyl 62.5 mcg/hr (12/31/21 0600)    NORepinephrine bitartrate-D5W 0.08 mcg/kg/min (12/31/21 0600)    sodium bicarbonate drip 75 mL/hr at 12/31/21 0600    vasopressin 0.04 Units/min (12/31/21 0600)       Physical Exam  Vitals and nursing note reviewed.   Constitutional:       Appearance: He is obese. He is ill-appearing.      Interventions: He is sedated, intubated and restrained.   Eyes:      General: No scleral icterus.     Conjunctiva/sclera: Conjunctivae normal.   Neck:      Vascular: No JVD.   Cardiovascular:      Rate and Rhythm: Normal rate and regular rhythm.      Pulses:           Radial pulses are 1+ on the right side and 1+ on the left side.        Dorsalis pedis pulses are 1+ on the right side and 1+ on the left side.   Pulmonary:      Effort: He is intubated.      Breath sounds: Decreased breath sounds present.   Abdominal:      General: Bowel sounds are decreased. There is no distension.      Palpations: Abdomen is soft.   Musculoskeletal:      Right lower leg: No edema.      Left lower leg: No edema.   Skin:     General: Skin is warm and dry.      Capillary Refill: Capillary refill takes less than 2 seconds.         Vents:  Vent Mode: A/C (12/31/21 7920)  Ventilator Initiated: Yes  (12/28/21 0824)  Set Rate: 26 BPM (12/31/21 0421)  Vt Set: 450 mL (12/31/21 0421)  Pressure Support: 0 cmH20 (12/28/21 0824)  PEEP/CPAP: 5 cmH20 (12/31/21 0421)  Oxygen Concentration (%): (S) 40 (12/31/21 0603)  Peak Airway Pressure: 20 cmH2O (12/31/21 0421)  Plateau Pressure: 16 cmH20 (12/31/21 0421)  Total Ve: 12.1 mL (12/31/21 0421)  Negative Inspiratory Force (cm H2O): 0 (12/31/21 0421)  F/VT Ratio<105 (RSBI): (!) 55.91 (12/31/21 0421)    Lines/Drains/Airways     Central Venous Catheter Line            Percutaneous Central Line Insertion/Assessment - Triple Lumen  12/28/21 1052 left internal jugular 2 days          Drain                 NG/OG Tube 12/28/21 0827 [place] sump 14 Fr. Left mouth 2 days         Urethral Catheter 12/28/21 0830 2 days          Airway                 Airway - Non-Surgical 12/28/21 0824 Endotracheal Tube 2 days          Arterial Line            Arterial Line 12/28/21 1045 Left Radial 2 days          Peripheral Intravenous Line                 Peripheral IV - Single Lumen 12/28/21 0745 20 G Posterior;Right Hand 2 days                Significant Labs:    CBC/Anemia Profile:  Recent Labs   Lab 12/29/21  1325 12/30/21 0358 12/31/21 0421   WBC  --  8.74 8.84   HGB  --  11.9* 11.2*   HCT 40 35.1* 31.9*   PLT  --  75* 70*   MCV  --  90 89   RDW  --  14.6* 14.4        Chemistries:  Recent Labs   Lab 12/29/21  1232 12/30/21  0358 12/31/21 0421   * 131* 130*   K 4.5 3.9 3.8    97 94*   CO2 19* 18* 21*   BUN 48* 62* 75*   CREATININE 3.3* 4.4* 6.0*   CALCIUM 7.8* 7.2* 7.3*   ALBUMIN 2.9* 2.4*  --    PROT 6.1 4.9*  --    BILITOT 11.5* 9.2*  --    ALKPHOS 179* 336*  --    * 127*  --    * 84*  --    MG  --  2.1  --    PHOS  --  3.4  --        All pertinent labs within the past 24 hours have been reviewed.    Significant Imaging:  I have reviewed all pertinent imaging results/findings within the past 24 hours.

## 2021-12-31 NOTE — PROGRESS NOTES
[facility] - Intensive Care ([facility])  Gastroenterology  Progress Note    Patient Name: Lizz Alas  MRN: [id number]  Admission Date: 12/27/2021  Hospital Length of Stay: 3 days  Code Status: Full Code   Attending Provider: Julissa Thompson MD  Consulting Provider: Melchor Sarmiento MD  Primary Care Physician: Primary Doctor No  Principal Problem: Septic shock      Subjective:     Interval History: Pressure requirements decreased. No urine output.   Liver enzymes were decreasing yesterday except for ALP. Had a bowel movement.    Review of Systems   Unable to perform ROS: Intubated     Objective:     Vital Signs (Most Recent):  Temp: 98.2 °F (36.8 °C) (12/31/21 1100)  Pulse: 69 (12/31/21 1145)  Resp: (!) 31 (12/31/21 1145)  BP: (!) 88/51 (12/31/21 1100)  SpO2: 98 % (12/31/21 1145) Vital Signs (24h Range):  Temp:  [97.5 °F (36.4 °C)-99.1 °F (37.3 °C)] 98.2 °F (36.8 °C)  Pulse:  [54-75] 69  Resp:  [23-31] 31  SpO2:  [88 %-100 %] 98 %  BP: ()/(45-69) 88/51  Arterial Line BP: ()/(40-76) 114/58     Weight: 90 kg (198 lb 6.6 oz) (12/29/21 1250)  Body mass index is 32.02 kg/m².      Intake/Output Summary (Last 24 hours) at 12/31/2021 1211  Last data filed at 12/31/2021 1100  Gross per 24 hour   Intake 4259.14 ml   Output 103 ml   Net 4156.14 ml       Lines/Drains/Airways     Central Venous Catheter Line            Percutaneous Central Line Insertion/Assessment - Triple Lumen  12/28/21 1052 left internal jugular 3 days          Drain                 NG/OG Tube 12/28/21 0827 [place] sump 14 Fr. Left mouth 3 days         Urethral Catheter 12/28/21 0830 3 days          Airway                 Airway - Non-Surgical 12/28/21 0824 Endotracheal Tube 3 days          Arterial Line            Arterial Line 12/28/21 1045 Left Radial 3 days          Peripheral Intravenous Line                 Peripheral IV - Single Lumen 12/28/21 0745 20 G Posterior;Right Hand 3 days                Physical Exam  Vitals and nursing note  reviewed.   Cardiovascular:      Rate and Rhythm: Normal rate and regular rhythm.      Pulses: Normal pulses.      Heart sounds: Normal heart sounds.   Pulmonary:      Effort: Pulmonary effort is normal.      Breath sounds: Normal breath sounds.   Abdominal:      General: Abdomen is flat.      Palpations: Abdomen is soft.         Significant Labs:  Acute Hepatitis Panel: No results for input(s): HEPBSAG, HEPAIGM, HEPCAB in the last 48 hours.    Invalid input(s): HAPBIGM  Amylase: No results for input(s): AMYLASE in the last 48 hours.  Blood Culture: No results for input(s): LABBLOO in the last 48 hours.  CBC:   Recent Labs   Lab 12/29/21  1325 12/30/21  0358 12/31/21 0421   WBC  --  8.74 8.84   HGB  --  11.9* 11.2*   HCT 40 35.1* 31.9*   PLT  --  75* 70*     BMP:   Recent Labs   Lab 12/30/21  0358 12/30/21 0358 12/31/21 0421   *   < > 197*   *   < > 130*   K 3.9   < > 3.8   CL 97   < > 94*   CO2 18*   < > 21*   BUN 62*   < > 75*   CREATININE 4.4*   < > 6.0*   CALCIUM 7.2*   < > 7.3*   MG 2.1  --   --     < > = values in this interval not displayed.     CMP:   Recent Labs   Lab 12/30/21  0358 12/30/21  0358 12/31/21 0421   *   < > 197*   CALCIUM 7.2*   < > 7.3*   ALBUMIN 2.4*  --   --    PROT 4.9*  --   --    *   < > 130*   K 3.9   < > 3.8   CO2 18*   < > 21*   CL 97   < > 94*   BUN 62*   < > 75*   CREATININE 4.4*   < > 6.0*   ALKPHOS 336*  --   --    *  --   --    AST 84*  --   --    BILITOT 9.2*  --   --     < > = values in this interval not displayed.         Significant Imaging:  Imaging results within the past 24 hours have been reviewed.    Assessment/Plan:     Acute cholangitis  Acute cholangitis based on elevated biliurbin, abdominal pain and biliary dilation. ERCP indicated and he has received the indicated zosyn and expanded to Vancomycin in addition given his decompensation. Most likely secondary to sludge or stones. Blood cultures are pending.   I discussed with   John to intubate and plan for ERCP in the OR as he is decompensating.     ERCP is currently recommended. The risks, benefits and alternatives of the procedure were discussed with the patient in detail. Benefits include removal of stones, stents, debri, sludge; dilation; placement of a stent; biopsies. Risks include bleeding (0.3-2%), pancreatitis (3%), cholangitis (0.5-3%), perforation (0.08-0.6%), cholecystitis (0.5%), sedation related adverse events. This discussion was had in the presence of his duaghter. Educational material of the biliary anatomy has been provided today for educational purposes. Other risks include, risks of adverse reaction to sedation requiring the use of reversal agents, bleeding requiring blood transfusion, perforation requiring surgical intervention, technical failure, aspiration leading to respiratory distress and respiratory failure resulting in endotracheal intubation and mechanical ventilation including death. Anesthesia is utilized for this procedure, it is up to the anesthesiologist to determine airway safety including elective endotracheal intubation. Questions were answered, the patient and daughter agree to proceed. There was no language barriers. Discussion was performed in Irish.     12/30:  -Patient continues to improve. WBC count normalized, Tbili decreasing, AST and ALT continue to trend downward.  -Continue abx and to monitor labs.    12/31:  - WBC improved, awaiting liver enzymes  - Continue ceftriaxone for E coli cholangitis with sepsis and bacteremia.        Thank you for your consult. I will follow-up with patient. Please contact us if you have any additional questions.    Melchor Sarmiento MD  Gastroenterology  [facility] - Intensive Care ([facility])

## 2021-12-31 NOTE — PROGRESS NOTES
[facility] Medicine  Progress Note    Patient Name: Lizz Alas  MRN: [id number]  Patient Class: IP- Inpatient   Admission Date: 12/27/2021  Length of Stay: 3 days  Attending Physician: Julissa Thompson MD  Primary Care Provider: Primary Doctor No        Subjective:     Principal Problem:Septic shock        HPI:  Lizz Alas is a 71 y.o. Albanian speaking male patient with a PMHx of PAF, HLD, HTN, thrombocytopenia, alcohol abuse, and tobacco use who presents to the Emergency Department for intermittent epigastric abdominal pain which onset gradually 3 days PTA. Today, the pt reports that his pain has been constant. His symptoms are constant and moderate in severity. No mitigating or exacerbating factors reported. Associated sxs include fever (102 F), nausea, and vomiting. Patient denies any diarrhea, constipation, SOB, weakness, fever, chills, and all other sxs at this time. Initial work-up in the ED showed: Normal WBC and lactic, , , alk phos 184, T bili 5.1, lipase 11, glucose 180, plt 119, sodium 134, COVID negative. Abdominal US showed gallbladder distension with internal sludge but without definite stones, intra and extrahepatic biliary ductal dilation which may represent acute cholecystitis or choledocholithiasis. Blood cultures obtained in the ED, and 1.5 L IV fluids and IV Zosyn given. Case was discussed with GI per the ED, who recommend NPO, IV abx, and ERCP in AM. Hospital Medicine was contacted for admission and patient placed in Observation.       Overview/Hospital Course:  Patient 70 yo male admitted to hospital with suspected cholangitis. Patient initiated on IV abx, fluids, O2. Patient with a rapid decompensation shourtly after admit, rapid response called, patient hypoxic in the 60's placed on bipap with good effect. Patient transferred to ICU. Patient with a progressive decline, febrile episodes to 102.9. Severe septic shock, pressors and fluids on  board. Patient evaluated by GI who recommended ERCP - additional CT abdomen. Patient scheduled for CT and this has been delayed pending stability. Patient intubated for airway protection.  12/29- Patient remains intubated, sedated, on pressors. Discussed POC with family at bedside. Patient evaluated by surgery who recommend o/p surgical followup on recovery for elective cholecystectomy.. Repeat blood cultures in progress, abx infusing.  12/30 - Patient wbc declining but remains on pressors. He is intubated and sedated. Family at bedside. Worsening renal function with cr 4.4 today. Nephrology on consult. Discussed with CC Team  12/31: On sole pressor Norepinephrine 0.14mcg/kg/min, afebrile, leucocytosis is resolved, scR is bumped to 6.0. Patient is oliguric (118 cc urine in last 24 hour). On Fentanyl gtt and NaHCO3 gtt   Daughter Hortencia  is at bedside and was updated.   AC 26/450/40/5      Interval History: worsening LISET    Review of Systems   Unable to perform ROS: Intubated     Objective:     Vital Signs (Most Recent):  Temp: 98.4 °F (36.9 °C) (12/31/21 0700)  Pulse: 65 (12/31/21 0943)  Resp: (!) 26 (12/31/21 0943)  BP: (!) 70/45 (12/31/21 0715)  SpO2: 98 % (12/31/21 0943) Vital Signs (24h Range):  Temp:  [97.5 °F (36.4 °C)-99.1 °F (37.3 °C)] 98.4 °F (36.9 °C)  Pulse:  [54-75] 65  Resp:  [23-30] 26  SpO2:  [88 %-100 %] 98 %  BP: ()/(45-69) 70/45  Arterial Line BP: ()/(40-76) 115/61     Weight: 90 kg (198 lb 6.6 oz)  Body mass index is 32.02 kg/m².    Intake/Output Summary (Last 24 hours) at 12/31/2021 1035  Last data filed at 12/31/2021 0701  Gross per 24 hour   Intake 4120.41 ml   Output 108 ml   Net 4012.41 ml      Physical Exam  Vitals reviewed.   Constitutional:       General: He is not in acute distress.     Appearance: He is ill-appearing. He is not toxic-appearing.      Comments: Intubated     HENT:      Head: Normocephalic.   Eyes:      Pupils: Pupils are equal, round, and reactive to  light.      Comments: Sedated and intubated   Cardiovascular:      Rate and Rhythm: Normal rate and regular rhythm.      Pulses: Normal pulses.   Pulmonary:      Effort: Pulmonary effort is normal.      Comments: Reduced entry at bases  Not in distress  Intubated  Abdominal:      General: Bowel sounds are normal. There is distension.      Palpations: Abdomen is soft.      Comments: Tenderness cannot be assesed  Patient sedated   Genitourinary:     Comments: Eng  Musculoskeletal:      Comments: Bilat upper extremity edema   Skin:     General: Skin is warm and dry.      Capillary Refill: Capillary refill takes less than 2 seconds.   Neurological:      Comments: Intubated and sedated   Psychiatric:      Comments: Cannot be assessed  Sedated         Significant Labs:   All pertinent labs within the past 24 hours have been reviewed.  Blood Culture: No results for input(s): LABBLOO in the last 48 hours.  CBC:   Recent Labs   Lab 12/29/21  1325 12/30/21  0358 12/31/21  0421   WBC  --  8.74 8.84   HGB  --  11.9* 11.2*   HCT 40 35.1* 31.9*   PLT  --  75* 70*     CMP:   Recent Labs   Lab 12/29/21  1232 12/30/21  0358 12/31/21  0421   * 131* 130*   K 4.5 3.9 3.8    97 94*   CO2 19* 18* 21*   * 219* 197*   BUN 48* 62* 75*   CREATININE 3.3* 4.4* 6.0*   CALCIUM 7.8* 7.2* 7.3*   PROT 6.1 4.9*  --    ALBUMIN 2.9* 2.4*  --    BILITOT 11.5* 9.2*  --    ALKPHOS 179* 336*  --    * 84*  --    * 127*  --    ANIONGAP 12 16 15   EGFRNONAA 18* 12* 9*       Significant Imaging: I have reviewed all pertinent imaging results/findings within the past 24 hours.   Stable mild cardiomegaly.  Stable bilateral lower lobe infiltrate and or atelectasis.    Stable position of the supporting tubes and lines.  No acute osseous findings demonstrated.    Impression:       No change.            Assessment/Plan:      * Septic shock  Pressors  Fluids  ABX  ICU  Pulmonary CC  Intubated  Sedated      12/31  On sole pressor,  Norepinephrine currently 0.14 mcg/kg/min, wean/titrate as per ICU team  On appropriate antibiotics: Ceftriaxone 1 gm IV Q12 for Sensitive E coli      E coli bacteremia  ABX  ICU  Follow Blood Cultures  Pressors as indicated      12/31  Sensitive E coli bacteremia on ceftriaxone IV  Afebrile  Resolved leucocytosis  Repeat blood culture on 12/29 NGTC x 2 days  Appropriate source control achieved S/P ERCP, stone retrieval and sphincterectomy.  Panculture as needed      LISET (acute kidney injury)  Worsening  Cr 3.3  Monitor    12/31  2 to shock  Nephro on board  Avoid nephrotoxins  Cont NaHCo3 gtt      On mechanically assisted ventilation  12/31  ICU team on board  Cont mech ventilation      Acute cholangitis  - Abdominal US showed: Gallbladder distension with internal sludge but without definite stones. Intra and extrahepatic biliary ductal dilation. Acute cholecystitis or choledocholithiasis not excluded.  - GI consult. Plans for ERCP in AM. Will keep NPO.  - Blood cultures obtained in the ED. Continue empiric IV Zosyn.   - IV hydration.  - Analgesics and antiemetics as needed.   - Follow labs.     12/28  ERCP planned  Await intervention per GI  Now Intubated    12/29  S/p ERCP  GI on consult  Stent placed per GI  Abx  Follow Cultures    12/30  GI on board  S/p ERCP  Abx  Improving clinically  Remains on Pressors    12/31  S/P source control as above  Cont ceftriaxone    Acute respiratory failure with hypoxia and hypercarbia  Patient with Hypoxic Respiratory failure which is Acute.  he is not on home oxygen. Supplemental oxygen was provided and noted- Vent Mode: A/C  Oxygen Concentration (%):  [30-40] 40  Resp Rate Total:  [26 br/min-34 br/min] 26 br/min  Vt Set:  [450 mL] 450 mL  PEEP/CPAP:  [5 cmH20] 5 cmH20  Mean Airway Pressure:  [9.5 giR34-47 cmH20] 13 cmH20.   Signs/symptoms of respiratory failure include- tachypnea, increased work of breathing, respiratory distress, use of accessory muscles and cyanosis.  Contributing diagnoses includes - COPD Labs and images were reviewed. Patient Has recent ABG, which has been reviewed. Will treat underlying causes and adjust management of respiratory failure as indicated    12/31  On 40 % FIO2 and PEEP 5 mech ventilation  Stable.  Ongoing renal and circulatory failure being addressed prior to considering weaning trials    Thrombocytopenia  Stable-jyothi  2 to Sepsis  Hold chemcial DVTp      Paroxysmal atrial fibrillation  - Remains in sinus rhythm on admission. Monitor telemetry.  - Continue home BB.  - Patient is not on long-term AC. Will defer to his primary cardiologist.       Primary hypertension  - Continue home antihypertensives.     12/31  On pressors    Tobacco use  - Assistance with smoking cessation was offered, including:  [x]  Medications  [x]  Counseling  [x]  Printed Information on Smoking Cessation  []  Referral to a Smoking Cessation Program  - Patient was counseled regarding smoking for 3-10 minutes.    Alcohol abuse  - Monitor for withdrawal/DTs. IV Ativan if needed.     Acute hyperglycemia  Cont Long acting insulin BID and SSI   Cont Tube feeding      Electrolyte imbalance  Correct as indicated          VTE Risk Mitigation (From admission, onward)         Ordered     IP VTE HIGH RISK PATIENT  Once         12/27/21 2247     Place sequential compression device  Until discontinued         12/27/21 2247     Reason for No Pharmacological VTE Prophylaxis  Once        Question Answer Comment   Reasons: Risk of Bleeding    Reasons: Thrombocytopenia        12/27/21 2247                Discharge Planning   FLAQUITA:      Code Status: Full Code   Is the patient medically ready for discharge?: NO    Reason for patient still in hospital (select all that apply): Patient new problem and Treatment  Discharge Plan A: Home with family            Critical care time spent on the evaluation and treatment of severe organ dysfunction, review of pertinent labs and imaging studies, discussions  with consulting providers and discussions with patient/family: 40 minutes.      Julissa Thompson MD  Department of Hospital Medicine   [facility] - Intensive Care ([facility])

## 2021-12-31 NOTE — ASSESSMENT & PLAN NOTE
Range 197-237  Add long acting insulin, continue SSI prn with monitoring for glucose control and prevention of insulin toxicity

## 2021-12-31 NOTE — PLAN OF CARE
Poc reviewed c pt daughter.  Fent for sedation, dec and pt arouses appropriately.  Able to wean levo today, vaso remains on, HR SR in the 60s.  Vent setting per pulm, no sat today.  Pt tolerating tf at goal of 40cc/hr, acch freq increased from q6h to q4, pt on mdss.  Miralax given today with positive results, 1 mod liquid bm this evening.  UOP <60cc per shift, md's aware and renal consult placed.  MD spoke with pt dgtr, no HD access placed today.  Rotation of renal providers tomorrow.  Banana bag due at 1600, waiting on med from pharmacy.  Pharmacy aware.

## 2021-12-31 NOTE — ASSESSMENT & PLAN NOTE
Patient with Hypoxic Respiratory failure which is Acute.  he is not on home oxygen. Supplemental oxygen was provided and noted- Vent Mode: A/C  Oxygen Concentration (%):  [30-40] 40  Resp Rate Total:  [26 br/min-34 br/min] 26 br/min  Vt Set:  [450 mL] 450 mL  PEEP/CPAP:  [5 cmH20] 5 cmH20  Mean Airway Pressure:  [9.5 ufJ34-62 cmH20] 13 cmH20.   Signs/symptoms of respiratory failure include- tachypnea, increased work of breathing, respiratory distress, use of accessory muscles and cyanosis. Contributing diagnoses includes - COPD Labs and images were reviewed. Patient Has recent ABG, which has been reviewed. Will treat underlying causes and adjust management of respiratory failure as indicated    12/31  On 40 % FIO2 and PEEP 5 mech ventilation  Stable.  Ongoing renal and circulatory failure being addressed prior to considering weaning trials

## 2021-12-31 NOTE — PROGRESS NOTES
[facility] - Intensive Care ([facility])  Nephrology  Progress Note    Patient Name: Lizz Alas  MRN: [id number]  Admission Date: 12/27/2021  Hospital Length of Stay: 3 days  Attending Provider: Julissa Thompson MD   Primary Care Physician: Primary Doctor No  Principal Problem:Septic shock      Subjective:     Interval History:   12/31: Remains on vasopressors, intubated in ICU. Poor UOP    Review of patient's allergies indicates:  No Known Allergies  Current Facility-Administered Medications   Medication Frequency    acetaminophen oral solution 650 mg Q6H PRN    albuterol-ipratropium 2.5 mg-0.5 mg/3 mL nebulizer solution 3 mL Q4H PRN    aluminum-magnesium hydroxide-simethicone 200-200-20 mg/5 mL suspension 30 mL QID PRN    arformoteroL nebulizer solution 15 mcg BID    budesonide nebulizer solution 0.5 mg Q12H    cefTRIAXone (ROCEPHIN) 1 g/50 mL D5W IVPB Q12H    chlorhexidine 0.12 % solution 15 mL BID    dextrose 50% injection 12.5 g PRN    famotidine (PF) injection 20 mg Daily    fentaNYL 2500 mcg in 0.9% sodium chloride 250 mL infusion premix (titrating) Continuous    furosemide injection 100 mg Once    glucagon (human recombinant) injection 1 mg PRN    insulin aspart U-100 pen 1-10 Units Q4H PRN    insulin detemir U-100 pen 6 Units BID    lorazepam injection 1 mg Q2H PRN    lorazepam injection 2 mg Q2H PRN    mupirocin 2 % ointment BID    naloxone 0.4 mg/mL injection 0.02 mg PRN    nicotine 21 mg/24 hr 1 patch Daily    NORepinephrine 32 mg in dextrose 5 % 250 mL infusion Continuous    ondansetron disintegrating tablet 8 mg Q8H PRN    oxyCODONE immediate release tablet 10 mg Q6H PRN    oxyCODONE immediate release tablet 5 mg Q6H PRN    [START ON 1/1/2022] polyethylene glycol packet 17 g Every other day    promethazine tablet 25 mg Q6H PRN    sodium bicarbonate 150 mEq in dextrose 5 % 1,150 mL infusion Continuous    sodium chloride 0.9% 1,000 mL with mvi, adult no.4 with vit K 3,300 unit-  150 mcg/10 mL 10 mL, thiamine 100 mg, folic acid 1 mg infusion Daily    sodium chloride 0.9% flush 10 mL Q12H PRN       Objective:     Vital Signs (Most Recent):  Temp: 98.2 °F (36.8 °C) (12/31/21 1100)  Pulse: 69 (12/31/21 1145)  Resp: (!) 31 (12/31/21 1145)  BP: (!) 88/51 (12/31/21 1100)  SpO2: 98 % (12/31/21 1145)  O2 Device (Oxygen Therapy): ventilator (12/31/21 1133) Vital Signs (24h Range):  Temp:  [97.5 °F (36.4 °C)-99.1 °F (37.3 °C)] 98.2 °F (36.8 °C)  Pulse:  [54-75] 69  Resp:  [23-31] 31  SpO2:  [88 %-100 %] 98 %  BP: ()/(45-69) 88/51  Arterial Line BP: ()/(40-76) 114/58     Weight: 90 kg (198 lb 6.6 oz) (12/29/21 1250)  Body mass index is 32.02 kg/m².  Body surface area is 2.05 meters squared.    I/O last 3 completed shifts:  In: 7293 [I.V.:5363.6; NG/GT:1680; IV Piggyback:249.5]  Out: 205 [Urine:205]    Physical Exam  Vitals and nursing note reviewed.   Constitutional:       Appearance: He is ill-appearing.      Interventions: He is sedated and intubated.   HENT:      Head: Atraumatic.   Cardiovascular:      Rate and Rhythm: Tachycardia present.   Pulmonary:      Effort: He is intubated.   Abdominal:      General: There is distension.   Musculoskeletal:         General: Swelling (very mild) present.   Skin:     General: Skin is dry.         Significant Labs: reviewed    Significant Imaging: reviewed     Assessment/Plan:     Active Diagnoses:    Diagnosis Date Noted POA    PRINCIPAL PROBLEM:  Septic shock [A41.9, R65.21] 12/28/2021 Yes    On mechanically assisted ventilation [Z99.11] 12/30/2021 Not Applicable    LISET (acute kidney injury) [N17.9] 12/29/2021 No    Thrombocytopenia [D69.6] 12/29/2021 Yes    Acute hyperglycemia [R73.9] 12/29/2021 Yes    Acute respiratory failure with hypoxia and hypercarbia [J96.01, J96.02] 12/28/2021 Yes    Electrolyte imbalance [E87.8] 12/28/2021 Yes    E coli bacteremia [R78.81, B96.20] 12/28/2021 Yes    Acute cholangitis [K83.09] 12/27/2021 Yes     Tobacco use [Z72.0] 12/27/2021 Yes     Chronic    Alcohol abuse [F10.10] 12/27/2021 Yes     Chronic    Primary hypertension [I10] 12/27/2021 Yes     Chronic    Paroxysmal atrial fibrillation [I48.0] 12/27/2021 Yes     Chronic      Problems Resolved During this Admission:       Oligoanuric LISET with baseline creatinine 0.9mg/dL in the setting of septic shock   - As no acute indications for Renal Replacement Therapy today will continue to observe with supportive care  - High dose Lasix X 1 for urinary response to evaluate density of ATN   - please keep MAP closer to 70 if acceptable to team   - avoid nephrotoxins as able    Thank you for your consult. I will follow-up with patient. Please contact us if you have any additional questions.    Conrad Arnett MD  Nephrology  [facility] - Intensive Care ([facility])

## 2021-12-31 NOTE — ASSESSMENT & PLAN NOTE
Acute cholangitis based on elevated biliurbin, abdominal pain and biliary dilation. ERCP indicated and he has received the indicated zosyn and expanded to Vancomycin in addition given his decompensation. Most likely secondary to sludge or stones. Blood cultures are pending.   I discussed with Dr. Lopez to intubate and plan for ERCP in the OR as he is decompensating.     ERCP is currently recommended. The risks, benefits and alternatives of the procedure were discussed with the patient in detail. Benefits include removal of stones, stents, debri, sludge; dilation; placement of a stent; biopsies. Risks include bleeding (0.3-2%), pancreatitis (3%), cholangitis (0.5-3%), perforation (0.08-0.6%), cholecystitis (0.5%), sedation related adverse events. This discussion was had in the presence of his duaghter. Educational material of the biliary anatomy has been provided today for educational purposes. Other risks include, risks of adverse reaction to sedation requiring the use of reversal agents, bleeding requiring blood transfusion, perforation requiring surgical intervention, technical failure, aspiration leading to respiratory distress and respiratory failure resulting in endotracheal intubation and mechanical ventilation including death. Anesthesia is utilized for this procedure, it is up to the anesthesiologist to determine airway safety including elective endotracheal intubation. Questions were answered, the patient and daughter agree to proceed. There was no language barriers. Discussion was performed in Sinhala.     12/30:  -Patient continues to improve. WBC count normalized, Tbili decreasing, AST and ALT continue to trend downward.  -Continue abx and to monitor labs.    12/31:  - WBC improved, awaiting liver enzymes  - Continue ceftriaxone for E coli cholangitis with sepsis and bacteremia.

## 2021-12-31 NOTE — SUBJECTIVE & OBJECTIVE
Subjective:     Interval History: Pressure requirements decreased. No urine output.   Liver enzymes were decreasing yesterday except for ALP. Had a bowel movement.    Review of Systems   Unable to perform ROS: Intubated     Objective:     Vital Signs (Most Recent):  Temp: 98.2 °F (36.8 °C) (12/31/21 1100)  Pulse: 69 (12/31/21 1145)  Resp: (!) 31 (12/31/21 1145)  BP: (!) 88/51 (12/31/21 1100)  SpO2: 98 % (12/31/21 1145) Vital Signs (24h Range):  Temp:  [97.5 °F (36.4 °C)-99.1 °F (37.3 °C)] 98.2 °F (36.8 °C)  Pulse:  [54-75] 69  Resp:  [23-31] 31  SpO2:  [88 %-100 %] 98 %  BP: ()/(45-69) 88/51  Arterial Line BP: ()/(40-76) 114/58     Weight: 90 kg (198 lb 6.6 oz) (12/29/21 1250)  Body mass index is 32.02 kg/m².      Intake/Output Summary (Last 24 hours) at 12/31/2021 1211  Last data filed at 12/31/2021 1100  Gross per 24 hour   Intake 4259.14 ml   Output 103 ml   Net 4156.14 ml       Lines/Drains/Airways     Central Venous Catheter Line            Percutaneous Central Line Insertion/Assessment - Triple Lumen  12/28/21 1052 left internal jugular 3 days          Drain                 NG/OG Tube 12/28/21 0827 [place] sump 14 Fr. Left mouth 3 days         Urethral Catheter 12/28/21 0830 3 days          Airway                 Airway - Non-Surgical 12/28/21 0824 Endotracheal Tube 3 days          Arterial Line            Arterial Line 12/28/21 1045 Left Radial 3 days          Peripheral Intravenous Line                 Peripheral IV - Single Lumen 12/28/21 0745 20 G Posterior;Right Hand 3 days                Physical Exam  Vitals and nursing note reviewed.   Cardiovascular:      Rate and Rhythm: Normal rate and regular rhythm.      Pulses: Normal pulses.      Heart sounds: Normal heart sounds.   Pulmonary:      Effort: Pulmonary effort is normal.      Breath sounds: Normal breath sounds.   Abdominal:      General: Abdomen is flat.      Palpations: Abdomen is soft.         Significant Labs:  Acute Hepatitis Panel:  No results for input(s): HEPBSAG, HEPAIGM, HEPCAB in the last 48 hours.    Invalid input(s): HAPBIGM  Amylase: No results for input(s): AMYLASE in the last 48 hours.  Blood Culture: No results for input(s): LABBLOO in the last 48 hours.  CBC:   Recent Labs   Lab 12/29/21  1325 12/30/21 0358 12/31/21 0421   WBC  --  8.74 8.84   HGB  --  11.9* 11.2*   HCT 40 35.1* 31.9*   PLT  --  75* 70*     BMP:   Recent Labs   Lab 12/30/21  0358 12/30/21 0358 12/31/21 0421   *   < > 197*   *   < > 130*   K 3.9   < > 3.8   CL 97   < > 94*   CO2 18*   < > 21*   BUN 62*   < > 75*   CREATININE 4.4*   < > 6.0*   CALCIUM 7.2*   < > 7.3*   MG 2.1  --   --     < > = values in this interval not displayed.     CMP:   Recent Labs   Lab 12/30/21  0358 12/30/21  0358 12/31/21 0421   *   < > 197*   CALCIUM 7.2*   < > 7.3*   ALBUMIN 2.4*  --   --    PROT 4.9*  --   --    *   < > 130*   K 3.9   < > 3.8   CO2 18*   < > 21*   CL 97   < > 94*   BUN 62*   < > 75*   CREATININE 4.4*   < > 6.0*   ALKPHOS 336*  --   --    *  --   --    AST 84*  --   --    BILITOT 9.2*  --   --     < > = values in this interval not displayed.         Significant Imaging:  Imaging results within the past 24 hours have been reviewed.

## 2021-12-31 NOTE — ASSESSMENT & PLAN NOTE
- Abdominal US showed: Gallbladder distension with internal sludge but without definite stones. Intra and extrahepatic biliary ductal dilation. Acute cholecystitis or choledocholithiasis not excluded.  - GI consult. Plans for ERCP in AM. Will keep NPO.  - Blood cultures obtained in the ED. Continue empiric IV Zosyn.   - IV hydration.  - Analgesics and antiemetics as needed.   - Follow labs.     12/28  ERCP planned  Await intervention per GI  Now Intubated    12/29  S/p ERCP  GI on consult  Stent placed per GI  Abx  Follow Cultures    12/30  GI on board  S/p ERCP  Abx  Improving clinically  Remains on Pressors    12/31  S/P source control as above  Cont ceftriaxone

## 2021-12-31 NOTE — ASSESSMENT & PLAN NOTE
Stable-jyothi  2 to Sepsis  Hold chemcial DVTp     [FreeTextEntry1] : I, Yuan Matute, acted solely as a scribe for Dr. Jai Key on this date. 08/05/2019. \par I, Barbara Love, acted solely as a scribe for Dr. Jai Key on this date. 08/05/2019.

## 2021-12-31 NOTE — ASSESSMENT & PLAN NOTE
Pressors  Fluids  ABX  ICU  Pulmonary CC  Intubated  Sedated      12/31  On sole pressor, Norepinephrine currently 0.14 mcg/kg/min, wean/titrate as per ICU team  On appropriate antibiotics: Ceftriaxone 1 gm IV Q12 for Sensitive E coli

## 2021-12-31 NOTE — ASSESSMENT & PLAN NOTE
Source: GB and Bacteremia  Cont IVFs   stop Vasopressin infusion and titrate Levophed infusion for MAP > 65  Sputum culture NGTD  Blood cultures X 2 E.Coli, continue rocephin  Temp and wbc trend down

## 2022-01-01 LAB
ALBUMIN SERPL BCP-MCNC: 2 G/DL (ref 3.5–5.2)
ALLENS TEST: ABNORMAL
ANION GAP SERPL CALC-SCNC: 12 MMOL/L (ref 8–16)
ANION GAP SERPL CALC-SCNC: 12 MMOL/L (ref 8–16)
BASOPHILS # BLD AUTO: 0.03 K/UL (ref 0–0.2)
BASOPHILS NFR BLD: 0.3 % (ref 0–1.9)
BUN SERPL-MCNC: 72 MG/DL (ref 8–23)
BUN SERPL-MCNC: 85 MG/DL (ref 8–23)
CALCIUM SERPL-MCNC: 7.4 MG/DL (ref 8.7–10.5)
CALCIUM SERPL-MCNC: 7.8 MG/DL (ref 8.7–10.5)
CHLORIDE SERPL-SCNC: 93 MMOL/L (ref 95–110)
CHLORIDE SERPL-SCNC: 94 MMOL/L (ref 95–110)
CO2 SERPL-SCNC: 23 MMOL/L (ref 23–29)
CO2 SERPL-SCNC: 24 MMOL/L (ref 23–29)
CREAT SERPL-MCNC: 6.2 MG/DL (ref 0.5–1.4)
CREAT SERPL-MCNC: 7 MG/DL (ref 0.5–1.4)
DELSYS: ABNORMAL
DIFFERENTIAL METHOD: ABNORMAL
EOSINOPHIL # BLD AUTO: 0.1 K/UL (ref 0–0.5)
EOSINOPHIL NFR BLD: 1.5 % (ref 0–8)
ERYTHROCYTE [DISTWIDTH] IN BLOOD BY AUTOMATED COUNT: 14.3 % (ref 11.5–14.5)
ERYTHROCYTE [SEDIMENTATION RATE] IN BLOOD BY WESTERGREN METHOD: 26 MM/H
EST. GFR  (AFRICAN AMERICAN): 8 ML/MIN/1.73 M^2
EST. GFR  (AFRICAN AMERICAN): 9 ML/MIN/1.73 M^2
EST. GFR  (NON AFRICAN AMERICAN): 7 ML/MIN/1.73 M^2
EST. GFR  (NON AFRICAN AMERICAN): 8 ML/MIN/1.73 M^2
ESTIMATED AVG GLUCOSE: 117 MG/DL (ref 68–131)
FIO2: 40
GLUCOSE SERPL-MCNC: 134 MG/DL (ref 70–110)
GLUCOSE SERPL-MCNC: 192 MG/DL (ref 70–110)
HBA1C MFR BLD: 5.7 % (ref 4–5.6)
HCO3 UR-SCNC: 25.9 MMOL/L (ref 24–28)
HCT VFR BLD AUTO: 31 % (ref 40–54)
HGB BLD-MCNC: 11.1 G/DL (ref 14–18)
IMM GRANULOCYTES # BLD AUTO: 0.07 K/UL (ref 0–0.04)
IMM GRANULOCYTES NFR BLD AUTO: 0.8 % (ref 0–0.5)
LYMPHOCYTES # BLD AUTO: 0.9 K/UL (ref 1–4.8)
LYMPHOCYTES NFR BLD: 9.7 % (ref 18–48)
MAGNESIUM SERPL-MCNC: 2.3 MG/DL (ref 1.6–2.6)
MAGNESIUM SERPL-MCNC: 2.4 MG/DL (ref 1.6–2.6)
MCH RBC QN AUTO: 30.8 PG (ref 27–31)
MCHC RBC AUTO-ENTMCNC: 35.8 G/DL (ref 32–36)
MCV RBC AUTO: 86 FL (ref 82–98)
MODE: ABNORMAL
MONOCYTES # BLD AUTO: 0.8 K/UL (ref 0.3–1)
MONOCYTES NFR BLD: 9 % (ref 4–15)
NEUTROPHILS # BLD AUTO: 6.9 K/UL (ref 1.8–7.7)
NEUTROPHILS NFR BLD: 78.7 % (ref 38–73)
NRBC BLD-RTO: 0 /100 WBC
PCO2 BLDA: 39.8 MMHG (ref 35–45)
PEEP: 5
PH SMN: 7.42 [PH] (ref 7.35–7.45)
PHOSPHATE SERPL-MCNC: 2.7 MG/DL (ref 2.7–4.5)
PHOSPHATE SERPL-MCNC: 2.7 MG/DL (ref 2.7–4.5)
PLATELET # BLD AUTO: 80 K/UL (ref 150–450)
PMV BLD AUTO: 11.8 FL (ref 9.2–12.9)
PO2 BLDA: 60 MMHG (ref 80–100)
POC BE: 1 MMOL/L
POC SATURATED O2: 91 % (ref 95–100)
POCT GLUCOSE: 133 MG/DL (ref 70–110)
POCT GLUCOSE: 139 MG/DL (ref 70–110)
POCT GLUCOSE: 201 MG/DL (ref 70–110)
POCT GLUCOSE: 202 MG/DL (ref 70–110)
POCT GLUCOSE: 216 MG/DL (ref 70–110)
POTASSIUM SERPL-SCNC: 3.3 MMOL/L (ref 3.5–5.1)
POTASSIUM SERPL-SCNC: 3.5 MMOL/L (ref 3.5–5.1)
RBC # BLD AUTO: 3.6 M/UL (ref 4.6–6.2)
SAMPLE: ABNORMAL
SITE: ABNORMAL
SODIUM SERPL-SCNC: 128 MMOL/L (ref 136–145)
SODIUM SERPL-SCNC: 130 MMOL/L (ref 136–145)
VT: 450
WBC # BLD AUTO: 8.79 K/UL (ref 3.9–12.7)

## 2022-01-01 PROCEDURE — 87340 HEPATITIS B SURFACE AG IA: CPT | Performed by: INTERNAL MEDICINE

## 2022-01-01 PROCEDURE — 20000000 HC ICU ROOM

## 2022-01-01 PROCEDURE — 90945 DIALYSIS ONE EVALUATION: CPT

## 2022-01-01 PROCEDURE — 99900035 HC TECH TIME PER 15 MIN (STAT)

## 2022-01-01 PROCEDURE — 99291 CRITICAL CARE FIRST HOUR: CPT | Mod: 25,,, | Performed by: NURSE PRACTITIONER

## 2022-01-01 PROCEDURE — C9399 UNCLASSIFIED DRUGS OR BIOLOG: HCPCS | Performed by: NURSE PRACTITIONER

## 2022-01-01 PROCEDURE — 83036 HEMOGLOBIN GLYCOSYLATED A1C: CPT | Performed by: INTERNAL MEDICINE

## 2022-01-01 PROCEDURE — 86706 HEP B SURFACE ANTIBODY: CPT | Mod: 91 | Performed by: INTERNAL MEDICINE

## 2022-01-01 PROCEDURE — 94640 AIRWAY INHALATION TREATMENT: CPT

## 2022-01-01 PROCEDURE — 25000003 PHARM REV CODE 250: Performed by: NURSE PRACTITIONER

## 2022-01-01 PROCEDURE — 63600175 PHARM REV CODE 636 W HCPCS: Performed by: INTERNAL MEDICINE

## 2022-01-01 PROCEDURE — 99233 PR SUBSEQUENT HOSPITAL CARE,LEVL III: ICD-10-PCS | Mod: ,,, | Performed by: INTERNAL MEDICINE

## 2022-01-01 PROCEDURE — 99900026 HC AIRWAY MAINTENANCE (STAT)

## 2022-01-01 PROCEDURE — 99291 PR CRITICAL CARE, E/M 30-74 MINUTES: ICD-10-PCS | Mod: 25,,, | Performed by: NURSE PRACTITIONER

## 2022-01-01 PROCEDURE — 36556 INSERT NON-TUNNEL CV CATH: CPT

## 2022-01-01 PROCEDURE — 84100 ASSAY OF PHOSPHORUS: CPT | Performed by: NURSE PRACTITIONER

## 2022-01-01 PROCEDURE — 99233 SBSQ HOSP IP/OBS HIGH 50: CPT | Mod: ,,, | Performed by: INTERNAL MEDICINE

## 2022-01-01 PROCEDURE — 36556 PR INSERT NON-TUNNEL CV CATH 5+ YRS OLD: ICD-10-PCS | Mod: ,,, | Performed by: NURSE PRACTITIONER

## 2022-01-01 PROCEDURE — 82803 BLOOD GASES ANY COMBINATION: CPT

## 2022-01-01 PROCEDURE — 83735 ASSAY OF MAGNESIUM: CPT | Mod: 91 | Performed by: NURSE PRACTITIONER

## 2022-01-01 PROCEDURE — 94003 VENT MGMT INPAT SUBQ DAY: CPT

## 2022-01-01 PROCEDURE — 25000003 PHARM REV CODE 250: Performed by: INTERNAL MEDICINE

## 2022-01-01 PROCEDURE — 86706 HEP B SURFACE ANTIBODY: CPT | Performed by: INTERNAL MEDICINE

## 2022-01-01 PROCEDURE — 86704 HEP B CORE ANTIBODY TOTAL: CPT | Performed by: INTERNAL MEDICINE

## 2022-01-01 PROCEDURE — 25000242 PHARM REV CODE 250 ALT 637 W/ HCPCS: Performed by: NURSE PRACTITIONER

## 2022-01-01 PROCEDURE — 80048 BASIC METABOLIC PNL TOTAL CA: CPT | Performed by: NURSE PRACTITIONER

## 2022-01-01 PROCEDURE — 63600175 PHARM REV CODE 636 W HCPCS: Performed by: NURSE PRACTITIONER

## 2022-01-01 PROCEDURE — S4991 NICOTINE PATCH NONLEGEND: HCPCS | Performed by: INTERNAL MEDICINE

## 2022-01-01 PROCEDURE — 27100171 HC OXYGEN HIGH FLOW UP TO 24 HOURS

## 2022-01-01 PROCEDURE — 36556 INSERT NON-TUNNEL CV CATH: CPT | Mod: ,,, | Performed by: NURSE PRACTITIONER

## 2022-01-01 PROCEDURE — 85025 COMPLETE CBC W/AUTO DIFF WBC: CPT | Performed by: NURSE PRACTITIONER

## 2022-01-01 PROCEDURE — 80069 RENAL FUNCTION PANEL: CPT | Performed by: INTERNAL MEDICINE

## 2022-01-01 RX ORDER — MAGNESIUM SULFATE HEPTAHYDRATE 40 MG/ML
2 INJECTION, SOLUTION INTRAVENOUS
Status: ACTIVE | OUTPATIENT
Start: 2022-01-01 | End: 2022-01-02

## 2022-01-01 RX ORDER — HEPARIN SODIUM 1000 [USP'U]/ML
2000 INJECTION, SOLUTION INTRAVENOUS; SUBCUTANEOUS
Status: DISCONTINUED | OUTPATIENT
Start: 2022-01-02 | End: 2022-01-11 | Stop reason: HOSPADM

## 2022-01-01 RX ORDER — THIAMINE HCL 100 MG
100 TABLET ORAL DAILY
Status: DISCONTINUED | OUTPATIENT
Start: 2022-01-01 | End: 2022-01-03

## 2022-01-01 RX ORDER — ONDANSETRON 2 MG/ML
4 INJECTION INTRAMUSCULAR; INTRAVENOUS EVERY 6 HOURS PRN
Status: DISCONTINUED | OUTPATIENT
Start: 2022-01-01 | End: 2022-01-11 | Stop reason: HOSPADM

## 2022-01-01 RX ORDER — FOLIC ACID 1 MG/1
1 TABLET ORAL DAILY
Status: DISCONTINUED | OUTPATIENT
Start: 2022-01-01 | End: 2022-01-03

## 2022-01-01 RX ORDER — HYDROCODONE BITARTRATE AND ACETAMINOPHEN 500; 5 MG/1; MG/1
TABLET ORAL CONTINUOUS
Status: ACTIVE | OUTPATIENT
Start: 2022-01-01 | End: 2022-01-02

## 2022-01-01 RX ORDER — SODIUM CHLORIDE 9 MG/ML
INJECTION, SOLUTION INTRAVENOUS
Status: DISCONTINUED | OUTPATIENT
Start: 2022-01-01 | End: 2022-01-11 | Stop reason: HOSPADM

## 2022-01-01 RX ORDER — POTASSIUM CHLORIDE 7.45 MG/ML
10 INJECTION INTRAVENOUS ONCE
Status: COMPLETED | OUTPATIENT
Start: 2022-01-01 | End: 2022-01-01

## 2022-01-01 RX ORDER — DEXMEDETOMIDINE HYDROCHLORIDE 4 UG/ML
0-1.4 INJECTION INTRAVENOUS CONTINUOUS
Status: DISCONTINUED | OUTPATIENT
Start: 2022-01-01 | End: 2022-01-03

## 2022-01-01 RX ADMIN — POTASSIUM CHLORIDE 10 MEQ: 7.46 INJECTION, SOLUTION INTRAVENOUS at 05:01

## 2022-01-01 RX ADMIN — INSULIN ASPART 4 UNITS: 100 INJECTION, SOLUTION INTRAVENOUS; SUBCUTANEOUS at 04:01

## 2022-01-01 RX ADMIN — ONDANSETRON 4 MG: 2 INJECTION INTRAMUSCULAR; INTRAVENOUS at 04:01

## 2022-01-01 RX ADMIN — CEFTRIAXONE 1 G: 1 INJECTION, SOLUTION INTRAVENOUS at 09:01

## 2022-01-01 RX ADMIN — MUPIROCIN: 20 OINTMENT TOPICAL at 08:01

## 2022-01-01 RX ADMIN — INSULIN DETEMIR 6 UNITS: 100 INJECTION, SOLUTION SUBCUTANEOUS at 09:01

## 2022-01-01 RX ADMIN — FAMOTIDINE 20 MG: 10 INJECTION, SOLUTION INTRAVENOUS at 08:01

## 2022-01-01 RX ADMIN — SODIUM CHLORIDE: 0.9 INJECTION, SOLUTION INTRAVENOUS at 10:01

## 2022-01-01 RX ADMIN — INSULIN ASPART 2 UNITS: 100 INJECTION, SOLUTION INTRAVENOUS; SUBCUTANEOUS at 12:01

## 2022-01-01 RX ADMIN — CHLORHEXIDINE GLUCONATE 0.12% ORAL RINSE 15 ML: 1.2 LIQUID ORAL at 09:01

## 2022-01-01 RX ADMIN — ARFORMOTEROL TARTRATE 15 MCG: 15 SOLUTION RESPIRATORY (INHALATION) at 08:01

## 2022-01-01 RX ADMIN — BUDESONIDE 0.5 MG: 0.5 INHALANT ORAL at 08:01

## 2022-01-01 RX ADMIN — CHLORHEXIDINE GLUCONATE 0.12% ORAL RINSE 15 ML: 1.2 LIQUID ORAL at 08:01

## 2022-01-01 RX ADMIN — CEFTRIAXONE 1 G: 1 INJECTION, SOLUTION INTRAVENOUS at 08:01

## 2022-01-01 RX ADMIN — INSULIN ASPART 4 UNITS: 100 INJECTION, SOLUTION INTRAVENOUS; SUBCUTANEOUS at 08:01

## 2022-01-01 RX ADMIN — SODIUM BICARBONATE: 84 INJECTION, SOLUTION INTRAVENOUS at 10:01

## 2022-01-01 RX ADMIN — POLYETHYLENE GLYCOL 3350 17 G: 17 POWDER, FOR SOLUTION ORAL at 08:01

## 2022-01-01 RX ADMIN — INSULIN DETEMIR 6 UNITS: 100 INJECTION, SOLUTION SUBCUTANEOUS at 08:01

## 2022-01-01 RX ADMIN — MUPIROCIN: 20 OINTMENT TOPICAL at 09:01

## 2022-01-01 RX ADMIN — NICOTINE 1 PATCH: 21 PATCH, EXTENDED RELEASE TRANSDERMAL at 08:01

## 2022-01-01 RX ADMIN — DEXMEDETOMIDINE HYDROCHLORIDE 0.2 MCG/KG/HR: 4 INJECTION INTRAVENOUS at 04:01

## 2022-01-01 RX ADMIN — BUDESONIDE 0.5 MG: 0.5 INHALANT ORAL at 07:01

## 2022-01-01 RX ADMIN — LORAZEPAM 1 MG: 2 INJECTION INTRAMUSCULAR; INTRAVENOUS at 07:01

## 2022-01-01 RX ADMIN — SODIUM BICARBONATE: 84 INJECTION, SOLUTION INTRAVENOUS at 07:01

## 2022-01-01 NOTE — ASSESSMENT & PLAN NOTE
ABX  ICU  Follow Blood Cultures  Pressors as indicated      12/31  Sensitive E coli bacteremia on ceftriaxone IV  Afebrile  Resolved leucocytosis  Repeat blood culture on 12/29 NGTC x 2 days  Appropriate source control achieved S/P ERCP, stone retrieval and sphincterectomy.  Panculture as needed      1/1  Repeat culture NGTD  Cont cefriaxone

## 2022-01-01 NOTE — ASSESSMENT & PLAN NOTE
SB - SR on monitor,  Extrasystoles noted  Not on anticoagulants on home med review  Hold metoprolol with pressor support

## 2022-01-01 NOTE — ASSESSMENT & PLAN NOTE
- Abdominal US showed: Gallbladder distension with internal sludge but without definite stones. Intra and extrahepatic biliary ductal dilation. Acute cholecystitis or choledocholithiasis not excluded.  - GI consult. Plans for ERCP in AM. Will keep NPO.  - Blood cultures obtained in the ED. Continue empiric IV Zosyn.   - IV hydration.  - Analgesics and antiemetics as needed.   - Follow labs.     12/28  ERCP planned  Await intervention per GI  Now Intubated    12/29  S/p ERCP  GI on consult  Stent placed per GI  Abx  Follow Cultures    12/30  GI on board  S/p ERCP  Abx  Improving clinically  Remains on Pressors    12/31, 1/1  S/P source control as above  Cont ceftriaxone

## 2022-01-01 NOTE — NURSING
Pt self-extubated, myself, FIDELIA Head, Dr. Jameson, and Pearl, charge nurse immediately at the beside. Placed on NRB @ 15L.  Citlaly at bedside, interpretor translated for patient. FIDELIA Head called daughter to update. RT Wendy notified

## 2022-01-01 NOTE — PROGRESS NOTES
PT with cholangitis with E coli and spectic shock and bacteremia complicated with renal failure. ERCP performed with stones removed and biliary stents placed. Liver enzymes imroved. Currently in renal failure and to start CRRT.     Will arrange a repeat ERCP in 2 months, will sign off as liver enzymes improving. Please reconsult as needed.     Melchor Sarmiento MD  Gastroenterology  Director of Advanced Endoscopy at [facility]

## 2022-01-01 NOTE — ASSESSMENT & PLAN NOTE
Patient with Hypoxic Respiratory failure which is Acute.  he is not on home oxygen. Supplemental oxygen was provided and noted- Vent Mode: A/C  Oxygen Concentration (%):  [40] 40  Resp Rate Total:  [26 br/min-28 br/min] 28 br/min  Vt Set:  [450 mL] 450 mL  PEEP/CPAP:  [5 cmH20] 5 cmH20  Mean Airway Pressure:  [9.7 dtX99-89 cmH20] 12 cmH20.   Signs/symptoms of respiratory failure include- tachypnea, increased work of breathing, respiratory distress, use of accessory muscles and cyanosis. Contributing diagnoses includes - COPD Labs and images were reviewed. Patient Has recent ABG, which has been reviewed. Will treat underlying causes and adjust management of respiratory failure as indicated    12/31  On 40 % FIO2 and PEEP 5 mech ventilation  Stable.  Ongoing renal and circulatory failure being addressed prior to considering weaning trials    1/1  On Mech Vent  ICU on board

## 2022-01-01 NOTE — SUBJECTIVE & OBJECTIVE
Review of Systems   Unable to perform ROS: Intubated     Objective:     Vital Signs (Most Recent):  Temp: 97.4 °F (36.3 °C) (01/01/22 0315)  Pulse: 68 (01/01/22 0600)  Resp: (!) 26 (01/01/22 0600)  BP: 102/63 (01/01/22 0600)  SpO2: 96 % (01/01/22 0600) Vital Signs (24h Range):  Temp:  [96.7 °F (35.9 °C)-98.2 °F (36.8 °C)] 97.4 °F (36.3 °C)  Pulse:  [57-69] 68  Resp:  [26-31] 26  SpO2:  [95 %-100 %] 96 %  BP: ()/(49-66) 102/63  Arterial Line BP: ()/(50-83) 103/55     Weight: 97.6 kg (215 lb 2.7 oz)  Body mass index is 34.73 kg/m².      Intake/Output Summary (Last 24 hours) at 1/1/2022 0749  Last data filed at 1/1/2022 0600  Gross per 24 hour   Intake 3745.01 ml   Output 160 ml   Net 3585.01 ml      fentanyl 100 mcg/hr (01/01/22 0600)    NORepinephrine bitartrate-D5W 0.02 mcg/kg/min (01/01/22 0600)    sodium bicarbonate drip 75 mL/hr at 01/01/22 0732       Physical Exam  Vitals and nursing note reviewed.   Constitutional:       Appearance: He is obese. He is ill-appearing.      Interventions: He is sedated, intubated and restrained.   Eyes:      General: No scleral icterus.     Conjunctiva/sclera: Conjunctivae normal.   Neck:      Vascular: No JVD.   Cardiovascular:      Rate and Rhythm: Normal rate and regular rhythm.      Pulses:           Radial pulses are 1+ on the right side and 1+ on the left side.        Dorsalis pedis pulses are 1+ on the right side and 1+ on the left side.   Pulmonary:      Effort: He is intubated.      Breath sounds: Decreased breath sounds present.   Abdominal:      General: Bowel sounds are decreased. There is no distension.      Palpations: Abdomen is soft.   Musculoskeletal:      Right lower leg: No edema.      Left lower leg: No edema.   Skin:     General: Skin is warm and dry.      Capillary Refill: Capillary refill takes less than 2 seconds.         Vents:  Vent Mode: A/C (01/01/22 0507)  Ventilator Initiated: Yes (12/28/21 0824)  Set Rate: 26 BPM (01/01/22 0507)  Vt  Set: 450 mL (01/01/22 0507)  Pressure Support: 0 cmH20 (12/28/21 0824)  PEEP/CPAP: 5 cmH20 (01/01/22 0507)  Oxygen Concentration (%): 40 (01/01/22 0600)  Peak Airway Pressure: 22 cmH2O (01/01/22 0507)  Plateau Pressure: 16 cmH20 (01/01/22 0507)  Total Ve: 12.1 mL (01/01/22 0507)  Negative Inspiratory Force (cm H2O): 0 (12/31/21 1714)  F/VT Ratio<105 (RSBI): (!) 55.67 (01/01/22 0507)    Lines/Drains/Airways     Central Venous Catheter Line            Percutaneous Central Line Insertion/Assessment - Triple Lumen  12/28/21 1052 left internal jugular 3 days          Drain                 NG/OG Tube 12/28/21 0827 [place] sump 14 Fr. Left mouth 3 days         Urethral Catheter 12/28/21 0830 3 days          Airway                 Airway - Non-Surgical 12/28/21 0824 Endotracheal Tube 3 days          Arterial Line            Arterial Line 12/28/21 1045 Left Radial 3 days          Peripheral Intravenous Line                 Peripheral IV - Single Lumen 12/28/21 0745 20 G Posterior;Right Hand 4 days                Significant Labs:    CBC/Anemia Profile:  Recent Labs   Lab 12/31/21 0421 01/01/22  0451   WBC 8.84 8.79   HGB 11.2* 11.1*   HCT 31.9* 31.0*   PLT 70* 80*   MCV 89 86   RDW 14.4 14.3        Chemistries:  Recent Labs   Lab 12/31/21 0421 12/31/21  1214 01/01/22  0451   *  --  128*   K 3.8  --  3.3*   CL 94*  --  93*   CO2 21*  --  23   BUN 75*  --  85*   CREATININE 6.0*  --  7.0*   CALCIUM 7.3*  --  7.8*   ALBUMIN  --  2.0*  --    PROT  --  4.8*  --    BILITOT  --  2.9*  --    ALKPHOS  --  323*  --    ALT  --  87*  --    AST  --  42*  --    PHOS 3.1  --   --        All pertinent labs within the past 24 hours have been reviewed.    Significant Imaging:  I have reviewed all pertinent imaging results/findings within the past 24 hours.

## 2022-01-01 NOTE — ASSESSMENT & PLAN NOTE
W/ met acidosis controlled on bicarb infusion  Accurate I/Os, urine output decreasing (160ml out last 24 hours)  Support MAP for renal perfusion  Renal dose meds and IVAB  Nephrology following  trialysis placed and initiating CRRT, goal fluid and toxin removal; stop bicarb infusion

## 2022-01-01 NOTE — PROGRESS NOTES
[facility] - Intensive Care [facility] Medicine  Progress Note    Patient Name: Lizz Alas  MRN: [id number]  Patient Class: IP- Inpatient   Admission Date: 12/27/2021  Length of Stay: 4 days  Attending Physician: Julissa Thompson MD  Primary Care Provider: Primary Doctor No        Subjective:     Principal Problem:Septic shock        HPI:  Lizz Alas is a 71 y.o. Persian speaking male patient with a PMHx of PAF, HLD, HTN, thrombocytopenia, alcohol abuse, and tobacco use who presents to the Emergency Department for intermittent epigastric abdominal pain which onset gradually 3 days PTA. Today, the pt reports that his pain has been constant. His symptoms are constant and moderate in severity. No mitigating or exacerbating factors reported. Associated sxs include fever (102 F), nausea, and vomiting. Patient denies any diarrhea, constipation, SOB, weakness, fever, chills, and all other sxs at this time. Initial work-up in the ED showed: Normal WBC and lactic, , , alk phos 184, T bili 5.1, lipase 11, glucose 180, plt 119, sodium 134, COVID negative. Abdominal US showed gallbladder distension with internal sludge but without definite stones, intra and extrahepatic biliary ductal dilation which may represent acute cholecystitis or choledocholithiasis. Blood cultures obtained in the ED, and 1.5 L IV fluids and IV Zosyn given. Case was discussed with GI per the ED, who recommend NPO, IV abx, and ERCP in AM. Hospital Medicine was contacted for admission and patient placed in Observation.       Overview/Hospital Course:  Patient 70 yo male admitted to hospital with suspected cholangitis. Patient initiated on IV abx, fluids, O2. Patient with a rapid decompensation shourtly after admit, rapid response called, patient hypoxic in the 60's placed on bipap with good effect. Patient transferred to ICU. Patient with a progressive decline, febrile episodes to 102.9. Severe septic shock, pressors and fluids on  board. Patient evaluated by GI who recommended ERCP - additional CT abdomen. Patient scheduled for CT and this has been delayed pending stability. Patient intubated for airway protection.  12/29- Patient remains intubated, sedated, on pressors. Discussed POC with family at bedside. Patient evaluated by surgery who recommend o/p surgical followup on recovery for elective cholecystectomy.. Repeat blood cultures in progress, abx infusing.  12/30 - Patient wbc declining but remains on pressors. He is intubated and sedated. Family at bedside. Worsening renal function with cr 4.4 today. Nephrology on consult. Discussed with CC Team  12/31: On sole pressor Norepinephrine 0.14mcg/kg/min, afebrile, leucocytosis is resolved, scR is bumped to 6.0. Patient is oliguric (118 cc urine in last 24 hour). On Fentanyl gtt and NaHCO3 gtt   Daughter Hortencia  is at bedside and was updated.   AC 26/450/40/5  1/1/22 patient seen, daughter Hortencia at bedside, currently on CRRT, Norepinephrine at 0.02mcg/kg/min, fentanyl 100mcg/Hr. AC 26/450/40/5. Patient more awake today, opens eyes spontaneously, is squeezing his daughter's hand      Interval History: no overnight events    Review of Systems   Unable to perform ROS: Intubated   Constitutional: Negative for fever.     Objective:     Vital Signs (Most Recent):  Temp: 97.4 °F (36.3 °C) (01/01/22 0315)  Pulse: (!) 51 (01/01/22 1040)  Resp: (!) 26 (01/01/22 1040)  BP: (!) 100/58 (01/01/22 1000)  SpO2: 99 % (01/01/22 1040) Vital Signs (24h Range):  Temp:  [96.7 °F (35.9 °C)-97.7 °F (36.5 °C)] 97.4 °F (36.3 °C)  Pulse:  [51-88] 51  Resp:  [25-31] 26  SpO2:  [92 %-100 %] 99 %  BP: ()/(49-66) 100/58  Arterial Line BP: ()/(50-83) 156/75     Weight: 97.6 kg (215 lb 2.7 oz)  Body mass index is 34.73 kg/m².    Intake/Output Summary (Last 24 hours) at 1/1/2022 1141  Last data filed at 1/1/2022 1000  Gross per 24 hour   Intake 3761.35 ml   Output 150 ml   Net 3611.35 ml      Physical  Exam  Vitals reviewed.   Constitutional:       General: He is not in acute distress.     Appearance: He is ill-appearing. He is not diaphoretic.   HENT:      Mouth/Throat:      Comments: Intubated  OGT  Eyes:      General: No scleral icterus.     Pupils: Pupils are equal, round, and reactive to light.   Cardiovascular:      Rate and Rhythm: Normal rate and regular rhythm.      Pulses: Normal pulses.   Pulmonary:      Effort: Pulmonary effort is normal. No respiratory distress.      Breath sounds: Normal breath sounds. No wheezing.   Abdominal:      General: Bowel sounds are normal.      Palpations: There is no mass.      Tenderness: There is no guarding.      Hernia: No hernia is present.      Comments: Distended+   Genitourinary:     Comments: hernandez  Musculoskeletal:         General: Normal range of motion.      Right lower leg: No edema.      Left lower leg: No edema.   Skin:     General: Skin is warm and dry.      Capillary Refill: Capillary refill takes less than 2 seconds.   Neurological:      Comments: Intubated and sedated  However opens eyes spontaneously, squeezes daughter's hand   Psychiatric:      Comments: Cannot be assessed         Significant Labs:   Blood Culture: No results for input(s): LABBLOO in the last 48 hours.  BMP:   Recent Labs   Lab 01/01/22  0451   *   *   K 3.3*   CL 93*   CO2 23   BUN 85*   CREATININE 7.0*   CALCIUM 7.8*   MG 2.4     CBC:   Recent Labs   Lab 12/31/21  0421 01/01/22  0451   WBC 8.84 8.79   HGB 11.2* 11.1*   HCT 31.9* 31.0*   PLT 70* 80*     CMP:   Recent Labs   Lab 12/31/21  0421 12/31/21  1214 01/01/22  0451   *  --  128*   K 3.8  --  3.3*   CL 94*  --  93*   CO2 21*  --  23   *  --  192*   BUN 75*  --  85*   CREATININE 6.0*  --  7.0*   CALCIUM 7.3*  --  7.8*   PROT  --  4.8*  --    ALBUMIN  --  2.0*  --    BILITOT  --  2.9*  --    ALKPHOS  --  323*  --    AST  --  42*  --    ALT  --  87*  --    ANIONGAP 15  --  12   EGFRNONAA 9*  --  7*        Significant Imaging: I have reviewed all pertinent imaging results/findings within the past 24 hours.      Assessment/Plan:      * Septic shock  Pressors  Fluids  ABX  ICU  Pulmonary CC  Intubated  Sedated      12/31  On sole pressor, Norepinephrine currently 0.14 mcg/kg/min, wean/titrate as per ICU team  On appropriate antibiotics: Ceftriaxone 1 gm IV Q12 for Sensitive E coli    1/1/22  On low dose Norepinephrine as he is getting CRRT  Source control achieved      E coli bacteremia  ABX  ICU  Follow Blood Cultures  Pressors as indicated      12/31  Sensitive E coli bacteremia on ceftriaxone IV  Afebrile  Resolved leucocytosis  Repeat blood culture on 12/29 NGTC x 2 days  Appropriate source control achieved S/P ERCP, stone retrieval and sphincterectomy.  Panculture as needed      1/1  Repeat culture NGTD  Cont cefriaxone    LISET (acute kidney injury)  Worsening  Cr 3.3  Monitor    12/31  2 to shock  Nephro on board  Avoid nephrotoxins  Cont NaHCo3 gtt    1/1/22  On CRRT started today   Nephro on board  Mild hypokalemia and hyponatremia from LISET: CRRT    On mechanically assisted ventilation  12/31  ICU team on board  Cont mech ventilation      Acute cholangitis  - Abdominal US showed: Gallbladder distension with internal sludge but without definite stones. Intra and extrahepatic biliary ductal dilation. Acute cholecystitis or choledocholithiasis not excluded.  - GI consult. Plans for ERCP in AM. Will keep NPO.  - Blood cultures obtained in the ED. Continue empiric IV Zosyn.   - IV hydration.  - Analgesics and antiemetics as needed.   - Follow labs.     12/28  ERCP planned  Await intervention per GI  Now Intubated    12/29  S/p ERCP  GI on consult  Stent placed per GI  Abx  Follow Cultures    12/30  GI on board  S/p ERCP  Abx  Improving clinically  Remains on Pressors    12/31, 1/1  S/P source control as above  Cont ceftriaxone    Acute respiratory failure with hypoxia and hypercarbia  Patient with Hypoxic  Respiratory failure which is Acute.  he is not on home oxygen. Supplemental oxygen was provided and noted- Vent Mode: A/C  Oxygen Concentration (%):  [40] 40  Resp Rate Total:  [26 br/min-28 br/min] 28 br/min  Vt Set:  [450 mL] 450 mL  PEEP/CPAP:  [5 cmH20] 5 cmH20  Mean Airway Pressure:  [9.7 anK47-34 cmH20] 12 cmH20.   Signs/symptoms of respiratory failure include- tachypnea, increased work of breathing, respiratory distress, use of accessory muscles and cyanosis. Contributing diagnoses includes - COPD Labs and images were reviewed. Patient Has recent ABG, which has been reviewed. Will treat underlying causes and adjust management of respiratory failure as indicated    12/31  On 40 % FIO2 and PEEP 5 mech ventilation  Stable.  Ongoing renal and circulatory failure being addressed prior to considering weaning trials    1/1  On Mech Vent  ICU on board    Thrombocytopenia  Stable-jyothi  2 to Sepsis  Hold chemcial DVTp    1/1  Stable      Paroxysmal atrial fibrillation  - Remains in sinus rhythm on admission. Monitor telemetry.  - Continue home BB.  - Patient is not on long-term AC. Will defer to his primary cardiologist.       Primary hypertension  - Continue home antihypertensives.     12/31  On pressors    Tobacco use  - Assistance with smoking cessation was offered, including:  [x]  Medications  [x]  Counseling  [x]  Printed Information on Smoking Cessation  []  Referral to a Smoking Cessation Program  - Patient was counseled regarding smoking for 3-10 minutes.    Alcohol abuse  - Monitor for withdrawal/DTs. IV Ativan if needed.     Acute hyperglycemia  Cont Long acting insulin BID and SSI   Cont Tube feeding      Electrolyte imbalance  Correct as indicated          VTE Risk Mitigation (From admission, onward)         Ordered     IP VTE HIGH RISK PATIENT  Once         12/27/21 2247     Place sequential compression device  Until discontinued         12/27/21 2247     Reason for No Pharmacological VTE Prophylaxis  Once         Question Answer Comment   Reasons: Risk of Bleeding    Reasons: Thrombocytopenia        12/27/21 0459                Discharge Planning   FLAQUITA:      Code Status: Full Code   Is the patient medically ready for discharge?:     Reason for patient still in hospital (select all that apply): Patient new problem and Treatment  Discharge Plan A: Home with family            Critical care time spent on the evaluation and treatment of severe organ dysfunction, review of pertinent labs and imaging studies, discussions with consulting providers and discussions with patient/family: 30 minutes.      Julissa Thompson MD  Department of Hospital Medicine   [facility] - Intensive Care ([facility])

## 2022-01-01 NOTE — SUBJECTIVE & OBJECTIVE
Interval History: no overnight events    Review of Systems   Unable to perform ROS: Intubated   Constitutional: Negative for fever.     Objective:     Vital Signs (Most Recent):  Temp: 97.4 °F (36.3 °C) (01/01/22 0315)  Pulse: (!) 51 (01/01/22 1040)  Resp: (!) 26 (01/01/22 1040)  BP: (!) 100/58 (01/01/22 1000)  SpO2: 99 % (01/01/22 1040) Vital Signs (24h Range):  Temp:  [96.7 °F (35.9 °C)-97.7 °F (36.5 °C)] 97.4 °F (36.3 °C)  Pulse:  [51-88] 51  Resp:  [25-31] 26  SpO2:  [92 %-100 %] 99 %  BP: ()/(49-66) 100/58  Arterial Line BP: ()/(50-83) 156/75     Weight: 97.6 kg (215 lb 2.7 oz)  Body mass index is 34.73 kg/m².    Intake/Output Summary (Last 24 hours) at 1/1/2022 1141  Last data filed at 1/1/2022 1000  Gross per 24 hour   Intake 3761.35 ml   Output 150 ml   Net 3611.35 ml      Physical Exam  Vitals reviewed.   Constitutional:       General: He is not in acute distress.     Appearance: He is ill-appearing. He is not diaphoretic.   HENT:      Mouth/Throat:      Comments: Intubated  OGT  Eyes:      General: No scleral icterus.     Pupils: Pupils are equal, round, and reactive to light.   Cardiovascular:      Rate and Rhythm: Normal rate and regular rhythm.      Pulses: Normal pulses.   Pulmonary:      Effort: Pulmonary effort is normal. No respiratory distress.      Breath sounds: Normal breath sounds. No wheezing.   Abdominal:      General: Bowel sounds are normal.      Palpations: There is no mass.      Tenderness: There is no guarding.      Hernia: No hernia is present.      Comments: Distended+   Genitourinary:     Comments: hernandez  Musculoskeletal:         General: Normal range of motion.      Right lower leg: No edema.      Left lower leg: No edema.   Skin:     General: Skin is warm and dry.      Capillary Refill: Capillary refill takes less than 2 seconds.   Neurological:      Comments: Intubated and sedated  However opens eyes spontaneously, squeezes daughter's hand   Psychiatric:      Comments:  Cannot be assessed         Significant Labs:   Blood Culture: No results for input(s): LABBLOO in the last 48 hours.  BMP:   Recent Labs   Lab 01/01/22 0451   *   *   K 3.3*   CL 93*   CO2 23   BUN 85*   CREATININE 7.0*   CALCIUM 7.8*   MG 2.4     CBC:   Recent Labs   Lab 12/31/21 0421 01/01/22  0451   WBC 8.84 8.79   HGB 11.2* 11.1*   HCT 31.9* 31.0*   PLT 70* 80*     CMP:   Recent Labs   Lab 12/31/21 0421 12/31/21  1214 01/01/22  0451   *  --  128*   K 3.8  --  3.3*   CL 94*  --  93*   CO2 21*  --  23   *  --  192*   BUN 75*  --  85*   CREATININE 6.0*  --  7.0*   CALCIUM 7.3*  --  7.8*   PROT  --  4.8*  --    ALBUMIN  --  2.0*  --    BILITOT  --  2.9*  --    ALKPHOS  --  323*  --    AST  --  42*  --    ALT  --  87*  --    ANIONGAP 15  --  12   EGFRNONAA 9*  --  7*       Significant Imaging: I have reviewed all pertinent imaging results/findings within the past 24 hours.

## 2022-01-01 NOTE — ASSESSMENT & PLAN NOTE
Likely secondary to sepsis and atelectasis  Cont fully vent support  Vent settings reviewed and adjusted to optimize gas exchange  daily and prn ABG to assess therapy  SAT daily / SBT once stable on CRRT

## 2022-01-01 NOTE — ASSESSMENT & PLAN NOTE
Transition banana bag to enteral supplements, thiamine/folate/multi vit  Monitor for withdrawals once awakening

## 2022-01-01 NOTE — PROCEDURES
"Lizz Alas is a 71 y.o. male patient.    Temp: 97.4 °F (36.3 °C) (01/01/22 0315)  Pulse: 64 (01/01/22 0901)  Resp: (!) 26 (01/01/22 0901)  BP: 102/63 (01/01/22 0600)  SpO2: 100 % (01/01/22 0901)  Weight: 97.6 kg (215 lb 2.7 oz) (01/01/22 0600)  Height: 5' 6" (167.6 cm) (12/29/21 1255)       Trialysis Catheter Insertion    Date/Time: 1/1/2022 9:22 AM  Performed by: AMY Dawn  Authorized by: AMY Dawn     Location procedure was performed:  [facility] INTENSIVE CARE UNIT  Pre-operative diagnosis:  LISET  Post-operative diagnosis:  LISET  Consent Done ?:  Yes  Time out complete?: Verified correct patient, procedure, equipment, staff, and site/side    Indications:  Hemodialysis  Anesthesia:  Local infiltration  Local anesthetic:  Lidocaine 1% without epinephrine  Anesthetic total (ml):  4  Preparation:  Skin prepped with ChloraPrep  Skin prep agent dried: Skin prep agent completely dried prior to procedure    Sterile barriers: All five maximal sterile barriers used - gloves, gown, cap, mask and large sterile sheet    Hand hygiene: Hand hygiene performed immediately prior to central venous catheter insertion    Location:  Right internal jugular  Catheter type:  Triple lumen  Catheter size:  13 Fr  Inserted Catheter Length (cm):  15  Ultrasound guidance: Yes    Vessel Caliber:  Large  Comprressibility:  Normal  Needle advanced into vessel with real time ultrasound guidance.    Guidewire confirmed in vessel.    Steril sheath on probe.    Sterile gel used.  Manometry: No    Number of attempts:  1  Securement:  Line sutured, chlorhexidine patch, sterile dressing applied and blood return through all ports  Complications: No    Specimens: No    Implants: No    XRay:  Placement verified by x-ray and no pneumothorax on x-ray  Adverse Events:  NoneTermination Site: superior vena cava        1/1/2022  "

## 2022-01-01 NOTE — ASSESSMENT & PLAN NOTE
Pressors  Fluids  ABX  ICU  Pulmonary CC  Intubated  Sedated      12/31  On sole pressor, Norepinephrine currently 0.14 mcg/kg/min, wean/titrate as per ICU team  On appropriate antibiotics: Ceftriaxone 1 gm IV Q12 for Sensitive E coli    1/1/22  On low dose Norepinephrine as he is getting CRRT  Source control achieved

## 2022-01-01 NOTE — ASSESSMENT & PLAN NOTE
Worsening  Cr 3.3  Monitor    12/31  2 to shock  Nephro on board  Avoid nephrotoxins  Cont NaHCo3 gtt    1/1/22  On CRRT started today   Nephro on board  Mild hypokalemia and hyponatremia from LISET: CRRT

## 2022-01-01 NOTE — ASSESSMENT & PLAN NOTE
Source: GB and Bacteremia  Cont IVFs   titrate Levophed infusion for MAP > 65  Sputum culture NGTD  Blood cultures X 2 E.Coli, continue rocephin  Temp and wbc trend down

## 2022-01-01 NOTE — PROGRESS NOTES
CRRT initiated. Pt vitals are stable and pt is tolerating well. Net UF of 0ml at this time. Supplies at bedside. All questions and concerns answered.

## 2022-01-01 NOTE — PROGRESS NOTES
[facility] ([facility])  Nephrology  Progress Note    Patient Name: Lizz Alas  MRN: [id number]  Admission Date: 12/27/2021  Hospital Length of Stay: 4 days  Attending Provider: Julissa Thompson MD   Primary Care Physician: Primary Doctor No  Principal Problem:Septic shock      Subjective:     Interval History:   12/31: Remains on vasopressors, intubated in ICU. Poor UOP    1/1/22: CRRT initiated, family at bedside     Review of patient's allergies indicates:  No Known Allergies  Current Facility-Administered Medications   Medication Frequency    0.9%  NaCl infusion (CRRT USE ONLY) Continuous    0.9%  NaCl infusion PRN    acetaminophen oral solution 650 mg Q6H PRN    albuterol-ipratropium 2.5 mg-0.5 mg/3 mL nebulizer solution 3 mL Q4H PRN    aluminum-magnesium hydroxide-simethicone 200-200-20 mg/5 mL suspension 30 mL QID PRN    arformoteroL nebulizer solution 15 mcg BID    budesonide nebulizer solution 0.5 mg Q12H    cefTRIAXone (ROCEPHIN) 1 g/50 mL D5W IVPB Q12H    chlorhexidine 0.12 % solution 15 mL BID    dextrose 50% injection 12.5 g PRN    famotidine (PF) injection 20 mg Daily    fentaNYL 2500 mcg in 0.9% sodium chloride 250 mL infusion premix (titrating) Continuous    glucagon (human recombinant) injection 1 mg PRN    insulin aspart U-100 pen 1-10 Units Q4H PRN    insulin detemir U-100 pen 6 Units BID    lorazepam injection 1 mg Q2H PRN    lorazepam injection 2 mg Q2H PRN    magnesium sulfate 2g in water 50mL IVPB (premix) PRN    mupirocin 2 % ointment BID    naloxone 0.4 mg/mL injection 0.02 mg PRN    nicotine 21 mg/24 hr 1 patch Daily    NORepinephrine 32 mg in dextrose 5 % 250 mL infusion Continuous    ondansetron disintegrating tablet 8 mg Q8H PRN    oxyCODONE immediate release tablet 10 mg Q6H PRN    oxyCODONE immediate release tablet 5 mg Q6H PRN    polyethylene glycol packet 17 g Every other day    promethazine tablet 25 mg Q6H PRN    sodium bicarbonate 150 mEq  in dextrose 5 % 1,150 mL infusion Continuous    sodium chloride 0.9% 1,000 mL with mvi, adult no.4 with vit K 3,300 unit- 150 mcg/10 mL 10 mL, thiamine 100 mg, folic acid 1 mg infusion Daily    sodium chloride 0.9% flush 10 mL Q12H PRN       Objective:     Vital Signs (Most Recent):  Temp: 97.4 °F (36.3 °C) (01/01/22 0315)  Pulse: (!) 51 (01/01/22 1040)  Resp: (!) 26 (01/01/22 1040)  BP: (!) 100/58 (01/01/22 1000)  SpO2: 99 % (01/01/22 1040)  O2 Device (Oxygen Therapy): ventilator (01/01/22 0901) Vital Signs (24h Range):  Temp:  [96.7 °F (35.9 °C)-97.7 °F (36.5 °C)] 97.4 °F (36.3 °C)  Pulse:  [51-88] 51  Resp:  [25-31] 26  SpO2:  [92 %-100 %] 99 %  BP: ()/(49-66) 100/58  Arterial Line BP: ()/(50-83) 156/75     Weight: 97.6 kg (215 lb 2.7 oz) (01/01/22 0600)  Body mass index is 34.73 kg/m².  Body surface area is 2.13 meters squared.    I/O last 3 completed shifts:  In: 6655.6 [I.V.:5162.8; NG/GT:1350; IV Piggyback:142.7]  Out: 223 [Urine:223]    Physical Exam  Vitals and nursing note reviewed.   Constitutional:       Appearance: He is ill-appearing.      Interventions: He is sedated and intubated.   HENT:      Head: Atraumatic.   Cardiovascular:      Rate and Rhythm: Normal rate.   Pulmonary:      Effort: No respiratory distress (intubated). He is intubated.   Abdominal:      General: There is distension.   Musculoskeletal:         General: Swelling (very mild) present.   Skin:     General: Skin is dry.         Significant Labs: reviewed    Significant Imaging: reviewed     Assessment/Plan:     Active Diagnoses:    Diagnosis Date Noted POA    PRINCIPAL PROBLEM:  Septic shock [A41.9, R65.21] 12/28/2021 Yes    On mechanically assisted ventilation [Z99.11] 12/30/2021 Not Applicable    LISET (acute kidney injury) [N17.9] 12/29/2021 No    Thrombocytopenia [D69.6] 12/29/2021 Yes    Acute hyperglycemia [R73.9] 12/29/2021 Yes    Acute respiratory failure with hypoxia and hypercarbia [J96.01, J96.02]  12/28/2021 Yes    Electrolyte imbalance [E87.8] 12/28/2021 Yes    E coli bacteremia [R78.81, B96.20] 12/28/2021 Yes    Acute cholangitis [K83.09] 12/27/2021 Yes    Tobacco use [Z72.0] 12/27/2021 Yes     Chronic    Alcohol abuse [F10.10] 12/27/2021 Yes     Chronic    Primary hypertension [I10] 12/27/2021 Yes     Chronic    Paroxysmal atrial fibrillation [I48.0] 12/27/2021 Yes     Chronic      Problems Resolved During this Admission:       Oligoanuric LISET with baseline creatinine 0.9mg/dL in the setting of septic shock   - Oligoanuric for 72 hours  - CRRT initated today  - increase UF as BP tolerates  - 4K 3Ca bath to start  - follow labs closely as per CRRT protocol   - monitor for renal recovery       Conrad Arnett MD  Nephrology  [facility] ([facility])

## 2022-01-01 NOTE — CARE UPDATE
Oligoanuric for 72 hours with continued rise in sCr. Will start CRRT for indication of clearance. If tolerates CRRT will ultrafiltrate as quite positive fluid balance     Dialysis orders in, HD nurse aware. Pulmonary to kindly place temporary dialysis catheter. Francisco Javier

## 2022-01-01 NOTE — PROGRESS NOTES
[facility] ([facility])  Critical Care Medicine  Progress Note    Patient Name: Lizz Alas  MRN: [id number]  Admission Date: 12/27/2021  Hospital Length of Stay: 4 days  Code Status: Full Code  Attending Provider: Julissa Thompson MD  Primary Care Provider: Primary Doctor No   Principal Problem: Septic shock    Subjective:     HPI:  Mr Alas is a 72 yo Romansh male with a PMH of HLD, etoh abuse, tobacco abuse, HTN and PAF.  He presented to [facility] ED yesterday evening via personal vehicle with complaint of abd pain over 3 days and N/V X 1 day.  He does not speak English and was accompanied by family.  In ED Glucose 180, TBili 5.1, LA 1.5 and Abd US with GB distention and sludging.  He had temp 102.9.  He as admitted to Tele and GI consulted diagnosed with acute cholecystitis.  This AM Telemetry reported HR was at 183 and upon assessment patient seemed to be in distress, mouth breathing , O2 was reading 73% pt was shaking and shivering. Called TOM due to pt speaking Slovenian [id number], # number [id number], pt was stating he could not breath.  Checked pt temp reading was 98.4 oral .Pt was on 3L then moved to 5L, then  Respiratory came in and put him on breathing treatment albuterol, still couldn't get breathing controlled, called Dr. Lopez and he stated to transfer pt to ICU.      Hospital/ICU Course:  12/28 - In ICU he was awake and alert on BiPAP .40 FiO2 with mild work of breathing but no distress and hemodynamically stable.  GI requested patient intubated and stabilized more so from pulm standpoint then obtain CT Abd and plan to OR for ERCP.  TBili up to 7.1.  He was intubated and supported on [facility] ventilation post explaining all this to daughter at bed side.  Post intubation and sedation became hypotensive requiring CL and AL placements and starting Levophed infusion.   12/29 - ERCP yesterday afternoon.  Increased vasopressor and O2 requirements overnight.  This AM still intubated on mech vent and  sedated on Fentanyl infusion.  Still on Vasopressin and Levophed infusions also.  Oliguria with LISET and Tmax last 24 hours up to 102.9.  Blood cultures X 2 + E.Coli.  Discussed care with daughter at bed side and all questions answered.   12/30 - remains intubated and sedated on mech vent. Temp and wbc trend down. Urine output, creatinine, and metabolic acidosis worsening  12/31 - remains intubated and sedated on mech vent with minimal oxygen demand but moderate pressor requirement. Urine output 118ml last 24 hours, creatinine 6.0. continued hyperglycemia 197-237  1/1/2022 - remains intubated and sedated on mechanical vent with support requirement unchanged but pO2 trending down, +18L since admission. Renal function without improvement, only 160ml urine output last 24 hours. Trialysis cath placed for initiation of CRRT, goal filtration and fluid removal      Review of Systems   Unable to perform ROS: Intubated     Objective:     Vital Signs (Most Recent):  Temp: 97.4 °F (36.3 °C) (01/01/22 0315)  Pulse: 68 (01/01/22 0600)  Resp: (!) 26 (01/01/22 0600)  BP: 102/63 (01/01/22 0600)  SpO2: 96 % (01/01/22 0600) Vital Signs (24h Range):  Temp:  [96.7 °F (35.9 °C)-98.2 °F (36.8 °C)] 97.4 °F (36.3 °C)  Pulse:  [57-69] 68  Resp:  [26-31] 26  SpO2:  [95 %-100 %] 96 %  BP: ()/(49-66) 102/63  Arterial Line BP: ()/(50-83) 103/55     Weight: 97.6 kg (215 lb 2.7 oz)  Body mass index is 34.73 kg/m².      Intake/Output Summary (Last 24 hours) at 1/1/2022 0749  Last data filed at 1/1/2022 0600  Gross per 24 hour   Intake 3745.01 ml   Output 160 ml   Net 3585.01 ml      fentanyl 100 mcg/hr (01/01/22 0600)    NORepinephrine bitartrate-D5W 0.02 mcg/kg/min (01/01/22 0600)    sodium bicarbonate drip 75 mL/hr at 01/01/22 0732       Physical Exam  Vitals and nursing note reviewed.   Constitutional:       Appearance: He is obese. He is ill-appearing.      Interventions: He is sedated, intubated and restrained.   Eyes:       General: No scleral icterus.     Conjunctiva/sclera: Conjunctivae normal.   Neck:      Vascular: No JVD.   Cardiovascular:      Rate and Rhythm: Normal rate and regular rhythm.      Pulses:           Radial pulses are 1+ on the right side and 1+ on the left side.        Dorsalis pedis pulses are 1+ on the right side and 1+ on the left side.   Pulmonary:      Effort: He is intubated.      Breath sounds: Decreased breath sounds present.   Abdominal:      General: Bowel sounds are decreased. There is no distension.      Palpations: Abdomen is soft.   Musculoskeletal:      Right lower leg: No edema.      Left lower leg: No edema.   Skin:     General: Skin is warm and dry.      Capillary Refill: Capillary refill takes less than 2 seconds.         Vents:  Vent Mode: A/C (01/01/22 0507)  Ventilator Initiated: Yes (12/28/21 0824)  Set Rate: 26 BPM (01/01/22 0507)  Vt Set: 450 mL (01/01/22 0507)  Pressure Support: 0 cmH20 (12/28/21 0824)  PEEP/CPAP: 5 cmH20 (01/01/22 0507)  Oxygen Concentration (%): 40 (01/01/22 0600)  Peak Airway Pressure: 22 cmH2O (01/01/22 0507)  Plateau Pressure: 16 cmH20 (01/01/22 0507)  Total Ve: 12.1 mL (01/01/22 0507)  Negative Inspiratory Force (cm H2O): 0 (12/31/21 1714)  F/VT Ratio<105 (RSBI): (!) 55.67 (01/01/22 0507)    Lines/Drains/Airways     Central Venous Catheter Line            Percutaneous Central Line Insertion/Assessment - Triple Lumen  12/28/21 1052 left internal jugular 3 days          Drain                 NG/OG Tube 12/28/21 0827 [place] sump 14 Fr. Left mouth 3 days         Urethral Catheter 12/28/21 0830 3 days          Airway                 Airway - Non-Surgical 12/28/21 0824 Endotracheal Tube 3 days          Arterial Line            Arterial Line 12/28/21 1045 Left Radial 3 days          Peripheral Intravenous Line                 Peripheral IV - Single Lumen 12/28/21 0745 20 G Posterior;Right Hand 4 days                Significant Labs:    CBC/Anemia Profile:  Recent Labs   Lab  12/31/21 0421 01/01/22  0451   WBC 8.84 8.79   HGB 11.2* 11.1*   HCT 31.9* 31.0*   PLT 70* 80*   MCV 89 86   RDW 14.4 14.3        Chemistries:  Recent Labs   Lab 12/31/21  0421 12/31/21  1214 01/01/22  0451   *  --  128*   K 3.8  --  3.3*   CL 94*  --  93*   CO2 21*  --  23   BUN 75*  --  85*   CREATININE 6.0*  --  7.0*   CALCIUM 7.3*  --  7.8*   ALBUMIN  --  2.0*  --    PROT  --  4.8*  --    BILITOT  --  2.9*  --    ALKPHOS  --  323*  --    ALT  --  87*  --    AST  --  42*  --    PHOS 3.1  --   --        All pertinent labs within the past 24 hours have been reviewed.    Significant Imaging:  I have reviewed all pertinent imaging results/findings within the past 24 hours.      ABG  Recent Labs   Lab 01/01/22 0423   PH 7.422   PO2 60*   PCO2 39.8   HCO3 25.9   BE 1     Assessment/Plan:     Psychiatric  Alcohol abuse  Transition banana bag to enteral supplements, thiamine/folate/multi vit  Monitor for withdrawals once awakening    Pulmonary  On mechanically assisted ventilation  VAP prophylaxis  Daily SAT    Acute respiratory failure with hypoxia and hypercarbia  Likely secondary to sepsis and atelectasis  Cont fully vent support  Vent settings reviewed and adjusted to optimize gas exchange  daily and prn ABG to assess therapy  SAT daily / SBT once stable on CRRT    Cardiac/Vascular  Paroxysmal atrial fibrillation  SB - SR on monitor,  Extrasystoles noted  Not on anticoagulants on home med review  Hold metoprolol with pressor support    Renal/  LISET (acute kidney injury)  W/ met acidosis controlled on bicarb infusion  Accurate I/Os, urine output decreasing (160ml out last 24 hours)  Support MAP for renal perfusion  Renal dose meds and IVAB  Nephrology following  trialysis placed and initiating CRRT, goal fluid and toxin removal; stop bicarb infusion    Electrolyte imbalance  ICU cardiac monitoring  Repeat labs daily    ID  * Septic shock  Source: GB and Bacteremia  Cont IVFs   titrate Levophed infusion for  MAP > 65  Sputum culture NGTD  Blood cultures X 2 E.Coli, continue rocephin  Temp and wbc trend down      E coli bacteremia  Continue rocephin, abx coverage day 5    Acute cholangitis  IVAB and IVFs  POD #4 S/P ERCP  Will need Cholecystectomy as outpt once clinically improved      Hematology  Thrombocytopenia  No active bleeding  Conservative transfusion protocol, holding pharmacologic anticoagulation  Rx sepsis  Monitor trend    Endocrine  Acute hyperglycemia  Range 172-226  increase long acting insulin, continue SSI prn with monitoring for glucose control and prevention of insulin toxicity    Other  Tobacco use  Will encourage tobacco cessation post extubation and once awake/alert  Cont LABA and ICS nebs  On Nicotine patch     Critical Care Daily Checklist:    A: Awake: RASS Goal/Actual Goal: RASS Goal: -2-->light sedation  Actual: [place] Agitation Sedation Scale (RASS): Light sedation   B: Spontaneous Breathing Trial Performed?     C: SAT & SBT Coordinated?  SAT/SBT once stable on CRRT                      D: Delirium: CAM-ICU Overall CAM-ICU: Positive   E: Early Mobility Performed? Yes   F: Feeding Goal: Goals: 1. Enteral Nutrition initiation within 24 hours.  Status: Nutrition Goal Status: new   Current Diet Order   Procedures    Diet NPO      AS: Analgesia/Sedation fentanyl   T: Thromboembolic Prophylaxis SCD   H: HOB > 300 Yes   U: Stress Ulcer Prophylaxis (if needed) pepcid   G: Glucose Control SSI   B: Bowel Function Stool Occurrence: 0   I: Indwelling Catheter (Lines & Eng) Necessity reviewed   D: De-escalation of Antimicrobials/Pharmacotherapies reviewed    Plan for the day/ETD As above    Code Status:  Family/Goals of Care: Full Code  Pending hospital course; daughter updated on status and plan at bedside   I have discussed case and plan of care in detail with Dr Jameson; Status and plan of care were discussed with team on multidisciplinary rounds.    Critical Care Time: 64 minutes  Critical  secondary to septic shock, bacteremia, LISET, cholangitis on mechanical ventilation and pressor support; Critical care was time spent personally by me on the following activities: development of treatment plan with patient or surrogate and bedside caregivers, discussions with consultants, evaluation of patient's response to treatment, examination of patient, ordering and performing treatments and interventions, ordering and review of laboratory studies, ordering and review of radiographic studies, pulse oximetry, re-evaluation of patient's condition. This critical care time did not overlap with that of any other provider or involve time for any procedures.     GREG Dawn-BC  Critical Care Medicine  [facility] ([facility])

## 2022-01-02 PROBLEM — E87.8 ELECTROLYTE IMBALANCE: Status: RESOLVED | Noted: 2021-12-28 | Resolved: 2022-01-02

## 2022-01-02 PROBLEM — Z99.11 ON MECHANICALLY ASSISTED VENTILATION: Status: RESOLVED | Noted: 2021-12-30 | Resolved: 2022-01-02

## 2022-01-02 LAB
ALBUMIN SERPL BCP-MCNC: 1.9 G/DL (ref 3.5–5.2)
ALBUMIN SERPL BCP-MCNC: 2 G/DL (ref 3.5–5.2)
ALBUMIN SERPL BCP-MCNC: 2.1 G/DL (ref 3.5–5.2)
ALLENS TEST: ABNORMAL
ANION GAP SERPL CALC-SCNC: 10 MMOL/L (ref 8–16)
ANION GAP SERPL CALC-SCNC: 13 MMOL/L (ref 8–16)
ANION GAP SERPL CALC-SCNC: 13 MMOL/L (ref 8–16)
ANION GAP SERPL CALC-SCNC: 8 MMOL/L (ref 8–16)
BASOPHILS # BLD AUTO: 0.04 K/UL (ref 0–0.2)
BASOPHILS NFR BLD: 0.7 % (ref 0–1.9)
BUN SERPL-MCNC: 53 MG/DL (ref 8–23)
BUN SERPL-MCNC: 58 MG/DL (ref 8–23)
BUN SERPL-MCNC: 59 MG/DL (ref 8–23)
BUN SERPL-MCNC: 59 MG/DL (ref 8–23)
CALCIUM SERPL-MCNC: 7.4 MG/DL (ref 8.7–10.5)
CALCIUM SERPL-MCNC: 7.6 MG/DL (ref 8.7–10.5)
CALCIUM SERPL-MCNC: 7.6 MG/DL (ref 8.7–10.5)
CALCIUM SERPL-MCNC: 8 MG/DL (ref 8.7–10.5)
CHLORIDE SERPL-SCNC: 99 MMOL/L (ref 95–110)
CO2 SERPL-SCNC: 23 MMOL/L (ref 23–29)
CO2 SERPL-SCNC: 25 MMOL/L (ref 23–29)
CREAT SERPL-MCNC: 4.7 MG/DL (ref 0.5–1.4)
CREAT SERPL-MCNC: 5.3 MG/DL (ref 0.5–1.4)
CREAT SERPL-MCNC: 5.4 MG/DL (ref 0.5–1.4)
CREAT SERPL-MCNC: 5.4 MG/DL (ref 0.5–1.4)
DELSYS: ABNORMAL
DIFFERENTIAL METHOD: ABNORMAL
EOSINOPHIL # BLD AUTO: 0.1 K/UL (ref 0–0.5)
EOSINOPHIL NFR BLD: 2 % (ref 0–8)
ERYTHROCYTE [DISTWIDTH] IN BLOOD BY AUTOMATED COUNT: 15 % (ref 11.5–14.5)
EST. GFR  (AFRICAN AMERICAN): 11 ML/MIN/1.73 M^2
EST. GFR  (AFRICAN AMERICAN): 13 ML/MIN/1.73 M^2
EST. GFR  (NON AFRICAN AMERICAN): 10 ML/MIN/1.73 M^2
EST. GFR  (NON AFRICAN AMERICAN): 11 ML/MIN/1.73 M^2
FLOW: 15
GLUCOSE SERPL-MCNC: 112 MG/DL (ref 70–110)
GLUCOSE SERPL-MCNC: 79 MG/DL (ref 70–110)
GLUCOSE SERPL-MCNC: 79 MG/DL (ref 70–110)
GLUCOSE SERPL-MCNC: 81 MG/DL (ref 70–110)
HCO3 UR-SCNC: 25.2 MMOL/L (ref 24–28)
HCT VFR BLD AUTO: 34 % (ref 40–54)
HGB BLD-MCNC: 11.7 G/DL (ref 14–18)
IMM GRANULOCYTES # BLD AUTO: 0.1 K/UL (ref 0–0.04)
IMM GRANULOCYTES NFR BLD AUTO: 1.6 % (ref 0–0.5)
LYMPHOCYTES # BLD AUTO: 0.9 K/UL (ref 1–4.8)
LYMPHOCYTES NFR BLD: 14.4 % (ref 18–48)
MAGNESIUM SERPL-MCNC: 2.2 MG/DL (ref 1.6–2.6)
MAGNESIUM SERPL-MCNC: 2.3 MG/DL (ref 1.6–2.6)
MAGNESIUM SERPL-MCNC: 2.4 MG/DL (ref 1.6–2.6)
MCH RBC QN AUTO: 30.3 PG (ref 27–31)
MCHC RBC AUTO-ENTMCNC: 34.4 G/DL (ref 32–36)
MCV RBC AUTO: 88 FL (ref 82–98)
MODE: ABNORMAL
MONOCYTES # BLD AUTO: 0.9 K/UL (ref 0.3–1)
MONOCYTES NFR BLD: 14.1 % (ref 4–15)
NEUTROPHILS # BLD AUTO: 4.1 K/UL (ref 1.8–7.7)
NEUTROPHILS NFR BLD: 67.2 % (ref 38–73)
NRBC BLD-RTO: 0 /100 WBC
PCO2 BLDA: 41 MMHG (ref 35–45)
PH SMN: 7.4 [PH] (ref 7.35–7.45)
PHOSPHATE SERPL-MCNC: 2.4 MG/DL (ref 2.7–4.5)
PHOSPHATE SERPL-MCNC: 2.8 MG/DL (ref 2.7–4.5)
PHOSPHATE SERPL-MCNC: 3.1 MG/DL (ref 2.7–4.5)
PLATELET # BLD AUTO: 73 K/UL (ref 150–450)
PMV BLD AUTO: 12.2 FL (ref 9.2–12.9)
PO2 BLDA: 62 MMHG (ref 80–100)
POC BE: 0 MMOL/L
POC SATURATED O2: 91 % (ref 95–100)
POCT GLUCOSE: 104 MG/DL (ref 70–110)
POCT GLUCOSE: 107 MG/DL (ref 70–110)
POCT GLUCOSE: 122 MG/DL (ref 70–110)
POCT GLUCOSE: 170 MG/DL (ref 70–110)
POCT GLUCOSE: 78 MG/DL (ref 70–110)
POCT GLUCOSE: 91 MG/DL (ref 70–110)
POCT GLUCOSE: 91 MG/DL (ref 70–110)
POCT GLUCOSE: 99 MG/DL (ref 70–110)
POTASSIUM SERPL-SCNC: 3.8 MMOL/L (ref 3.5–5.1)
POTASSIUM SERPL-SCNC: 4 MMOL/L (ref 3.5–5.1)
RBC # BLD AUTO: 3.86 M/UL (ref 4.6–6.2)
SAMPLE: ABNORMAL
SITE: ABNORMAL
SODIUM SERPL-SCNC: 132 MMOL/L (ref 136–145)
SODIUM SERPL-SCNC: 132 MMOL/L (ref 136–145)
SODIUM SERPL-SCNC: 135 MMOL/L (ref 136–145)
SODIUM SERPL-SCNC: 135 MMOL/L (ref 136–145)
WBC # BLD AUTO: 6.12 K/UL (ref 3.9–12.7)

## 2022-01-02 PROCEDURE — 80069 RENAL FUNCTION PANEL: CPT | Mod: 91 | Performed by: INTERNAL MEDICINE

## 2022-01-02 PROCEDURE — 99900035 HC TECH TIME PER 15 MIN (STAT)

## 2022-01-02 PROCEDURE — 82803 BLOOD GASES ANY COMBINATION: CPT

## 2022-01-02 PROCEDURE — 25000003 PHARM REV CODE 250: Performed by: NURSE PRACTITIONER

## 2022-01-02 PROCEDURE — 25000003 PHARM REV CODE 250: Performed by: INTERNAL MEDICINE

## 2022-01-02 PROCEDURE — 85025 COMPLETE CBC W/AUTO DIFF WBC: CPT | Performed by: NURSE PRACTITIONER

## 2022-01-02 PROCEDURE — 83735 ASSAY OF MAGNESIUM: CPT | Mod: 91 | Performed by: NURSE PRACTITIONER

## 2022-01-02 PROCEDURE — 27000249 HC VAPOTHERM CIRCUIT

## 2022-01-02 PROCEDURE — 80069 RENAL FUNCTION PANEL: CPT | Performed by: INTERNAL MEDICINE

## 2022-01-02 PROCEDURE — 90945 DIALYSIS ONE EVALUATION: CPT

## 2022-01-02 PROCEDURE — 25000242 PHARM REV CODE 250 ALT 637 W/ HCPCS: Performed by: NURSE PRACTITIONER

## 2022-01-02 PROCEDURE — 99291 CRITICAL CARE FIRST HOUR: CPT | Mod: ,,, | Performed by: NURSE PRACTITIONER

## 2022-01-02 PROCEDURE — 27100092 HC HIGH FLOW DELIVERY CANNULA

## 2022-01-02 PROCEDURE — 63600175 PHARM REV CODE 636 W HCPCS: Performed by: NURSE PRACTITIONER

## 2022-01-02 PROCEDURE — 94640 AIRWAY INHALATION TREATMENT: CPT

## 2022-01-02 PROCEDURE — 27100171 HC OXYGEN HIGH FLOW UP TO 24 HOURS

## 2022-01-02 PROCEDURE — C9399 UNCLASSIFIED DRUGS OR BIOLOG: HCPCS | Performed by: NURSE PRACTITIONER

## 2022-01-02 PROCEDURE — 99291 PR CRITICAL CARE, E/M 30-74 MINUTES: ICD-10-PCS | Mod: ,,, | Performed by: NURSE PRACTITIONER

## 2022-01-02 PROCEDURE — 20000000 HC ICU ROOM

## 2022-01-02 PROCEDURE — 83735 ASSAY OF MAGNESIUM: CPT | Performed by: INTERNAL MEDICINE

## 2022-01-02 PROCEDURE — 27201109 HC SYSTEM FECAL MANAGEMENT

## 2022-01-02 PROCEDURE — 36600 WITHDRAWAL OF ARTERIAL BLOOD: CPT

## 2022-01-02 PROCEDURE — 94799 UNLISTED PULMONARY SVC/PX: CPT

## 2022-01-02 PROCEDURE — S4991 NICOTINE PATCH NONLEGEND: HCPCS | Performed by: INTERNAL MEDICINE

## 2022-01-02 RX ORDER — HYDROCODONE BITARTRATE AND ACETAMINOPHEN 500; 5 MG/1; MG/1
TABLET ORAL CONTINUOUS
Status: DISCONTINUED | OUTPATIENT
Start: 2022-01-03 | End: 2022-01-03

## 2022-01-02 RX ADMIN — SODIUM CHLORIDE: 0.9 INJECTION, SOLUTION INTRAVENOUS at 11:01

## 2022-01-02 RX ADMIN — CHLORHEXIDINE GLUCONATE 0.12% ORAL RINSE 15 ML: 1.2 LIQUID ORAL at 08:01

## 2022-01-02 RX ADMIN — CEFTRIAXONE 1 G: 1 INJECTION, SOLUTION INTRAVENOUS at 08:01

## 2022-01-02 RX ADMIN — DEXMEDETOMIDINE HYDROCHLORIDE 0.3 MCG/KG/HR: 4 INJECTION INTRAVENOUS at 02:01

## 2022-01-02 RX ADMIN — CEFTRIAXONE 1 G: 1 INJECTION, SOLUTION INTRAVENOUS at 09:01

## 2022-01-02 RX ADMIN — POTASSIUM PHOSPHATE, MONOBASIC AND POTASSIUM PHOSPHATE, DIBASIC 15 MMOL: 224; 236 INJECTION, SOLUTION, CONCENTRATE INTRAVENOUS at 04:01

## 2022-01-02 RX ADMIN — FAMOTIDINE 20 MG: 10 INJECTION, SOLUTION INTRAVENOUS at 09:01

## 2022-01-02 RX ADMIN — ARFORMOTEROL TARTRATE 15 MCG: 15 SOLUTION RESPIRATORY (INHALATION) at 08:01

## 2022-01-02 RX ADMIN — DEXMEDETOMIDINE HYDROCHLORIDE 0.5 MCG/KG/HR: 4 INJECTION INTRAVENOUS at 09:01

## 2022-01-02 RX ADMIN — BUDESONIDE 0.5 MG: 0.5 INHALANT ORAL at 08:01

## 2022-01-02 RX ADMIN — BUDESONIDE 0.5 MG: 0.5 INHALANT ORAL at 09:01

## 2022-01-02 RX ADMIN — NICOTINE 1 PATCH: 21 PATCH, EXTENDED RELEASE TRANSDERMAL at 09:01

## 2022-01-02 RX ADMIN — DEXMEDETOMIDINE HYDROCHLORIDE 0.6 MCG/KG/HR: 4 INJECTION INTRAVENOUS at 09:01

## 2022-01-02 RX ADMIN — CHLORHEXIDINE GLUCONATE 0.12% ORAL RINSE 15 ML: 1.2 LIQUID ORAL at 09:01

## 2022-01-02 RX ADMIN — ARFORMOTEROL TARTRATE 15 MCG: 15 SOLUTION RESPIRATORY (INHALATION) at 09:01

## 2022-01-02 NOTE — PLAN OF CARE
Pt VSS and RASS -1. Pt had one large loose BM at the beginning of shift. Levo titrated off throughout shift. Around 1300 the filter clotted and CRRT ended. Renal said to restart in the morning.Pt is lightly sedated with Precedex ggt but opens eyes and moves extremities spontaneous. Bed alarm on.

## 2022-01-02 NOTE — ASSESSMENT & PLAN NOTE
Patient with Hypoxic Respiratory failure which is Acute.  he is not on home oxygen. Supplemental oxygen was provided and noted- Vent Mode: A/C  Oxygen Concentration (%):  [40-80] 80  Resp Rate Total:  [26 br/min-36 br/min] 36 br/min  Vt Set:  [450 mL] 450 mL  PEEP/CPAP:  [5 cmH20] 5 cmH20  Mean Airway Pressure:  [12 ixS52-30 cmH20] 12 cmH20.   Signs/symptoms of respiratory failure include- tachypnea, increased work of breathing, respiratory distress, use of accessory muscles and cyanosis. Contributing diagnoses includes - COPD Labs and images were reviewed. Patient Has recent ABG, which has been reviewed. Will treat underlying causes and adjust management of respiratory failure as indicated    12/31  On 40 % FIO2 and PEEP 5 mech ventilation  Stable.  Ongoing renal and circulatory failure being addressed prior to considering weaning trials    1/1  On Mech Vent  ICU on board    1/2  On Vapotherm  CXR in AM  Pulm toilet as tolerated

## 2022-01-02 NOTE — ASSESSMENT & PLAN NOTE
Worsening  Cr 3.3  Monitor    12/31  2 to shock  Nephro on board  Avoid nephrotoxins  Cont NaHCo3 gtt    1/1/22  On CRRT started today   Nephro on board  Mild hypokalemia and hyponatremia from LISET: CRRT    1/2  Net positive about 18L since admit  Ongoing CRRT  Avoid nephrotoxic agents

## 2022-01-02 NOTE — SUBJECTIVE & OBJECTIVE
Review of Systems   Unable to perform ROS: Intubated     Objective:     Vital Signs (Most Recent):  Temp: 97.9 °F (36.6 °C) (01/02/22 0400)  Pulse: 62 (01/02/22 0630)  Resp: 16 (01/02/22 0630)  BP: (!) 99/58 (01/02/22 0600)  SpO2: 97 % (01/02/22 0630) Vital Signs (24h Range):  Temp:  [96 °F (35.6 °C)-98.4 °F (36.9 °C)] 97.9 °F (36.6 °C)  Pulse:  [] 62  Resp:  [11-29] 16  SpO2:  [86 %-100 %] 97 %  BP: ()/(53-99) 99/58  Arterial Line BP: ()/() 115/60     Weight: 97.9 kg (215 lb 13.3 oz)  Body mass index is 34.84 kg/m².      Intake/Output Summary (Last 24 hours) at 1/2/2022 0741  Last data filed at 1/2/2022 0600  Gross per 24 hour   Intake 3570.9 ml   Output 3720 ml   Net -149.1 ml      sodium chloride 0.9% Stopped (01/01/22 2317)    dexmedetomidine (PRECEDEX) infusion 0.5 mcg/kg/hr (01/02/22 0600)       Physical Exam  Vitals and nursing note reviewed.   Constitutional:       Appearance: He is obese. He is ill-appearing.      Interventions: He is sedated and restrained. Nasal cannula and face mask in place.   Eyes:      General: No scleral icterus.     Conjunctiva/sclera: Conjunctivae normal.   Neck:      Vascular: No JVD.   Cardiovascular:      Rate and Rhythm: Normal rate and regular rhythm.      Pulses:           Radial pulses are 1+ on the right side and 1+ on the left side.        Dorsalis pedis pulses are 1+ on the right side and 1+ on the left side.   Pulmonary:      Breath sounds: Decreased breath sounds and rhonchi present.   Abdominal:      General: Bowel sounds are decreased. There is no distension.      Palpations: Abdomen is soft.   Musculoskeletal:      Right lower leg: No edema.      Left lower leg: No edema.   Skin:     General: Skin is warm and dry.      Capillary Refill: Capillary refill takes less than 2 seconds.   Neurological:      GCS: GCS eye subscore is 3. GCS verbal subscore is 3. GCS motor subscore is 5.      Comments: Arouses to stimuli and responds verbally to   but with gibberish/non words         Vents:  Vent Mode: A/C (01/01/22 1309)  Ventilator Initiated: Yes (12/28/21 0824)  Set Rate: 26 BPM (01/01/22 1309)  Vt Set: 450 mL (01/01/22 1309)  Pressure Support: 0 cmH20 (12/28/21 0824)  PEEP/CPAP: 5 cmH20 (01/01/22 1309)  Oxygen Concentration (%): 40 (01/02/22 0300)  Peak Airway Pressure: 25 cmH2O (01/01/22 1309)  Plateau Pressure: 16 cmH20 (01/01/22 1309)  Total Ve: 15 mL (01/01/22 1309)  Negative Inspiratory Force (cm H2O): 0 (01/01/22 1309)  F/VT Ratio<105 (RSBI): (!) 56.22 (01/01/22 1309)    Lines/Drains/Airways     Central Venous Catheter Line            Percutaneous Central Line Insertion/Assessment - Triple Lumen  12/28/21 1052 left internal jugular 4 days    Trialysis (Dialysis) Catheter 01/01/22 0922 right internal jugular <1 day          Drain                 Urethral Catheter 12/28/21 0830 4 days          Arterial Line            Arterial Line 12/28/21 1045 Left Radial 4 days          Peripheral Intravenous Line                 Peripheral IV - Single Lumen 12/28/21 0745 20 G Posterior;Right Hand 4 days                Significant Labs:    CBC/Anemia Profile:  Recent Labs   Lab 01/01/22  0451 01/02/22  0402   WBC 8.79 6.12   HGB 11.1* 11.7*   HCT 31.0* 34.0*   PLT 80* 73*   MCV 86 88   RDW 14.3 15.0*        Chemistries:  Recent Labs   Lab 12/31/21  1214 01/01/22  0451 01/01/22  1438 01/02/22  0046 01/02/22  0402   NA  --    < > 130* 132* 135*  135*   K  --    < > 3.5 4.0 4.0  4.0   CL  --    < > 94* 99 99  99   CO2  --    < > 24 25 23  23   BUN  --    < > 72* 58* 59*  59*   CREATININE  --    < > 6.2* 5.3* 5.4*  5.4*   CALCIUM  --    < > 7.4* 7.4* 7.6*  7.6*   ALBUMIN 2.0*  --  2.0* 1.9* 2.0*   PROT 4.8*  --   --   --   --    BILITOT 2.9*  --   --   --   --    ALKPHOS 323*  --   --   --   --    ALT 87*  --   --   --   --    AST 42*  --   --   --   --    MG  --    < > 2.3 2.3 2.4   PHOS  --    < > 2.7 2.8 3.1    < > = values in this interval not  displayed.       All pertinent labs within the past 24 hours have been reviewed.    Significant Imaging:  I have reviewed all pertinent imaging results/findings within the past 24 hours.

## 2022-01-02 NOTE — SUBJECTIVE & OBJECTIVE
Interval History: extubated to Vapotherm    Review of Systems   Unable to perform ROS: Acuity of condition   Constitutional: Positive for fatigue. Negative for fever.   Respiratory: Positive for cough and shortness of breath.    Cardiovascular: Positive for leg swelling.   Gastrointestinal: Positive for abdominal distention.     Objective:     Vital Signs (Most Recent):  Temp: 97.9 °F (36.6 °C) (01/02/22 0400)  Pulse: 86 (01/02/22 0831)  Resp: (!) 22 (01/02/22 0831)  BP: (!) 143/75 (01/02/22 0800)  SpO2: 95 % (01/02/22 0831) Vital Signs (24h Range):  Temp:  [96 °F (35.6 °C)-98.4 °F (36.9 °C)] 97.9 °F (36.6 °C)  Pulse:  [] 86  Resp:  [11-29] 22  SpO2:  [86 %-100 %] 95 %  BP: ()/(53-99) 143/75  Arterial Line BP: ()/() 115/60     Weight: 97.9 kg (215 lb 13.3 oz)  Body mass index is 34.84 kg/m².    Intake/Output Summary (Last 24 hours) at 1/2/2022 0850  Last data filed at 1/2/2022 0830  Gross per 24 hour   Intake 3445.56 ml   Output 3720 ml   Net -274.44 ml      Physical Exam  Constitutional:       General: He is not in acute distress.     Appearance: He is ill-appearing. He is not toxic-appearing.      Comments: Weak  Confused  Awake, groggy on Preceedex gtt   HENT:      Head: Normocephalic and atraumatic.      Mouth/Throat:      Mouth: Mucous membranes are dry.   Eyes:      General: No scleral icterus.     Pupils: Pupils are equal, round, and reactive to light.   Neck:      Comments: Lt IJ  Rt HD temporary catheter  Cardiovascular:      Rate and Rhythm: Normal rate and regular rhythm.      Pulses: Normal pulses.   Pulmonary:      Effort: Pulmonary effort is normal.      Breath sounds: No wheezing or rhonchi.      Comments: Reduced at bases    Abdominal:      General: There is distension.      Tenderness: There is abdominal tenderness. There is no guarding or rebound.      Comments: Mild generalized tenderness  Ascites   Musculoskeletal:      Right lower leg: Edema present.      Left lower leg:  Edema present.      Comments: bilat UE edema   Skin:     General: Skin is warm and dry.      Capillary Refill: Capillary refill takes less than 2 seconds.   Neurological:      General: No focal deficit present.      Comments: Oriented x 1         Significant Labs:   BMP:   Recent Labs   Lab 01/02/22  0402   GLU 79  79   *  135*   K 4.0  4.0   CL 99  99   CO2 23  23   BUN 59*  59*   CREATININE 5.4*  5.4*   CALCIUM 7.6*  7.6*   MG 2.4     CBC:   Recent Labs   Lab 01/01/22  0451 01/02/22  0402   WBC 8.79 6.12   HGB 11.1* 11.7*   HCT 31.0* 34.0*   PLT 80* 73*     CMP:   Recent Labs   Lab 12/31/21  1214 01/01/22  0451 01/01/22  1438 01/02/22  0046 01/02/22  0402   NA  --    < > 130* 132* 135*  135*   K  --    < > 3.5 4.0 4.0  4.0   CL  --    < > 94* 99 99  99   CO2  --    < > 24 25 23  23   GLU  --    < > 134* 112* 79  79   BUN  --    < > 72* 58* 59*  59*   CREATININE  --    < > 6.2* 5.3* 5.4*  5.4*   CALCIUM  --    < > 7.4* 7.4* 7.6*  7.6*   PROT 4.8*  --   --   --   --    ALBUMIN 2.0*  --  2.0* 1.9* 2.0*   BILITOT 2.9*  --   --   --   --    ALKPHOS 323*  --   --   --   --    AST 42*  --   --   --   --    ALT 87*  --   --   --   --    ANIONGAP  --    < > 12 8 13  13   EGFRNONAA  --    < > 8* 10* 10*  10*    < > = values in this interval not displayed.       Significant Imaging: n/a

## 2022-01-02 NOTE — ASSESSMENT & PLAN NOTE
- Abdominal US showed: Gallbladder distension with internal sludge but without definite stones. Intra and extrahepatic biliary ductal dilation. Acute cholecystitis or choledocholithiasis not excluded.  - GI consult. Plans for ERCP in AM. Will keep NPO.  - Blood cultures obtained in the ED. Continue empiric IV Zosyn.   - IV hydration.  - Analgesics and antiemetics as needed.   - Follow labs.     12/28  ERCP planned  Await intervention per GI  Now Intubated    12/29  S/p ERCP  GI on consult  Stent placed per GI  Abx  Follow Cultures    12/30  GI on board  S/p ERCP  Abx  Improving clinically  Remains on Pressors    12/31, 1/1, 1/2  S/P source control as above  Cont ceftriaxone

## 2022-01-02 NOTE — ASSESSMENT & PLAN NOTE
Stable-jyothi  2 to Sepsis  Hold chemcial DVTp    1/1  Stable      1/2  Likely combination of ETOH misuse and sepsis  Platelet count is stable 73K, not bleeding  No need for transfusion

## 2022-01-02 NOTE — ASSESSMENT & PLAN NOTE
Likely secondary to sepsis and atelectasis  HFNC for goal sat > 92  Encourage TCDB and mobilize when able

## 2022-01-02 NOTE — PROGRESS NOTES
[facility] ([facility])  Critical Care Medicine  Progress Note    Patient Name: Lizz Alas  MRN: [id number]  Admission Date: 12/27/2021  Hospital Length of Stay: 5 days  Code Status: Full Code  Attending Provider: Julissa Thompson MD  Primary Care Provider: Primary Doctor No   Principal Problem: Septic shock    Subjective:     HPI:  Mr Alas is a 70 yo La[place] male with a PMH of HLD, etoh abuse, tobacco abuse, HTN and PAF.  He presented to [facility] ED yesterday evening via personal vehicle with complaint of abd pain over 3 days and N/V X 1 day.  He does not speak English and was accompanied by family.  In ED Glucose 180, TBili 5.1, LA 1.5 and Abd US with GB distention and sludging.  He had temp 102.9.  He as admitted to Tele and GI consulted diagnosed with acute cholecystitis.  This AM Telemetry reported HR was at 183 and upon assessment patient seemed to be in distress, mouth breathing , O2 was reading 73% pt was shaking and shivering. Called TOM due to pt speaking Sami [phone], # number [phone], pt was stating he could not breath.  Checked pt temp reading was 98.4 oral .Pt was on 3L then moved to 5L, then  Respiratory came in and put him on breathing treatment albuterol, still couldn't get breathing controlled, called Dr. Lopez and he stated to transfer pt to ICU.      Hospital/ICU Course:  12/28 - In ICU he was awake and alert on BiPAP .40 FiO2 with mild work of breathing but no distress and hemodynamically stable.  GI requested patient intubated and stabilized more so from pulm standpoint then obtain CT Abd and plan to OR for ERCP.  TBili up to 7.1.  He was intubated and supported on [place] ventilation post explaining all this to daughter at bed side.  Post intubation and sedation became hypotensive requiring CL and AL placements and starting Levophed infusion.   12/29 - ERCP yesterday afternoon.  Increased vasopressor and O2 requirements overnight.  This AM still intubated on mech vent and  sedated on Fentanyl infusion.  Still on Vasopressin and Levophed infusions also.  Oliguria with LISET and Tmax last 24 hours up to 102.9.  Blood cultures X 2 + E.Coli.  Discussed care with daughter at bed side and all questions answered.   12/30 - remains intubated and sedated on mech vent. Temp and wbc trend down. Urine output, creatinine, and metabolic acidosis worsening  12/31 - remains intubated and sedated on mech vent with minimal oxygen demand but moderate pressor requirement. Urine output 118ml last 24 hours, creatinine 6.0. continued hyperglycemia 197-237  1/1/2022 - remains intubated and sedated on mechanical vent with support requirement unchanged but pO2 trending down, +18L since admission. Renal function without improvement, only 160ml urine output last 24 hours. Trialysis cath placed for initiation of CRRT, goal filtration and fluid removal  1/2 - self extubated yesterday, tolerating HFNC support. Confusion/Delirium requiring precedex infusion for safety. CRRT initiated but clotted around 0100 after about 3L removed, remains oliguric, electrolytes stable      Review of Systems   Unable to perform ROS: Intubated     Objective:     Vital Signs (Most Recent):  Temp: 97.9 °F (36.6 °C) (01/02/22 0400)  Pulse: 62 (01/02/22 0630)  Resp: 16 (01/02/22 0630)  BP: (!) 99/58 (01/02/22 0600)  SpO2: 97 % (01/02/22 0630) Vital Signs (24h Range):  Temp:  [96 °F (35.6 °C)-98.4 °F (36.9 °C)] 97.9 °F (36.6 °C)  Pulse:  [] 62  Resp:  [11-29] 16  SpO2:  [86 %-100 %] 97 %  BP: ()/(53-99) 99/58  Arterial Line BP: ()/() 115/60     Weight: 97.9 kg (215 lb 13.3 oz)  Body mass index is 34.84 kg/m².      Intake/Output Summary (Last 24 hours) at 1/2/2022 0741  Last data filed at 1/2/2022 0600  Gross per 24 hour   Intake 3570.9 ml   Output 3720 ml   Net -149.1 ml      sodium chloride 0.9% Stopped (01/01/22 2317)    dexmedetomidine (PRECEDEX) infusion 0.5 mcg/kg/hr (01/02/22 0600)       Physical Exam  Vitals  and nursing note reviewed.   Constitutional:       Appearance: He is obese. He is ill-appearing.      Interventions: He is sedated and restrained. Nasal cannula and face mask in place.   Eyes:      General: No scleral icterus.     Conjunctiva/sclera: Conjunctivae normal.   Neck:      Vascular: No JVD.   Cardiovascular:      Rate and Rhythm: Normal rate and regular rhythm.      Pulses:           Radial pulses are 1+ on the right side and 1+ on the left side.        Dorsalis pedis pulses are 1+ on the right side and 1+ on the left side.   Pulmonary:      Breath sounds: Decreased breath sounds and rhonchi present.   Abdominal:      General: Bowel sounds are decreased. There is no distension.      Palpations: Abdomen is soft.   Musculoskeletal:      Right lower leg: No edema.      Left lower leg: No edema.   Skin:     General: Skin is warm and dry.      Capillary Refill: Capillary refill takes less than 2 seconds.   Neurological:      GCS: GCS eye subscore is 3. GCS verbal subscore is 3. GCS motor subscore is 5.      Comments: Arouses to stimuli and responds verbally to  but with gibberish/non words         Vents:  Vent Mode: A/C (01/01/22 1309)  Ventilator Initiated: Yes (12/28/21 0824)  Set Rate: 26 BPM (01/01/22 1309)  Vt Set: 450 mL (01/01/22 1309)  Pressure Support: 0 cmH20 (12/28/21 0824)  PEEP/CPAP: 5 cmH20 (01/01/22 1309)  Oxygen Concentration (%): 40 (01/02/22 0300)  Peak Airway Pressure: 25 cmH2O (01/01/22 1309)  Plateau Pressure: 16 cmH20 (01/01/22 1309)  Total Ve: 15 mL (01/01/22 1309)  Negative Inspiratory Force (cm H2O): 0 (01/01/22 1309)  F/VT Ratio<105 (RSBI): (!) 56.22 (01/01/22 1309)    Lines/Drains/Airways     Central Venous Catheter Line            Percutaneous Central Line Insertion/Assessment - Triple Lumen  12/28/21 1052 left internal jugular 4 days    Trialysis (Dialysis) Catheter 01/01/22 0922 right internal jugular <1 day          Drain                 Urethral Catheter 12/28/21 0830  4 days          Arterial Line            Arterial Line 12/28/21 1045 Left Radial 4 days          Peripheral Intravenous Line                 Peripheral IV - Single Lumen 12/28/21 0745 20 G Posterior;Right Hand 4 days                Significant Labs:    CBC/Anemia Profile:  Recent Labs   Lab 01/01/22  0451 01/02/22  0402   WBC 8.79 6.12   HGB 11.1* 11.7*   HCT 31.0* 34.0*   PLT 80* 73*   MCV 86 88   RDW 14.3 15.0*        Chemistries:  Recent Labs   Lab 12/31/21  1214 01/01/22  0451 01/01/22  1438 01/02/22  0046 01/02/22  0402   NA  --    < > 130* 132* 135*  135*   K  --    < > 3.5 4.0 4.0  4.0   CL  --    < > 94* 99 99  99   CO2  --    < > 24 25 23  23   BUN  --    < > 72* 58* 59*  59*   CREATININE  --    < > 6.2* 5.3* 5.4*  5.4*   CALCIUM  --    < > 7.4* 7.4* 7.6*  7.6*   ALBUMIN 2.0*  --  2.0* 1.9* 2.0*   PROT 4.8*  --   --   --   --    BILITOT 2.9*  --   --   --   --    ALKPHOS 323*  --   --   --   --    ALT 87*  --   --   --   --    AST 42*  --   --   --   --    MG  --    < > 2.3 2.3 2.4   PHOS  --    < > 2.7 2.8 3.1    < > = values in this interval not displayed.       All pertinent labs within the past 24 hours have been reviewed.    Significant Imaging:  I have reviewed all pertinent imaging results/findings within the past 24 hours.      ABG  Recent Labs   Lab 01/02/22  0747   PH 7.397   PO2 62*   PCO2 41.0   HCO3 25.2   BE 0     Assessment/Plan:     Psychiatric  Alcohol abuse  Continue enteral thiamine/folate/multi vit  precedex infusion    Pulmonary  Acute respiratory failure with hypoxia and hypercarbia  Likely secondary to sepsis and atelectasis  HFNC for goal sat > 92  Encourage TCDB and mobilize when able    Cardiac/Vascular  Paroxysmal atrial fibrillation  SB - SR on monitor,  Extrasystoles noted  Not on anticoagulants on home med review      Renal/  LISET (acute kidney injury)  Accurate I/Os, urine output decreasing (140ml out last 24 hours)  Renal dose meds and IVAB  Nephrology following  CRRT  clotted overnight, resumed this am. Consider conventional RRT if CRRT clots given BP improvement    ID  * Septic shock  Source: GB and Bacteremia  Shock resolved  Sputum culture NGTD  Blood cultures X 2 E.Coli, continue rocephin  Temp and wbc trend down      E coli bacteremia  Continue rocephin  Will need 14d from negative culture (12/29)    Acute cholangitis  IVAB  POD #5 S/P ERCP  Will need Cholecystectomy as outpt once clinically improved      Hematology  Thrombocytopenia  No active bleeding  Conservative transfusion protocol, holding pharmacologic anticoagulation  Rx sepsis  Monitor trend    Endocrine  Acute hyperglycemia  Range 250-79  stop long acting insulin, continue SSI prn with monitoring for glucose control and prevention of insulin toxicity    Other  Tobacco use  Will encourage tobacco cessation once awake/alert  Cont LABA and ICS nebs  On Nicotine patch     Critical Care Daily Checklist:    A: Awake: RASS Goal/Actual Goal: RASS Goal: -1-->drowsy  Actual: [place] Agitation Sedation Scale (RASS): Drowsy   B: Spontaneous Breathing Trial Performed?     C: SAT & SBT Coordinated?  Extubation 1/1                  D: Delirium: CAM-ICU Overall CAM-ICU: Positive   E: Early Mobility Performed? Yes   F: Feeding Goal: Goals: 1. Enteral Nutrition initiation within 24 hours.  Status: Nutrition Goal Status: new   Current Diet Order   Procedures    Diet NPO      AS: Analgesia/Sedation precedex   T: Thromboembolic Prophylaxis SCD   H: HOB > 300 Yes   U: Stress Ulcer Prophylaxis (if needed) pepcid   G: Glucose Control SSI   B: Bowel Function Stool Occurrence: 0   I: Indwelling Catheter (Lines & Eng) Necessity reviewed   D: De-escalation of Antimicrobials/Pharmacotherapies reviewed    Plan for the day/ETD As above    Code Status:  Family/Goals of Care: Full Code  Pending hospital course; daughter updated on status and plan at bedside   I have discussed case and plan of care in detail with Dr Jameson; Status and plan  of care were discussed with team on multidisciplinary rounds.    Critical Care Time: 60 minutes  Critical secondary to severe sepsis, bacteremia, LISET, cholangitis; Critical care was time spent personally by me on the following activities: development of treatment plan with patient or surrogate and bedside caregivers, discussions with consultants, evaluation of patient's response to treatment, examination of patient, ordering and performing treatments and interventions, ordering and review of laboratory studies, ordering and review of radiographic studies, pulse oximetry, re-evaluation of patient's condition. This critical care time did not overlap with that of any other provider or involve time for any procedures.     GREG Dawn-BC  Critical Care Medicine  [facility] ([facility])

## 2022-01-02 NOTE — ASSESSMENT & PLAN NOTE
Pressors  Fluids  ABX  ICU  Pulmonary CC  Intubated  Sedated      12/31  On sole pressor, Norepinephrine currently 0.14 mcg/kg/min, wean/titrate as per ICU team  On appropriate antibiotics: Ceftriaxone 1 gm IV Q12 for Sensitive E coli    1/1/22  On low dose Norepinephrine as he is getting CRRT  Source control achieved    1/2  Off Pressors  SBP acceptable

## 2022-01-02 NOTE — PLAN OF CARE
Pt Afebrle, Rass goal -1. Opens eyes spontaneously. Easily  agitated. Trialysis cath placed this am, CRRT initiated, now tolerated @ goal.Self extubated (see previous note) approx. 1340.  Pt now tolerating 15L High-flow NC w/ O2 sats 91-92%. Levo gtt titrated on/off per order. Precedex gtt initiated for agitation. Son & daughter at bedside throughout shift .

## 2022-01-02 NOTE — ASSESSMENT & PLAN NOTE
Accurate I/Os, urine output decreasing (140ml out last 24 hours)  Renal dose meds and IVAB  Nephrology following  CRRT clotted overnight, resumed this am. Consider conventional RRT if CRRT clots given BP improvement

## 2022-01-02 NOTE — ASSESSMENT & PLAN NOTE
Range 250-79  stop long acting insulin, continue SSI prn with monitoring for glucose control and prevention of insulin toxicity

## 2022-01-02 NOTE — ASSESSMENT & PLAN NOTE
Source: GB and Bacteremia  Shock resolved  Sputum culture NGTD  Blood cultures X 2 E.Coli, continue rocephin  Temp and wbc trend down

## 2022-01-02 NOTE — PLAN OF CARE
CRRT restarted per p/p and Dr. Arnett orders. See crrt orders. See crrt flow sheet. Tolerating. Lines secure. Supplies at bs. Reported to primary nurse.

## 2022-01-02 NOTE — ASSESSMENT & PLAN NOTE
12/31  ICU team on board  Cont mech ventilation      1/2  Extubated  Stable on Vapotherm  Wean as tolerated   CXR in AM

## 2022-01-02 NOTE — ASSESSMENT & PLAN NOTE
ABX  ICU  Follow Blood Cultures  Pressors as indicated      12/31  Sensitive E coli bacteremia on ceftriaxone IV  Afebrile  Resolved leucocytosis  Repeat blood culture on 12/29 NGTC x 2 days  Appropriate source control achieved S/P ERCP, stone retrieval and sphincterectomy.  Panculture as needed      1/1, 2  Repeat culture NGTD  Cont cefriaxone 1 gm IV Q12

## 2022-01-02 NOTE — ASSESSMENT & PLAN NOTE
- Monitor for withdrawal/DTs. IV Ativan if needed.     1/2  With some delirium likely from aute illness and ICU stay, in addition to possibly some withdrawal: Precedex gtt  Bedside swallow eval when possible: oral folate/thiamine +- oral Benzodiapines as needed

## 2022-01-02 NOTE — PROGRESS NOTES
[facility] ([facility])  Nephrology  Progress Note    Patient Name: Lizz Alas  MRN: [id number]  Admission Date: 12/27/2021  Hospital Length of Stay: 5 days  Attending Provider: Julissa Thompson MD   Primary Care Physician: Primary Doctor No  Principal Problem:Septic shock      Subjective:     Interval History:   12/31: Remains on vasopressors, intubated in ICU. Poor UOP    1/1/22: CRRT initiated, family at bedside     1/2/22: CRRT clotted overnight, restarted this am. Self-extubation yesterday afternoon. Family at bedside, helping to reorient     Review of patient's allergies indicates:  No Known Allergies  Current Facility-Administered Medications   Medication Frequency    0.9%  NaCl infusion PRN    acetaminophen oral solution 650 mg Q6H PRN    albuterol-ipratropium 2.5 mg-0.5 mg/3 mL nebulizer solution 3 mL Q4H PRN    aluminum-magnesium hydroxide-simethicone 200-200-20 mg/5 mL suspension 30 mL QID PRN    arformoteroL nebulizer solution 15 mcg BID    budesonide nebulizer solution 0.5 mg Q12H    cefTRIAXone (ROCEPHIN) 1 g/50 mL D5W IVPB Q12H    chlorhexidine 0.12 % solution 15 mL BID    dexmedetomidine (PRECEDEX) 400mcg/100mL dextrose 5% infusion Continuous    dextrose 50% injection 12.5 g PRN    famotidine (PF) injection 20 mg Daily    folic acid tablet 1 mg Daily    glucagon (human recombinant) injection 1 mg PRN    heparin (porcine) injection 2,000 Units PRN    insulin aspart U-100 pen 1-10 Units Q4H PRN    lorazepam injection 1 mg Q2H PRN    multivitamin liquid per dose 5 mL Daily    naloxone 0.4 mg/mL injection 0.02 mg PRN    nicotine 21 mg/24 hr 1 patch Daily    ondansetron injection 4 mg Q6H PRN    polyethylene glycol packet 17 g Every other day    promethazine tablet 25 mg Q6H PRN    sodium chloride 0.9% flush 10 mL Q12H PRN    thiamine tablet 100 mg Daily       Objective:     Vital Signs (Most Recent):  Temp: 97.5 °F (36.4 °C) (01/02/22 1200)  Pulse: 81 (01/02/22  1329)  Resp: (!) 22 (01/02/22 1329)  BP: 129/67 (01/02/22 1300)  SpO2: 95 % (01/02/22 1329)  O2 Device (Oxygen Therapy): Vapotherm (01/02/22 1329) Vital Signs (24h Range):  Temp:  [96 °F (35.6 °C)-98.4 °F (36.9 °C)] 97.5 °F (36.4 °C)  Pulse:  [] 81  Resp:  [11-28] 22  SpO2:  [86 %-100 %] 95 %  BP: ()/(56-99) 129/67  Arterial Line BP: ()/() 115/62     Weight: 97.9 kg (215 lb 13.3 oz) (01/02/22 0600)  Body mass index is 34.84 kg/m².  Body surface area is 2.14 meters squared.    I/O last 3 completed shifts:  In: 5677.6 [I.V.:4737; NG/GT:700; IV Piggyback:240.6]  Out: 3830 [Urine:250; Other:3580]    Physical Exam  Vitals and nursing note reviewed.   Constitutional:       Appearance: He is ill-appearing.      Interventions: He is sedated and intubated.   HENT:      Head: Atraumatic.   Cardiovascular:      Rate and Rhythm: Normal rate.   Pulmonary:      Effort: No respiratory distress (intubated). He is intubated.   Abdominal:      General: There is distension.   Musculoskeletal:         General: Swelling (very mild) present.   Skin:     General: Skin is dry.         Significant Labs: reviewed    Significant Imaging: reviewed     Assessment/Plan:     Active Diagnoses:    Diagnosis Date Noted POA    PRINCIPAL PROBLEM:  Septic shock [A41.9, R65.21] 12/28/2021 Yes    LISET (acute kidney injury) [N17.9] 12/29/2021 No    Thrombocytopenia [D69.6] 12/29/2021 Yes    Acute hyperglycemia [R73.9] 12/29/2021 Yes    Acute respiratory failure with hypoxia and hypercarbia [J96.01, J96.02] 12/28/2021 Yes    E coli bacteremia [R78.81, B96.20] 12/28/2021 Yes    Acute cholangitis [K83.09] 12/27/2021 Yes    Tobacco use [Z72.0] 12/27/2021 Yes     Chronic    Alcohol abuse [F10.10] 12/27/2021 Yes     Chronic    Primary hypertension [I10] 12/27/2021 Yes     Chronic    Paroxysmal atrial fibrillation [I48.0] 12/27/2021 Yes     Chronic      Problems Resolved During this Admission:    Diagnosis Date Noted Date  Resolved POA    On mechanically assisted ventilation [Z99.11] 12/30/2021 01/02/2022 Not Applicable    Electrolyte imbalance [E87.8] 12/28/2021 01/02/2022 Yes       Oligoanuric LISET with baseline creatinine 0.9mg/dL in the setting of septic shock   - Oligoanuric for 4 days, 116mL>>140mL last 48 hours  - continue CRRT for UF and clearance, will not restart if clots, or cartridge expires as will transition to IHD since hemodynamic improved and tolerating volume removal   - continue to monitor for renal recovery     - replete carroll Arnett MD  Nephrology  [facility] ([facility])

## 2022-01-02 NOTE — PT/OT/SLP PROGRESS
Speech Language Pathology      Lizz Alas  MRN: [id number]    S.T. evaluation attempted this morning(10:50am) but pt too lethargic at this time.  S.T. to attempt tomorrow 1/3 if more alert.  Pt remains NPO at this time due to risks of aspiration.

## 2022-01-02 NOTE — ASSESSMENT & PLAN NOTE
Cont Long acting insulin BID and SSI   Cont Tube feeding    1/2  Resolved as tube feedings D/C  Mild prediabetic  SSI PRN  No need for long acting insulins at this point  SLP for feeding in AM

## 2022-01-02 NOTE — PROGRESS NOTES
[facility] - Intensive Care [facility] Medicine  Progress Note    Patient Name: Lizz Alas  MRN: [id number]  Patient Class: IP- Inpatient   Admission Date: 12/27/2021  Length of Stay: 5 days  Attending Physician: Julissa Thompson MD  Primary Care Provider: Primary Doctor No        Subjective:     Principal Problem:Septic shock        HPI:  Lizz Alas is a 71 y.o. Bulgarian speaking male patient with a PMHx of PAF, HLD, HTN, thrombocytopenia, alcohol abuse, and tobacco use who presents to the Emergency Department for intermittent epigastric abdominal pain which onset gradually 3 days PTA. Today, the pt reports that his pain has been constant. His symptoms are constant and moderate in severity. No mitigating or exacerbating factors reported. Associated sxs include fever (102 F), nausea, and vomiting. Patient denies any diarrhea, constipation, SOB, weakness, fever, chills, and all other sxs at this time. Initial work-up in the ED showed: Normal WBC and lactic, , , alk phos 184, T bili 5.1, lipase 11, glucose 180, plt 119, sodium 134, COVID negative. Abdominal US showed gallbladder distension with internal sludge but without definite stones, intra and extrahepatic biliary ductal dilation which may represent acute cholecystitis or choledocholithiasis. Blood cultures obtained in the ED, and 1.5 L IV fluids and IV Zosyn given. Case was discussed with GI per the ED, who recommend NPO, IV abx, and ERCP in AM. Hospital Medicine was contacted for admission and patient placed in Observation.       Overview/Hospital Course:  Patient 70 yo male admitted to hospital with suspected cholangitis. Patient initiated on IV abx, fluids, O2. Patient with a rapid decompensation shourtly after admit, rapid response called, patient hypoxic in the 60's placed on bipap with good effect. Patient transferred to ICU. Patient with a progressive decline, febrile episodes to 102.9. Severe septic shock, pressors and fluids on  board. Patient evaluated by GI who recommended ERCP - additional CT abdomen. Patient scheduled for CT and this has been delayed pending stability. Patient intubated for airway protection.  12/29- Patient remains intubated, sedated, on pressors. Discussed POC with family at bedside. Patient evaluated by surgery who recommend o/p surgical followup on recovery for elective cholecystectomy.. Repeat blood cultures in progress, abx infusing.  12/30 - Patient wbc declining but remains on pressors. He is intubated and sedated. Family at bedside. Worsening renal function with cr 4.4 today. Nephrology on consult. Discussed with CC Team  12/31: On sole pressor Norepinephrine 0.14mcg/kg/min, afebrile, leucocytosis is resolved, scR is bumped to 6.0. Patient is oliguric (118 cc urine in last 24 hour). On Fentanyl gtt and NaHCO3 gtt   Daughter Hortencia  is at bedside and was updated.   AC 26/450/40/5  1/1/22 patient seen, daughter Hortencia at bedside, currently on CRRT, Norepinephrine at 0.02mcg/kg/min, fentanyl 100mcg/Hr. AC 26/450/40/5. Patient more awake today, opens eyes spontaneously, is squeezing his daughter's hand  1/2 About afternoon yesterday he self extubated, did not require immediate re-intubation as he was doing rel well resp wise, he is currently on Vapo therm 35LPM 80% FIO2, precedex drip to help with agitation (history of ETOH misuse), getting CRRT, family at bedside, whilst confused he is oriented to selpf/family, conversing with family      Interval History: extubated to Vapotherm    Review of Systems   Unable to perform ROS: Acuity of condition   Constitutional: Positive for fatigue. Negative for fever.   Respiratory: Positive for cough and shortness of breath.    Cardiovascular: Positive for leg swelling.   Gastrointestinal: Positive for abdominal distention.     Objective:     Vital Signs (Most Recent):  Temp: 97.9 °F (36.6 °C) (01/02/22 0400)  Pulse: 86 (01/02/22 0831)  Resp: (!) 22 (01/02/22  0831)  BP: (!) 143/75 (01/02/22 0800)  SpO2: 95 % (01/02/22 0831) Vital Signs (24h Range):  Temp:  [96 °F (35.6 °C)-98.4 °F (36.9 °C)] 97.9 °F (36.6 °C)  Pulse:  [] 86  Resp:  [11-29] 22  SpO2:  [86 %-100 %] 95 %  BP: ()/(53-99) 143/75  Arterial Line BP: ()/() 115/60     Weight: 97.9 kg (215 lb 13.3 oz)  Body mass index is 34.84 kg/m².    Intake/Output Summary (Last 24 hours) at 1/2/2022 0850  Last data filed at 1/2/2022 0830  Gross per 24 hour   Intake 3445.56 ml   Output 3720 ml   Net -274.44 ml      Physical Exam  Constitutional:       General: He is not in acute distress.     Appearance: He is ill-appearing. He is not toxic-appearing.      Comments: Weak  Confused  Awake, groggy on Preceedex gtt   HENT:      Head: Normocephalic and atraumatic.      Mouth/Throat:      Mouth: Mucous membranes are dry.   Eyes:      General: No scleral icterus.     Pupils: Pupils are equal, round, and reactive to light.   Neck:      Comments: Lt IJ  Rt HD temporary catheter  Cardiovascular:      Rate and Rhythm: Normal rate and regular rhythm.      Pulses: Normal pulses.   Pulmonary:      Effort: Pulmonary effort is normal.      Breath sounds: No wheezing or rhonchi.      Comments: Reduced at bases    Abdominal:      General: There is distension.      Tenderness: There is abdominal tenderness. There is no guarding or rebound.      Comments: Mild generalized tenderness  Ascites   Musculoskeletal:      Right lower leg: Edema present.      Left lower leg: Edema present.      Comments: bilat UE edema   Skin:     General: Skin is warm and dry.      Capillary Refill: Capillary refill takes less than 2 seconds.   Neurological:      General: No focal deficit present.      Comments: Oriented x 1         Significant Labs:   BMP:   Recent Labs   Lab 01/02/22  0402   GLU 79  79   *  135*   K 4.0  4.0   CL 99  99   CO2 23  23   BUN 59*  59*   CREATININE 5.4*  5.4*   CALCIUM 7.6*  7.6*   MG 2.4     CBC:    Recent Labs   Lab 01/01/22  0451 01/02/22  0402   WBC 8.79 6.12   HGB 11.1* 11.7*   HCT 31.0* 34.0*   PLT 80* 73*     CMP:   Recent Labs   Lab 12/31/21  1214 01/01/22  0451 01/01/22  1438 01/02/22  0046 01/02/22  0402   NA  --    < > 130* 132* 135*  135*   K  --    < > 3.5 4.0 4.0  4.0   CL  --    < > 94* 99 99  99   CO2  --    < > 24 25 23  23   GLU  --    < > 134* 112* 79  79   BUN  --    < > 72* 58* 59*  59*   CREATININE  --    < > 6.2* 5.3* 5.4*  5.4*   CALCIUM  --    < > 7.4* 7.4* 7.6*  7.6*   PROT 4.8*  --   --   --   --    ALBUMIN 2.0*  --  2.0* 1.9* 2.0*   BILITOT 2.9*  --   --   --   --    ALKPHOS 323*  --   --   --   --    AST 42*  --   --   --   --    ALT 87*  --   --   --   --    ANIONGAP  --    < > 12 8 13  13   EGFRNONAA  --    < > 8* 10* 10*  10*    < > = values in this interval not displayed.       Significant Imaging: n/a      Assessment/Plan:      * Septic shock  Pressors  Fluids  ABX  ICU  Pulmonary CC  Intubated  Sedated      12/31  On sole pressor, Norepinephrine currently 0.14 mcg/kg/min, wean/titrate as per ICU team  On appropriate antibiotics: Ceftriaxone 1 gm IV Q12 for Sensitive E coli    1/1/22  On low dose Norepinephrine as he is getting CRRT  Source control achieved    1/2  Off Pressors  SBP acceptable    E coli bacteremia  ABX  ICU  Follow Blood Cultures  Pressors as indicated      12/31  Sensitive E coli bacteremia on ceftriaxone IV  Afebrile  Resolved leucocytosis  Repeat blood culture on 12/29 NGTC x 2 days  Appropriate source control achieved S/P ERCP, stone retrieval and sphincterectomy.  Panculture as needed      1/1, 2  Repeat culture NGTD  Cont cefriaxone 1 gm IV Q12    LISET (acute kidney injury)  Worsening  Cr 3.3  Monitor    12/31  2 to shock  Nephro on board  Avoid nephrotoxins  Cont NaHCo3 gtt    1/1/22  On CRRT started today   Nephro on board  Mild hypokalemia and hyponatremia from LISET: CRRT    1/2  Net positive about 18L since admit  Ongoing CRRT  Avoid  nephrotoxic agents    On mechanically assisted ventilation  12/31  ICU team on board  Cont mech ventilation      1/2  Extubated  Stable on Vapotherm  Wean as tolerated   CXR in AM    Acute cholangitis  - Abdominal US showed: Gallbladder distension with internal sludge but without definite stones. Intra and extrahepatic biliary ductal dilation. Acute cholecystitis or choledocholithiasis not excluded.  - GI consult. Plans for ERCP in AM. Will keep NPO.  - Blood cultures obtained in the ED. Continue empiric IV Zosyn.   - IV hydration.  - Analgesics and antiemetics as needed.   - Follow labs.     12/28  ERCP planned  Await intervention per GI  Now Intubated    12/29  S/p ERCP  GI on consult  Stent placed per GI  Abx  Follow Cultures    12/30  GI on board  S/p ERCP  Abx  Improving clinically  Remains on Pressors    12/31, 1/1, 1/2  S/P source control as above  Cont ceftriaxone    Acute respiratory failure with hypoxia and hypercarbia  Patient with Hypoxic Respiratory failure which is Acute.  he is not on home oxygen. Supplemental oxygen was provided and noted- Vent Mode: A/C  Oxygen Concentration (%):  [40-80] 80  Resp Rate Total:  [26 br/min-36 br/min] 36 br/min  Vt Set:  [450 mL] 450 mL  PEEP/CPAP:  [5 cmH20] 5 cmH20  Mean Airway Pressure:  [12 jvB03-18 cmH20] 12 cmH20.   Signs/symptoms of respiratory failure include- tachypnea, increased work of breathing, respiratory distress, use of accessory muscles and cyanosis. Contributing diagnoses includes - COPD Labs and images were reviewed. Patient Has recent ABG, which has been reviewed. Will treat underlying causes and adjust management of respiratory failure as indicated    12/31  On 40 % FIO2 and PEEP 5 mech ventilation  Stable.  Ongoing renal and circulatory failure being addressed prior to considering weaning trials    1/1  On Mech Vent  ICU on board    1/2  On Vapotherm  CXR in AM  Pulm toilet as tolerated    Thrombocytopenia  Stable-jyothi  2 to Sepsis  Hold chemcial  DVTp    1/1  Stable      1/2  Likely combination of ETOH misuse and sepsis  Platelet count is stable 73K, not bleeding  No need for transfusion    Paroxysmal atrial fibrillation  - Remains in sinus rhythm on admission. Monitor telemetry.  - Continue home BB.  - Patient is not on long-term AC. Will defer to his primary cardiologist.       Primary hypertension  - Continue home antihypertensives.     12/31  On pressors    Tobacco use  - Assistance with smoking cessation was offered, including:  [x]  Medications  [x]  Counseling  [x]  Printed Information on Smoking Cessation  []  Referral to a Smoking Cessation Program  - Patient was counseled regarding smoking for 3-10 minutes.    Alcohol abuse  - Monitor for withdrawal/DTs. IV Ativan if needed.     1/2  With some delirium likely from aute illness and ICU stay, in addition to possibly some withdrawal: Precedex gtt  Bedside swallow eval when possible: oral folate/thiamine +- oral Benzodiapines as needed    Acute hyperglycemia  Cont Long acting insulin BID and SSI   Cont Tube feeding    1/2  Resolved as tube feedings D/C  Mild prediabetic  SSI PRN  No need for long acting insulins at this point  SLP for feeding in AM        Electrolyte imbalance  Correct as indicated          VTE Risk Mitigation (From admission, onward)         Ordered     heparin (porcine) injection 2,000 Units  As needed (PRN)         01/01/22 2321     IP VTE HIGH RISK PATIENT  Once         12/27/21 2247     Place sequential compression device  Until discontinued         12/27/21 2247     Reason for No Pharmacological VTE Prophylaxis  Once        Question Answer Comment   Reasons: Risk of Bleeding    Reasons: Thrombocytopenia        12/27/21 2247                Discharge Planning   FLAQUITA:      Code Status: Full Code   Is the patient medically ready for discharge?:     Reason for patient still in hospital (select all that apply): Treatment  Discharge Plan A: Home with family            Critical care time  spent on the evaluation and treatment of severe organ dysfunction, review of pertinent labs and imaging studies, discussions with consulting providers and discussions with patient/family: 30 minutes.      Julissa Thompson MD  Department of Hospital Medicine   [facility] - Intensive Care ([facility])

## 2022-01-03 PROBLEM — R65.20 SEVERE SEPSIS WITH ACUTE ORGAN DYSFUNCTION: Status: ACTIVE | Noted: 2021-12-28

## 2022-01-03 LAB
ALBUMIN SERPL BCP-MCNC: 2.1 G/DL (ref 3.5–5.2)
ALBUMIN SERPL BCP-MCNC: 2.2 G/DL (ref 3.5–5.2)
ANION GAP SERPL CALC-SCNC: 10 MMOL/L (ref 8–16)
ANION GAP SERPL CALC-SCNC: 15 MMOL/L (ref 8–16)
ANION GAP SERPL CALC-SCNC: 15 MMOL/L (ref 8–16)
ANISOCYTOSIS BLD QL SMEAR: SLIGHT
BACTERIA BLD CULT: NORMAL
BACTERIA BLD CULT: NORMAL
BASOPHILS NFR BLD: 0 % (ref 0–1.9)
BUN SERPL-MCNC: 46 MG/DL (ref 8–23)
BUN SERPL-MCNC: 49 MG/DL (ref 8–23)
BUN SERPL-MCNC: 49 MG/DL (ref 8–23)
CALCIUM SERPL-MCNC: 7.8 MG/DL (ref 8.7–10.5)
CALCIUM SERPL-MCNC: 8.1 MG/DL (ref 8.7–10.5)
CALCIUM SERPL-MCNC: 8.1 MG/DL (ref 8.7–10.5)
CHLORIDE SERPL-SCNC: 99 MMOL/L (ref 95–110)
CO2 SERPL-SCNC: 18 MMOL/L (ref 23–29)
CO2 SERPL-SCNC: 18 MMOL/L (ref 23–29)
CO2 SERPL-SCNC: 23 MMOL/L (ref 23–29)
CREAT SERPL-MCNC: 4.2 MG/DL (ref 0.5–1.4)
CREAT SERPL-MCNC: 4.7 MG/DL (ref 0.5–1.4)
CREAT SERPL-MCNC: 4.7 MG/DL (ref 0.5–1.4)
DIFFERENTIAL METHOD: ABNORMAL
EOSINOPHIL NFR BLD: 1 % (ref 0–8)
ERYTHROCYTE [DISTWIDTH] IN BLOOD BY AUTOMATED COUNT: 15.1 % (ref 11.5–14.5)
EST. GFR  (AFRICAN AMERICAN): 13 ML/MIN/1.73 M^2
EST. GFR  (AFRICAN AMERICAN): 13 ML/MIN/1.73 M^2
EST. GFR  (AFRICAN AMERICAN): 15 ML/MIN/1.73 M^2
EST. GFR  (NON AFRICAN AMERICAN): 11 ML/MIN/1.73 M^2
EST. GFR  (NON AFRICAN AMERICAN): 11 ML/MIN/1.73 M^2
EST. GFR  (NON AFRICAN AMERICAN): 13 ML/MIN/1.73 M^2
GLUCOSE SERPL-MCNC: 91 MG/DL (ref 70–110)
GLUCOSE SERPL-MCNC: 91 MG/DL (ref 70–110)
GLUCOSE SERPL-MCNC: 93 MG/DL (ref 70–110)
HBV CORE AB SERPL QL IA: POSITIVE
HBV SURFACE AB SER-ACNC: NEGATIVE M[IU]/ML
HBV SURFACE AG SERPL QL IA: NEGATIVE
HCT VFR BLD AUTO: 33.8 % (ref 40–54)
HGB BLD-MCNC: 11.6 G/DL (ref 14–18)
IMM GRANULOCYTES # BLD AUTO: ABNORMAL K/UL (ref 0–0.04)
IMM GRANULOCYTES NFR BLD AUTO: ABNORMAL % (ref 0–0.5)
LYMPHOCYTES NFR BLD: 7 % (ref 18–48)
MAGNESIUM SERPL-MCNC: 2.3 MG/DL (ref 1.6–2.6)
MAGNESIUM SERPL-MCNC: 2.4 MG/DL (ref 1.6–2.6)
MCH RBC QN AUTO: 30.7 PG (ref 27–31)
MCHC RBC AUTO-ENTMCNC: 34.3 G/DL (ref 32–36)
MCV RBC AUTO: 89 FL (ref 82–98)
MONOCYTES NFR BLD: 6 % (ref 4–15)
MYELOCYTES NFR BLD MANUAL: 4 %
NEUTROPHILS NFR BLD: 80 % (ref 38–73)
NEUTS BAND NFR BLD MANUAL: 2 %
NRBC BLD-RTO: 0 /100 WBC
PHOSPHATE SERPL-MCNC: 3.2 MG/DL (ref 2.7–4.5)
PHOSPHATE SERPL-MCNC: 3.2 MG/DL (ref 2.7–4.5)
PLATELET # BLD AUTO: 114 K/UL (ref 150–450)
PLATELET BLD QL SMEAR: ABNORMAL
PMV BLD AUTO: 11.3 FL (ref 9.2–12.9)
POCT GLUCOSE: 102 MG/DL (ref 70–110)
POCT GLUCOSE: 109 MG/DL (ref 70–110)
POCT GLUCOSE: 94 MG/DL (ref 70–110)
POCT GLUCOSE: 96 MG/DL (ref 70–110)
POIKILOCYTOSIS BLD QL SMEAR: SLIGHT
POTASSIUM SERPL-SCNC: 3.9 MMOL/L (ref 3.5–5.1)
POTASSIUM SERPL-SCNC: 3.9 MMOL/L (ref 3.5–5.1)
POTASSIUM SERPL-SCNC: 4 MMOL/L (ref 3.5–5.1)
RBC # BLD AUTO: 3.78 M/UL (ref 4.6–6.2)
SODIUM SERPL-SCNC: 132 MMOL/L (ref 136–145)
TARGETS BLD QL SMEAR: ABNORMAL
WBC # BLD AUTO: 7.16 K/UL (ref 3.9–12.7)

## 2022-01-03 PROCEDURE — 99291 PR CRITICAL CARE, E/M 30-74 MINUTES: ICD-10-PCS | Mod: ,,, | Performed by: NURSE PRACTITIONER

## 2022-01-03 PROCEDURE — 99233 PR SUBSEQUENT HOSPITAL CARE,LEVL III: ICD-10-PCS | Mod: ,,, | Performed by: INTERNAL MEDICINE

## 2022-01-03 PROCEDURE — 25000003 PHARM REV CODE 250: Performed by: NURSE PRACTITIONER

## 2022-01-03 PROCEDURE — 27000249 HC VAPOTHERM CIRCUIT

## 2022-01-03 PROCEDURE — 94640 AIRWAY INHALATION TREATMENT: CPT

## 2022-01-03 PROCEDURE — 27100171 HC OXYGEN HIGH FLOW UP TO 24 HOURS

## 2022-01-03 PROCEDURE — 25000242 PHARM REV CODE 250 ALT 637 W/ HCPCS: Performed by: NURSE PRACTITIONER

## 2022-01-03 PROCEDURE — 25000003 PHARM REV CODE 250: Performed by: INTERNAL MEDICINE

## 2022-01-03 PROCEDURE — 85007 BL SMEAR W/DIFF WBC COUNT: CPT | Performed by: NURSE PRACTITIONER

## 2022-01-03 PROCEDURE — 99291 CRITICAL CARE FIRST HOUR: CPT | Mod: ,,, | Performed by: NURSE PRACTITIONER

## 2022-01-03 PROCEDURE — 94799 UNLISTED PULMONARY SVC/PX: CPT

## 2022-01-03 PROCEDURE — 85027 COMPLETE CBC AUTOMATED: CPT | Performed by: NURSE PRACTITIONER

## 2022-01-03 PROCEDURE — 99900035 HC TECH TIME PER 15 MIN (STAT)

## 2022-01-03 PROCEDURE — 83735 ASSAY OF MAGNESIUM: CPT | Performed by: NURSE PRACTITIONER

## 2022-01-03 PROCEDURE — 20000000 HC ICU ROOM

## 2022-01-03 PROCEDURE — 99233 SBSQ HOSP IP/OBS HIGH 50: CPT | Mod: ,,, | Performed by: INTERNAL MEDICINE

## 2022-01-03 PROCEDURE — 92610 EVALUATE SWALLOWING FUNCTION: CPT

## 2022-01-03 PROCEDURE — 80069 RENAL FUNCTION PANEL: CPT | Performed by: INTERNAL MEDICINE

## 2022-01-03 PROCEDURE — S4991 NICOTINE PATCH NONLEGEND: HCPCS | Performed by: INTERNAL MEDICINE

## 2022-01-03 PROCEDURE — C9399 UNCLASSIFIED DRUGS OR BIOLOG: HCPCS | Performed by: NURSE PRACTITIONER

## 2022-01-03 PROCEDURE — 63600175 PHARM REV CODE 636 W HCPCS: Performed by: NURSE PRACTITIONER

## 2022-01-03 RX ORDER — METOPROLOL TARTRATE 25 MG/1
25 TABLET, FILM COATED ORAL 2 TIMES DAILY
Status: DISCONTINUED | OUTPATIENT
Start: 2022-01-03 | End: 2022-01-04

## 2022-01-03 RX ORDER — FAMOTIDINE 20 MG/1
20 TABLET, FILM COATED ORAL DAILY
Status: DISCONTINUED | OUTPATIENT
Start: 2022-01-04 | End: 2022-01-11 | Stop reason: HOSPADM

## 2022-01-03 RX ORDER — POLYETHYLENE GLYCOL 3350 17 G/17G
17 POWDER, FOR SOLUTION ORAL EVERY OTHER DAY
Status: DISCONTINUED | OUTPATIENT
Start: 2022-01-05 | End: 2022-01-07

## 2022-01-03 RX ORDER — HEPARIN SODIUM 5000 [USP'U]/ML
5000 INJECTION, SOLUTION INTRAVENOUS; SUBCUTANEOUS EVERY 8 HOURS
Status: DISCONTINUED | OUTPATIENT
Start: 2022-01-03 | End: 2022-01-11 | Stop reason: HOSPADM

## 2022-01-03 RX ORDER — THIAMINE HCL 100 MG
100 TABLET ORAL DAILY
Status: DISCONTINUED | OUTPATIENT
Start: 2022-01-04 | End: 2022-01-11 | Stop reason: HOSPADM

## 2022-01-03 RX ORDER — METOPROLOL TARTRATE 25 MG/1
12.5 TABLET ORAL ONCE
Status: COMPLETED | OUTPATIENT
Start: 2022-01-03 | End: 2022-01-03

## 2022-01-03 RX ORDER — HYDRALAZINE HYDROCHLORIDE 20 MG/ML
10 INJECTION INTRAMUSCULAR; INTRAVENOUS EVERY 8 HOURS PRN
Status: DISCONTINUED | OUTPATIENT
Start: 2022-01-03 | End: 2022-01-04

## 2022-01-03 RX ORDER — FOLIC ACID 1 MG/1
1 TABLET ORAL DAILY
Status: DISCONTINUED | OUTPATIENT
Start: 2022-01-04 | End: 2022-01-11 | Stop reason: HOSPADM

## 2022-01-03 RX ORDER — MIDODRINE HYDROCHLORIDE 5 MG/1
10 TABLET ORAL 3 TIMES DAILY PRN
Status: DISCONTINUED | OUTPATIENT
Start: 2022-01-03 | End: 2022-01-04

## 2022-01-03 RX ORDER — DEXMEDETOMIDINE HYDROCHLORIDE 4 UG/ML
0-1.4 INJECTION INTRAVENOUS CONTINUOUS
Status: DISCONTINUED | OUTPATIENT
Start: 2022-01-03 | End: 2022-01-03

## 2022-01-03 RX ADMIN — DEXMEDETOMIDINE HYDROCHLORIDE 0.6 MCG/KG/HR: 4 INJECTION INTRAVENOUS at 04:01

## 2022-01-03 RX ADMIN — HYDRALAZINE HYDROCHLORIDE 10 MG: 20 INJECTION INTRAMUSCULAR; INTRAVENOUS at 01:01

## 2022-01-03 RX ADMIN — ARFORMOTEROL TARTRATE 15 MCG: 15 SOLUTION RESPIRATORY (INHALATION) at 08:01

## 2022-01-03 RX ADMIN — METOPROLOL TARTRATE 12.5 MG: 25 TABLET, FILM COATED ORAL at 02:01

## 2022-01-03 RX ADMIN — CEFTRIAXONE 2 G: 2 INJECTION, SOLUTION INTRAVENOUS at 08:01

## 2022-01-03 RX ADMIN — FAMOTIDINE 20 MG: 10 INJECTION, SOLUTION INTRAVENOUS at 09:01

## 2022-01-03 RX ADMIN — HYDRALAZINE HYDROCHLORIDE 10 MG: 20 INJECTION INTRAMUSCULAR; INTRAVENOUS at 08:01

## 2022-01-03 RX ADMIN — HEPARIN SODIUM 5000 UNITS: 5000 INJECTION INTRAVENOUS; SUBCUTANEOUS at 02:01

## 2022-01-03 RX ADMIN — CHLORHEXIDINE GLUCONATE 0.12% ORAL RINSE 15 ML: 1.2 LIQUID ORAL at 09:01

## 2022-01-03 RX ADMIN — DEXMEDETOMIDINE HYDROCHLORIDE 0.6 MCG/KG/HR: 4 INJECTION INTRAVENOUS at 09:01

## 2022-01-03 RX ADMIN — HEPARIN SODIUM 5000 UNITS: 5000 INJECTION INTRAVENOUS; SUBCUTANEOUS at 09:01

## 2022-01-03 RX ADMIN — CEFTRIAXONE 1 G: 1 INJECTION, SOLUTION INTRAVENOUS at 09:01

## 2022-01-03 RX ADMIN — METOPROLOL TARTRATE 25 MG: 25 TABLET, FILM COATED ORAL at 08:01

## 2022-01-03 RX ADMIN — BUDESONIDE 0.5 MG: 0.5 INHALANT ORAL at 08:01

## 2022-01-03 RX ADMIN — BUDESONIDE 0.5 MG: 0.5 INHALANT ORAL at 07:01

## 2022-01-03 RX ADMIN — MULTIPLE VITAMINS W/ MINERALS TAB 1 TABLET: TAB at 02:01

## 2022-01-03 RX ADMIN — NICOTINE 1 PATCH: 21 PATCH, EXTENDED RELEASE TRANSDERMAL at 09:01

## 2022-01-03 NOTE — PROGRESS NOTES
[facility] - Intensive Care [facility] Medicine  Progress Note    Patient Name: Lizz Alas  MRN: [id number]  Patient Class: IP- Inpatient   Admission Date: 12/27/2021  Length of Stay: 6 days  Attending Physician: Julissa Thompson MD  Primary Care Provider: Primary Doctor No        Subjective:     Principal Problem:Septic shock        HPI:  Lizz Alas is a 71 y.o. Moh[place] speaking male patient with a PMHx of PAF, HLD, HTN, thrombocytopenia, alcohol abuse, and tobacco use who presents to the Emergency Department for intermittent epigastric abdominal pain which onset gradually 3 days PTA. Today, the pt reports that his pain has been constant. His symptoms are constant and moderate in severity. No mitigating or exacerbating factors reported. Associated sxs include fever (102 F), nausea, and vomiting. Patient denies any diarrhea, constipation, SOB, weakness, fever, chills, and all other sxs at this time. Initial work-up in the ED showed: Normal WBC and lactic, , , alk phos 184, T bili 5.1, lipase 11, glucose 180, plt 119, sodium 134, COVID negative. Abdominal US showed gallbladder distension with internal sludge but without definite stones, intra and extrahepatic biliary ductal dilation which may represent acute cholecystitis or choledocholithiasis. Blood cultures obtained in the ED, and 1.5 L IV fluids and IV Zosyn given. Case was discussed with GI per the ED, who recommend NPO, IV abx, and ERCP in AM. Hospital Medicine was contacted for admission and patient placed in Observation.       Overview/Hospital Course:  Patient 72 yo male admitted to hospital with suspected cholangitis. Patient initiated on IV abx, fluids, O2. Patient with a rapid decompensation shourtly after admit, rapid response called, patient hypoxic in the 60's placed on bipap with good effect. Patient transferred to ICU. Patient with a progressive decline, febrile episodes to 102.9. Severe septic shock, pressors and fluids on  board. Patient evaluated by GI who recommended ERCP - additional CT abdomen. Patient scheduled for CT and this has been delayed pending stability. Patient intubated for airway protection.  12/29- Patient remains intubated, sedated, on pressors. Discussed POC with family at bedside. Patient evaluated by surgery who recommend o/p surgical followup on recovery for elective cholecystectomy.. Repeat blood cultures in progress, abx infusing.  12/30 - Patient wbc declining but remains on pressors. He is intubated and sedated. Family at bedside. Worsening renal function with cr 4.4 today. Nephrology on consult. Discussed with CC Team  12/31: On sole pressor Norepinephrine 0.14mcg/kg/min, afebrile, leucocytosis is resolved, scR is bumped to 6.0. Patient is oliguric (118 cc urine in last 24 hour). On Fentanyl gtt and NaHCO3 gtt   Daughter Hortencia  is at bedside and was updated.   AC 26/450/40/5  1/1/22 patient seen, daughter Hortencia at bedside, currently on CRRT, Norepinephrine at 0.02mcg/kg/min, fentanyl 100mcg/Hr. AC 26/450/40/5. Patient more awake today, opens eyes spontaneously, is squeezing his daughter's hand  1/2 About afternoon yesterday he self extubated, did not require immediate re-intubation as he was doing rel well resp wise, he is currently on Vapo therm 35LPM 80% FIO2, precedex drip to help with agitation (history of ETOH misuse), getting CRRT, family at bedside, whilst confused he is oriented to selpf/family, conversing with family  1/3 patient seen, was sleeping calmly early this AM, family not at bedside, on Vapotherm 2oLPM at 45% FIO2, not dyspneic. CRRT off. Remains afebrile      Interval History: no overnight events    Review of Systems   Unable to perform ROS: Other (Patient asleep, language barrier. )   Constitutional: Negative for fever.     Objective:     Vital Signs (Most Recent):  Temp: 98.3 °F (36.8 °C) (01/03/22 0000)  Pulse: 80 (01/03/22 0800)  Resp: (!) 24 (01/03/22 0800)  BP: (!) 109/59  (01/03/22 0600)  SpO2: (!) 92 % (01/03/22 0800) Vital Signs (24h Range):  Temp:  [97.5 °F (36.4 °C)-98.3 °F (36.8 °C)] 98.3 °F (36.8 °C)  Pulse:  [] 80  Resp:  [18-34] 24  SpO2:  [89 %-100 %] 92 %  BP: (109-175)/(59-94) 109/59     Weight: 97.5 kg (214 lb 15.2 oz)  Body mass index is 34.69 kg/m².    Intake/Output Summary (Last 24 hours) at 1/3/2022 0919  Last data filed at 1/3/2022 0600  Gross per 24 hour   Intake 2113.82 ml   Output 4959 ml   Net -2845.18 ml      Physical Exam  Constitutional:       General: He is not in acute distress.     Appearance: Normal appearance. He is ill-appearing. He is not toxic-appearing or diaphoretic.   HENT:      Head: Normocephalic.   Eyes:      Pupils: Pupils are equal, round, and reactive to light.   Cardiovascular:      Rate and Rhythm: Normal rate and regular rhythm.      Pulses: Normal pulses.   Pulmonary:      Effort: Pulmonary effort is normal.      Comments: Scattered rales bilaterally  Abdominal:      General: Bowel sounds are normal. There is distension.      Palpations: Abdomen is soft.      Tenderness: There is no abdominal tenderness. There is no guarding or rebound.   Genitourinary:     Comments: hernandez  Musculoskeletal:      Right lower leg: Edema present.      Left lower leg: Edema present.      Comments: Improved swelling to both hands  Stable bilat pedal edema 1+   Skin:     General: Skin is warm and dry.      Capillary Refill: Capillary refill takes less than 2 seconds.   Neurological:      General: No focal deficit present.   Psychiatric:      Comments: Not assessed         Significant Labs:   BMP:   Recent Labs   Lab 01/03/22  0434   GLU 91  91   *  132*   K 3.9  3.9   CL 99  99   CO2 18*  18*   BUN 49*  49*   CREATININE 4.7*  4.7*   CALCIUM 8.1*  8.1*   MG 2.4     CBC:   Recent Labs   Lab 01/02/22  0402 01/03/22  0434   WBC 6.12 7.16   HGB 11.7* 11.6*   HCT 34.0* 33.8*   PLT 73* 114*     CMP:   Recent Labs   Lab 01/02/22  1432 01/02/22  4848  01/03/22  0434   * 132* 132*  132*   K 3.8 4.0 3.9  3.9   CL 99 99 99  99   CO2 23 23 18*  18*   GLU 81 93 91  91   BUN 53* 46* 49*  49*   CREATININE 4.7* 4.2* 4.7*  4.7*   CALCIUM 8.0* 7.8* 8.1*  8.1*   ALBUMIN 2.1* 2.1* 2.2*   ANIONGAP 10 10 15  15   EGFRNONAA 11* 13* 11*  11*       Significant Imaging: I have reviewed all pertinent imaging results/findings within the past 24 hours.   CXR 1/3:   .There is interval improvement in bilateral lower lung zone consolidative opacity/atelectasis, however there remains diffuse interstitial opacity throughout the lungs.  No pneumothorax or significant pleural fluid.       Assessment/Plan:      * Septic shock  Pressors  Fluids  ABX  ICU  Pulmonary CC  Intubated  Sedated      12/31  On sole pressor, Norepinephrine currently 0.14 mcg/kg/min, wean/titrate as per ICU team  On appropriate antibiotics: Ceftriaxone 1 gm IV Q12 for Sensitive E coli    1/1/22  On low dose Norepinephrine as he is getting CRRT  Source control achieved    1/2  Off Pressors  SBP acceptable    1/3  Resolved  Off Pressors    E coli bacteremia  ABX  ICU  Follow Blood Cultures  Pressors as indicated      12/31  Sensitive E coli bacteremia on ceftriaxone IV  Afebrile  Resolved leucocytosis  Repeat blood culture on 12/29 NGTC x 2 days  Appropriate source control achieved S/P ERCP, stone retrieval and sphincterectomy.  Panculture as needed      1/1, 2, 3  Repeat culture NGTD  Cont cefriaxone 1 gm IV Q12    LISET (acute kidney injury)  Worsening  Cr 3.3  Monitor    12/31  2 to shock  Nephro on board  Avoid nephrotoxins  Cont NaHCo3 gtt    1/1/22  On CRRT started today   Nephro on board  Mild hypokalemia and hyponatremia from LISET: CRRT    1/2  Net positive about 18L since admit  Ongoing CRRT  Avoid nephrotoxic agents    1/3  Getting RRT  scR 4.7  Oliguric UOP last 24 slightly over 227  Got CRRT past approx 48 hrs (-ve slightly over 2L)    Acute cholangitis  - Abdominal US showed: Gallbladder  distension with internal sludge but without definite stones. Intra and extrahepatic biliary ductal dilation. Acute cholecystitis or choledocholithiasis not excluded.  - GI consult. Plans for ERCP in AM. Will keep NPO.  - Blood cultures obtained in the ED. Continue empiric IV Zosyn.   - IV hydration.  - Analgesics and antiemetics as needed.   - Follow labs.     12/28  ERCP planned  Await intervention per GI  Now Intubated    12/29  S/p ERCP  GI on consult  Stent placed per GI  Abx  Follow Cultures    12/30  GI on board  S/p ERCP  Abx  Improving clinically  Remains on Pressors    12/31, 1/1, 1/2, 3  S/P source control as above  Cont ceftriaxone    Acute respiratory failure with hypoxia and hypercarbia  Patient with Hypoxic Respiratory failure which is Acute.  he is not on home oxygen. Supplemental oxygen was provided and noted- Oxygen Concentration (%):  [40-70] 45.   Signs/symptoms of respiratory failure include- tachypnea, increased work of breathing, respiratory distress, use of accessory muscles and cyanosis. Contributing diagnoses includes - COPD Labs and images were reviewed. Patient Has recent ABG, which has been reviewed. Will treat underlying causes and adjust management of respiratory failure as indicated    12/31  On 40 % FIO2 and PEEP 5 mech ventilation  Stable.  Ongoing renal and circulatory failure being addressed prior to considering weaning trials    1/1  On Mech Vent  ICU on board    1/2  On Vapotherm  CXR in AM  Pulm toilet as tolerated    1/3  Being weaned down on Vapotherm  Diffuse interstitial opacities throughout lungs, will likely improve with RRT  Repeat CXR as needed    Thrombocytopenia  Stable-jyothi  2 to Sepsis  Hold chemcial DVTp    1/1  Stable      1/2  Likely combination of ETOH misuse and sepsis  Platelet count is stable 73K, not bleeding  No need for transfusion    1/3  Improved to 114K today    Paroxysmal atrial fibrillation  - Remains in sinus rhythm on admission. Monitor telemetry.  -  Continue home BB.  - Patient is not on long-term AC. Will defer to his primary cardiologist.       Primary hypertension  - Continue home antihypertensives.     12/31  On pressors    Tobacco use  - Assistance with smoking cessation was offered, including:  [x]  Medications  [x]  Counseling  [x]  Printed Information on Smoking Cessation  []  Referral to a Smoking Cessation Program  - Patient was counseled regarding smoking for 3-10 minutes.    Alcohol abuse  - Monitor for withdrawal/DTs. IV Ativan if needed.     1/2  With some delirium likely from aute illness and ICU stay, in addition to possibly some withdrawal: Precedex gtt  Bedside swallow eval when possible: oral folate/thiamine +- oral Benzodiapines as needed    Acute hyperglycemia  Cont Long acting insulin BID and SSI   Cont Tube feeding    1/2  Resolved as tube feedings D/C  Mild prediabetic  SSI PRN  No need for long acting insulins at this point  SLP for feeding in AM          VTE Risk Mitigation (From admission, onward)         Ordered     heparin (porcine) injection 5,000 Units  Every 8 hours         01/03/22 0750     heparin (porcine) injection 2,000 Units  As needed (PRN)         01/01/22 2321     IP VTE HIGH RISK PATIENT  Once         12/27/21 2247     Place sequential compression device  Until discontinued         12/27/21 2247     Reason for No Pharmacological VTE Prophylaxis  Once        Question Answer Comment   Reasons: Risk of Bleeding    Reasons: Thrombocytopenia        12/27/21 2247                Discharge Planning   FLAQUITA:      Code Status: Full Code   Is the patient medically ready for discharge?:     Reason for patient still in hospital (select all that apply): Treatment  Discharge Plan A: Home with family            Critical care time spent on the evaluation and treatment of severe organ dysfunction, review of pertinent labs and imaging studies, discussions with consulting providers and discussions with patient/family: 30  minutes.      Julissa Thompson MD  Department of Hospital Medicine   [facility] - Intensive Care ([facility])

## 2022-01-03 NOTE — SUBJECTIVE & OBJECTIVE
Interval History: no overnight events    Review of Systems   Unable to perform ROS: Other (Patient asleep, language barrier. )   Constitutional: Negative for fever.     Objective:     Vital Signs (Most Recent):  Temp: 98.3 °F (36.8 °C) (01/03/22 0000)  Pulse: 80 (01/03/22 0800)  Resp: (!) 24 (01/03/22 0800)  BP: (!) 109/59 (01/03/22 0600)  SpO2: (!) 92 % (01/03/22 0800) Vital Signs (24h Range):  Temp:  [97.5 °F (36.4 °C)-98.3 °F (36.8 °C)] 98.3 °F (36.8 °C)  Pulse:  [] 80  Resp:  [18-34] 24  SpO2:  [89 %-100 %] 92 %  BP: (109-175)/(59-94) 109/59     Weight: 97.5 kg (214 lb 15.2 oz)  Body mass index is 34.69 kg/m².    Intake/Output Summary (Last 24 hours) at 1/3/2022 0919  Last data filed at 1/3/2022 0600  Gross per 24 hour   Intake 2113.82 ml   Output 4959 ml   Net -2845.18 ml      Physical Exam  Constitutional:       General: He is not in acute distress.     Appearance: Normal appearance. He is ill-appearing. He is not toxic-appearing or diaphoretic.   HENT:      Head: Normocephalic.   Eyes:      Pupils: Pupils are equal, round, and reactive to light.   Cardiovascular:      Rate and Rhythm: Normal rate and regular rhythm.      Pulses: Normal pulses.   Pulmonary:      Effort: Pulmonary effort is normal.      Comments: Scattered rales bilaterally  Abdominal:      General: Bowel sounds are normal. There is distension.      Palpations: Abdomen is soft.      Tenderness: There is no abdominal tenderness. There is no guarding or rebound.   Genitourinary:     Comments: hernandez  Musculoskeletal:      Right lower leg: Edema present.      Left lower leg: Edema present.      Comments: Improved swelling to both hands  Stable bilat pedal edema 1+   Skin:     General: Skin is warm and dry.      Capillary Refill: Capillary refill takes less than 2 seconds.   Neurological:      General: No focal deficit present.   Psychiatric:      Comments: Not assessed         Significant Labs:   BMP:   Recent Labs   Lab 01/03/22  0434   GLU  91  91   *  132*   K 3.9  3.9   CL 99  99   CO2 18*  18*   BUN 49*  49*   CREATININE 4.7*  4.7*   CALCIUM 8.1*  8.1*   MG 2.4     CBC:   Recent Labs   Lab 01/02/22  0402 01/03/22  0434   WBC 6.12 7.16   HGB 11.7* 11.6*   HCT 34.0* 33.8*   PLT 73* 114*     CMP:   Recent Labs   Lab 01/02/22  1432 01/02/22  2306 01/03/22  0434   * 132* 132*  132*   K 3.8 4.0 3.9  3.9   CL 99 99 99  99   CO2 23 23 18*  18*   GLU 81 93 91  91   BUN 53* 46* 49*  49*   CREATININE 4.7* 4.2* 4.7*  4.7*   CALCIUM 8.0* 7.8* 8.1*  8.1*   ALBUMIN 2.1* 2.1* 2.2*   ANIONGAP 10 10 15  15   EGFRNONAA 11* 13* 11*  11*       Significant Imaging: I have reviewed all pertinent imaging results/findings within the past 24 hours.   CXR 1/3:   .There is interval improvement in bilateral lower lung zone consolidative opacity/atelectasis, however there remains diffuse interstitial opacity throughout the lungs.  No pneumothorax or significant pleural fluid.

## 2022-01-03 NOTE — PROGRESS NOTES
"CRRT alarming high venous pressures.  Large clot seen in filter element.  Attempted to give blood back - alarm again "blood leak alarm," would not allow blood to be infused.  Stopped treatment.  Heparin locked Vas/Cath port per policy.  Hemodynamically stable. Per Philip DE ANDA in report - if Clotted off during the night shift OK to not restart - MD to evaluate in AM  "

## 2022-01-03 NOTE — ASSESSMENT & PLAN NOTE
ABX  ICU  Follow Blood Cultures  Pressors as indicated      12/31  Sensitive E coli bacteremia on ceftriaxone IV  Afebrile  Resolved leucocytosis  Repeat blood culture on 12/29 NGTC x 2 days  Appropriate source control achieved S/P ERCP, stone retrieval and sphincterectomy.  Panculture as needed      1/1, 2, 3  Repeat culture NGTD  Cont cefriaxone 1 gm IV Q12

## 2022-01-03 NOTE — ASSESSMENT & PLAN NOTE
Likely secondary to sepsis and atelectasis  HFNC weaning for goal sat > 92  Encourage TCDB and mobilize when able

## 2022-01-03 NOTE — ASSESSMENT & PLAN NOTE
Source: GB and Bacteremia  Shock resolved  Sputum culture NGTD  Blood cultures X 2 E.Coli, continue rocephin; consider transition to enteral abx once tolerating po diet  Temp and wbc trend down

## 2022-01-03 NOTE — ASSESSMENT & PLAN NOTE
Patient with Hypoxic Respiratory failure which is Acute.  he is not on home oxygen. Supplemental oxygen was provided and noted- Oxygen Concentration (%):  [40-70] 45.   Signs/symptoms of respiratory failure include- tachypnea, increased work of breathing, respiratory distress, use of accessory muscles and cyanosis. Contributing diagnoses includes - COPD Labs and images were reviewed. Patient Has recent ABG, which has been reviewed. Will treat underlying causes and adjust management of respiratory failure as indicated    12/31  On 40 % FIO2 and PEEP 5 mech ventilation  Stable.  Ongoing renal and circulatory failure being addressed prior to considering weaning trials    1/1  On Mech Vent  ICU on board    1/2  On Vapotherm  CXR in AM  Pulm toilet as tolerated    1/3  Being weaned down on Vapotherm  Diffuse interstitial opacities throughout lungs, will likely improve with RRT  Repeat CXR as needed

## 2022-01-03 NOTE — ASSESSMENT & PLAN NOTE
Accurate I/Os, urine output up at 220ml out last 24 hours  Renal dose meds and IVAB  Nephrology following  CRRT clotted again overnight; urine output up; plan eval tomorrow for intermitted HD indication

## 2022-01-03 NOTE — PROGRESS NOTES
[facility] ([facility])  Critical Care Medicine  Progress Note    Patient Name: Lizz Alas  MRN: [id number]  Admission Date: 12/27/2021  Hospital Length of Stay: 6 days  Code Status: Full Code  Attending Provider: Julissa Thompson MD  Primary Care Provider: Primary Doctor No   Principal Problem: Severe sepsis with acute organ dysfunction    Subjective:     HPI:  Mr Alas is a 70 yo Irish male with a PMH of HLD, etoh abuse, tobacco abuse, HTN and PAF.  He presented to [facility] ED yesterday evening via personal vehicle with complaint of abd pain over 3 days and N/V X 1 day.  He does not speak English and was accompanied by family.  In ED Glucose 180, TBili 5.1, LA 1.5 and Abd US with GB distention and sludging.  He had temp 102.9.  He as admitted to Tele and GI consulted diagnosed with acute cholecystitis.  This AM Telemetry reported HR was at 183 and upon assessment patient seemed to be in distress, mouth breathing , O2 was reading 73% pt was shaking and shivering. Called TOM due to pt speaking Luxembourgish [id number], # number [id number], pt was stating he could not breath.  Checked pt temp reading was 98.4 oral .Pt was on 3L then moved to 5L, then  Respiratory came in and put him on breathing treatment albuterol, still couldn't get breathing controlled, called Dr. Lopez and he stated to transfer pt to ICU.      Hospital/ICU Course:  12/28 - In ICU he was awake and alert on BiPAP .40 FiO2 with mild work of breathing but no distress and hemodynamically stable.  GI requested patient intubated and stabilized more so from pulm standpoint then obtain CT Abd and plan to OR for ERCP.  TBili up to 7.1.  He was intubated and supported on [facility] ventilation post explaining all this to daughter at bed side.  Post intubation and sedation became hypotensive requiring CL and AL placements and starting Levophed infusion.   12/29 - ERCP yesterday afternoon.  Increased vasopressor and O2 requirements overnight.  This AM still  intubated on mech vent and sedated on Fentanyl infusion.  Still on Vasopressin and Levophed infusions also.  Oliguria with LISET and Tmax last 24 hours up to 102.9.  Blood cultures X 2 + E.Coli.  Discussed care with daughter at bed side and all questions answered.   12/30 - remains intubated and sedated on mech vent. Temp and wbc trend down. Urine output, creatinine, and metabolic acidosis worsening  12/31 - remains intubated and sedated on mech vent with minimal oxygen demand but moderate pressor requirement. Urine output 118ml last 24 hours, creatinine 6.0. continued hyperglycemia 197-237  1/1/2022 - remains intubated and sedated on mechanical vent with support requirement unchanged but pO2 trending down, +18L since admission. Renal function without improvement, only 160ml urine output last 24 hours. Trialysis cath placed for initiation of CRRT, goal filtration and fluid removal  1/2 - self extubated yesterday, tolerating HFNC support. Confusion/Delirium requiring precedex infusion for safety. CRRT initiated but clotted around 0100 after about 3L removed, remains oliguric, electrolytes stable  1/3 - supplemental oxygen weaned to now 8L NC; CRRT clotted again around 2300 but urine output 220 ml last 24 hours; precedex off this morning, delirium improving; HTN requiring treatment      Review of Systems   Unable to perform ROS: Medical condition     Objective:     Vital Signs (Most Recent):  Temp: 98.3 °F (36.8 °C) (01/03/22 0000)  Pulse: 68 (01/03/22 0600)  Resp: 18 (01/03/22 0600)  BP: (!) 109/59 (01/03/22 0600)  SpO2: (!) 94 % (01/03/22 0600) Vital Signs (24h Range):  Temp:  [97.5 °F (36.4 °C)-98.3 °F (36.8 °C)] 98.3 °F (36.8 °C)  Pulse:  [] 68  Resp:  [18-34] 18  SpO2:  [89 %-100 %] 94 %  BP: (109-175)/(59-94) 109/59     Weight: 97.5 kg (214 lb 15.2 oz)  Body mass index is 34.69 kg/m².      Intake/Output Summary (Last 24 hours) at 1/3/2022 0750  Last data filed at 1/3/2022 0600  Gross per 24 hour   Intake  2113.82 ml   Output 5143 ml   Net -3029.18 ml      sodium chloride 0.9% Stopped (01/03/22 0012)    dexmedetomidine (PRECEDEX) infusion         Physical Exam  Vitals and nursing note reviewed.   Constitutional:       Appearance: He is obese. He is ill-appearing.      Interventions: He is sedated and restrained. Nasal cannula and face mask in place.   Eyes:      General: No scleral icterus.     Conjunctiva/sclera: Conjunctivae normal.   Neck:      Vascular: No JVD.   Cardiovascular:      Rate and Rhythm: Normal rate and regular rhythm.      Pulses:           Radial pulses are 1+ on the right side and 1+ on the left side.        Dorsalis pedis pulses are 1+ on the right side and 1+ on the left side.   Pulmonary:      Breath sounds: Decreased breath sounds and rhonchi present.   Abdominal:      General: Bowel sounds are decreased. There is no distension.      Palpations: Abdomen is soft.   Musculoskeletal:      Right lower leg: No edema.      Left lower leg: No edema.   Skin:     General: Skin is warm and dry.      Capillary Refill: Capillary refill takes less than 2 seconds.   Neurological:      GCS: GCS eye subscore is 3. GCS verbal subscore is 4. GCS motor subscore is 6.      Comments: Arouses easily; responds verbally to /daughter         Vents:  Vent Mode: A/C (01/01/22 1309)  Ventilator Initiated: Yes (12/28/21 0824)  Set Rate: 26 BPM (01/01/22 1309)  Vt Set: 450 mL (01/01/22 1309)  Pressure Support: 0 cmH20 (12/28/21 0824)  PEEP/CPAP: 5 cmH20 (01/01/22 1309)  Oxygen Concentration (%): 45 (01/03/22 0321)  Peak Airway Pressure: 25 cmH2O (01/01/22 1309)  Plateau Pressure: 16 cmH20 (01/01/22 1309)  Total Ve: 15 mL (01/01/22 1309)  Negative Inspiratory Force (cm H2O): 0 (01/01/22 1309)  F/VT Ratio<105 (RSBI): (!) 56.22 (01/01/22 1309)    Lines/Drains/Airways     Central Venous Catheter Line            Percutaneous Central Line Insertion/Assessment - Triple Lumen  12/28/21 1052 left internal jugular 5 days     Trialysis (Dialysis) Catheter 01/01/22 0922 right internal jugular 1 day          Drain                 Urethral Catheter 12/28/21 0830 5 days         Rectal Tube 01/02/22 1324 <1 day          Peripheral Intravenous Line                 Peripheral IV - Single Lumen 12/28/21 0745 20 G Posterior;Right Hand 6 days                Significant Labs:    CBC/Anemia Profile:  Recent Labs   Lab 01/02/22  0402 01/03/22  0434   WBC 6.12 7.16   HGB 11.7* 11.6*   HCT 34.0* 33.8*   PLT 73* 114*   MCV 88 89   RDW 15.0* 15.1*        Chemistries:  Recent Labs   Lab 01/02/22  1432 01/02/22  2306 01/03/22  0434   * 132* 132*  132*   K 3.8 4.0 3.9  3.9   CL 99 99 99  99   CO2 23 23 18*  18*   BUN 53* 46* 49*  49*   CREATININE 4.7* 4.2* 4.7*  4.7*   CALCIUM 8.0* 7.8* 8.1*  8.1*   ALBUMIN 2.1* 2.1* 2.2*   MG 2.2 2.3 2.4   PHOS 2.4* 3.2 3.2       All pertinent labs within the past 24 hours have been reviewed.    Significant Imaging:  I have reviewed all pertinent imaging results/findings within the past 24 hours.      ABG  Recent Labs   Lab 01/02/22  0747   PH 7.397   PO2 62*   PCO2 41.0   HCO3 25.2   BE 0     Assessment/Plan:     Psychiatric  Alcohol abuse  Likely contributing to delirium/confusion  Continue enteral thiamine/folate/multi vit  Prn ativan    Pulmonary  Acute respiratory failure with hypoxia and hypercarbia  Likely secondary to sepsis and atelectasis  HFNC weaning for goal sat > 92  Encourage TCDB and mobilize when able    Cardiac/Vascular  Paroxysmal atrial fibrillation  SR on monitor,  Extrasystoles noted  Not on anticoagulants on home med review  Will start metoprolol for rate and BP control    Renal/  LISET (acute kidney injury)  Accurate I/Os, urine output up at 220ml out last 24 hours  Renal dose meds and IVAB  Nephrology following  CRRT clotted again overnight; urine output up; plan eval tomorrow for intermitted HD indication    ID  * Severe sepsis with acute organ dysfunction  Source: GB and  Bacteremia  Shock resolved  Sputum culture NGTD  Blood cultures X 2 E.Coli, continue rocephin; consider transition to enteral abx once tolerating po diet  Temp and wbc trend down      E coli bacteremia  Continue rocephin  Reviewed case and bacteria with clinical pharmacist; given gram neg and overall clinical improvement, 14 day course not required. Will continue rocephin and eval daily    Acute cholangitis  IVAB  POD #6 S/P ERCP  Will need Cholecystectomy as outpt once clinically improved      Hematology  Thrombocytopenia  No active bleeding  Conservative transfusion protocol, holding pharmacologic anticoagulation  Improving, monitor    Endocrine  Acute hyperglycemia  Range   Start diet  continue SSI prn with monitoring for glucose control and prevention of insulin toxicity    Other  Tobacco use  encourage tobacco cessation  Cont LABA and ICS nebs  On Nicotine patch     Critical Care Daily Checklist:    A: Awake: RASS Goal/Actual Goal: RASS Goal: 0-->alert and calm  Actual: [place] Agitation Sedation Scale (RASS): Drowsy   B: Spontaneous Breathing Trial Performed?     C: SAT & SBT Coordinated?  Extubation 1/1                  D: Delirium: CAM-ICU Overall CAM-ICU: Positive   E: Early Mobility Performed? Yes   F: Feeding Goal: Goals: 1. Enteral Nutrition initiation within 24 hours.  Status: Nutrition Goal Status: new   Current Diet Order   Procedures    Diet renal      AS: Analgesia/Sedation Prn ativan   T: Thromboembolic Prophylaxis SCD   H: HOB > 300 Yes   U: Stress Ulcer Prophylaxis (if needed) pepcid   G: Glucose Control SSI   B: Bowel Function Stool Occurrence: 1   I: Indwelling Catheter (Lines & Eng) Necessity reviewed   D: De-escalation of Antimicrobials/Pharmacotherapies reviewed    Plan for the day/ETD As above    Code Status:  Family/Goals of Care: Full Code  Pending hospital course; daughter updated on status and plan at bedside   I have discussed case and plan of care in detail with Dr Blum;  Status and plan of care were discussed with team on multidisciplinary rounds.    Critical Care Time: 50 minutes  Critical secondary to severe sepsis, bacteremia, LISET, cholangitis; Critical care was time spent personally by me on the following activities: development of treatment plan with patient or surrogate and bedside caregivers, discussions with consultants, evaluation of patient's response to treatment, examination of patient, ordering and performing treatments and interventions, ordering and review of laboratory studies, ordering and review of radiographic studies, pulse oximetry, re-evaluation of patient's condition. This critical care time did not overlap with that of any other provider or involve time for any procedures.     GREG Dawn-BC  Critical Care Medicine  [facility] ([facility])

## 2022-01-03 NOTE — ASSESSMENT & PLAN NOTE
Worsening  Cr 3.3  Monitor    12/31  2 to shock  Nephro on board  Avoid nephrotoxins  Cont NaHCo3 gtt    1/1/22  On CRRT started today   Nephro on board  Mild hypokalemia and hyponatremia from LISET: CRRT    1/2  Net positive about 18L since admit  Ongoing CRRT  Avoid nephrotoxic agents    1/3  Getting RRT  scR 4.7  Oliguric UOP last 24 slightly over 227  Got CRRT past approx 48 hrs (-ve slightly over 2L)

## 2022-01-03 NOTE — ASSESSMENT & PLAN NOTE
Range   Start diet  continue SSI prn with monitoring for glucose control and prevention of insulin toxicity

## 2022-01-03 NOTE — ASSESSMENT & PLAN NOTE
SR on monitor,  Extrasystoles noted  Not on anticoagulants on home med review  Will start metoprolol for rate and BP control

## 2022-01-03 NOTE — PLAN OF CARE
Recommendation/Intervention: 1/3  1. When medically appropriate, advance diet as tolerated.   2. Weekly weights per RD follow up.   3. RD to provide recommendations accordingly.    Jade Drake RD,LDN

## 2022-01-03 NOTE — SUBJECTIVE & OBJECTIVE
Review of Systems   Unable to perform ROS: Medical condition     Objective:     Vital Signs (Most Recent):  Temp: 98.3 °F (36.8 °C) (01/03/22 0000)  Pulse: 68 (01/03/22 0600)  Resp: 18 (01/03/22 0600)  BP: (!) 109/59 (01/03/22 0600)  SpO2: (!) 94 % (01/03/22 0600) Vital Signs (24h Range):  Temp:  [97.5 °F (36.4 °C)-98.3 °F (36.8 °C)] 98.3 °F (36.8 °C)  Pulse:  [] 68  Resp:  [18-34] 18  SpO2:  [89 %-100 %] 94 %  BP: (109-175)/(59-94) 109/59     Weight: 97.5 kg (214 lb 15.2 oz)  Body mass index is 34.69 kg/m².      Intake/Output Summary (Last 24 hours) at 1/3/2022 0750  Last data filed at 1/3/2022 0600  Gross per 24 hour   Intake 2113.82 ml   Output 5143 ml   Net -3029.18 ml      sodium chloride 0.9% Stopped (01/03/22 0012)    dexmedetomidine (PRECEDEX) infusion         Physical Exam  Vitals and nursing note reviewed.   Constitutional:       Appearance: He is obese. He is ill-appearing.      Interventions: He is sedated and restrained. Nasal cannula and face mask in place.   Eyes:      General: No scleral icterus.     Conjunctiva/sclera: Conjunctivae normal.   Neck:      Vascular: No JVD.   Cardiovascular:      Rate and Rhythm: Normal rate and regular rhythm.      Pulses:           Radial pulses are 1+ on the right side and 1+ on the left side.        Dorsalis pedis pulses are 1+ on the right side and 1+ on the left side.   Pulmonary:      Breath sounds: Decreased breath sounds and rhonchi present.   Abdominal:      General: Bowel sounds are decreased. There is no distension.      Palpations: Abdomen is soft.   Musculoskeletal:      Right lower leg: No edema.      Left lower leg: No edema.   Skin:     General: Skin is warm and dry.      Capillary Refill: Capillary refill takes less than 2 seconds.   Neurological:      GCS: GCS eye subscore is 3. GCS verbal subscore is 4. GCS motor subscore is 6.      Comments: Arouses easily; responds verbally to /daughter         Vents:  Vent Mode: A/C (01/01/22  1309)  Ventilator Initiated: Yes (12/28/21 0824)  Set Rate: 26 BPM (01/01/22 1309)  Vt Set: 450 mL (01/01/22 1309)  Pressure Support: 0 cmH20 (12/28/21 0824)  PEEP/CPAP: 5 cmH20 (01/01/22 1309)  Oxygen Concentration (%): 45 (01/03/22 0321)  Peak Airway Pressure: 25 cmH2O (01/01/22 1309)  Plateau Pressure: 16 cmH20 (01/01/22 1309)  Total Ve: 15 mL (01/01/22 1309)  Negative Inspiratory Force (cm H2O): 0 (01/01/22 1309)  F/VT Ratio<105 (RSBI): (!) 56.22 (01/01/22 1309)    Lines/Drains/Airways     Central Venous Catheter Line            Percutaneous Central Line Insertion/Assessment - Triple Lumen  12/28/21 1052 left internal jugular 5 days    Trialysis (Dialysis) Catheter 01/01/22 0922 right internal jugular 1 day          Drain                 Urethral Catheter 12/28/21 0830 5 days         Rectal Tube 01/02/22 1324 <1 day          Peripheral Intravenous Line                 Peripheral IV - Single Lumen 12/28/21 0745 20 G Posterior;Right Hand 6 days                Significant Labs:    CBC/Anemia Profile:  Recent Labs   Lab 01/02/22  0402 01/03/22  0434   WBC 6.12 7.16   HGB 11.7* 11.6*   HCT 34.0* 33.8*   PLT 73* 114*   MCV 88 89   RDW 15.0* 15.1*        Chemistries:  Recent Labs   Lab 01/02/22  1432 01/02/22  2306 01/03/22  0434   * 132* 132*  132*   K 3.8 4.0 3.9  3.9   CL 99 99 99  99   CO2 23 23 18*  18*   BUN 53* 46* 49*  49*   CREATININE 4.7* 4.2* 4.7*  4.7*   CALCIUM 8.0* 7.8* 8.1*  8.1*   ALBUMIN 2.1* 2.1* 2.2*   MG 2.2 2.3 2.4   PHOS 2.4* 3.2 3.2       All pertinent labs within the past 24 hours have been reviewed.    Significant Imaging:  I have reviewed all pertinent imaging results/findings within the past 24 hours.

## 2022-01-03 NOTE — ASSESSMENT & PLAN NOTE
Stable-jyothi  2 to Sepsis  Hold chemcial DVTp    1/1  Stable      1/2  Likely combination of ETOH misuse and sepsis  Platelet count is stable 73K, not bleeding  No need for transfusion    1/3  Improved to 114K today

## 2022-01-03 NOTE — PROGRESS NOTES
[facility] ([facility])  Nephrology  Progress Note    Patient Name: Lizz Alas  MRN: [id number]  Admission Date: 12/27/2021  Hospital Length of Stay: 6 days  Attending Provider: Julissa Thompson MD   Primary Care Physician: Primary Doctor No  Principal Problem:Septic shock      Subjective:     Interval History:   12/31: Remains on vasopressors, intubated in ICU. Poor UOP    1/1/22: CRRT initiated, family at bedside     1/2/22: CRRT clotted overnight, restarted this am. Self-extubation yesterday afternoon. Family at bedside, helping to reorient     1/3/22: CRRT clotted overnight. Cleared to swallow. Negative 3L previous 24s and Negative 3.2L past 48 hours     Review of patient's allergies indicates:  No Known Allergies  Current Facility-Administered Medications   Medication Frequency    0.9%  NaCl infusion PRN    acetaminophen oral solution 650 mg Q6H PRN    albuterol-ipratropium 2.5 mg-0.5 mg/3 mL nebulizer solution 3 mL Q4H PRN    aluminum-magnesium hydroxide-simethicone 200-200-20 mg/5 mL suspension 30 mL QID PRN    arformoteroL nebulizer solution 15 mcg BID    budesonide nebulizer solution 0.5 mg Q12H    cefTRIAXone (ROCEPHIN) 1 g/50 mL D5W IVPB Q12H    chlorhexidine 0.12 % solution 15 mL BID    dexmedetomidine (PRECEDEX) 400mcg/100mL dextrose 5% infusion Continuous    dextrose 50% injection 12.5 g PRN    famotidine (PF) injection 20 mg Daily    folic acid tablet 1 mg Daily    glucagon (human recombinant) injection 1 mg PRN    heparin (porcine) injection 2,000 Units PRN    heparin (porcine) injection 5,000 Units Q8H    lorazepam injection 1 mg Q2H PRN    midodrine tablet 10 mg TID PRN    multivitamin liquid per dose 5 mL Daily    naloxone 0.4 mg/mL injection 0.02 mg PRN    nicotine 21 mg/24 hr 1 patch Daily    ondansetron injection 4 mg Q6H PRN    polyethylene glycol packet 17 g Every other day    promethazine tablet 25 mg Q6H PRN    sodium chloride 0.9% flush 10 mL Q12H PRN     thiamine tablet 100 mg Daily       Objective:     Vital Signs (Most Recent):  Temp: 98.3 °F (36.8 °C) (01/03/22 0000)  Pulse: 86 (01/03/22 1111)  Resp: (!) 25 (01/03/22 1111)  BP: (!) 145/75 (01/03/22 1100)  SpO2: 97 % (01/03/22 1111)  O2 Device (Oxygen Therapy): Vapotherm (01/03/22 1111) Vital Signs (24h Range):  Temp:  [97.5 °F (36.4 °C)-98.3 °F (36.8 °C)] 98.3 °F (36.8 °C)  Pulse:  [] 86  Resp:  [18-34] 25  SpO2:  [89 %-99 %] 97 %  BP: (109-175)/(59-94) 145/75     Weight: 97.5 kg (214 lb 15.2 oz) (01/03/22 0542)  Body mass index is 34.69 kg/m².  Body surface area is 2.13 meters squared.    I/O last 3 completed shifts:  In: 3093 [I.V.:2630.4; NG/GT:65; IV Piggyback:397.6]  Out: 5862 [Urine:260; Other:5382; Stool:220]    Physical Exam  Vitals and nursing note reviewed.   Constitutional:       General: He is not in acute distress.  HENT:      Head: Atraumatic.   Cardiovascular:      Rate and Rhythm: Normal rate.   Pulmonary:      Effort: No respiratory distress (intubated).   Abdominal:      Palpations: Abdomen is soft.   Musculoskeletal:         General: Swelling (very mild) present.   Skin:     General: Skin is warm and dry.   Neurological:      Mental Status: He is easily aroused.         Significant Labs: reviewed    Significant Imaging: reviewed     Assessment/Plan:     Active Diagnoses:    Diagnosis Date Noted POA    PRINCIPAL PROBLEM:  Septic shock [A41.9, R65.21] 12/28/2021 Yes    LISET (acute kidney injury) [N17.9] 12/29/2021 No    Thrombocytopenia [D69.6] 12/29/2021 Yes    Acute hyperglycemia [R73.9] 12/29/2021 Yes    Acute respiratory failure with hypoxia and hypercarbia [J96.01, J96.02] 12/28/2021 Yes    E coli bacteremia [R78.81, B96.20] 12/28/2021 Yes    Acute cholangitis [K83.09] 12/27/2021 Yes    Tobacco use [Z72.0] 12/27/2021 Yes     Chronic    Alcohol abuse [F10.10] 12/27/2021 Yes     Chronic    Primary hypertension [I10] 12/27/2021 Yes     Chronic    Paroxysmal atrial  fibrillation [I48.0] 12/27/2021 Yes     Chronic      Problems Resolved During this Admission:    Diagnosis Date Noted Date Resolved POA    On mechanically assisted ventilation [Z99.11] 12/30/2021 01/02/2022 Not Applicable    Electrolyte imbalance [E87.8] 12/28/2021 01/02/2022 Yes       Oligoanuric LISET with baseline creatinine 0.9mg/dL in the setting of septic shock   - Oligoanuric for 5 days, 116mL>>140mL>>220mL last 72 hours  - hold all RRT and observe UOP today and overnight  -  RRT can delay renal recovery     Septic Shock  - clinically improving         Conrad Arnett MD  Nephrology  [facility] ([facility])

## 2022-01-03 NOTE — PROGRESS NOTES
[facility] ([facility])  Adult Nutrition  Consult Note    SUMMARY     Recommendations    Recommendation/Intervention:   1. When medically appropriate, advance diet as tolerated.   2. Weekly weights per RD follow up.   3. RD to provide recommendations accordingly.    Goals:   1. Patient will meet >50% of estimated energy needs by RD follow up.     Nutrition Goal Status: goal not met     Communication of RD Recs: (Plan of care;Sticky Note)    Assessment and Plan    Nutrition Problem  Inadequate oral intake     Related to (etiology):   Decreased ability to consume sufficient energy     Signs and Symptoms (as evidenced by):   NPO    Interventions/Recommendations (treatment strategy):  Advance diet as tolerated   Collaboration with Medical Providers   Weekly Weights     Nutrition Diagnosis Status:   Continues         Malnutrition Assessment                                       Reason for Assessment    Reason For Assessment: Follow up  Diagnosis: infection/sepsis  Relevant Medical History: HTN, HLD  Interdisciplinary Rounds: attended  General Information Comments:     12/29:Patient consulted for TF recommendations. Patient was admitted with abdominal pain, yellowing of skin, N/V. Patient is NPO on vent. Patient was intubated 12/28. ERCP was performed 12/28. No edema noted per EMR. Patients weight is stable. Patients last BM 12/24. Will continue to monitor.    1/3: Patient seen for follow up. Patient is NPO due to aspiration precautions. Patient self extubated 1/2. Patient was also receiving CRRT on 1/2. SLP attempted evaluation but patient was too lethargic. Today CRRT is off. Patient shows a weight gain of 16 lbs x 5 days. Possible fluid and bed scale malfunction. Patient has 3+ moderate generalized edema.     Nutrition Discharge Planning: pending medical course    Nutrition Risk Screen    Nutrition Risk Screen: dysphagia or difficulty swallowing    Nutrition/Diet History    Patient Reported  "Diet/Restrictions/Preferences: unknown  Spiritual, Cultural Beliefs, Church Practices, Values that Affect Care: no  Food Allergies: NKFA  Factors Affecting Nutritional Intake: NPO     Anthropometrics    Temp: 98.3 °F (36.8 °C)  Height Method: Stated  Height: 5' 6" (167.6 cm)  Height (inches): 66 in  Weight Method: Bed Scale  Weight: 97.5 kg (214 lb 15.2 oz)  Weight (lb): 214.95 lb  Ideal Body Weight (IBW), Male: 142 lb  % Ideal Body Weight, Male (lb): 139.73 %  BMI (Calculated): 34.7  BMI Grade: 30 - 34.9- obesity - grade I       Lab/Procedures/Meds  BMP  Lab Results   Component Value Date     (L) 01/03/2022     (L) 01/03/2022    K 3.9 01/03/2022    K 3.9 01/03/2022    CL 99 01/03/2022    CL 99 01/03/2022    CO2 18 (L) 01/03/2022    CO2 18 (L) 01/03/2022    BUN 49 (H) 01/03/2022    BUN 49 (H) 01/03/2022    CREATININE 4.7 (H) 01/03/2022    CREATININE 4.7 (H) 01/03/2022    CALCIUM 8.1 (L) 01/03/2022    CALCIUM 8.1 (L) 01/03/2022    ANIONGAP 15 01/03/2022    ANIONGAP 15 01/03/2022    ESTGFRAFRICA 13 (A) 01/03/2022    ESTGFRAFRICA 13 (A) 01/03/2022    EGFRNONAA 11 (A) 01/03/2022    EGFRNONAA 11 (A) 01/03/2022     Lab Results   Component Value Date     (L) 01/03/2022     (L) 01/03/2022    K 3.9 01/03/2022    K 3.9 01/03/2022    CL 99 01/03/2022    CL 99 01/03/2022    CO2 18 (L) 01/03/2022    CO2 18 (L) 01/03/2022     Lab Results   Component Value Date    LABPROT 10.8 12/30/2021    ALBUMIN 2.2 (L) 01/03/2022     Lab Results   Component Value Date    CALCIUM 8.1 (L) 01/03/2022    CALCIUM 8.1 (L) 01/03/2022    PHOS 3.2 01/03/2022     Pertinent Labs Reviewed: reviewed  Pertinent Medications Reviewed: reviewed  Scheduled Meds:   arformoteroL  15 mcg Nebulization BID    budesonide  0.5 mg Nebulization Q12H    cefTRIAXone (ROCEPHIN) IVPB  1 g Intravenous Q12H    chlorhexidine  15 mL Mouth/Throat BID    famotidine (PF)  20 mg Intravenous Daily    folic acid  1 mg Per OG tube Daily    heparin " (porcine)  5,000 Units Subcutaneous Q8H    multivitamin liquid  5 mL Per OG tube Daily    nicotine  1 patch Transdermal Daily    polyethylene glycol  17 g Per NG tube Every other day    thiamine  100 mg Per OG tube Daily     Continuous Infusions:   sodium chloride 0.9% Stopped (01/03/22 0012)    dexmedetomidine (PRECEDEX) infusion 0.6 mcg/kg/hr (01/03/22 0932)     PRN Meds:.sodium chloride 0.9%, acetaminophen, albuterol-ipratropium, aluminum-magnesium hydroxide-simethicone, dextrose 50%, glucagon (human recombinant), heparin (porcine), lorazepam, midodrine, naloxone, ondansetron, promethazine, sodium chloride 0.9%    Physical Findings/Assessment         Estimated/Assessed Needs    Weight Used For Calorie Calculations: 90 kg (198 lb 6.6 oz)  Energy Calorie Requirements (kcal): 990-1260 (11-14kcal/kg, permissive underfeeding, ICU,  BMI >30)  Energy Need Method: Kcal/kg  Protein Requirements: 51-64 (0.8-1.0g/kg, renal labs)  Weight Used For Protein Calculations: 64.5 kg (142 lb 3.2 oz) (IBW)  Fluid Requirements (mL): 990-1260  Estimated Fluid Requirement Method: RDA Method  RDA Method (mL): 990  CHO Requirement: 123-157      Nutrition Prescription Ordered    Current Diet Order: NPO    Evaluation of Received Nutrient/Fluid Intake    % Kcal Needs: 0  % Protein Needs: 0  Energy Calories Required: not meeting needs  Protein Required: not meeting needs  Fluid Required: not meeting needs  Tolerance: other (see comments) (Patient NPO)  % Intake of Estimated Energy Needs: 0 - 25 %  % Meal Intake: NPO    Nutrition Risk    Level of Risk/Frequency of Follow-up: moderate - high       Monitor and Evaluation    Food and Nutrient Intake: energy intake,enteral nutrition intake  Food and Nutrient Adminstration: enteral and parenteral nutrition administration  Anthropometric Measurements: weight,weight change,body mass index  Biochemical Data, Medical Tests and Procedures: glucose/endocrine profile,inflammatory profile,lipid  profile,gastrointestinal profile,electrolyte and renal panel  Nutrition-Focused Physical Findings: overall appearance       Nutrition Follow-Up    RD Follow-up?: Yes   Jade Drake RD,LDN

## 2022-01-03 NOTE — PLAN OF CARE
POC and interventions discussed with daughter at visitation - VU.  Italian speaking.  Per daughter remains confused and disoriented.  Forgets things quickly when reoriented and redirected.  Restless at times.  On low dose precedex gtt.  Heart RRR, occasional PAC's.  Hemodynamically stable.  CRRT clotted off shortly after Midnight.  NPO.  Oxygenating well on vapotherm.  Eng oliguric urine output.  Afebrile.  Flexiseal with small amount of light brown stool.

## 2022-01-03 NOTE — ASSESSMENT & PLAN NOTE
No active bleeding  Conservative transfusion protocol, holding pharmacologic anticoagulation  Improving, monitor

## 2022-01-03 NOTE — ASSESSMENT & PLAN NOTE
- Abdominal US showed: Gallbladder distension with internal sludge but without definite stones. Intra and extrahepatic biliary ductal dilation. Acute cholecystitis or choledocholithiasis not excluded.  - GI consult. Plans for ERCP in AM. Will keep NPO.  - Blood cultures obtained in the ED. Continue empiric IV Zosyn.   - IV hydration.  - Analgesics and antiemetics as needed.   - Follow labs.     12/28  ERCP planned  Await intervention per GI  Now Intubated    12/29  S/p ERCP  GI on consult  Stent placed per GI  Abx  Follow Cultures    12/30  GI on board  S/p ERCP  Abx  Improving clinically  Remains on Pressors    12/31, 1/1, 1/2, 3  S/P source control as above  Cont ceftriaxone

## 2022-01-03 NOTE — ASSESSMENT & PLAN NOTE
Pressors  Fluids  ABX  ICU  Pulmonary CC  Intubated  Sedated      12/31  On sole pressor, Norepinephrine currently 0.14 mcg/kg/min, wean/titrate as per ICU team  On appropriate antibiotics: Ceftriaxone 1 gm IV Q12 for Sensitive E coli    1/1/22  On low dose Norepinephrine as he is getting CRRT  Source control achieved    1/2  Off Pressors  SBP acceptable    1/3  Resolved  Off Pressors

## 2022-01-03 NOTE — PT/OT/SLP EVAL
Speech Language Pathology Evaluation  Bedside Swallow    Patient Name:  Lizz Alas   MRN:  [id number]  Admitting Diagnosis: Septic shock    Recommendations:                 General Recommendations:  Follow-up not indicated  Diet recommendations:  Regular, Thin   Aspiration Precautions: 1 bite/sip at a time, Assistance with meals, HOB to 90 degrees, Remain upright 30 minutes post meal and Small bites/sips   General Precautions: Standard, fall  Communication strategies:  Pt speaks little English     History:     Past Medical History:   Diagnosis Date    Hyperlipemia     Hypertension     Other lesions of oral mucosa     PAF (paroxysmal atrial fibrillation)     Thrombocytopenia        Past Surgical History:   Procedure Laterality Date    ERCP N/A 12/28/2021    Procedure: ERCP (ENDOSCOPIC RETROGRADE CHOLANGIOPANCREATOGRAPHY);  Surgeon: Melchor Sarmiento MD;  [facility];  Service: Endoscopy;  Laterality: N/A;    FRACTURE SURGERY      HERNIA REPAIR         Subjective   ST consulted for a swallow assessment following extubation.  Pt's daughter at bedside and denies the need for JOSE R to enhance communication and stated that she will translate.  She stated pt was Independent with all ADLS PTA and denies any dysphagia symptoms.  ST completed bedside swallow evaluation with pt seated upright in bed and requiring vapotherm for O2 support.  ST spoke with Nurse Palacios prior to session.    Patient goals: pt wants water     Pain/Comfort:  · Pain Rating 1: 0/10    Respiratory Status: vapotherm    Objective:     Oral Musculature Evaluation  · Oral Musculature: WNL  · Dentition: edentulous (pt's dentures not present)    Bedside Swallow Eval:   Consistencies Assessed:  · Thin liquids, puree, and solids      Oral Phase:   · Pt able to masticate and clear oral cavity of all materials    Pharyngeal Phase:   · no overt clinical signs/symptoms of aspiration  · no overt clinical signs/symptoms of pharyngeal  dysphagia    Pt with weak, swollen extremities and required assistance for self-feeding.    Assessment:     Lizz Alas is a 71 y.o. male with a diagnosis of The primary encounter diagnosis was Acute cholangitis. Diagnoses of Epigastric abdominal pain, Septic shock, Acute hypoxemic respiratory failure, Alcohol abuse, Bacteremia due to Gram-negative bacteria, Electrolyte imbalance, Paroxysmal atrial fibrillation, Tobacco use, Bigeminy, and LISET (acute kidney injury) were also pertinent to this visit.  He presents with a safe functional swallow across consistencies.  B wrist restraints were untied during eval and nurse Philip alerted and reported ok to leave restraints untied as long as pt's daughter is at bedside and pt is alert.  ST recommends a po diet of Regular consistency with strict aspiration precautions.  Please re-consult if problem arise.     Plan:     · Plan of Care reviewed with:  patient,daughter   · SLP Follow-Up:  No      Time Tracking:     SLP Treatment Date:   01/03/22  Speech Start Time:  1030  Speech Stop Time:  1045     Speech Total Time (min):  15 min    Billable Minutes: Eval Swallow and Oral Function 15    01/03/2022

## 2022-01-03 NOTE — ASSESSMENT & PLAN NOTE
Continue rocephin  Reviewed case and bacteria with clinical pharmacist; given gram neg and overall clinical improvement, 14 day course not required. Will continue rocephin and eval daily

## 2022-01-04 LAB
ALLENS TEST: ABNORMAL
ANION GAP SERPL CALC-SCNC: 15 MMOL/L (ref 8–16)
BASOPHILS # BLD AUTO: 0.07 K/UL (ref 0–0.2)
BASOPHILS NFR BLD: 0.7 % (ref 0–1.9)
BUN SERPL-MCNC: 76 MG/DL (ref 8–23)
CALCIUM SERPL-MCNC: 8.3 MG/DL (ref 8.7–10.5)
CHLORIDE SERPL-SCNC: 100 MMOL/L (ref 95–110)
CO2 SERPL-SCNC: 21 MMOL/L (ref 23–29)
CREAT SERPL-MCNC: 6.7 MG/DL (ref 0.5–1.4)
DELSYS: ABNORMAL
DIFFERENTIAL METHOD: ABNORMAL
EOSINOPHIL # BLD AUTO: 0.1 K/UL (ref 0–0.5)
EOSINOPHIL NFR BLD: 0.8 % (ref 0–8)
ERYTHROCYTE [DISTWIDTH] IN BLOOD BY AUTOMATED COUNT: 14.9 % (ref 11.5–14.5)
EST. GFR  (AFRICAN AMERICAN): 9 ML/MIN/1.73 M^2
EST. GFR  (NON AFRICAN AMERICAN): 7 ML/MIN/1.73 M^2
FIO2: 28
FLOW: 2
GLUCOSE SERPL-MCNC: 87 MG/DL (ref 70–110)
HBV SURFACE AB SER QL IA: POSITIVE
HBV SURFACE AB SERPL IA-ACNC: 47 MIU/ML
HCO3 UR-SCNC: 18 MMOL/L (ref 24–28)
HCT VFR BLD AUTO: 34.9 % (ref 40–54)
HGB BLD-MCNC: 12 G/DL (ref 14–18)
IMM GRANULOCYTES # BLD AUTO: 0.42 K/UL (ref 0–0.04)
IMM GRANULOCYTES NFR BLD AUTO: 4.3 % (ref 0–0.5)
LYMPHOCYTES # BLD AUTO: 0.9 K/UL (ref 1–4.8)
LYMPHOCYTES NFR BLD: 9.3 % (ref 18–48)
MCH RBC QN AUTO: 30.6 PG (ref 27–31)
MCHC RBC AUTO-ENTMCNC: 34.4 G/DL (ref 32–36)
MCV RBC AUTO: 89 FL (ref 82–98)
MODE: ABNORMAL
MONOCYTES # BLD AUTO: 1 K/UL (ref 0.3–1)
MONOCYTES NFR BLD: 9.9 % (ref 4–15)
NEUTROPHILS # BLD AUTO: 7.3 K/UL (ref 1.8–7.7)
NEUTROPHILS NFR BLD: 75 % (ref 38–73)
NRBC BLD-RTO: 0 /100 WBC
PCO2 BLDA: 25.6 MMHG (ref 35–45)
PH SMN: 7.45 [PH] (ref 7.35–7.45)
PLATELET # BLD AUTO: 183 K/UL (ref 150–450)
PMV BLD AUTO: 10.2 FL (ref 9.2–12.9)
PO2 BLDA: 56 MMHG (ref 80–100)
POC BE: -6 MMOL/L
POC SATURATED O2: 91 % (ref 95–100)
POCT GLUCOSE: 106 MG/DL (ref 70–110)
POCT GLUCOSE: 119 MG/DL (ref 70–110)
POCT GLUCOSE: 87 MG/DL (ref 70–110)
POTASSIUM SERPL-SCNC: 3.8 MMOL/L (ref 3.5–5.1)
RBC # BLD AUTO: 3.92 M/UL (ref 4.6–6.2)
SAMPLE: ABNORMAL
SITE: ABNORMAL
SODIUM SERPL-SCNC: 136 MMOL/L (ref 136–145)
WBC # BLD AUTO: 9.7 K/UL (ref 3.9–12.7)

## 2022-01-04 PROCEDURE — S4991 NICOTINE PATCH NONLEGEND: HCPCS | Performed by: INTERNAL MEDICINE

## 2022-01-04 PROCEDURE — 99233 PR SUBSEQUENT HOSPITAL CARE,LEVL III: ICD-10-PCS | Mod: ,,, | Performed by: INTERNAL MEDICINE

## 2022-01-04 PROCEDURE — 85025 COMPLETE CBC W/AUTO DIFF WBC: CPT | Performed by: NURSE PRACTITIONER

## 2022-01-04 PROCEDURE — 25000242 PHARM REV CODE 250 ALT 637 W/ HCPCS: Performed by: NURSE PRACTITIONER

## 2022-01-04 PROCEDURE — 25000003 PHARM REV CODE 250: Performed by: INTERNAL MEDICINE

## 2022-01-04 PROCEDURE — 82803 BLOOD GASES ANY COMBINATION: CPT

## 2022-01-04 PROCEDURE — 63600175 PHARM REV CODE 636 W HCPCS: Performed by: NURSE PRACTITIONER

## 2022-01-04 PROCEDURE — 25000003 PHARM REV CODE 250: Performed by: NURSE PRACTITIONER

## 2022-01-04 PROCEDURE — 94640 AIRWAY INHALATION TREATMENT: CPT

## 2022-01-04 PROCEDURE — 80048 BASIC METABOLIC PNL TOTAL CA: CPT | Performed by: NURSE PRACTITIONER

## 2022-01-04 PROCEDURE — 36600 WITHDRAWAL OF ARTERIAL BLOOD: CPT

## 2022-01-04 PROCEDURE — 99233 PR SUBSEQUENT HOSPITAL CARE,LEVL III: ICD-10-PCS | Mod: ,,, | Performed by: NURSE PRACTITIONER

## 2022-01-04 PROCEDURE — 27000221 HC OXYGEN, UP TO 24 HOURS

## 2022-01-04 PROCEDURE — 99233 SBSQ HOSP IP/OBS HIGH 50: CPT | Mod: ,,, | Performed by: NURSE PRACTITIONER

## 2022-01-04 PROCEDURE — 99233 SBSQ HOSP IP/OBS HIGH 50: CPT | Mod: ,,, | Performed by: INTERNAL MEDICINE

## 2022-01-04 PROCEDURE — 20000000 HC ICU ROOM

## 2022-01-04 PROCEDURE — 99900035 HC TECH TIME PER 15 MIN (STAT)

## 2022-01-04 PROCEDURE — 63600175 PHARM REV CODE 636 W HCPCS: Performed by: INTERNAL MEDICINE

## 2022-01-04 RX ORDER — METOPROLOL TARTRATE 50 MG/1
50 TABLET ORAL 2 TIMES DAILY
Status: DISCONTINUED | OUTPATIENT
Start: 2022-01-04 | End: 2022-01-08

## 2022-01-04 RX ORDER — OXYCODONE AND ACETAMINOPHEN 5; 325 MG/1; MG/1
1 TABLET ORAL EVERY 8 HOURS PRN
Status: DISCONTINUED | OUTPATIENT
Start: 2022-01-04 | End: 2022-01-11 | Stop reason: HOSPADM

## 2022-01-04 RX ORDER — AMOXICILLIN AND CLAVULANATE POTASSIUM 500; 125 MG/1; MG/1
1 TABLET, FILM COATED ORAL DAILY
Status: DISCONTINUED | OUTPATIENT
Start: 2022-01-04 | End: 2022-01-09

## 2022-01-04 RX ORDER — SIMETHICONE 80 MG
1 TABLET,CHEWABLE ORAL 3 TIMES DAILY PRN
Status: DISCONTINUED | OUTPATIENT
Start: 2022-01-04 | End: 2022-01-11 | Stop reason: HOSPADM

## 2022-01-04 RX ORDER — ACETAMINOPHEN 650 MG/20.3ML
650 LIQUID ORAL EVERY 6 HOURS PRN
Status: DISCONTINUED | OUTPATIENT
Start: 2022-01-04 | End: 2022-01-11 | Stop reason: HOSPADM

## 2022-01-04 RX ORDER — HYDRALAZINE HYDROCHLORIDE 20 MG/ML
20 INJECTION INTRAMUSCULAR; INTRAVENOUS EVERY 4 HOURS PRN
Status: DISCONTINUED | OUTPATIENT
Start: 2022-01-04 | End: 2022-01-11 | Stop reason: HOSPADM

## 2022-01-04 RX ADMIN — FAMOTIDINE 20 MG: 20 TABLET ORAL at 08:01

## 2022-01-04 RX ADMIN — HEPARIN SODIUM 5000 UNITS: 5000 INJECTION INTRAVENOUS; SUBCUTANEOUS at 06:01

## 2022-01-04 RX ADMIN — AMOXICILLIN AND CLAVULANATE POTASSIUM 500 MG: 500; 125 TABLET, FILM COATED ORAL at 04:01

## 2022-01-04 RX ADMIN — ARFORMOTEROL TARTRATE 15 MCG: 15 SOLUTION RESPIRATORY (INHALATION) at 07:01

## 2022-01-04 RX ADMIN — BUDESONIDE 0.5 MG: 0.5 INHALANT ORAL at 08:01

## 2022-01-04 RX ADMIN — FOLIC ACID 1 MG: 1 TABLET ORAL at 08:01

## 2022-01-04 RX ADMIN — NICOTINE 1 PATCH: 21 PATCH, EXTENDED RELEASE TRANSDERMAL at 08:01

## 2022-01-04 RX ADMIN — BUDESONIDE 0.5 MG: 0.5 INHALANT ORAL at 07:01

## 2022-01-04 RX ADMIN — HEPARIN SODIUM 5000 UNITS: 5000 INJECTION INTRAVENOUS; SUBCUTANEOUS at 04:01

## 2022-01-04 RX ADMIN — HYDRALAZINE HYDROCHLORIDE 10 MG: 20 INJECTION INTRAMUSCULAR; INTRAVENOUS at 05:01

## 2022-01-04 RX ADMIN — HEPARIN SODIUM 5000 UNITS: 5000 INJECTION INTRAVENOUS; SUBCUTANEOUS at 09:01

## 2022-01-04 RX ADMIN — ARFORMOTEROL TARTRATE 15 MCG: 15 SOLUTION RESPIRATORY (INHALATION) at 08:01

## 2022-01-04 RX ADMIN — MULTIPLE VITAMINS W/ MINERALS TAB 1 TABLET: TAB at 08:01

## 2022-01-04 RX ADMIN — LORAZEPAM 1 MG: 2 INJECTION INTRAMUSCULAR; INTRAVENOUS at 08:01

## 2022-01-04 RX ADMIN — HYDRALAZINE HYDROCHLORIDE 10 MG: 20 INJECTION INTRAMUSCULAR; INTRAVENOUS at 10:01

## 2022-01-04 RX ADMIN — OXYCODONE HYDROCHLORIDE AND ACETAMINOPHEN 1 TABLET: 5; 325 TABLET ORAL at 06:01

## 2022-01-04 RX ADMIN — METOPROLOL TARTRATE 25 MG: 25 TABLET, FILM COATED ORAL at 08:01

## 2022-01-04 RX ADMIN — THIAMINE HCL TAB 100 MG 100 MG: 100 TAB at 08:01

## 2022-01-04 NOTE — ASSESSMENT & PLAN NOTE
Stable-jyothi  2 to Sepsis  Hold chemcial DVTp    1/1  Stable      1/2  Likely combination of ETOH misuse and sepsis  Platelet count is stable 73K, not bleeding  No need for transfusion    1/3  Improved to 114K today    1/4  Resolved

## 2022-01-04 NOTE — PROGRESS NOTES
[facility] - Intensive Care ([facility] Medicine  Progress Note    Patient Name: Lizz Alas  MRN: [id number]  Patient Class: IP- Inpatient   Admission Date: 12/27/2021  Length of Stay: 7 days  Attending Physician: Julissa Thompson MD  Primary Care Provider: Primary Doctor No        Subjective:     Principal Problem:Severe sepsis with acute organ dysfunction        HPI:  Lizz Alas is a 71 y.o. Georgian speaking male patient with a PMHx of PAF, HLD, HTN, thrombocytopenia, alcohol abuse, and tobacco use who presents to the Emergency Department for intermittent epigastric abdominal pain which onset gradually 3 days PTA. Today, the pt reports that his pain has been constant. His symptoms are constant and moderate in severity. No mitigating or exacerbating factors reported. Associated sxs include fever (102 F), nausea, and vomiting. Patient denies any diarrhea, constipation, SOB, weakness, fever, chills, and all other sxs at this time. Initial work-up in the ED showed: Normal WBC and lactic, , , alk phos 184, T bili 5.1, lipase 11, glucose 180, plt 119, sodium 134, COVID negative. Abdominal US showed gallbladder distension with internal sludge but without definite stones, intra and extrahepatic biliary ductal dilation which may represent acute cholecystitis or choledocholithiasis. Blood cultures obtained in the ED, and 1.5 L IV fluids and IV Zosyn given. Case was discussed with GI per the ED, who recommend NPO, IV abx, and ERCP in AM. Hospital Medicine was contacted for admission and patient placed in Observation.       Overview/Hospital Course:  Patient 72 yo male admitted to hospital with suspected cholangitis. Patient initiated on IV abx, fluids, O2. Patient with a rapid decompensation shourtly after admit, rapid response called, patient hypoxic in the 60's placed on bipap with good effect. Patient transferred to ICU. Patient with a progressive decline, febrile episodes to 102.9. Severe septic  shock, pressors and fluids on board. Patient evaluated by GI who recommended ERCP - additional CT abdomen. Patient scheduled for CT and this has been delayed pending stability. Patient intubated for airway protection.  12/29- Patient remains intubated, sedated, on pressors. Discussed POC with family at bedside. Patient evaluated by surgery who recommend o/p surgical followup on recovery for elective cholecystectomy.. Repeat blood cultures in progress, abx infusing.  12/30 - Patient wbc declining but remains on pressors. He is intubated and sedated. Family at bedside. Worsening renal function with cr 4.4 today. Nephrology on consult. Discussed with CC Team  12/31: On sole pressor Norepinephrine 0.14mcg/kg/min, afebrile, leucocytosis is resolved, scR is bumped to 6.0. Patient is oliguric (118 cc urine in last 24 hour). On Fentanyl gtt and NaHCO3 gtt   Daughter Hortencia  is at bedside and was updated.   AC 26/450/40/5  1/1/22 patient seen, daughter Hortencia at bedside, currently on CRRT, Norepinephrine at 0.02mcg/kg/min, fentanyl 100mcg/Hr. AC 26/450/40/5. Patient more awake today, opens eyes spontaneously, is squeezing his daughter's hand  1/2 About afternoon yesterday he self extubated, did not require immediate re-intubation as he was doing rel well resp wise, he is currently on Vapo therm 35LPM 80% FIO2, precedex drip to help with agitation (history of ETOH misuse), getting CRRT, family at bedside, whilst confused he is oriented to selpf/family, conversing with family  1/3 patient seen, was sleeping calmly early this AM, family not at bedside, on Vapotherm 2oLPM at 45% FIO2, not dyspneic. CRRT off. Remains afebrile  1/4 patient seen, down to 4L oxygen, awake and alert, off Precedex gtt, been off pressors      Interval History: no overnight events    Review of Systems   Constitutional: Negative for fever.   HENT: Negative for drooling, rhinorrhea and trouble swallowing.    Respiratory: Negative for shortness  of breath and wheezing.    Cardiovascular: Positive for leg swelling. Negative for chest pain and palpitations.   Endocrine: Negative for polyuria.   Genitourinary: Negative for dysuria and hematuria.   Musculoskeletal: Negative for back pain.   Skin: Negative for color change.   Neurological: Negative for tremors and numbness.   Hematological: Negative for adenopathy.   Psychiatric/Behavioral: Positive for confusion and decreased concentration. Negative for agitation.     Objective:     Vital Signs (Most Recent):  Temp: 98.6 °F (37 °C) (01/03/22 1900)  Pulse: 82 (01/04/22 0741)  Resp: 18 (01/04/22 0741)  BP: (!) 166/93 (01/04/22 0700)  SpO2: 100 % (01/04/22 0738) Vital Signs (24h Range):  Temp:  [98.1 °F (36.7 °C)-98.6 °F (37 °C)] 98.6 °F (37 °C)  Pulse:  [] 82  Resp:  [18-32] 18  SpO2:  [93 %-100 %] 100 %  BP: (141-208)/(68-93) 166/93     Weight: 97.5 kg (214 lb 15.2 oz)  Body mass index is 34.69 kg/m².    Intake/Output Summary (Last 24 hours) at 1/4/2022 0902  Last data filed at 1/4/2022 0701  Gross per 24 hour   Intake 148.53 ml   Output 345 ml   Net -196.47 ml      Physical Exam  Vitals reviewed.   Constitutional:       General: He is not in acute distress.     Appearance: He is normal weight. He is ill-appearing and diaphoretic. He is not toxic-appearing.   HENT:      Head: Normocephalic and atraumatic.   Eyes:      General: No scleral icterus.     Pupils: Pupils are equal, round, and reactive to light.   Cardiovascular:      Rate and Rhythm: Normal rate and regular rhythm.      Pulses: Normal pulses.      Heart sounds: No murmur heard.      Pulmonary:      Effort: Pulmonary effort is normal.      Comments: Reduced entry at bases  Abdominal:      General: Bowel sounds are normal. There is distension.      Palpations: Abdomen is soft.      Tenderness: There is no abdominal tenderness. There is no guarding.      Comments: Rectal tube   Genitourinary:     Comments: hernandez  Musculoskeletal:      Right lower  leg: Edema present.      Left lower leg: Edema present.      Comments: Swelling to both hands   Skin:     General: Skin is warm.      Capillary Refill: Capillary refill takes less than 2 seconds.   Neurological:      General: No focal deficit present.      Mental Status: He is alert.      Comments: Oriented to self and place         Significant Labs:   BMP:   Recent Labs   Lab 01/03/22  0434 01/03/22  0434 01/04/22  0438   GLU 91  91   < > 87   *  132*   < > 136   K 3.9  3.9   < > 3.8   CL 99  99   < > 100   CO2 18*  18*   < > 21*   BUN 49*  49*   < > 76*   CREATININE 4.7*  4.7*   < > 6.7*   CALCIUM 8.1*  8.1*   < > 8.3*   MG 2.4  --   --     < > = values in this interval not displayed.     CBC:   Recent Labs   Lab 01/03/22  0434 01/04/22  0438   WBC 7.16 9.70   HGB 11.6* 12.0*   HCT 33.8* 34.9*   * 183     CMP:   Recent Labs   Lab 01/02/22  1432 01/02/22  1432 01/02/22  2306 01/03/22  0434 01/04/22  0438   *   < > 132* 132*  132* 136   K 3.8   < > 4.0 3.9  3.9 3.8   CL 99   < > 99 99  99 100   CO2 23   < > 23 18*  18* 21*   GLU 81   < > 93 91  91 87   BUN 53*   < > 46* 49*  49* 76*   CREATININE 4.7*   < > 4.2* 4.7*  4.7* 6.7*   CALCIUM 8.0*   < > 7.8* 8.1*  8.1* 8.3*   ALBUMIN 2.1*  --  2.1* 2.2*  --    ANIONGAP 10   < > 10 15  15 15   EGFRNONAA 11*   < > 13* 11*  11* 7*    < > = values in this interval not displayed.       Significant Imaging: I have reviewed all pertinent imaging results/findings within the past 24 hours.     CXr:ECG monitoring leads overlie the chest.  There is interval removal of an endotracheal tube.  A right internal jugular central venous catheter terminates over the SVC.  A left internal jugular central venous catheter terminates at the junction of the left brachiocephalic vein and SVC.There is interval improvement in bilateral lower lung zone consolidative opacity/atelectasis, however there remains diffuse interstitial opacity throughout the lungs.  No  pneumothorax or significant pleural fluid.       Assessment/Plan:      * Severe sepsis with acute organ dysfunction  Pressors  Fluids  ABX  ICU  Pulmonary CC  Intubated  Sedated      12/31  On sole pressor, Norepinephrine currently 0.14 mcg/kg/min, wean/titrate as per ICU team  On appropriate antibiotics: Ceftriaxone 1 gm IV Q12 for Sensitive E coli    1/1/22  On low dose Norepinephrine as he is getting CRRT  Source control achieved    1/2  Off Pressors  SBP acceptable    1/3, 4  Resolved  Off Pressors    E coli bacteremia  ABX  ICU  Follow Blood Cultures  Pressors as indicated      12/31  Sensitive E coli bacteremia on ceftriaxone IV  Afebrile  Resolved leucocytosis  Repeat blood culture on 12/29 NGTC x 2 days  Appropriate source control achieved S/P ERCP, stone retrieval and sphincterectomy.  Panculture as needed      1/1, 2, 3  Repeat culture NGTD  Cont cefriaxone 1 gm IV Q12    1/4  Final repeat blood culture Negative  Cont Ceftriaxone    LISET (acute kidney injury)  Worsening  Cr 3.3  Monitor    12/31  2 to shock  Nephro on board  Avoid nephrotoxins  Cont NaHCo3 gtt    1/1/22  On CRRT started today   Nephro on board  Mild hypokalemia and hyponatremia from LISET: CRRT    1/2  Net positive about 18L since admit  Ongoing CRRT  Avoid nephrotoxic agents    1/3  Getting RRT  scR 4.7  Oliguric UOP last 24 slightly over 227  Got CRRT past approx 48 hrs (-ve slightly over 2L)    1/4  Dialysis break yesterday to see intrinsic function: sCR up to 6.7, no dangerous electrolyte or acid imbalance today  uop 315 ml last 24 hour  Nephro on board      Acute cholangitis  - Abdominal US showed: Gallbladder distension with internal sludge but without definite stones. Intra and extrahepatic biliary ductal dilation. Acute cholecystitis or choledocholithiasis not excluded.  - GI consult. Plans for ERCP in AM. Will keep NPO.  - Blood cultures obtained in the ED. Continue empiric IV Zosyn.   - IV hydration.  - Analgesics and antiemetics as  needed.   - Follow labs.     12/28  ERCP planned  Await intervention per GI  Now Intubated    12/29  S/p ERCP  GI on consult  Stent placed per GI  Abx  Follow Cultures    12/30  GI on board  S/p ERCP  Abx  Improving clinically  Remains on Pressors    12/31, 1/1, 1/2, 3, 4  S/P source control as above  Cont ceftriaxone    Acute respiratory failure with hypoxia and hypercarbia  Patient with Hypoxic Respiratory failure which is Acute.  he is not on home oxygen. Supplemental oxygen was provided and noted- Oxygen Concentration (%):  [45] 45.   Signs/symptoms of respiratory failure include- tachypnea, increased work of breathing, respiratory distress, use of accessory muscles and cyanosis. Contributing diagnoses includes - COPD Labs and images were reviewed. Patient Has recent ABG, which has been reviewed. Will treat underlying causes and adjust management of respiratory failure as indicated    12/31  On 40 % FIO2 and PEEP 5 [facility] ventilation  Stable.  Ongoing renal and circulatory failure being addressed prior to considering weaning trials    1/1  On Mech Vent  ICU on board    1/2  On Vapotherm  CXR in AM  Pulm toilet as tolerated    1/3  Being weaned down on Vapotherm  Diffuse interstitial opacities throughout lungs, will likely improve with RRT  Repeat CXR as needed    1/4  Improving  PT; OOB to chair as tolerated  Incentive spirometry  Breathing treatment  Wean supplemental O2 as toelrated    Thrombocytopenia  Stable-jyothi  2 to Sepsis  Hold chemcial DVTp    1/1  Stable      1/2  Likely combination of ETOH misuse and sepsis  Platelet count is stable 73K, not bleeding  No need for transfusion    1/3  Improved to 114K today    1/4  Resolved    Paroxysmal atrial fibrillation  - Remains in sinus rhythm on admission. Monitor telemetry.  - Continue home BB.  - Patient is not on long-term AC. Will defer to his primary cardiologist.       Primary hypertension  - Continue home antihypertensives.     12/31  On  pressors    1/4  Lopressor 25 mg PO BID, adjust as needed   May have to resume home Amlodipine 5m g PO QD, will monitor    Tobacco use  - Assistance with smoking cessation was offered, including:  [x]  Medications  [x]  Counseling  [x]  Printed Information on Smoking Cessation  []  Referral to a Smoking Cessation Program  - Patient was counseled regarding smoking for 3-10 minutes.    Alcohol abuse  - Monitor for withdrawal/DTs. IV Ativan if needed.     1/2  With some delirium likely from aute illness and ICU stay, in addition to possibly some withdrawal: Precedex gtt  Bedside swallow eval when possible: oral folate/thiamine +- oral Benzodiapines as needed    Acute hyperglycemia  Cont Long acting insulin BID and SSI   Cont Tube feeding    1/2  Resolved as tube feedings D/C  Mild prediabetic  SSI PRN  No need for long acting insulins at this point  SLP for feeding in AM          VTE Risk Mitigation (From admission, onward)         Ordered     heparin (porcine) injection 5,000 Units  Every 8 hours         01/03/22 0750     heparin (porcine) injection 2,000 Units  As needed (PRN)         01/01/22 2321     IP VTE HIGH RISK PATIENT  Once         12/27/21 2247     Place sequential compression device  Until discontinued         12/27/21 2247     Reason for No Pharmacological VTE Prophylaxis  Once        Question Answer Comment   Reasons: Risk of Bleeding    Reasons: Thrombocytopenia        12/27/21 2247                Discharge Planning   FLAQUITA:      Code Status: Full Code   Is the patient medically ready for discharge?:     Reason for patient still in hospital (select all that apply): Treatment  Discharge Plan A: Home with family            Critical care time spent on the evaluation and treatment of severe organ dysfunction, review of pertinent labs and imaging studies, discussions with consulting providers and discussions with patient/family: 30  minutes.      Julissa Thompson MD  Department of Hospital Medicine   [facility]  - Intensive Care ([facility])

## 2022-01-04 NOTE — ASSESSMENT & PLAN NOTE
Likely secondary to sepsis and atelectasis  weaning NC for goal sat > 92  Encourage TCDB and mobilize when able

## 2022-01-04 NOTE — ASSESSMENT & PLAN NOTE
Accurate I/Os, urine output up at 315ml out last 24 hours  Renal dose meds and IVAB  Nephrology following  urine output up; plan eval daily for intermittent HD indication

## 2022-01-04 NOTE — PROGRESS NOTES
[facility] - Intensive Care ([facility])  Nephrology  Progress Note    Patient Name: Lizz Alas  MRN: [id number]  Admission Date: 12/27/2021  Hospital Length of Stay: 7 days  Attending Provider: Julissa Thompson MD   Primary Care Physician: Primary Doctor No  Principal Problem:Severe sepsis with acute organ dysfunction      Subjective:     Interval History:   12/31: Remains on vasopressors, intubated in ICU. Poor UOP    1/1/22: CRRT initiated, family at bedside     1/2/22: CRRT clotted overnight, restarted this am. Self-extubation yesterday afternoon. Family at bedside, helping to reorient     1/3/22: CRRT clotted overnight. Cleared to swallow. Negative 3L previous 24s and Negative 3.2L past 48 hours     1/4/22: UOP increasing and is matched in volume, slightly negative. More oriented per family (language barrier)     Review of patient's allergies indicates:  No Known Allergies  Current Facility-Administered Medications   Medication Frequency    0.9%  NaCl infusion PRN    acetaminophen oral solution 650 mg Q6H PRN    albuterol-ipratropium 2.5 mg-0.5 mg/3 mL nebulizer solution 3 mL Q4H PRN    aluminum-magnesium hydroxide-simethicone 200-200-20 mg/5 mL suspension 30 mL QID PRN    amoxicillin-clavulanate 500-125mg per tablet 500 mg Daily    arformoteroL nebulizer solution 15 mcg BID    budesonide nebulizer solution 0.5 mg Q12H    dextrose 50% injection 12.5 g PRN    famotidine tablet 20 mg Daily    folic acid tablet 1 mg Daily    glucagon (human recombinant) injection 1 mg PRN    heparin (porcine) injection 2,000 Units PRN    heparin (porcine) injection 5,000 Units Q8H    hydrALAZINE injection 10 mg Q8H PRN    lorazepam injection 1 mg Q2H PRN    metoprolol tartrate (LOPRESSOR) tablet 25 mg BID    multivitamin tablet Daily    naloxone 0.4 mg/mL injection 0.02 mg PRN    nicotine 21 mg/24 hr 1 patch Daily    ondansetron injection 4 mg Q6H PRN    [START ON 1/5/2022] polyethylene glycol packet 17 g Every  other day    promethazine tablet 25 mg Q6H PRN    sodium chloride 0.9% flush 10 mL Q12H PRN    thiamine tablet 100 mg Daily       Objective:     Vital Signs (Most Recent):  Temp: 98.4 °F (36.9 °C) (01/04/22 0700)  Pulse: 73 (01/04/22 1000)  Resp: (!) 26 (01/04/22 1000)  BP: (!) 178/82 (01/04/22 1000)  SpO2: 96 % (01/04/22 1003)  O2 Device (Oxygen Therapy): (S) nasal cannula (01/04/22 1003) Vital Signs (24h Range):  Temp:  [98.1 °F (36.7 °C)-98.6 °F (37 °C)] 98.4 °F (36.9 °C)  Pulse:  [] 73  Resp:  [18-32] 26  SpO2:  [93 %-100 %] 96 %  BP: (145-208)/(71-93) 178/82     Weight: 97.5 kg (214 lb 15.2 oz) (01/03/22 0542)  Body mass index is 34.69 kg/m².  Body surface area is 2.13 meters squared.    I/O last 3 completed shifts:  In: 1504.8 [I.V.:1247.1; NG/GT:30; IV Piggyback:227.7]  Out: 1973 [Urine:385; Other:1518; Stool:70]    Physical Exam  Vitals and nursing note reviewed.   Constitutional:       General: He is not in acute distress.  HENT:      Head: Atraumatic.   Cardiovascular:      Rate and Rhythm: Normal rate.   Pulmonary:      Effort: No respiratory distress.   Abdominal:      Palpations: Abdomen is soft.   Musculoskeletal:         General: Swelling (very mild) present.   Skin:     General: Skin is warm and dry.   Neurological:      Mental Status: He is easily aroused.         Significant Labs: reviewed    Significant Imaging: reviewed     Assessment/Plan:     Active Diagnoses:    Diagnosis Date Noted POA    PRINCIPAL PROBLEM:  Severe sepsis with acute organ dysfunction [A41.9, R65.20] 12/28/2021 Yes    LISET (acute kidney injury) [N17.9] 12/29/2021 No    Thrombocytopenia [D69.6] 12/29/2021 Yes    Acute hyperglycemia [R73.9] 12/29/2021 Yes    Acute respiratory failure with hypoxia and hypercarbia [J96.01, J96.02] 12/28/2021 Yes    E coli bacteremia [R78.81, B96.20] 12/28/2021 Yes    Acute cholangitis [K83.09] 12/27/2021 Yes    Tobacco use [Z72.0] 12/27/2021 Yes     Chronic    Alcohol abuse  [F10.10] 12/27/2021 Yes     Chronic    Primary hypertension [I10] 12/27/2021 Yes     Chronic    Paroxysmal atrial fibrillation [I48.0] 12/27/2021 Yes     Chronic      Problems Resolved During this Admission:    Diagnosis Date Noted Date Resolved POA    On mechanically assisted ventilation [Z99.11] 12/30/2021 01/02/2022 Not Applicable    Electrolyte imbalance [E87.8] 12/28/2021 01/02/2022 Yes       Oligoanuric LISET with baseline creatinine 0.9mg/dL in the setting of septic shock   - Oligoanuric for 5 days now with improving UOP  - continue to hold all RRT and observe UOP today and overnight  -  RRT can delay renal recovery   - possible IHD tomorrow pending labs/oxygenation needs     Septic Shock  - clinically improving     Volume status  - stable, overall improved with UF         Conrad Arnett MD  Nephrology  [facility] - Intensive Care ([facility])

## 2022-01-04 NOTE — ASSESSMENT & PLAN NOTE
Likely contributing to delirium/confusion - now improving  Continue enteral thiamine/folate/multi vit  Prn ativan

## 2022-01-04 NOTE — ASSESSMENT & PLAN NOTE
ABX  ICU  Follow Blood Cultures  Pressors as indicated      12/31  Sensitive E coli bacteremia on ceftriaxone IV  Afebrile  Resolved leucocytosis  Repeat blood culture on 12/29 NGTC x 2 days  Appropriate source control achieved S/P ERCP, stone retrieval and sphincterectomy.  Panculture as needed      1/1, 2, 3  Repeat culture NGTD  Cont cefriaxone 1 gm IV Q12    1/4  Final repeat blood culture Negative  Cont Ceftriaxone

## 2022-01-04 NOTE — ASSESSMENT & PLAN NOTE
Worsening  Cr 3.3  Monitor    12/31  2 to shock  Nephro on board  Avoid nephrotoxins  Cont NaHCo3 gtt    1/1/22  On CRRT started today   Nephro on board  Mild hypokalemia and hyponatremia from LISET: CRRT    1/2  Net positive about 18L since admit  Ongoing CRRT  Avoid nephrotoxic agents    1/3  Getting RRT  scR 4.7  Oliguric UOP last 24 slightly over 227  Got CRRT past approx 48 hrs (-ve slightly over 2L)    1/4  Dialysis break yesterday to see intrinsic function: sCR up to 6.7, no dangerous electrolyte or acid imbalance today  uop 315 ml last 24 hour  Nephro on board

## 2022-01-04 NOTE — ASSESSMENT & PLAN NOTE
- Abdominal US showed: Gallbladder distension with internal sludge but without definite stones. Intra and extrahepatic biliary ductal dilation. Acute cholecystitis or choledocholithiasis not excluded.  - GI consult. Plans for ERCP in AM. Will keep NPO.  - Blood cultures obtained in the ED. Continue empiric IV Zosyn.   - IV hydration.  - Analgesics and antiemetics as needed.   - Follow labs.     12/28  ERCP planned  Await intervention per GI  Now Intubated    12/29  S/p ERCP  GI on consult  Stent placed per GI  Abx  Follow Cultures    12/30  GI on board  S/p ERCP  Abx  Improving clinically  Remains on Pressors    12/31, 1/1, 1/2, 3, 4  S/P source control as above  Cont ceftriaxone

## 2022-01-04 NOTE — PLAN OF CARE
Spoke with patient's daughter at bedside in regards to medicaid application. Explained this type of emergency medicaid will only apply to inpatient hospitalization due to patient's immigration status. The application will not be approved until after the patient has been DC'd and all medical records can be reviewed. Patient's daughter advised that she has been working on patient's immigration status for a while pre hospitalization. They have an  and she will reach our to him/her regarding status and if there is an ETA of when this will be determined. Advised daughter once patient is approved citizenship and assigned a SSN Medicaid can be applied for to assist with post acute services which the patient will more than likely need. The hospital can start the application if we have a valid SSN. She verbalized understanding and will reach out to hospital staff with any questions.

## 2022-01-04 NOTE — SUBJECTIVE & OBJECTIVE
Interval History: no overnight events    Review of Systems   Constitutional: Negative for fever.   HENT: Negative for drooling, rhinorrhea and trouble swallowing.    Respiratory: Negative for shortness of breath and wheezing.    Cardiovascular: Positive for leg swelling. Negative for chest pain and palpitations.   Endocrine: Negative for polyuria.   Genitourinary: Negative for dysuria and hematuria.   Musculoskeletal: Negative for back pain.   Skin: Negative for color change.   Neurological: Negative for tremors and numbness.   Hematological: Negative for adenopathy.   Psychiatric/Behavioral: Positive for confusion and decreased concentration. Negative for agitation.     Objective:     Vital Signs (Most Recent):  Temp: 98.6 °F (37 °C) (01/03/22 1900)  Pulse: 82 (01/04/22 0741)  Resp: 18 (01/04/22 0741)  BP: (!) 166/93 (01/04/22 0700)  SpO2: 100 % (01/04/22 0738) Vital Signs (24h Range):  Temp:  [98.1 °F (36.7 °C)-98.6 °F (37 °C)] 98.6 °F (37 °C)  Pulse:  [] 82  Resp:  [18-32] 18  SpO2:  [93 %-100 %] 100 %  BP: (141-208)/(68-93) 166/93     Weight: 97.5 kg (214 lb 15.2 oz)  Body mass index is 34.69 kg/m².    Intake/Output Summary (Last 24 hours) at 1/4/2022 0902  Last data filed at 1/4/2022 0701  Gross per 24 hour   Intake 148.53 ml   Output 345 ml   Net -196.47 ml      Physical Exam  Vitals reviewed.   Constitutional:       General: He is not in acute distress.     Appearance: He is normal weight. He is ill-appearing and diaphoretic. He is not toxic-appearing.   HENT:      Head: Normocephalic and atraumatic.   Eyes:      General: No scleral icterus.     Pupils: Pupils are equal, round, and reactive to light.   Cardiovascular:      Rate and Rhythm: Normal rate and regular rhythm.      Pulses: Normal pulses.      Heart sounds: No murmur heard.      Pulmonary:      Effort: Pulmonary effort is normal.      Comments: Reduced entry at bases  Abdominal:      General: Bowel sounds are normal. There is distension.       Palpations: Abdomen is soft.      Tenderness: There is no abdominal tenderness. There is no guarding.      Comments: Rectal tube   Genitourinary:     Comments: hernandez  Musculoskeletal:      Right lower leg: Edema present.      Left lower leg: Edema present.      Comments: Swelling to both hands   Skin:     General: Skin is warm.      Capillary Refill: Capillary refill takes less than 2 seconds.   Neurological:      General: No focal deficit present.      Mental Status: He is alert.      Comments: Oriented to self and place         Significant Labs:   BMP:   Recent Labs   Lab 01/03/22  0434 01/03/22 0434 01/04/22 0438   GLU 91  91   < > 87   *  132*   < > 136   K 3.9  3.9   < > 3.8   CL 99  99   < > 100   CO2 18*  18*   < > 21*   BUN 49*  49*   < > 76*   CREATININE 4.7*  4.7*   < > 6.7*   CALCIUM 8.1*  8.1*   < > 8.3*   MG 2.4  --   --     < > = values in this interval not displayed.     CBC:   Recent Labs   Lab 01/03/22 0434 01/04/22 0438   WBC 7.16 9.70   HGB 11.6* 12.0*   HCT 33.8* 34.9*   * 183     CMP:   Recent Labs   Lab 01/02/22  1432 01/02/22  1432 01/02/22  2306 01/03/22 0434 01/04/22 0438   *   < > 132* 132*  132* 136   K 3.8   < > 4.0 3.9  3.9 3.8   CL 99   < > 99 99  99 100   CO2 23   < > 23 18*  18* 21*   GLU 81   < > 93 91  91 87   BUN 53*   < > 46* 49*  49* 76*   CREATININE 4.7*   < > 4.2* 4.7*  4.7* 6.7*   CALCIUM 8.0*   < > 7.8* 8.1*  8.1* 8.3*   ALBUMIN 2.1*  --  2.1* 2.2*  --    ANIONGAP 10   < > 10 15  15 15   EGFRNONAA 11*   < > 13* 11*  11* 7*    < > = values in this interval not displayed.       Significant Imaging: I have reviewed all pertinent imaging results/findings within the past 24 hours.     CXr:ECG monitoring leads overlie the chest.  There is interval removal of an endotracheal tube.  A right internal jugular central venous catheter terminates over the SVC.  A left internal jugular central venous catheter terminates at the junction of the left  brachiocephalic vein and SVC.There is interval improvement in bilateral lower lung zone consolidative opacity/atelectasis, however there remains diffuse interstitial opacity throughout the lungs.  No pneumothorax or significant pleural fluid.

## 2022-01-04 NOTE — ASSESSMENT & PLAN NOTE
Pressors  Fluids  ABX  ICU  Pulmonary CC  Intubated  Sedated      12/31  On sole pressor, Norepinephrine currently 0.14 mcg/kg/min, wean/titrate as per ICU team  On appropriate antibiotics: Ceftriaxone 1 gm IV Q12 for Sensitive E coli    1/1/22  On low dose Norepinephrine as he is getting CRRT  Source control achieved    1/2  Off Pressors  SBP acceptable    1/3, 4  Resolved  Off Pressors

## 2022-01-04 NOTE — PROGRESS NOTES
[facility] - Intensive Care ([facility])  Critical Care Medicine  Progress Note    Patient Name: Lizz Alas  MRN: [id number]  Admission Date: 12/27/2021  Hospital Length of Stay: 7 days  Code Status: Full Code  Attending Provider: Julissa Thompson MD  Primary Care Provider: Primary Doctor No   Principal Problem: Severe sepsis with acute organ dysfunction    Subjective:     HPI:  Mr Alas is a 72 yo [place]ian male with a PMH of HLD, etoh abuse, tobacco abuse, HTN and PAF.  He presented to [facility] ED yesterday evening via personal vehicle with complaint of abd pain over 3 days and N/V X 1 day.  He does not speak English and was accompanied by family.  In ED Glucose 180, TBili 5.1, LA 1.5 and Abd US with GB distention and sludging.  He had temp 102.9.  He as admitted to Tele and GI consulted diagnosed with acute cholecystitis.  This AM Telemetry reported HR was at 183 and upon assessment patient seemed to be in distress, mouth breathing , O2 was reading 73% pt was shaking and shivering. Called TOM due to pt speaking Latvian [id number], # number [id number], pt was stating he could not breath.  Checked pt temp reading was 98.4 oral .Pt was on 3L then moved to 5L, then  Respiratory came in and put him on breathing treatment albuterol, still couldn't get breathing controlled, called Dr. Lopez and he stated to transfer pt to ICU.      Hospital/ICU Course:  12/28 - In ICU he was awake and alert on BiPAP .40 FiO2 with mild work of breathing but no distress and hemodynamically stable.  GI requested patient intubated and stabilized more so from pulm standpoint then obtain CT Abd and plan to OR for ERCP.  TBili up to 7.1.  He was intubated and supported on [facility] ventilation post explaining all this to daughter at bed side.  Post intubation and sedation became hypotensive requiring CL and AL placements and starting Levophed infusion.   12/29 - ERCP yesterday afternoon.  Increased vasopressor and O2 requirements overnight.  This AM still  intubated on mech vent and sedated on Fentanyl infusion.  Still on Vasopressin and Levophed infusions also.  Oliguria with LISET and Tmax last 24 hours up to 102.9.  Blood cultures X 2 + E.Coli.  Discussed care with daughter at bed side and all questions answered.   12/30 - remains intubated and sedated on mech vent. Temp and wbc trend down. Urine output, creatinine, and metabolic acidosis worsening  12/31 - remains intubated and sedated on mech vent with minimal oxygen demand but moderate pressor requirement. Urine output 118ml last 24 hours, creatinine 6.0. continued hyperglycemia 197-237  1/1/2022 - remains intubated and sedated on mechanical vent with support requirement unchanged but pO2 trending down, +18L since admission. Renal function without improvement, only 160ml urine output last 24 hours. Trialysis cath placed for initiation of CRRT, goal filtration and fluid removal  1/2 - self extubated yesterday, tolerating HFNC support. Confusion/Delirium requiring precedex infusion for safety. CRRT initiated but clotted around 0100 after about 3L removed, remains oliguric, electrolytes stable  1/3 - supplemental oxygen weaned to now 8L NC; CRRT clotted again around 2300 but urine output 220 ml last 24 hours; precedex off this morning, delirium improving; HTN requiring treatment  1/4 - off precedex, continued mild confusion without agitation now. Supplemental oxygen weaned to 4L NC. Urine output up to 315ml last 24 hours, creatinine up 6.7, K stable      Review of Systems   Unable to perform ROS: Medical condition     Objective:     Vital Signs (Most Recent):  Temp: 98.6 °F (37 °C) (01/03/22 1900)  Pulse: 82 (01/04/22 0741)  Resp: 18 (01/04/22 0741)  BP: (!) 171/86 (01/04/22 0600)  SpO2: 100 % (01/04/22 0738) Vital Signs (24h Range):  Temp:  [98.1 °F (36.7 °C)-98.6 °F (37 °C)] 98.6 °F (37 °C)  Pulse:  [] 82  Resp:  [18-32] 18  SpO2:  [91 %-100 %] 100 %  BP: (133-208)/(68-91) 171/86     Weight: 97.5 kg (214 lb  15.2 oz)  Body mass index is 34.69 kg/m².      Intake/Output Summary (Last 24 hours) at 1/4/2022 0746  Last data filed at 1/4/2022 0701  Gross per 24 hour   Intake 192.45 ml   Output 385 ml   Net -192.55 ml         Physical Exam  Vitals and nursing note reviewed.   Constitutional:       Appearance: He is obese. He is ill-appearing.      Interventions: Nasal cannula in place.   Eyes:      General: No scleral icterus.     Conjunctiva/sclera: Conjunctivae normal.   Neck:      Vascular: No JVD.   Cardiovascular:      Rate and Rhythm: Normal rate and regular rhythm.      Pulses:           Radial pulses are 1+ on the right side and 1+ on the left side.        Dorsalis pedis pulses are 1+ on the right side and 1+ on the left side.   Pulmonary:      Breath sounds: Decreased breath sounds and rhonchi present.   Abdominal:      General: Bowel sounds are decreased. There is no distension.      Palpations: Abdomen is soft.   Musculoskeletal:      Right lower leg: No edema.      Left lower leg: No edema.   Skin:     General: Skin is warm and dry.      Capillary Refill: Capillary refill takes less than 2 seconds.   Neurological:      Mental Status: He is alert.      GCS: GCS eye subscore is 3. GCS verbal subscore is 4. GCS motor subscore is 6.      Comments: Arouses easily; responds verbally to daughter             Lines/Drains/Airways     Central Venous Catheter Line            Percutaneous Central Line Insertion/Assessment - Triple Lumen  12/28/21 1052 left internal jugular 6 days    Trialysis (Dialysis) Catheter 01/01/22 0922 right internal jugular 2 days          Drain                 Urethral Catheter 12/28/21 0830 6 days         Rectal Tube 01/02/22 1324 1 day          Peripheral Intravenous Line                 Peripheral IV - Single Lumen 12/28/21 0745 20 G Posterior;Right Hand 7 days                Significant Labs:    CBC/Anemia Profile:  Recent Labs   Lab 01/03/22  0434 01/04/22  0438   WBC 7.16 9.70   HGB 11.6* 12.0*    HCT 33.8* 34.9*   * 183   MCV 89 89   RDW 15.1* 14.9*        Chemistries:  Recent Labs   Lab 01/02/22  1432 01/02/22  1432 01/02/22  2306 01/03/22  0434 01/04/22  0438   *   < > 132* 132*  132* 136   K 3.8   < > 4.0 3.9  3.9 3.8   CL 99   < > 99 99  99 100   CO2 23   < > 23 18*  18* 21*   BUN 53*   < > 46* 49*  49* 76*   CREATININE 4.7*   < > 4.2* 4.7*  4.7* 6.7*   CALCIUM 8.0*   < > 7.8* 8.1*  8.1* 8.3*   ALBUMIN 2.1*  --  2.1* 2.2*  --    MG 2.2  --  2.3 2.4  --    PHOS 2.4*  --  3.2 3.2  --     < > = values in this interval not displayed.       All pertinent labs within the past 24 hours have been reviewed.    Significant Imaging:  I have reviewed all pertinent imaging results/findings within the past 24 hours.      ABG  Recent Labs   Lab 01/02/22  0747   PH 7.397   PO2 62*   PCO2 41.0   HCO3 25.2   BE 0     Assessment/Plan:     Psychiatric  Alcohol abuse  Likely contributing to delirium/confusion - now improving  Continue enteral thiamine/folate/multi vit  Prn ativan    Pulmonary  Acute respiratory failure with hypoxia and hypercarbia  Likely secondary to sepsis and atelectasis  weaning NC for goal sat > 92  Encourage TCDB and mobilize when able    Cardiac/Vascular  Paroxysmal atrial fibrillation  SR on monitor,  Extrasystoles noted  Not on anticoagulants on home med review  Continue metoprolol for rate and BP control    Renal/  LISET (acute kidney injury)  Accurate I/Os, urine output up at 315ml out last 24 hours  Renal dose meds and IVAB  Nephrology following  urine output up; plan eval daily for intermittent HD indication    ID  * Severe sepsis with acute organ dysfunction  Source: GB and Bacteremia  Shock resolved  Sputum culture NGTD  Blood cultures X 2 E.Coli, continue rocephin; transition to enteral augmentin for additional 5 days (total 14d abx course)  Monitor Temp and wbc trend      E coli bacteremia  Reviewed case and bacteria with clinical pharmacist; given gram neg and overall  clinical improvement  Now tolerating po  Will transition to augmentin for additional 5 days    Acute cholangitis  IVAB  POD #7 S/P ERCP  Will need Cholecystectomy as outpt once clinically improved      Hematology  Thrombocytopenia  resolved    Endocrine  Acute hyperglycemia  Resolved, Range     Other  Tobacco use  encourage tobacco cessation  Cont LABA and ICS nebs  On Nicotine patch     Critical Care Daily Checklist:    A: Awake: RASS Goal/Actual Goal: RASS Goal: 0-->alert and calm  Actual: [place] Agitation Sedation Scale (RASS): Alert and calm   B: Spontaneous Breathing Trial Performed?     C: SAT & SBT Coordinated?  Extubation 1/1                  D: Delirium: CAM-ICU Overall CAM-ICU: Positive   E: Early Mobility Performed? Yes   F: Feeding Goal: Goals: 1. Enteral Nutrition initiation within 24 hours.  Status: Nutrition Goal Status: new   Current Diet Order   Procedures    Diet renal      AS: Analgesia/Sedation Prn ativan   T: Thromboembolic Prophylaxis SCD   H: HOB > 300 Yes   U: Stress Ulcer Prophylaxis (if needed) pepcid   G: Glucose Control SSI   B: Bowel Function Stool Occurrence: 1   I: Indwelling Catheter (Lines & Eng) Necessity reviewed   D: De-escalation of Antimicrobials/Pharmacotherapies reviewed    Plan for the day/ETD Transfer out of ICU    Code Status:  Family/Goals of Care: Full Code  Pending hospital course; daughter updated on status and plan at bedside   I have discussed case and plan of care in detail with Dr Blum; Status and plan of care were discussed with team on multidisciplinary rounds.  Weaned to LFNC, now 2L, will sign off with transfer; please call if we can be of additional assistance.     GREG Dawn-BC  Critical Care Medicine  [facility] - Intensive Care ([facility])

## 2022-01-04 NOTE — PLAN OF CARE
POC and interventions discussed with daughter at visitation.  Daughter translating.  Reports remains confused, but less so than yesterday.  Did not want to eat dinner.  Heart RRR with occasional PAC's.  BBS coarse, but mildly so.  Oxygenating well on HF NC.  Abd. Soft, BS + x 4 quads.  Urine output improving, approx 20 cc/hr.  Restraints removed, has been calm and not attempting to get out of bed.  Afebrile.

## 2022-01-04 NOTE — SUBJECTIVE & OBJECTIVE
Review of Systems   Unable to perform ROS: Medical condition     Objective:     Vital Signs (Most Recent):  Temp: 98.6 °F (37 °C) (01/03/22 1900)  Pulse: 82 (01/04/22 0741)  Resp: 18 (01/04/22 0741)  BP: (!) 171/86 (01/04/22 0600)  SpO2: 100 % (01/04/22 0738) Vital Signs (24h Range):  Temp:  [98.1 °F (36.7 °C)-98.6 °F (37 °C)] 98.6 °F (37 °C)  Pulse:  [] 82  Resp:  [18-32] 18  SpO2:  [91 %-100 %] 100 %  BP: (133-208)/(68-91) 171/86     Weight: 97.5 kg (214 lb 15.2 oz)  Body mass index is 34.69 kg/m².      Intake/Output Summary (Last 24 hours) at 1/4/2022 0746  Last data filed at 1/4/2022 0701  Gross per 24 hour   Intake 192.45 ml   Output 385 ml   Net -192.55 ml         Physical Exam  Vitals and nursing note reviewed.   Constitutional:       Appearance: He is obese. He is ill-appearing.      Interventions: Nasal cannula in place.   Eyes:      General: No scleral icterus.     Conjunctiva/sclera: Conjunctivae normal.   Neck:      Vascular: No JVD.   Cardiovascular:      Rate and Rhythm: Normal rate and regular rhythm.      Pulses:           Radial pulses are 1+ on the right side and 1+ on the left side.        Dorsalis pedis pulses are 1+ on the right side and 1+ on the left side.   Pulmonary:      Breath sounds: Decreased breath sounds and rhonchi present.   Abdominal:      General: Bowel sounds are decreased. There is no distension.      Palpations: Abdomen is soft.   Musculoskeletal:      Right lower leg: No edema.      Left lower leg: No edema.   Skin:     General: Skin is warm and dry.      Capillary Refill: Capillary refill takes less than 2 seconds.   Neurological:      Mental Status: He is alert.      GCS: GCS eye subscore is 3. GCS verbal subscore is 4. GCS motor subscore is 6.      Comments: Arouses easily; responds verbally to daughter             Lines/Drains/Airways     Central Venous Catheter Line            Percutaneous Central Line Insertion/Assessment - Triple Lumen  12/28/21 1052 left internal  jugular 6 days    Trialysis (Dialysis) Catheter 01/01/22 0922 right internal jugular 2 days          Drain                 Urethral Catheter 12/28/21 0830 6 days         Rectal Tube 01/02/22 1324 1 day          Peripheral Intravenous Line                 Peripheral IV - Single Lumen 12/28/21 0745 20 G Posterior;Right Hand 7 days                Significant Labs:    CBC/Anemia Profile:  Recent Labs   Lab 01/03/22  0434 01/04/22  0438   WBC 7.16 9.70   HGB 11.6* 12.0*   HCT 33.8* 34.9*   * 183   MCV 89 89   RDW 15.1* 14.9*        Chemistries:  Recent Labs   Lab 01/02/22  1432 01/02/22  1432 01/02/22  2306 01/03/22  0434 01/04/22  0438   *   < > 132* 132*  132* 136   K 3.8   < > 4.0 3.9  3.9 3.8   CL 99   < > 99 99  99 100   CO2 23   < > 23 18*  18* 21*   BUN 53*   < > 46* 49*  49* 76*   CREATININE 4.7*   < > 4.2* 4.7*  4.7* 6.7*   CALCIUM 8.0*   < > 7.8* 8.1*  8.1* 8.3*   ALBUMIN 2.1*  --  2.1* 2.2*  --    MG 2.2  --  2.3 2.4  --    PHOS 2.4*  --  3.2 3.2  --     < > = values in this interval not displayed.       All pertinent labs within the past 24 hours have been reviewed.    Significant Imaging:  I have reviewed all pertinent imaging results/findings within the past 24 hours.

## 2022-01-04 NOTE — ASSESSMENT & PLAN NOTE
- Continue home antihypertensives.     12/31  On pressors    1/4  Lopressor 25 mg PO BID, adjust as needed   May have to resume home Amlodipine 5m g PO QD, will monitor

## 2022-01-04 NOTE — ASSESSMENT & PLAN NOTE
SR on monitor,  Extrasystoles noted  Not on anticoagulants on home med review  Continue metoprolol for rate and BP control

## 2022-01-04 NOTE — ASSESSMENT & PLAN NOTE
Reviewed case and bacteria with clinical pharmacist; given gram neg and overall clinical improvement  Now tolerating po  Will transition to augmentin for additional 5 days

## 2022-01-04 NOTE — ASSESSMENT & PLAN NOTE
Patient with Hypoxic Respiratory failure which is Acute.  he is not on home oxygen. Supplemental oxygen was provided and noted- Oxygen Concentration (%):  [45] 45.   Signs/symptoms of respiratory failure include- tachypnea, increased work of breathing, respiratory distress, use of accessory muscles and cyanosis. Contributing diagnoses includes - COPD Labs and images were reviewed. Patient Has recent ABG, which has been reviewed. Will treat underlying causes and adjust management of respiratory failure as indicated    12/31  On 40 % FIO2 and PEEP 5 [facility] ventilation  Stable.  Ongoing renal and circulatory failure being addressed prior to considering weaning trials    1/1  On Mech Vent  ICU on board    1/2  On Vapotherm  CXR in AM  Pulm toilet as tolerated    1/3  Being weaned down on Vapotherm  Diffuse interstitial opacities throughout lungs, will likely improve with RRT  Repeat CXR as needed    1/4  Improving  PT; OOB to chair as tolerated  Incentive spirometry  Breathing treatment  Wean supplemental O2 as toelrated

## 2022-01-04 NOTE — ASSESSMENT & PLAN NOTE
Source: GB and Bacteremia  Shock resolved  Sputum culture NGTD  Blood cultures X 2 E.Coli, continue rocephin; transition to enteral augmentin for additional 5 days (total 14d abx course)  Monitor Temp and wbc trend

## 2022-01-05 PROBLEM — R73.9 ACUTE HYPERGLYCEMIA: Status: RESOLVED | Noted: 2021-12-29 | Resolved: 2022-01-05

## 2022-01-05 LAB
ALBUMIN SERPL BCP-MCNC: 2.3 G/DL (ref 3.5–5.2)
ALP SERPL-CCNC: 172 U/L (ref 55–135)
ALT SERPL W/O P-5'-P-CCNC: 39 U/L (ref 10–44)
ANION GAP SERPL CALC-SCNC: 17 MMOL/L (ref 8–16)
AST SERPL-CCNC: 27 U/L (ref 10–40)
BASOPHILS # BLD AUTO: 0.05 K/UL (ref 0–0.2)
BASOPHILS NFR BLD: 0.5 % (ref 0–1.9)
BILIRUB SERPL-MCNC: 1.3 MG/DL (ref 0.1–1)
BUN SERPL-MCNC: 98 MG/DL (ref 8–23)
CALCIUM SERPL-MCNC: 7.8 MG/DL (ref 8.7–10.5)
CHLORIDE SERPL-SCNC: 102 MMOL/L (ref 95–110)
CO2 SERPL-SCNC: 18 MMOL/L (ref 23–29)
CREAT SERPL-MCNC: 8 MG/DL (ref 0.5–1.4)
DIFFERENTIAL METHOD: ABNORMAL
EOSINOPHIL # BLD AUTO: 0.2 K/UL (ref 0–0.5)
EOSINOPHIL NFR BLD: 1.7 % (ref 0–8)
ERYTHROCYTE [DISTWIDTH] IN BLOOD BY AUTOMATED COUNT: 15.1 % (ref 11.5–14.5)
EST. GFR  (AFRICAN AMERICAN): 7 ML/MIN/1.73 M^2
EST. GFR  (NON AFRICAN AMERICAN): 6 ML/MIN/1.73 M^2
GLUCOSE SERPL-MCNC: 110 MG/DL (ref 70–110)
HCT VFR BLD AUTO: 35 % (ref 40–54)
HGB BLD-MCNC: 11.9 G/DL (ref 14–18)
IMM GRANULOCYTES # BLD AUTO: 0.38 K/UL (ref 0–0.04)
IMM GRANULOCYTES NFR BLD AUTO: 3.7 % (ref 0–0.5)
LYMPHOCYTES # BLD AUTO: 1 K/UL (ref 1–4.8)
LYMPHOCYTES NFR BLD: 9.9 % (ref 18–48)
MCH RBC QN AUTO: 30.4 PG (ref 27–31)
MCHC RBC AUTO-ENTMCNC: 34 G/DL (ref 32–36)
MCV RBC AUTO: 89 FL (ref 82–98)
MONOCYTES # BLD AUTO: 1 K/UL (ref 0.3–1)
MONOCYTES NFR BLD: 9.5 % (ref 4–15)
NEUTROPHILS # BLD AUTO: 7.7 K/UL (ref 1.8–7.7)
NEUTROPHILS NFR BLD: 74.7 % (ref 38–73)
NRBC BLD-RTO: 0 /100 WBC
PLATELET # BLD AUTO: 244 K/UL (ref 150–450)
PMV BLD AUTO: 10.2 FL (ref 9.2–12.9)
POCT GLUCOSE: 106 MG/DL (ref 70–110)
POCT GLUCOSE: 130 MG/DL (ref 70–110)
POCT GLUCOSE: 144 MG/DL (ref 70–110)
POTASSIUM SERPL-SCNC: 4.1 MMOL/L (ref 3.5–5.1)
PROT SERPL-MCNC: 5.5 G/DL (ref 6–8.4)
RBC # BLD AUTO: 3.92 M/UL (ref 4.6–6.2)
SODIUM SERPL-SCNC: 137 MMOL/L (ref 136–145)
WBC # BLD AUTO: 10.32 K/UL (ref 3.9–12.7)

## 2022-01-05 PROCEDURE — 21400001 HC TELEMETRY ROOM

## 2022-01-05 PROCEDURE — 85025 COMPLETE CBC W/AUTO DIFF WBC: CPT | Performed by: NURSE PRACTITIONER

## 2022-01-05 PROCEDURE — 25000003 PHARM REV CODE 250: Performed by: NURSE PRACTITIONER

## 2022-01-05 PROCEDURE — 25000003 PHARM REV CODE 250: Performed by: INTERNAL MEDICINE

## 2022-01-05 PROCEDURE — 63600175 PHARM REV CODE 636 W HCPCS: Performed by: NURSE PRACTITIONER

## 2022-01-05 PROCEDURE — 94761 N-INVAS EAR/PLS OXIMETRY MLT: CPT

## 2022-01-05 PROCEDURE — 80053 COMPREHEN METABOLIC PANEL: CPT | Performed by: INTERNAL MEDICINE

## 2022-01-05 PROCEDURE — 99291 PR CRITICAL CARE, E/M 30-74 MINUTES: ICD-10-PCS | Mod: ,,, | Performed by: INTERNAL MEDICINE

## 2022-01-05 PROCEDURE — 63600175 PHARM REV CODE 636 W HCPCS: Performed by: INTERNAL MEDICINE

## 2022-01-05 PROCEDURE — 94640 AIRWAY INHALATION TREATMENT: CPT

## 2022-01-05 PROCEDURE — 99233 PR SUBSEQUENT HOSPITAL CARE,LEVL III: ICD-10-PCS | Mod: ,,, | Performed by: INTERNAL MEDICINE

## 2022-01-05 PROCEDURE — 27000221 HC OXYGEN, UP TO 24 HOURS

## 2022-01-05 PROCEDURE — 25000242 PHARM REV CODE 250 ALT 637 W/ HCPCS: Performed by: NURSE PRACTITIONER

## 2022-01-05 PROCEDURE — 99900035 HC TECH TIME PER 15 MIN (STAT)

## 2022-01-05 PROCEDURE — 99233 SBSQ HOSP IP/OBS HIGH 50: CPT | Mod: ,,, | Performed by: INTERNAL MEDICINE

## 2022-01-05 PROCEDURE — 99291 CRITICAL CARE FIRST HOUR: CPT | Mod: ,,, | Performed by: INTERNAL MEDICINE

## 2022-01-05 PROCEDURE — 96372 THER/PROPH/DIAG INJ SC/IM: CPT

## 2022-01-05 PROCEDURE — 80100016 HC MAINTENANCE HEMODIALYSIS

## 2022-01-05 PROCEDURE — S4991 NICOTINE PATCH NONLEGEND: HCPCS | Performed by: INTERNAL MEDICINE

## 2022-01-05 RX ORDER — AMLODIPINE BESYLATE 5 MG/1
5 TABLET ORAL NIGHTLY
Status: DISCONTINUED | OUTPATIENT
Start: 2022-01-05 | End: 2022-01-08

## 2022-01-05 RX ORDER — SODIUM CHLORIDE 9 MG/ML
INJECTION, SOLUTION INTRAVENOUS ONCE
Status: COMPLETED | OUTPATIENT
Start: 2022-01-05 | End: 2022-01-05

## 2022-01-05 RX ORDER — OXAZEPAM 10 MG/1
10 CAPSULE ORAL 3 TIMES DAILY PRN
Status: DISCONTINUED | OUTPATIENT
Start: 2022-01-05 | End: 2022-01-11 | Stop reason: HOSPADM

## 2022-01-05 RX ADMIN — METOPROLOL TARTRATE 50 MG: 50 TABLET, FILM COATED ORAL at 08:01

## 2022-01-05 RX ADMIN — ARFORMOTEROL TARTRATE 15 MCG: 15 SOLUTION RESPIRATORY (INHALATION) at 07:01

## 2022-01-05 RX ADMIN — HEPARIN SODIUM 5000 UNITS: 5000 INJECTION INTRAVENOUS; SUBCUTANEOUS at 10:01

## 2022-01-05 RX ADMIN — FOLIC ACID 1 MG: 1 TABLET ORAL at 08:01

## 2022-01-05 RX ADMIN — MULTIPLE VITAMINS W/ MINERALS TAB 1 TABLET: TAB at 08:01

## 2022-01-05 RX ADMIN — FAMOTIDINE 20 MG: 20 TABLET ORAL at 08:01

## 2022-01-05 RX ADMIN — HYDRALAZINE HYDROCHLORIDE 20 MG: 20 INJECTION, SOLUTION INTRAMUSCULAR; INTRAVENOUS at 01:01

## 2022-01-05 RX ADMIN — AMLODIPINE BESYLATE 5 MG: 5 TABLET ORAL at 08:01

## 2022-01-05 RX ADMIN — SODIUM CHLORIDE: 0.9 INJECTION, SOLUTION INTRAVENOUS at 12:01

## 2022-01-05 RX ADMIN — AMOXICILLIN AND CLAVULANATE POTASSIUM 500 MG: 500; 125 TABLET, FILM COATED ORAL at 05:01

## 2022-01-05 RX ADMIN — HEPARIN SODIUM 5000 UNITS: 5000 INJECTION INTRAVENOUS; SUBCUTANEOUS at 05:01

## 2022-01-05 RX ADMIN — BUDESONIDE 0.5 MG: 0.5 INHALANT ORAL at 07:01

## 2022-01-05 RX ADMIN — THIAMINE HCL TAB 100 MG 100 MG: 100 TAB at 08:01

## 2022-01-05 RX ADMIN — LORAZEPAM 1 MG: 2 INJECTION INTRAMUSCULAR; INTRAVENOUS at 01:01

## 2022-01-05 RX ADMIN — NICOTINE 1 PATCH: 21 PATCH, EXTENDED RELEASE TRANSDERMAL at 08:01

## 2022-01-05 RX ADMIN — POLYETHYLENE GLYCOL 3350 17 G: 17 POWDER, FOR SOLUTION ORAL at 08:01

## 2022-01-05 NOTE — ASSESSMENT & PLAN NOTE
SR on monitor,  Extrasystoles noted  Not on anticoagulants on home med review  Continue metoprolol for rate and BP control  1/5 continue metoprolol.

## 2022-01-05 NOTE — ASSESSMENT & PLAN NOTE
Likely secondary to sepsis and atelectasis  weaning NC for goal sat > 92  Encourage TCDB and mobilize when able  1/5 wean O2.  Target sat 92-95%.  Nebs p.r.n..   H/O abdominoplasty  3/15  H/O breast surgery  bilateral implants 3/16  H/O:  section   and 3/1995

## 2022-01-05 NOTE — PROGRESS NOTES
[facility] - Telemetry ([facility])  Critical Care Medicine  Progress Note    Patient Name: Lizz Alas  MRN: [id number]  Admission Date: 12/27/2021  Hospital Length of Stay: 8 days  Code Status: Full Code  Attending Provider: Julissa Thompson MD  Primary Care Provider: Primary Doctor No   Principal Problem: Severe sepsis with acute organ dysfunction    Subjective:     HPI:  Mr Alas is a 72 yo Telugu male with a PMH of HLD, etoh abuse, tobacco abuse, HTN and PAF.  He presented to [facility] ED yesterday evening via personal vehicle with complaint of abd pain over 3 days and N/V X 1 day.  He does not speak English and was accompanied by family.  In ED Glucose 180, TBili 5.1, LA 1.5 and Abd US with GB distention and sludging.  He had temp 102.9.  He as admitted to Tele and GI consulted diagnosed with acute cholecystitis.  This AM Telemetry reported HR was at 183 and upon assessment patient seemed to be in distress, mouth breathing , O2 was reading 73% pt was shaking and shivering. Called TOM due to pt speaking Bengali [id number], # number [id number], pt was stating he could not breath.  Checked pt temp reading was 98.4 oral .Pt was on 3L then moved to 5L, then  Respiratory came in and put him on breathing treatment albuterol, still couldn't get breathing controlled, called Dr. Lopez and he stated to transfer pt to ICU.      Hospital/ICU Course:  12/28 - In ICU he was awake and alert on BiPAP .40 FiO2 with mild work of breathing but no distress and hemodynamically stable.  GI requested patient intubated and stabilized more so from pulm standpoint then obtain CT Abd and plan to OR for ERCP.  TBili up to 7.1.  He was intubated and supported on [facility] ventilation post explaining all this to daughter at bed side.  Post intubation and sedation became hypotensive requiring CL and AL placements and starting Levophed infusion.   12/29 - ERCP yesterday afternoon.  Increased vasopressor and O2 requirements overnight.  This AM still  intubated on mech vent and sedated on Fentanyl infusion.  Still on Vasopressin and Levophed infusions also.  Oliguria with LISET and Tmax last 24 hours up to 102.9.  Blood cultures X 2 + E.Coli.  Discussed care with daughter at bed side and all questions answered.   12/30 - remains intubated and sedated on mech vent. Temp and wbc trend down. Urine output, creatinine, and metabolic acidosis worsening  12/31 - remains intubated and sedated on mech vent with minimal oxygen demand but moderate pressor requirement. Urine output 118ml last 24 hours, creatinine 6.0. continued hyperglycemia 197-237  1/1/2022 - remains intubated and sedated on mechanical vent with support requirement unchanged but pO2 trending down, +18L since admission. Renal function without improvement, only 160ml urine output last 24 hours. Trialysis cath placed for initiation of CRRT, goal filtration and fluid removal  1/2 - self extubated yesterday, tolerating HFNC support. Confusion/Delirium requiring precedex infusion for safety. CRRT initiated but clotted around 0100 after about 3L removed, remains oliguric, electrolytes stable  1/3 - supplemental oxygen weaned to now 8L NC; CRRT clotted again around 2300 but urine output 220 ml last 24 hours; precedex off this morning, delirium improving; HTN requiring treatment  1/4 - off precedex, continued mild confusion without agitation now. Supplemental oxygen weaned to 4L NC. Urine output up to 315ml last 24 hours, creatinine up 6.7, K stable  1/5 patient seen and examined.  Afebrile.  Urine output 645 mL last 24 hours.  Weak.  Poor appetite.      Interval History:   1/5 patient seen and examined.  Afebrile.  Urine output 645 mL last 24 hours.  Weak.  Poor appetite.  O2 sat 95% on 2 liter/minute    Review of Systems   Constitutional: Positive for malaise/fatigue. Negative for chills and fever.   HENT: Negative for hearing loss and nosebleeds.    Eyes: Negative for discharge.   Respiratory: Positive for cough  and shortness of breath.    Cardiovascular: Positive for leg swelling. Negative for chest pain.   Gastrointestinal: Positive for abdominal pain.   Genitourinary: Negative for hematuria.   Musculoskeletal: Negative for falls.   Skin: Negative for itching.   Neurological: Positive for weakness. Negative for speech change, seizures and loss of consciousness.   Endo/Heme/Allergies: Negative for polydipsia. Does not bruise/bleed easily.   Psychiatric/Behavioral: Negative for hallucinations.             Objective:     Vital Signs (Most Recent):  Temp: 97.8 °F (36.6 °C) (01/05/22 1129)  Pulse: 71 (01/05/22 1129)  Resp: 16 (01/05/22 1129)  BP: (!) 164/79 (01/05/22 1129)  SpO2: 95 % (01/05/22 0916) Vital Signs (24h Range):  Temp:  [97.7 °F (36.5 °C)-98.5 °F (36.9 °C)] 97.8 °F (36.6 °C)  Pulse:  [] 71  Resp:  [16-43] 16  SpO2:  [93 %-100 %] 95 %  BP: (122-193)/(59-90) 164/79     Weight: 98.4 kg (216 lb 14.9 oz)  Body mass index is 35.01 kg/m².      Intake/Output Summary (Last 24 hours) at 1/5/2022 1337  Last data filed at 1/5/2022 0700  Gross per 24 hour   Intake 130 ml   Output 470 ml   Net -340 ml       Physical Exam  Vitals and nursing note reviewed.   Constitutional:       General: He is not in acute distress.     Appearance: He is well-developed and well-nourished. He is ill-appearing.   HENT:      Head: Normocephalic and atraumatic.   Eyes:      Extraocular Movements: EOM normal.   Neck:      Comments: Right IJ Trialysis catheter  Cardiovascular:      Rate and Rhythm: Normal rate and regular rhythm.   Pulmonary:      Effort: Pulmonary effort is normal.      Breath sounds: Rhonchi present.   Abdominal:      Palpations: Abdomen is soft.      Tenderness: There is abdominal tenderness.   Genitourinary:     Comments: Urethral catheter rectal tube  Musculoskeletal:         General: Swelling present. No edema.      Cervical back: Neck supple.   Skin:     General: Skin is warm.   Neurological:      General: No focal  deficit present.      Mental Status: He is alert.      Comments: Intermittent confusion   Psychiatric:         Mood and Affect: Mood and affect normal.         Behavior: Behavior normal.         Vents:  Vent Mode: A/C (01/01/22 1309)  Ventilator Initiated: Yes (12/28/21 0824)  Set Rate: 26 BPM (01/01/22 1309)  Vt Set: 450 mL (01/01/22 1309)  Pressure Support: 0 cmH20 (12/28/21 0824)  PEEP/CPAP: 5 cmH20 (01/01/22 1309)  Oxygen Concentration (%): 32 (01/05/22 0730)  Peak Airway Pressure: 25 cmH2O (01/01/22 1309)  Plateau Pressure: 16 cmH20 (01/01/22 1309)  Total Ve: 15 mL (01/01/22 1309)  Negative Inspiratory Force (cm H2O): 0 (01/01/22 1309)  F/VT Ratio<105 (RSBI): (!) 56.22 (01/01/22 1309)    Lines/Drains/Airways     Central Venous Catheter Line            Trialysis (Dialysis) Catheter 01/01/22 0922 right internal jugular 4 days          Drain                 Urethral Catheter 12/28/21 0830 8 days         Rectal Tube 01/02/22 1324 3 days          Peripheral Intravenous Line                 Peripheral IV - Single Lumen 12/28/21 0745 20 G Posterior;Right Hand 8 days                Significant Labs:    CBC/Anemia Profile:  Recent Labs   Lab 01/04/22  0438 01/05/22  0323   WBC 9.70 10.32   HGB 12.0* 11.9*   HCT 34.9* 35.0*    244   MCV 89 89   RDW 14.9* 15.1*        Chemistries:  Recent Labs   Lab 01/04/22  0438 01/05/22  0323    137   K 3.8 4.1    102   CO2 21* 18*   BUN 76* 98*   CREATININE 6.7* 8.0*   CALCIUM 8.3* 7.8*   ALBUMIN  --  2.3*   PROT  --  5.5*   BILITOT  --  1.3*   ALKPHOS  --  172*   ALT  --  39   AST  --  27   Results for JUNIOR ALONZO (MRN [id number]) as of 1/5/2022 13:36   Ref. Range 1/4/2022 20:59   POC PH Latest Ref Range: 7.35 - 7.45  7.454 (H)   POC PCO2 Latest Ref Range: 35 - 45 mmHg 25.6 (LL)   POC PO2 Latest Ref Range: 80 - 100 mmHg 56 (LL)   POC BE Latest Ref Range: -2 to 2 mmol/L -6   POC HCO3 Latest Ref Range: 24 - 28 mmol/L 18.0 (L)   POC SATURATED O2 Latest Ref Range: 95 -  100 % 91 (L)   FiO2 Unknown 28   Flow Unknown 2   Sample Unknown ARTERIAL   DelSys Unknown Nasal Can   Allens Test Unknown Pass   Site Unknown LR   Mode Unknown SPONT       EKG 12/29/2021    Sinus rhythm with Premature supraventricular complexes   Incomplete right bundle branch block   Prolonged QT   Abnormal ECG   When compared with ECG of 27-DEC-2021 15:30,   Fusion complexes are no longer Present   T wave inversion now evident in Inferior leads         Significant Imaging:  I have reviewed all pertinent imaging results/findings within the past 24 hours.  CXR: I have reviewed all pertinent results/findings within the past 24 hours and my personal findings are:  TECHNIQUE:     TECHNIQUE:  Single frontal view of the chest was performed.     COMPARISON:  Chest radiograph 01/01/2022     FINDINGS:  ECG monitoring leads overlie the chest.  There is interval removal of an endotracheal tube.  A right internal jugular central venous catheter terminates over the SVC.  A left internal jugular central venous catheter terminates at the junction of the left brachiocephalic vein and SVC.There is interval improvement in bilateral lower lung zone consolidative opacity/atelectasis, however there remains diffuse interstitial opacity throughout the lungs.  No pneumothorax or significant pleural fluid.     The cardiac silhouette is mildly enlarged.     No acute osseous abnormality.     Impression:     Interval removal of an endotracheal tube with improving bibasilar consolidation/atelectasis and persistent diffuse interstitial opacity.         Ultrasound abdomen 12/27/2021    Impression:     Gallbladder distension with internal sludge but without definite stones.  Intra and extrahepatic biliary ductal dilation.  Consider further evaluation with contrast enhanced CT.  Acute cholecystitis or choledocholithiasis not excluded.     Pancreas is largely obscured by bowel gas.         ABG  Recent Labs   Lab 01/04/22 2059   PH 7.454*   PO2  56*   PCO2 25.6*   HCO3 18.0*   BE -6     Assessment/Plan:     Psychiatric  Alcohol abuse  Likely contributing to delirium/confusion - now improving  Continue enteral thiamine/folate/multi vit  Prn ativan  1/5 p.r.n. Ativan time in folate multivitamin    Pulmonary  Acute respiratory failure with hypoxia and hypercarbia  Likely secondary to sepsis and atelectasis  weaning NC for goal sat > 92  Encourage TCDB and mobilize when able  1/5 wean O2.  Target sat 92-95%.  Nebs p.r.n..    Cardiac/Vascular  Paroxysmal atrial fibrillation  SR on monitor,  Extrasystoles noted  Not on anticoagulants on home med review  Continue metoprolol for rate and BP control  1/5 continue metoprolol.    Renal/  LISET (acute kidney injury)  Accurate I/Os, urine output up at 315ml out last 24 hours  Renal dose meds and IVAB  Nephrology following  urine output up; plan eval daily for intermittent HD indication  1/5 urine output 645 mL last 24 hours.  HD today for clearance and volume removal.  Strict I&Os.  Monitor BMP.    ID  * Severe sepsis with acute organ dysfunction  Source: GB and Bacteremia  Shock resolved  Sputum culture NGTD  Blood cultures X 2 E.Coli, continue rocephin; transition to enteral augmentin for additional 5 days (total 14d abx course)  Monitor Temp and wbc trend      E coli bacteremia  Reviewed case and bacteria with clinical pharmacist; given gram neg and overall clinical improvement  Now tolerating po  Will transition to augmentin for additional 5 days  1/5 bacteremia resolved.  Now p.o. Augmentin.    Acute cholangitis  IVAB  POD #7 S/P ERCP  Will need Cholecystectomy as outpt once clinically improved  01/05/2022 status post ERCP with CBD stones removal sphincterotomy and placement of 2 stents.  E coli bacteremia treated with IV antibiotic now on p.o. Augmentin.      Hematology  Thrombocytopenia  resolved  1/5 platelet 244. On subQ heparin for DVT prophylaxis.      Other  Tobacco use  encourage tobacco cessation  Cont  LABA and ICS nebs  On Nicotine patch  1/5 nicotine patches       Critical Care Daily Checklist:    A: Awake: RASS Goal/Actual Goal: RASS Goal: 0-->alert and calm  Actual: [place] Agitation Sedation Scale (RASS): Alert and calm   B: Spontaneous Breathing Trial Performed?     C: SAT & SBT Coordinated?  Not intubated                      D: Delirium: CAM-ICU Overall CAM-ICU: Negative   E: Early Mobility Performed? Yes   F: Feeding Goal: Goals: 1. Enteral Nutrition initiation within 24 hours.  Status: Nutrition Goal Status: new   Current Diet Order   Procedures    Diet renal      AS: Analgesia/Sedation Not sedated   T: Thromboembolic Prophylaxis Subcutaneous heparin   H: HOB > 300 Yes   U: Stress Ulcer Prophylaxis (if needed) Famotidine   G: Glucose Control Fingerstick p.r.n.   B: Bowel Function Stool Occurrence: 1   I: Indwelling Catheter (Lines & Eng) Necessity Critically keep Eng   D: De-escalation of Antimicrobials/Pharmacotherapies P.o. Augmentin    Plan for the day/ETD HD    Code Status:  Family/Goals of Care: Full Code  Daughter at bedside     Critical Care Time: 35 minutes  Critical secondary to Patient has a condition that poses threat to life and bodily function: Acute Renal Failure      Critical care was time spent personally by me on the following activities: development of treatment plan with patient or surrogate and bedside caregivers, discussions with consultants, evaluation of patient's response to treatment, examination of patient, ordering and performing treatments and interventions, ordering and review of laboratory studies, ordering and review of radiographic studies, pulse oximetry, re-evaluation of patient's condition. This critical care time did not overlap with that of any other provider or involve time for any procedures.     Jeffrey Blum MD  Critical Care Medicine  [facility] ([facility])

## 2022-01-05 NOTE — PROGRESS NOTES
Pt c/o upper right abdominal pain that extends to the back side. Pt feel uncomfortable but attributes pain to gas at this time.

## 2022-01-05 NOTE — SUBJECTIVE & OBJECTIVE
Interval History:   1/5 patient seen and examined.  Afebrile.  Urine output 645 mL last 24 hours.  Weak.  Poor appetite.  O2 sat 95% on 2 liter/minute    Review of Systems   Constitutional: Positive for malaise/fatigue. Negative for chills and fever.   HENT: Negative for hearing loss and nosebleeds.    Eyes: Negative for discharge.   Respiratory: Positive for cough and shortness of breath.    Cardiovascular: Positive for leg swelling. Negative for chest pain.   Gastrointestinal: Positive for abdominal pain.   Genitourinary: Negative for hematuria.   Musculoskeletal: Negative for falls.   Skin: Negative for itching.   Neurological: Positive for weakness. Negative for speech change, seizures and loss of consciousness.   Endo/Heme/Allergies: Negative for polydipsia. Does not bruise/bleed easily.   Psychiatric/Behavioral: Negative for hallucinations.             Objective:     Vital Signs (Most Recent):  Temp: 97.8 °F (36.6 °C) (01/05/22 1129)  Pulse: 71 (01/05/22 1129)  Resp: 16 (01/05/22 1129)  BP: (!) 164/79 (01/05/22 1129)  SpO2: 95 % (01/05/22 0916) Vital Signs (24h Range):  Temp:  [97.7 °F (36.5 °C)-98.5 °F (36.9 °C)] 97.8 °F (36.6 °C)  Pulse:  [] 71  Resp:  [16-43] 16  SpO2:  [93 %-100 %] 95 %  BP: (122-193)/(59-90) 164/79     Weight: 98.4 kg (216 lb 14.9 oz)  Body mass index is 35.01 kg/m².      Intake/Output Summary (Last 24 hours) at 1/5/2022 1337  Last data filed at 1/5/2022 0700  Gross per 24 hour   Intake 130 ml   Output 470 ml   Net -340 ml       Physical Exam  Vitals and nursing note reviewed.   Constitutional:       General: He is not in acute distress.     Appearance: He is well-developed and well-nourished. He is ill-appearing.   HENT:      Head: Normocephalic and atraumatic.   Eyes:      Extraocular Movements: EOM normal.   Neck:      Comments: Right IJ Trialysis catheter  Cardiovascular:      Rate and Rhythm: Normal rate and regular rhythm.   Pulmonary:      Effort: Pulmonary effort is normal.       Breath sounds: Rhonchi present.   Abdominal:      Palpations: Abdomen is soft.      Tenderness: There is abdominal tenderness.   Genitourinary:     Comments: Urethral catheter rectal tube  Musculoskeletal:         General: Swelling present. No edema.      Cervical back: Neck supple.   Skin:     General: Skin is warm.   Neurological:      General: No focal deficit present.      Mental Status: He is alert.      Comments: Intermittent confusion   Psychiatric:         Mood and Affect: Mood and affect normal.         Behavior: Behavior normal.         Vents:  Vent Mode: A/C (01/01/22 1309)  Ventilator Initiated: Yes (12/28/21 0824)  Set Rate: 26 BPM (01/01/22 1309)  Vt Set: 450 mL (01/01/22 1309)  Pressure Support: 0 cmH20 (12/28/21 0824)  PEEP/CPAP: 5 cmH20 (01/01/22 1309)  Oxygen Concentration (%): 32 (01/05/22 0730)  Peak Airway Pressure: 25 cmH2O (01/01/22 1309)  Plateau Pressure: 16 cmH20 (01/01/22 1309)  Total Ve: 15 mL (01/01/22 1309)  Negative Inspiratory Force (cm H2O): 0 (01/01/22 1309)  F/VT Ratio<105 (RSBI): (!) 56.22 (01/01/22 1309)    Lines/Drains/Airways     Central Venous Catheter Line            Trialysis (Dialysis) Catheter 01/01/22 0922 right internal jugular 4 days          Drain                 Urethral Catheter 12/28/21 0830 8 days         Rectal Tube 01/02/22 1324 3 days          Peripheral Intravenous Line                 Peripheral IV - Single Lumen 12/28/21 0745 20 G Posterior;Right Hand 8 days                Significant Labs:    CBC/Anemia Profile:  Recent Labs   Lab 01/04/22 0438 01/05/22  0323   WBC 9.70 10.32   HGB 12.0* 11.9*   HCT 34.9* 35.0*    244   MCV 89 89   RDW 14.9* 15.1*        Chemistries:  Recent Labs   Lab 01/04/22  0438 01/05/22  0323    137   K 3.8 4.1    102   CO2 21* 18*   BUN 76* 98*   CREATININE 6.7* 8.0*   CALCIUM 8.3* 7.8*   ALBUMIN  --  2.3*   PROT  --  5.5*   BILITOT  --  1.3*   ALKPHOS  --  172*   ALT  --  39   AST  --  27   Results for CHERI,  JUNIOR (MRN [id number]) as of 1/5/2022 13:36   Ref. Range 1/4/2022 20:59   POC PH Latest Ref Range: 7.35 - 7.45  7.454 (H)   POC PCO2 Latest Ref Range: 35 - 45 mmHg 25.6 (LL)   POC PO2 Latest Ref Range: 80 - 100 mmHg 56 (LL)   POC BE Latest Ref Range: -2 to 2 mmol/L -6   POC HCO3 Latest Ref Range: 24 - 28 mmol/L 18.0 (L)   POC SATURATED O2 Latest Ref Range: 95 - 100 % 91 (L)   FiO2 Unknown 28   Flow Unknown 2   Sample Unknown ARTERIAL   DelSys Unknown Nasal Can   Allens Test Unknown Pass   Site Unknown LR   Mode Unknown SPONT       EKG 12/29/2021    Sinus rhythm with Premature supraventricular complexes   Incomplete right bundle branch block   Prolonged QT   Abnormal ECG   When compared with ECG of 27-DEC-2021 15:30,   Fusion complexes are no longer Present   T wave inversion now evident in Inferior leads         Significant Imaging:  I have reviewed all pertinent imaging results/findings within the past 24 hours.  CXR: I have reviewed all pertinent results/findings within the past 24 hours and my personal findings are:  TECHNIQUE:     TECHNIQUE:  Single frontal view of the chest was performed.     COMPARISON:  Chest radiograph 01/01/2022     FINDINGS:  ECG monitoring leads overlie the chest.  There is interval removal of an endotracheal tube.  A right internal jugular central venous catheter terminates over the SVC.  A left internal jugular central venous catheter terminates at the junction of the left brachiocephalic vein and SVC.There is interval improvement in bilateral lower lung zone consolidative opacity/atelectasis, however there remains diffuse interstitial opacity throughout the lungs.  No pneumothorax or significant pleural fluid.     The cardiac silhouette is mildly enlarged.     No acute osseous abnormality.     Impression:     Interval removal of an endotracheal tube with improving bibasilar consolidation/atelectasis and persistent diffuse interstitial opacity.         Ultrasound abdomen  12/27/2021    Impression:     Gallbladder distension with internal sludge but without definite stones.  Intra and extrahepatic biliary ductal dilation.  Consider further evaluation with contrast enhanced CT.  Acute cholecystitis or choledocholithiasis not excluded.     Pancreas is largely obscured by bowel gas.

## 2022-01-05 NOTE — SUBJECTIVE & OBJECTIVE
Interval History: no overnight events    Review of Systems   Constitutional: Positive for fatigue. Negative for fever.   HENT: Negative for drooling, sinus pressure, sinus pain and sore throat.    Eyes: Negative for photophobia.   Respiratory: Negative for cough and shortness of breath.    Gastrointestinal: Negative for abdominal pain and constipation.   Genitourinary: Negative for dysuria, hematuria and urgency.   Musculoskeletal: Negative for arthralgias.   Allergic/Immunologic: Negative for immunocompromised state.   Neurological: Negative for speech difficulty and weakness.   Psychiatric/Behavioral: Positive for decreased concentration. Negative for agitation, behavioral problems and confusion.     Objective:     Vital Signs (Most Recent):  Temp: 98.5 °F (36.9 °C) (01/05/22 1354)  Pulse: 70 (01/05/22 1400)  Resp: (!) 30 (01/05/22 1354)  BP: (!) 157/94 (01/05/22 1400)  SpO2: 95 % (01/05/22 0916) Vital Signs (24h Range):  Temp:  [97.7 °F (36.5 °C)-98.5 °F (36.9 °C)] 98.5 °F (36.9 °C)  Pulse:  [] 70  Resp:  [16-43] 30  SpO2:  [93 %-100 %] 95 %  BP: (122-193)/() 157/94     Weight: 98.4 kg (216 lb 14.9 oz)  Body mass index is 35.01 kg/m².    Intake/Output Summary (Last 24 hours) at 1/5/2022 1455  Last data filed at 1/5/2022 0700  Gross per 24 hour   Intake 130 ml   Output 470 ml   Net -340 ml      Physical Exam  Vitals reviewed.   Constitutional:       General: He is not in acute distress.     Appearance: Normal appearance. He is ill-appearing. He is not toxic-appearing.   HENT:      Head: Normocephalic and atraumatic.      Nose: Nose normal.      Mouth/Throat:      Mouth: Mucous membranes are moist.   Eyes:      General: No scleral icterus.     Pupils: Pupils are equal, round, and reactive to light.   Cardiovascular:      Rate and Rhythm: Normal rate and regular rhythm.      Pulses: Normal pulses.   Pulmonary:      Effort: Pulmonary effort is normal. No respiratory distress.      Breath sounds: No  wheezing.      Comments: Reduced at bases  Abdominal:      General: Abdomen is flat. Bowel sounds are normal. There is distension.      Palpations: Abdomen is soft.      Tenderness: There is no abdominal tenderness. There is no guarding.      Comments: Mild ascites   Musculoskeletal:      Cervical back: Normal range of motion.      Comments: Trace bilat pedal edema  Improving edema to both hand dorsum   Skin:     General: Skin is warm and dry.      Capillary Refill: Capillary refill takes less than 2 seconds.      Coloration: Skin is not pale.   Neurological:      General: No focal deficit present.      Mental Status: He is alert and oriented to person, place, and time.   Psychiatric:         Mood and Affect: Mood normal.         Significant Labs:   BMP:   Recent Labs   Lab 01/05/22  0323         K 4.1      CO2 18*   BUN 98*   CREATININE 8.0*   CALCIUM 7.8*     CBC:   Recent Labs   Lab 01/04/22 0438 01/05/22  0323   WBC 9.70 10.32   HGB 12.0* 11.9*   HCT 34.9* 35.0*    244     CMP:   Recent Labs   Lab 01/04/22 0438 01/05/22  0323    137   K 3.8 4.1    102   CO2 21* 18*   GLU 87 110   BUN 76* 98*   CREATININE 6.7* 8.0*   CALCIUM 8.3* 7.8*   PROT  --  5.5*   ALBUMIN  --  2.3*   BILITOT  --  1.3*   ALKPHOS  --  172*   AST  --  27   ALT  --  39   ANIONGAP 15 17*   EGFRNONAA 7* 6*       Significant Imaging: I have reviewed all pertinent imaging results/findings within the past 24 hours.

## 2022-01-05 NOTE — ASSESSMENT & PLAN NOTE
Patient with Hypoxic Respiratory failure which is Acute.  he is not on home oxygen. Supplemental oxygen was provided and noted- Oxygen Concentration (%):  [32] 32.   Signs/symptoms of respiratory failure include- tachypnea, increased work of breathing, respiratory distress, use of accessory muscles and cyanosis. Contributing diagnoses includes - COPD Labs and images were reviewed. Patient Has recent ABG, which has been reviewed. Will treat underlying causes and adjust management of respiratory failure as indicated    12/31  On 40 % FIO2 and PEEP 5 [facility] ventilation  Stable.  Ongoing renal and circulatory failure being addressed prior to considering weaning trials    1/1  On Mech Vent  ICU on board    1/2  On Vapotherm  CXR in AM  Pulm toilet as tolerated    1/3  Being weaned down on Vapotherm  Diffuse interstitial opacities throughout lungs, will likely improve with RRT  Repeat CXR as needed    1/4  Improving  PT; OOB to chair as tolerated  Incentive spirometry  Breathing treatment  Wean supplemental O2 as toelrated    1/5  Cont 3L nasal canula and wean as tolerated

## 2022-01-05 NOTE — ASSESSMENT & PLAN NOTE
IVAB  POD #7 S/P ERCP  Will need Cholecystectomy as outpt once clinically improved  01/05/2022 status post ERCP with CBD stones removal sphincterotomy and placement of 2 stents.  E coli bacteremia treated with IV antibiotic now on p.o. Augmentin.

## 2022-01-05 NOTE — PROGRESS NOTES
[facility]   Nephrology  Progress Note    Patient Name: Lizz Alas  MRN: [id number]  Admission Date: 12/27/2021  Hospital Length of Stay: 8 days  Attending Provider: Julissa Thompson MD   Primary Care Physician: Primary Doctor No  Principal Problem:Severe sepsis with acute organ dysfunction  Reason for Consult: LISET    Subjective:     Interval History:   12/31: Remains on vasopressors, intubated in ICU. Poor UOP    1/1/22: CRRT initiated, family at bedside     1/2/22: CRRT clotted overnight, restarted this am. Self-extubation yesterday afternoon. Family at bedside, helping to reorient     1/3/22: CRRT clotted overnight. Cleared to swallow. Negative 3L previous 24s and Negative 3.2L past 48 hours     1/4/22: UOP increasing and is matched in volume, slightly negative. More oriented per family (language barrier)     1/5/22: SOB, lying almost supine, no distress, HTN overnight. Not great appetite (dtr translates)     Review of patient's allergies indicates:  No Known Allergies  Current Facility-Administered Medications   Medication Frequency    0.9%  NaCl infusion PRN    0.9%  NaCl infusion Once    acetaminophen oral solution 650 mg Q6H PRN    albuterol-ipratropium 2.5 mg-0.5 mg/3 mL nebulizer solution 3 mL Q4H PRN    aluminum-magnesium hydroxide-simethicone 200-200-20 mg/5 mL suspension 30 mL QID PRN    amoxicillin-clavulanate 500-125mg per tablet 500 mg Daily    arformoteroL nebulizer solution 15 mcg BID    budesonide nebulizer solution 0.5 mg Q12H    dextrose 50% injection 12.5 g PRN    famotidine tablet 20 mg Daily    folic acid tablet 1 mg Daily    glucagon (human recombinant) injection 1 mg PRN    heparin (porcine) injection 2,000 Units PRN    heparin (porcine) injection 5,000 Units Q8H    hydrALAZINE injection 20 mg Q4H PRN    metoprolol tartrate (LOPRESSOR) tablet 50 mg BID    multivitamin tablet Daily    naloxone 0.4 mg/mL injection 0.02 mg PRN    nicotine 21 mg/24 hr 1 patch Daily     ondansetron injection 4 mg Q6H PRN    oxazepam capsule 10 mg TID PRN    oxyCODONE-acetaminophen 5-325 mg per tablet 1 tablet Q8H PRN    polyethylene glycol packet 17 g Every other day    promethazine tablet 25 mg Q6H PRN    simethicone chewable tablet 80 mg TID PRN    sodium chloride 0.9% bolus 250 mL PRN    sodium chloride 0.9% flush 10 mL Q12H PRN    thiamine tablet 100 mg Daily       Objective:     Vital Signs (Most Recent):  Temp: 97.7 °F (36.5 °C) (01/05/22 0916)  Pulse: 81 (01/05/22 0916)  Resp: (!) 22 (01/05/22 0916)  BP: (!) 158/89 (01/05/22 0916)  SpO2: 95 % (01/05/22 0916)  O2 Device (Oxygen Therapy): nasal cannula (01/05/22 0730) Vital Signs (24h Range):  Temp:  [97.7 °F (36.5 °C)-98.8 °F (37.1 °C)] 97.7 °F (36.5 °C)  Pulse:  [] 81  Resp:  [20-43] 22  SpO2:  [93 %-100 %] 95 %  BP: (122-193)/(59-90) 158/89     Weight: 98.4 kg (216 lb 14.9 oz) (01/05/22 0541)  Body mass index is 35.01 kg/m².  Body surface area is 2.14 meters squared.    I/O last 3 completed shifts:  In: 454.8 [P.O.:375; NG/GT:30; IV Piggyback:49.8]  Out: 890 [Urine:840; Stool:50]    Physical Exam  Vitals and nursing note reviewed.   Constitutional:       General: He is not in acute distress.  HENT:      Head: Atraumatic.   Eyes:      General: No scleral icterus.  Cardiovascular:      Rate and Rhythm: Normal rate.   Pulmonary:      Effort: No respiratory distress (increased WOB).   Abdominal:      Palpations: Abdomen is soft.   Musculoskeletal:         General: Swelling present.      Right lower leg: Edema present.      Left lower leg: Edema present.   Skin:     General: Skin is warm and dry.   Neurological:      Mental Status: He is alert, oriented to person, place, and time and easily aroused.     oriented X 3 per dtr     Significant Labs: reviewed    Significant Imaging: reviewed     Assessment/Plan:     Active Diagnoses:    Diagnosis Date Noted POA    PRINCIPAL PROBLEM:  Severe sepsis with acute organ dysfunction [A41.9,  R65.20] 12/28/2021 Yes    Septic shock [A41.9, R65.21]  Yes    LISET (acute kidney injury) [N17.9] 12/29/2021 No    Thrombocytopenia [D69.6] 12/29/2021 Yes    Acute hyperglycemia [R73.9] 12/29/2021 Yes    Acute respiratory failure with hypoxia and hypercarbia [J96.01, J96.02] 12/28/2021 Yes    E coli bacteremia [R78.81, B96.20] 12/28/2021 Yes    Acute cholangitis [K83.09] 12/27/2021 Yes    Tobacco use [Z72.0] 12/27/2021 Yes     Chronic    Alcohol abuse [F10.10] 12/27/2021 Yes     Chronic    Primary hypertension [I10] 12/27/2021 Yes     Chronic    Paroxysmal atrial fibrillation [I48.0] 12/27/2021 Yes     Chronic      Problems Resolved During this Admission:    Diagnosis Date Noted Date Resolved POA    On mechanically assisted ventilation [Z99.11] 12/30/2021 01/02/2022 Not Applicable    Electrolyte imbalance [E87.8] 12/28/2021 01/02/2022 Yes       Initially oligoanuric LISET with baseline creatinine 0.9mg/dL in the setting of septic shock   - UOP doubled, good sign for renal recovery   - plan HD today for volume and clearance  -  RRT can delay renal recovery      Septic Shock  - Shock resolved   - GB per GI/HM     Volume status  - UF today with HD     Anemia  - stable H&H     Conrad Arnett MD  Nephrology

## 2022-01-05 NOTE — ASSESSMENT & PLAN NOTE
Likely contributing to delirium/confusion - now improving  Continue enteral thiamine/folate/multi vit  Prn ativan  1/5 p.r.n. Ativan time in folate multivitamin

## 2022-01-05 NOTE — PROGRESS NOTES
[facility] Medicine  Progress Note    Patient Name: Lizz Alas  MRN: [id number]  Patient Class: IP- Inpatient   Admission Date: 12/27/2021  Length of Stay: 8 days  Attending Physician: Julissa Thompson MD  Primary Care Provider: Primary Doctor No        Subjective:     Principal Problem:Severe sepsis with acute organ dysfunction        HPI:  Lizz Alas is a 71 y.o. Spanish speaking male patient with a PMHx of PAF, HLD, HTN, thrombocytopenia, alcohol abuse, and tobacco use who presents to the Emergency Department for intermittent epigastric abdominal pain which onset gradually 3 days PTA. Today, the pt reports that his pain has been constant. His symptoms are constant and moderate in severity. No mitigating or exacerbating factors reported. Associated sxs include fever (102 F), nausea, and vomiting. Patient denies any diarrhea, constipation, SOB, weakness, fever, chills, and all other sxs at this time. Initial work-up in the ED showed: Normal WBC and lactic, , , alk phos 184, T bili 5.1, lipase 11, glucose 180, plt 119, sodium 134, COVID negative. Abdominal US showed gallbladder distension with internal sludge but without definite stones, intra and extrahepatic biliary ductal dilation which may represent acute cholecystitis or choledocholithiasis. Blood cultures obtained in the ED, and 1.5 L IV fluids and IV Zosyn given. Case was discussed with GI per the ED, who recommend NPO, IV abx, and ERCP in AM. Hospital Medicine was contacted for admission and patient placed in Observation.       Overview/Hospital Course:  Patient 70 yo male admitted to hospital with suspected cholangitis. Patient initiated on IV abx, fluids, O2. Patient with a rapid decompensation shourtly after admit, rapid response called, patient hypoxic in the 60's placed on bipap with good effect. Patient transferred to ICU. Patient with a progressive decline, febrile episodes to 102.9. Severe septic shock,  pressors and fluids on board. Patient evaluated by GI who recommended ERCP - additional CT abdomen. Patient scheduled for CT and this has been delayed pending stability. Patient intubated for airway protection.  12/29- Patient remains intubated, sedated, on pressors. Discussed POC with family at bedside. Patient evaluated by surgery who recommend o/p surgical followup on recovery for elective cholecystectomy.. Repeat blood cultures in progress, abx infusing.  12/30 - Patient wbc declining but remains on pressors. He is intubated and sedated. Family at bedside. Worsening renal function with cr 4.4 today. Nephrology on consult. Discussed with CC Team  12/31: On sole pressor Norepinephrine 0.14mcg/kg/min, afebrile, leucocytosis is resolved, scR is bumped to 6.0. Patient is oliguric (118 cc urine in last 24 hour). On Fentanyl gtt and NaHCO3 gtt   Daughter Hortencia  is at bedside and was updated.   AC 26/450/40/5  1/1/22 patient seen, daughter Hortencia at bedside, currently on CRRT, Norepinephrine at 0.02mcg/kg/min, fentanyl 100mcg/Hr. AC 26/450/40/5. Patient more awake today, opens eyes spontaneously, is squeezing his daughter's hand  1/2 About afternoon yesterday he self extubated, did not require immediate re-intubation as he was doing rel well resp wise, he is currently on Vapo therm 35LPM 80% FIO2, precedex drip to help with agitation (history of ETOH misuse), getting CRRT, family at bedside, whilst confused he is oriented to selpf/family, conversing with family  1/3 patient seen, was sleeping calmly early this AM, family not at bedside, on Vapotherm 2oLPM at 45% FIO2, not dyspneic. CRRT off. Remains afebrile  1/4 patient seen, down to 4L oxygen, awake and alert, off Precedex gtt, been off pressors  1/5 seen in the ICU. Awake and alert. Not in distress.  PT/OT. D/C urethral catheter, place condom cath      Interval History: no overnight events    Review of Systems   Constitutional: Positive for fatigue.  Negative for fever.   HENT: Negative for drooling, sinus pressure, sinus pain and sore throat.    Eyes: Negative for photophobia.   Respiratory: Negative for cough and shortness of breath.    Gastrointestinal: Negative for abdominal pain and constipation.   Genitourinary: Negative for dysuria, hematuria and urgency.   Musculoskeletal: Negative for arthralgias.   Allergic/Immunologic: Negative for immunocompromised state.   Neurological: Negative for speech difficulty and weakness.   Psychiatric/Behavioral: Positive for decreased concentration. Negative for agitation, behavioral problems and confusion.     Objective:     Vital Signs (Most Recent):  Temp: 98.5 °F (36.9 °C) (01/05/22 1354)  Pulse: 70 (01/05/22 1400)  Resp: (!) 30 (01/05/22 1354)  BP: (!) 157/94 (01/05/22 1400)  SpO2: 95 % (01/05/22 0916) Vital Signs (24h Range):  Temp:  [97.7 °F (36.5 °C)-98.5 °F (36.9 °C)] 98.5 °F (36.9 °C)  Pulse:  [] 70  Resp:  [16-43] 30  SpO2:  [93 %-100 %] 95 %  BP: (122-193)/() 157/94     Weight: 98.4 kg (216 lb 14.9 oz)  Body mass index is 35.01 kg/m².    Intake/Output Summary (Last 24 hours) at 1/5/2022 1455  Last data filed at 1/5/2022 0700  Gross per 24 hour   Intake 130 ml   Output 470 ml   Net -340 ml      Physical Exam  Vitals reviewed.   Constitutional:       General: He is not in acute distress.     Appearance: Normal appearance. He is ill-appearing. He is not toxic-appearing.   HENT:      Head: Normocephalic and atraumatic.      Nose: Nose normal.      Mouth/Throat:      Mouth: Mucous membranes are moist.   Rt neck temporary HD catheter  Eyes:      General: No scleral icterus.     Pupils: Pupils are equal, round, and reactive to light.   Cardiovascular:      Rate and Rhythm: Normal rate and regular rhythm.      Pulses: Normal pulses.   Pulmonary:      Effort: Pulmonary effort is normal. No respiratory distress.      Breath sounds: No wheezing.      Comments: Reduced at bases  Abdominal:      General:  Abdomen is flat. Bowel sounds are normal. There is distension.      Palpations: Abdomen is soft.      Tenderness: There is no abdominal tenderness. There is no guarding.      Comments: Mild ascites   Musculoskeletal:      Cervical back: Normal range of motion.      Comments: Trace bilat pedal edema  Improving edema to both hand dorsum   Skin:     General: Skin is warm and dry.      Capillary Refill: Capillary refill takes less than 2 seconds.      Coloration: Skin is not pale.   Neurological:      General: No focal deficit present.      Mental Status: He is alert and oriented to person, place, and time.   Psychiatric:         Mood and Affect: Mood normal.         Significant Labs:   BMP:   Recent Labs   Lab 01/05/22 0323         K 4.1      CO2 18*   BUN 98*   CREATININE 8.0*   CALCIUM 7.8*     CBC:   Recent Labs   Lab 01/04/22 0438 01/05/22 0323   WBC 9.70 10.32   HGB 12.0* 11.9*   HCT 34.9* 35.0*    244     CMP:   Recent Labs   Lab 01/04/22 0438 01/05/22 0323    137   K 3.8 4.1    102   CO2 21* 18*   GLU 87 110   BUN 76* 98*   CREATININE 6.7* 8.0*   CALCIUM 8.3* 7.8*   PROT  --  5.5*   ALBUMIN  --  2.3*   BILITOT  --  1.3*   ALKPHOS  --  172*   AST  --  27   ALT  --  39   ANIONGAP 15 17*   EGFRNONAA 7* 6*       Significant Imaging: I have reviewed all pertinent imaging results/findings within the past 24 hours.      Assessment/Plan:      * Severe sepsis with acute organ dysfunction  Pressors  Fluids  ABX  ICU  Pulmonary CC  Intubated  Sedated      12/31  On sole pressor, Norepinephrine currently 0.14 mcg/kg/min, wean/titrate as per ICU team  On appropriate antibiotics: Ceftriaxone 1 gm IV Q12 for Sensitive E coli    1/1/22  On low dose Norepinephrine as he is getting CRRT  Source control achieved    1/2  Off Pressors  SBP acceptable    1/3, 4  Resolved  Off Pressors    E coli bacteremia  ABX  ICU  Follow Blood Cultures  Pressors as indicated      12/31  Sensitive E coli  bacteremia on ceftriaxone IV  Afebrile  Resolved leucocytosis  Repeat blood culture on 12/29 NGTC x 2 days  Appropriate source control achieved S/P ERCP, stone retrieval and sphincterectomy.  Panculture as needed      1/1, 2, 3  Repeat culture NGTD  Cont cefriaxone 1 gm IV Q12    1/4  Final repeat blood culture Negative  Cont Ceftriaxone    1/5  On Augmentin PO    LISET (acute kidney injury)  Worsening  Cr 3.3  Monitor    12/31  2 to shock  Nephro on board  Avoid nephrotoxins  Cont NaHCo3 gtt    1/1/22  On CRRT started today   Nephro on board  Mild hypokalemia and hyponatremia from LISET: CRRT    1/2  Net positive about 18L since admit  Ongoing CRRT  Avoid nephrotoxic agents    1/3  Getting RRT  scR 4.7  Oliguric UOP last 24 slightly over 227  Got CRRT past approx 48 hrs (-ve slightly over 2L)    1/4  Dialysis break yesterday to see intrinsic function: sCR up to 6.7, no dangerous electrolyte or acid imbalance today  uop 315 ml last 24 hour  Nephro on board    1/5  Increased sCR  To get RRT today      Acute cholangitis  - Abdominal US showed: Gallbladder distension with internal sludge but without definite stones. Intra and extrahepatic biliary ductal dilation. Acute cholecystitis or choledocholithiasis not excluded.  - GI consult. Plans for ERCP in AM. Will keep NPO.  - Blood cultures obtained in the ED. Continue empiric IV Zosyn.   - IV hydration.  - Analgesics and antiemetics as needed.   - Follow labs.     12/28  ERCP planned  Await intervention per GI  Now Intubated    12/29  S/p ERCP  GI on consult  Stent placed per GI  Abx  Follow Cultures    12/30  GI on board  S/p ERCP  Abx  Improving clinically  Remains on Pressors    12/31, 1/1, 1/2, 3, 4  S/P source control as above  Cont ceftriaxone    1/5  Cont Augmentin PO    Acute respiratory failure with hypoxia and hypercarbia  Patient with Hypoxic Respiratory failure which is Acute.  he is not on home oxygen. Supplemental oxygen was provided and noted- Oxygen  Concentration (%):  [32] 32.   Signs/symptoms of respiratory failure include- tachypnea, increased work of breathing, respiratory distress, use of accessory muscles and cyanosis. Contributing diagnoses includes - COPD Labs and images were reviewed. Patient Has recent ABG, which has been reviewed. Will treat underlying causes and adjust management of respiratory failure as indicated    12/31  On 40 % FIO2 and PEEP 5 mech ventilation  Stable.  Ongoing renal and circulatory failure being addressed prior to considering weaning trials    1/1  On Mech Vent  ICU on board    1/2  On Vapotherm  CXR in AM  Pulm toilet as tolerated    1/3  Being weaned down on Vapotherm  Diffuse interstitial opacities throughout lungs, will likely improve with RRT  Repeat CXR as needed    1/4  Improving  PT; OOB to chair as tolerated  Incentive spirometry  Breathing treatment  Wean supplemental O2 as toelrated    1/5  Cont 3L nasal canula and wean as tolerated    Thrombocytopenia  Stable-jyothi  2 to Sepsis  Hold chemcial DVTp    1/1  Stable      1/2  Likely combination of ETOH misuse and sepsis  Platelet count is stable 73K, not bleeding  No need for transfusion    1/3  Improved to 114K today    1/4, 5  Resolved    Paroxysmal atrial fibrillation  - Remains in sinus rhythm on admission. Monitor telemetry.  - Continue home BB.  - Patient is not on long-term AC. Will defer to his primary cardiologist.       1/5  Cont MTP    Primary hypertension  - Continue home antihypertensives.     12/31  On pressors    1/4  Lopressor 25 mg PO BID, adjust as needed   May have to resume home Amlodipine 5m g PO QD, will monitor    1/5  MTP 50 mg PO BID  Amlodipine 5 mg PO QHS    Tobacco use  - Assistance with smoking cessation was offered, including:  [x]  Medications  [x]  Counseling  [x]  Printed Information on Smoking Cessation  []  Referral to a Smoking Cessation Program  - Patient was counseled regarding smoking for 3-10 minutes.    Alcohol abuse  - Monitor  for withdrawal/DTs. IV Ativan if needed.     1/2  With some delirium likely from aute illness and ICU stay, in addition to possibly some withdrawal: Precedex gtt  Bedside swallow eval when possible: oral folate/thiamine +- oral Benzodiapines as needed    1/5  PRN Low dose Librium        VTE Risk Mitigation (From admission, onward)         Ordered     heparin (porcine) injection 5,000 Units  Every 8 hours         01/03/22 0750     heparin (porcine) injection 2,000 Units  As needed (PRN)         01/01/22 2321     IP VTE HIGH RISK PATIENT  Once         12/27/21 2247     Place sequential compression device  Until discontinued         12/27/21 2247     Reason for No Pharmacological VTE Prophylaxis  Once        Question Answer Comment   Reasons: Risk of Bleeding    Reasons: Thrombocytopenia        12/27/21 2247                Discharge Planning   FLAQUITA:      Code Status: Full Code   Is the patient medically ready for discharge?: No     Reason for patient still in hospital (select all that apply): Treatment, PT / OT recommendations and Pending disposition. New RRT  Discharge Plan A: Home with family                  Julissa Thompson MD  Department of Hospital Medicine   [facility] - Telemetry ([facility])

## 2022-01-05 NOTE — ASSESSMENT & PLAN NOTE
- Monitor for withdrawal/DTs. IV Ativan if needed.     1/2  With some delirium likely from aute illness and ICU stay, in addition to possibly some withdrawal: Precedex gtt  Bedside swallow eval when possible: oral folate/thiamine +- oral Benzodiapines as needed    1/5  PRN Low dose Librium

## 2022-01-05 NOTE — PROGRESS NOTES
Upon 1st patient assessment , patient belly breathing, diaphoretic, tachypneic. Afebrile. eICU notified. ABG ordered. Prn hydralazine order modified. eICU aware that patient was unable to take dose of metoprolol.

## 2022-01-05 NOTE — ASSESSMENT & PLAN NOTE
Stable-jyothi  2 to Sepsis  Hold chemcial DVTp    1/1  Stable      1/2  Likely combination of ETOH misuse and sepsis  Platelet count is stable 73K, not bleeding  No need for transfusion    1/3  Improved to 114K today    1/4, 5  Resolved

## 2022-01-05 NOTE — PROGRESS NOTES
3 hours of HD.  2000ml of fluid removal.  System clotted and had to resetup.  cvc dressing changed.  VSS.

## 2022-01-05 NOTE — EICU
70 yo male active smoker and alcohol abuser here w/ sepsis d.t acute cholecystitis now being managed in the ICU.     RN calling re pt being anxious and for increased WOB.   Pt has been on CRRT.     Will obtain ABG now.   Also given aspiration risk, she may not be able to give him his PO meds.     Adding Hydralazine 20mg IV q4h PRN for SBP > 140.   HR @ 70s now.     Follow up note:  ABG was 7.45/26/56 sating 91% on 2L NC.   Will continue to observe for now.

## 2022-01-05 NOTE — ASSESSMENT & PLAN NOTE
Accurate I/Os, urine output up at 315ml out last 24 hours  Renal dose meds and IVAB  Nephrology following  urine output up; plan eval daily for intermittent HD indication  1/5 urine output 645 mL last 24 hours.  HD today for clearance and volume removal.  Strict I&Os.  Monitor BMP.

## 2022-01-05 NOTE — ASSESSMENT & PLAN NOTE
Reviewed case and bacteria with clinical pharmacist; given gram neg and overall clinical improvement  Now tolerating po  Will transition to augmentin for additional 5 days  1/5 bacteremia resolved.  Now p.o. Augmentin.

## 2022-01-05 NOTE — PROGRESS NOTES
Care assumed. Pt supine with hob 45%. Pt awake. Swallowed medication without difficulty. >+3 edema to both hands/arms. VSS. Eng to gravity. R IJ tri cath wnl. Safety maintained. SR up/ bed alarms set.

## 2022-01-05 NOTE — ASSESSMENT & PLAN NOTE
- Continue home antihypertensives.     12/31  On pressors    1/4  Lopressor 25 mg PO BID, adjust as needed   May have to resume home Amlodipine 5m g PO QD, will monitor    1/5  MTP 50 mg PO BID  Amlodipine 5 mg PO QHS

## 2022-01-05 NOTE — ASSESSMENT & PLAN NOTE
- Remains in sinus rhythm on admission. Monitor telemetry.  - Continue home BB.  - Patient is not on long-term AC. Will defer to his primary cardiologist.       1/5  Cont MTP

## 2022-01-05 NOTE — ASSESSMENT & PLAN NOTE
Worsening  Cr 3.3  Monitor    12/31  2 to shock  Nephro on board  Avoid nephrotoxins  Cont NaHCo3 gtt    1/1/22  On CRRT started today   Nephro on board  Mild hypokalemia and hyponatremia from LISET: CRRT    1/2  Net positive about 18L since admit  Ongoing CRRT  Avoid nephrotoxic agents    1/3  Getting RRT  scR 4.7  Oliguric UOP last 24 slightly over 227  Got CRRT past approx 48 hrs (-ve slightly over 2L)    1/4  Dialysis break yesterday to see intrinsic function: sCR up to 6.7, no dangerous electrolyte or acid imbalance today  uop 315 ml last 24 hour  Nephro on board    1/5  Increased sCR  To get RRT today

## 2022-01-05 NOTE — ASSESSMENT & PLAN NOTE
- Abdominal US showed: Gallbladder distension with internal sludge but without definite stones. Intra and extrahepatic biliary ductal dilation. Acute cholecystitis or choledocholithiasis not excluded.  - GI consult. Plans for ERCP in AM. Will keep NPO.  - Blood cultures obtained in the ED. Continue empiric IV Zosyn.   - IV hydration.  - Analgesics and antiemetics as needed.   - Follow labs.     12/28  ERCP planned  Await intervention per GI  Now Intubated    12/29  S/p ERCP  GI on consult  Stent placed per GI  Abx  Follow Cultures    12/30  GI on board  S/p ERCP  Abx  Improving clinically  Remains on Pressors    12/31, 1/1, 1/2, 3, 4  S/P source control as above  Cont ceftriaxone    1/5  Cont Augmentin PO

## 2022-01-05 NOTE — ASSESSMENT & PLAN NOTE
[facility]  ICU  Follow Blood Cultures  Pressors as indicated      12/31  Sensitive E coli bacteremia on ceftriaxone IV  Afebrile  Resolved leucocytosis  Repeat blood culture on 12/29 NGTC x 2 days  Appropriate source control achieved S/P ERCP, stone retrieval and sphincterectomy.  Panculture as needed      1/1, 2, 3  Repeat culture NGTD  Cont cefriaxone 1 gm IV Q12    1/4  Final repeat blood culture Negative  Cont Ceftriaxone    1/5  On Augmentin PO

## 2022-01-06 PROBLEM — R09.02 HYPOXEMIA: Status: ACTIVE | Noted: 2022-01-06

## 2022-01-06 LAB
ANION GAP SERPL CALC-SCNC: 12 MMOL/L (ref 8–16)
BUN SERPL-MCNC: 71 MG/DL (ref 8–23)
CALCIUM SERPL-MCNC: 7.6 MG/DL (ref 8.7–10.5)
CHLORIDE SERPL-SCNC: 100 MMOL/L (ref 95–110)
CO2 SERPL-SCNC: 24 MMOL/L (ref 23–29)
CREAT SERPL-MCNC: 6.6 MG/DL (ref 0.5–1.4)
EST. GFR  (AFRICAN AMERICAN): 9 ML/MIN/1.73 M^2
EST. GFR  (NON AFRICAN AMERICAN): 8 ML/MIN/1.73 M^2
GLUCOSE SERPL-MCNC: 79 MG/DL (ref 70–110)
POCT GLUCOSE: 145 MG/DL (ref 70–110)
POCT GLUCOSE: 96 MG/DL (ref 70–110)
POCT GLUCOSE: 98 MG/DL (ref 70–110)
POTASSIUM SERPL-SCNC: 4 MMOL/L (ref 3.5–5.1)
SODIUM SERPL-SCNC: 136 MMOL/L (ref 136–145)

## 2022-01-06 PROCEDURE — 25000003 PHARM REV CODE 250: Performed by: INTERNAL MEDICINE

## 2022-01-06 PROCEDURE — 99291 CRITICAL CARE FIRST HOUR: CPT | Mod: ,,, | Performed by: INTERNAL MEDICINE

## 2022-01-06 PROCEDURE — 25000003 PHARM REV CODE 250: Performed by: NURSE PRACTITIONER

## 2022-01-06 PROCEDURE — 97166 OT EVAL MOD COMPLEX 45 MIN: CPT

## 2022-01-06 PROCEDURE — 94640 AIRWAY INHALATION TREATMENT: CPT

## 2022-01-06 PROCEDURE — 97530 THERAPEUTIC ACTIVITIES: CPT

## 2022-01-06 PROCEDURE — 99232 PR SUBSEQUENT HOSPITAL CARE,LEVL II: ICD-10-PCS | Mod: ,,, | Performed by: INTERNAL MEDICINE

## 2022-01-06 PROCEDURE — 27000221 HC OXYGEN, UP TO 24 HOURS

## 2022-01-06 PROCEDURE — 99232 SBSQ HOSP IP/OBS MODERATE 35: CPT | Mod: ,,, | Performed by: INTERNAL MEDICINE

## 2022-01-06 PROCEDURE — S4991 NICOTINE PATCH NONLEGEND: HCPCS | Performed by: NURSE PRACTITIONER

## 2022-01-06 PROCEDURE — 21400001 HC TELEMETRY ROOM

## 2022-01-06 PROCEDURE — 80048 BASIC METABOLIC PNL TOTAL CA: CPT | Performed by: INTERNAL MEDICINE

## 2022-01-06 PROCEDURE — 25000242 PHARM REV CODE 250 ALT 637 W/ HCPCS: Performed by: NURSE PRACTITIONER

## 2022-01-06 PROCEDURE — 97162 PT EVAL MOD COMPLEX 30 MIN: CPT

## 2022-01-06 PROCEDURE — 99291 PR CRITICAL CARE, E/M 30-74 MINUTES: ICD-10-PCS | Mod: ,,, | Performed by: INTERNAL MEDICINE

## 2022-01-06 PROCEDURE — 99900035 HC TECH TIME PER 15 MIN (STAT)

## 2022-01-06 PROCEDURE — 63600175 PHARM REV CODE 636 W HCPCS: Performed by: NURSE PRACTITIONER

## 2022-01-06 RX ADMIN — ARFORMOTEROL TARTRATE 15 MCG: 15 SOLUTION RESPIRATORY (INHALATION) at 08:01

## 2022-01-06 RX ADMIN — NICOTINE 1 PATCH: 21 PATCH, EXTENDED RELEASE TRANSDERMAL at 09:01

## 2022-01-06 RX ADMIN — FAMOTIDINE 20 MG: 20 TABLET ORAL at 09:01

## 2022-01-06 RX ADMIN — MULTIPLE VITAMINS W/ MINERALS TAB 1 TABLET: TAB at 09:01

## 2022-01-06 RX ADMIN — METOPROLOL TARTRATE 50 MG: 50 TABLET, FILM COATED ORAL at 09:01

## 2022-01-06 RX ADMIN — HEPARIN SODIUM 5000 UNITS: 5000 INJECTION INTRAVENOUS; SUBCUTANEOUS at 05:01

## 2022-01-06 RX ADMIN — THIAMINE HCL TAB 100 MG 100 MG: 100 TAB at 09:01

## 2022-01-06 RX ADMIN — HEPARIN SODIUM 5000 UNITS: 5000 INJECTION INTRAVENOUS; SUBCUTANEOUS at 06:01

## 2022-01-06 RX ADMIN — AMLODIPINE BESYLATE 5 MG: 5 TABLET ORAL at 09:01

## 2022-01-06 RX ADMIN — FOLIC ACID 1 MG: 1 TABLET ORAL at 09:01

## 2022-01-06 RX ADMIN — BUDESONIDE 0.5 MG: 0.5 INHALANT ORAL at 08:01

## 2022-01-06 RX ADMIN — AMOXICILLIN AND CLAVULANATE POTASSIUM 500 MG: 500; 125 TABLET, FILM COATED ORAL at 06:01

## 2022-01-06 RX ADMIN — HEPARIN SODIUM 5000 UNITS: 5000 INJECTION INTRAVENOUS; SUBCUTANEOUS at 09:01

## 2022-01-06 NOTE — SUBJECTIVE & OBJECTIVE
Interval History: no overnight events    Review of Systems   Constitutional: Positive for fatigue. Negative for fever.   HENT: Negative for congestion, rhinorrhea and tinnitus.    Respiratory: Negative for cough and shortness of breath.    Cardiovascular: Negative for chest pain.   Gastrointestinal: Negative for constipation, diarrhea and vomiting.   Endocrine: Negative for polyuria.   Genitourinary: Negative for dysuria, flank pain and hematuria.   Musculoskeletal: Negative for back pain.   Allergic/Immunologic: Negative for immunocompromised state.   Neurological: Negative for tremors, seizures and numbness.   Hematological: Negative for adenopathy.   Psychiatric/Behavioral: Negative for agitation, behavioral problems and confusion.     Objective:     Vital Signs (Most Recent):  Temp: 98.3 °F (36.8 °C) (01/06/22 0729)  Pulse: 65 (01/06/22 0729)  Resp: (!) 22 (01/06/22 0729)  BP: (!) 145/69 (01/06/22 0729)  SpO2: (!) 93 % (01/06/22 0729) Vital Signs (24h Range):  Temp:  [97.6 °F (36.4 °C)-98.5 °F (36.9 °C)] 98.3 °F (36.8 °C)  Pulse:  [58-92] 65  Resp:  [16-30] 22  SpO2:  [90 %-95 %] 93 %  BP: (113-164)/() 145/69     Weight: 89.1 kg (196 lb 6.9 oz)  Body mass index is 31.7 kg/m².    Intake/Output Summary (Last 24 hours) at 1/6/2022 0826  Last data filed at 1/6/2022 0535  Gross per 24 hour   Intake 1240 ml   Output 4750 ml   Net -3510 ml      Physical Exam  Vitals reviewed.   Constitutional:       Appearance: Normal appearance.   HENT:      Head: Normocephalic and atraumatic.      Mouth/Throat:      Mouth: Mucous membranes are moist.   Eyes:      General: No scleral icterus.     Pupils: Pupils are equal, round, and reactive to light.   Cardiovascular:      Rate and Rhythm: Normal rate and regular rhythm.      Pulses: Normal pulses.      Heart sounds: No murmur heard.  No gallop.    Pulmonary:      Effort: Pulmonary effort is normal. No respiratory distress.      Comments: Reduced entry at bases  Abdominal:       General: Bowel sounds are normal. There is distension.      Palpations: Abdomen is soft. There is no mass.      Tenderness: There is no abdominal tenderness. There is no guarding or rebound.      Hernia: No hernia is present.   Musculoskeletal:      Cervical back: Normal range of motion.      Right lower leg: Edema present.      Left lower leg: Edema present.      Comments: 1+ pitting edema  Edema both hands, stable   Skin:     General: Skin is warm and dry.      Capillary Refill: Capillary refill takes less than 2 seconds.   Neurological:      Mental Status: He is alert.         Significant Labs:   All pertinent labs within the past 24 hours have been reviewed.  BMP:   Recent Labs   Lab 01/06/22  0523   GLU 79      K 4.0      CO2 24   BUN 71*   CREATININE 6.6*   CALCIUM 7.6*       Significant Imaging: I have reviewed all pertinent imaging results/findings within the past 24 hours.

## 2022-01-06 NOTE — PLAN OF CARE
Recommendation/Intervention: 1/6  1. Recommend to continue on current Renal diet.   2. Recommend to consider Novasource Renal supplement BID to help increase calorie and protein needs.   -Provides 950 calories and 43g protein.   3. Weekly weights per RD follow up.    Jade Drake RD,LDN

## 2022-01-06 NOTE — PLAN OF CARE
P.T. EVAL COMPLETE, PT CURRENTLY REQUIRES CGA FOR BED MOBILITY, AURELIANO FOR TF'S, MODA FOR GAIT HHA.  P.T. RECOMMENDS HHPT

## 2022-01-06 NOTE — PROGRESS NOTES
[facility]   Nephrology  Progress Note    Patient Name: Lizz Alas  MRN: [id number]  Admission Date: 12/27/2021  Hospital Length of Stay: 9 days  Attending Provider: Julissa Thompson MD   Primary Care Physician: Primary Doctor No  Principal Problem:LISET (acute kidney injury)  Reason for Consult: LISET    Subjective:     Interval History:   12/31: Remains on vasopressors, intubated in ICU. Poor UOP    1/1/22: CRRT initiated, family at bedside     1/2/22: CRRT clotted overnight, restarted this am. Self-extubation yesterday afternoon. Family at bedside, helping to reorient     1/3/22: CRRT clotted overnight. Cleared to swallow. Negative 3L previous 24s and Negative 3.2L past 48 hours     1/4/22: UOP increasing and is matched in volume, slightly negative. More oriented per family (language barrier)     1/5/22: SOB, lying almost supine, no distress, HTN overnight. Not great appetite (dtr translates)     1/6/22: less SOB, lying supine. Not eating well per dtr (phone conversation) Also burning with urination. UOP 1.8L. Daughter with questions regarding timing of jon'    Review of patient's allergies indicates:  No Known Allergies  Current Facility-Administered Medications   Medication Frequency    0.9%  NaCl infusion PRN    acetaminophen oral solution 650 mg Q6H PRN    albuterol-ipratropium 2.5 mg-0.5 mg/3 mL nebulizer solution 3 mL Q4H PRN    aluminum-magnesium hydroxide-simethicone 200-200-20 mg/5 mL suspension 30 mL QID PRN    amLODIPine tablet 5 mg QHS    amoxicillin-clavulanate 500-125mg per tablet 500 mg Daily    arformoteroL nebulizer solution 15 mcg BID    budesonide nebulizer solution 0.5 mg Q12H    dextrose 50% injection 12.5 g PRN    famotidine tablet 20 mg Daily    folic acid tablet 1 mg Daily    glucagon (human recombinant) injection 1 mg PRN    heparin (porcine) injection 2,000 Units PRN    heparin (porcine) injection 5,000 Units Q8H    hydrALAZINE injection 20 mg Q4H PRN    metoprolol  tartrate (LOPRESSOR) tablet 50 mg BID    multivitamin tablet Daily    naloxone 0.4 mg/mL injection 0.02 mg PRN    nicotine 21 mg/24 hr 1 patch Daily    ondansetron injection 4 mg Q6H PRN    oxazepam capsule 10 mg TID PRN    oxyCODONE-acetaminophen 5-325 mg per tablet 1 tablet Q8H PRN    polyethylene glycol packet 17 g Every other day    promethazine tablet 25 mg Q6H PRN    simethicone chewable tablet 80 mg TID PRN    sodium chloride 0.9% bolus 250 mL PRN    sodium chloride 0.9% flush 10 mL Q12H PRN    thiamine tablet 100 mg Daily       Objective:     Vital Signs (Most Recent):  Temp: 98.1 °F (36.7 °C) (01/06/22 1118)  Pulse: (!) 58 (01/06/22 1118)  Resp: 16 (01/06/22 1118)  BP: 134/69 (01/06/22 1118)  SpO2: (!) 94 % (01/06/22 1118)  O2 Device (Oxygen Therapy): room air (01/06/22 0853) Vital Signs (24h Range):  Temp:  [97.6 °F (36.4 °C)-98.4 °F (36.9 °C)] 98.1 °F (36.7 °C)  Pulse:  [58-87] 58  Resp:  [16-22] 16  SpO2:  [90 %-95 %] 94 %  BP: (113-160)/(65-98) 134/69     Weight: 89.1 kg (196 lb 6.9 oz) (01/06/22 0439)  Body mass index is 31.7 kg/m².  Body surface area is 2.04 meters squared.    I/O last 3 completed shifts:  In: 1270 [P.O.:270; Other:1000]  Out: 5025 [Urine:2025; Other:3000]    Physical Exam  Vitals and nursing note reviewed.   Constitutional:       General: He is not in acute distress.  HENT:      Head: Atraumatic.   Eyes:      General: No scleral icterus.  Cardiovascular:      Rate and Rhythm: Normal rate.   Pulmonary:      Effort: No respiratory distress.   Abdominal:      Palpations: Abdomen is soft.   Musculoskeletal:         General: Swelling present.      Right lower leg: Edema (improved) present.      Left lower leg: Edema (improved) present.   Skin:     General: Skin is warm and dry.   Neurological:      Mental Status: He is alert, oriented to person, place, and time and easily aroused.     oriented X 3 per dtr     Significant Labs: reviewed    Significant Imaging: reviewed      Assessment/Plan:     Active Diagnoses:    Diagnosis Date Noted POA    PRINCIPAL PROBLEM:  LISET (acute kidney injury) [N17.9] 12/29/2021 No    Hypoxemia [R09.02] 01/06/2022 Unknown    Acute respiratory failure with hypoxia and hypercarbia [J96.01, J96.02] 12/28/2021 Yes    Severe sepsis with acute organ dysfunction [A41.9, R65.20] 12/28/2021 Yes    E coli bacteremia [R78.81, B96.20] 12/28/2021 Yes    Acute cholangitis [K83.09] 12/27/2021 Yes    Tobacco use [Z72.0] 12/27/2021 Yes     Chronic    Alcohol abuse [F10.10] 12/27/2021 Yes     Chronic    Primary hypertension [I10] 12/27/2021 Yes     Chronic    Paroxysmal atrial fibrillation [I48.0] 12/27/2021 Yes     Chronic      Problems Resolved During this Admission:    Diagnosis Date Noted Date Resolved POA    Septic shock [A41.9, R65.21]  01/05/2022 Yes    On mechanically assisted ventilation [Z99.11] 12/30/2021 01/02/2022 Not Applicable    Acute hyperglycemia [R73.9] 12/29/2021 01/05/2022 Yes    Electrolyte imbalance [E87.8] 12/28/2021 01/02/2022 Yes       Initially oligoanuric LISET with baseline creatinine 0.9mg/dL in the setting of septic shock, CRRT initiated 1/1-1/3 (clotted)  - UOP continues to improve  - s/p first IHD 1/5/22 for volume and clearance  - hold HD today and check labs in am       Septic Shock  - Shock resolved   - GB per GI/HM     Volume status, overlaod  - improved  - can try Lasix for diuresis     Anemia  - stable H&H     Nutrition  - add Liz Arnett MD  Nephrology

## 2022-01-06 NOTE — SUBJECTIVE & OBJECTIVE
Interval History:   1/6 seen and examined.  Afebrile.  O2 sat 94% on room air.  Urine output 1.7 L last 24 hours.  BM 1/5. creatinine 6. For Eng catheter and rectal tube removed    Review of Systems   Constitutional: Positive for malaise/fatigue. Negative for chills and fever.   HENT: Negative for hearing loss and nosebleeds.    Eyes: Negative for discharge.   Respiratory: Positive for cough and shortness of breath.    Cardiovascular: Positive for leg swelling. Negative for chest pain.   Gastrointestinal: Positive for abdominal pain.   Genitourinary: Negative for hematuria.   Musculoskeletal: Negative for falls.   Skin: Negative for itching.   Neurological: Positive for weakness. Negative for speech change, seizures and loss of consciousness.   Endo/Heme/Allergies: Negative for polydipsia. Does not bruise/bleed easily.   Psychiatric/Behavioral: Negative for hallucinations.             Objective:     Vital Signs (Most Recent):  Temp: 98.1 °F (36.7 °C) (01/06/22 1118)  Pulse: (!) 58 (01/06/22 1118)  Resp: 16 (01/06/22 1118)  BP: 134/69 (01/06/22 1118)  SpO2: (!) 94 % (01/06/22 1118) Vital Signs (24h Range):  Temp:  [97.6 °F (36.4 °C)-98.4 °F (36.9 °C)] 98.1 °F (36.7 °C)  Pulse:  [58-92] 58  Resp:  [16-22] 16  SpO2:  [90 %-95 %] 94 %  BP: (113-160)/(65-98) 134/69     Weight: 89.1 kg (196 lb 6.9 oz)  Body mass index is 31.7 kg/m².      Intake/Output Summary (Last 24 hours) at 1/6/2022 1405  Last data filed at 1/6/2022 0535  Gross per 24 hour   Intake 1240 ml   Output 4750 ml   Net -3510 ml       Physical Exam  Vitals and nursing note reviewed.   Constitutional:       General: He is not in acute distress.     Appearance: He is well-developed. He is ill-appearing.   HENT:      Head: Normocephalic and atraumatic.   Neck:      Comments: Right IJ Trialysis catheter  Cardiovascular:      Rate and Rhythm: Normal rate and regular rhythm.   Pulmonary:      Effort: Pulmonary effort is normal.      Breath sounds: Rhonchi  present.   Abdominal:      Palpations: Abdomen is soft.      Tenderness: There is abdominal tenderness.   Musculoskeletal:         General: Swelling present.      Cervical back: Neck supple.   Skin:     General: Skin is warm.      Coloration: Skin is pale.   Neurological:      General: No focal deficit present.      Mental Status: He is alert.      Comments: Intermittent confusion   Psychiatric:         Mood and Affect: Mood normal.         Behavior: Behavior normal.         Thought Content: Thought content normal.         Judgment: Judgment normal.         Vents:  Vent Mode: A/C (01/01/22 1309)  Ventilator Initiated: Yes (12/28/21 0824)  Set Rate: 26 BPM (01/01/22 1309)  Vt Set: 450 mL (01/01/22 1309)  Pressure Support: 0 cmH20 (12/28/21 0824)  PEEP/CPAP: 5 cmH20 (01/01/22 1309)  Oxygen Concentration (%): 32 (01/05/22 1956)  Peak Airway Pressure: 25 cmH2O (01/01/22 1309)  Plateau Pressure: 16 cmH20 (01/01/22 1309)  Total Ve: 15 mL (01/01/22 1309)  Negative Inspiratory Force (cm H2O): 0 (01/01/22 1309)  F/VT Ratio<105 (RSBI): (!) 56.22 (01/01/22 1309)    Lines/Drains/Airways     Central Venous Catheter Line            Trialysis (Dialysis) Catheter 01/01/22 0922 right internal jugular 5 days                Significant Labs:    CBC/Anemia Profile:  Recent Labs   Lab 01/05/22 0323   WBC 10.32   HGB 11.9*   HCT 35.0*      MCV 89   RDW 15.1*        Chemistries:  Recent Labs   Lab 01/05/22 0323 01/06/22  0523    136   K 4.1 4.0    100   CO2 18* 24   BUN 98* 71*   CREATININE 8.0* 6.6*   CALCIUM 7.8* 7.6*   ALBUMIN 2.3*  --    PROT 5.5*  --    BILITOT 1.3*  --    ALKPHOS 172*  --    ALT 39  --    AST 27  --    Results for JUNIOR ALONZO (MRN [id number]) as of 1/5/2022 13:36   Ref. Range 1/4/2022 20:59   POC PH Latest Ref Range: 7.35 - 7.45  7.454 (H)   POC PCO2 Latest Ref Range: 35 - 45 mmHg 25.6 (LL)   POC PO2 Latest Ref Range: 80 - 100 mmHg 56 (LL)   POC BE Latest Ref Range: -2 to 2 mmol/L -6   POC HCO3  Latest Ref Range: 24 - 28 mmol/L 18.0 (L)   POC SATURATED O2 Latest Ref Range: 95 - 100 % 91 (L)   FiO2 Unknown 28   Flow Unknown 2   Sample Unknown ARTERIAL   DelSys Unknown Nasal Can   Allens Test Unknown Pass   Site Unknown LR   Mode Unknown SPONT       EKG 12/29/2021    Sinus rhythm with Premature supraventricular complexes   Incomplete right bundle branch block   Prolonged QT   Abnormal ECG   When compared with ECG of 27-DEC-2021 15:30,   Fusion complexes are no longer Present   T wave inversion now evident in Inferior leads         Significant Imaging:    Chest x-ray 01/03/2022      TECHNIQUE:  Single frontal view of the chest was performed.     COMPARISON:  Chest radiograph 01/01/2022     FINDINGS:  ECG monitoring leads overlie the chest.  There is interval removal of an endotracheal tube.  A right internal jugular central venous catheter terminates over the SVC.  A left internal jugular central venous catheter terminates at the junction of the left brachiocephalic vein and SVC.There is interval improvement in bilateral lower lung zone consolidative opacity/atelectasis, however there remains diffuse interstitial opacity throughout the lungs.  No pneumothorax or significant pleural fluid.     The cardiac silhouette is mildly enlarged.     No acute osseous abnormality.     Impression:     Interval removal of an endotracheal tube with improving bibasilar consolidation/atelectasis and persistent diffuse interstitial opacity.         Ultrasound abdomen 12/27/2021    Impression:     Gallbladder distension with internal sludge but without definite stones.  Intra and extrahepatic biliary ductal dilation.  Consider further evaluation with contrast enhanced CT.  Acute cholecystitis or choledocholithiasis not excluded.     Pancreas is largely obscured by bowel gas.

## 2022-01-06 NOTE — PLAN OF CARE
OT blanca completed. Sup>sit CGA, sit>Stand min A, functional mobility with mod HHA, sit>sup min A. Recommending HHOT and RW at d/c.

## 2022-01-06 NOTE — PROGRESS NOTES
[facility] - Telemetry ([facility])  Adult Nutrition  Progress Note    SUMMARY       Recommendations    Recommendation/Intervention:   1. Recommend to continue on current Renal diet.   2. Recommend to consider Novasource Renal supplement BID to help increase calorie and protein needs.   -Provides 950 calories and 43g protein.   3. Weekly weights per RD follow up.    Goals:   1. Patient will meet >50% of estimated energy needs by RD follow up.    Nutrition Goal Status: progressing towards goal    Communication of RD Recs: other (comment) (Plan of care;Sticky Note)    Assessment and Plan    Nutrition Problem  Inadequate oral intake      Related to (etiology):   Decreased ability to consume sufficient energy      Signs and Symptoms (as evidenced by):   <25% Po intake, decreased appetite      Interventions/Recommendations (treatment strategy):  Advance diet as tolerated   Novasource Renal BID  Collaboration with Medical Providers   Weekly Weights      Nutrition Diagnosis Status:   Continues       Malnutrition Assessment                                       Reason for Assessment    Reason For Assessment: RD follow-up  Diagnosis: infection/sepsis  Relevant Medical History: HTN, HLD  Interdisciplinary Rounds: attended  General Information Comments:     12/29:Patient consulted for TF recommendations. Patient was admitted with abdominal pain, yellowing of skin, N/V. Patient is NPO on vent. Patient was intubated 12/28. ERCP was performed 12/28. No edema noted per EMR. Patients weight is stable. Patients last BM 12/24. Will continue to monitor.     1/3: Patient seen for follow up. Patient is NPO due to aspiration precautions. Patient self extubated 1/2. Patient was also receiving CRRT on 1/2. SLP attempted evaluation but patient was too lethargic. Today CRRT is off. Patient shows a weight gain of 16 lbs x 5 days. Possible fluid and bed scale malfunction. Patient has 3+ moderate generalized edema.      1/6:Patient seen for follow  "up. Patient is on a renal diet and has poor PO intake. Patient is only eating 25% of meals. Patient received 3 hours of HD last night and 2L was removed. Patients weight is stable considering extra fluid. Will continue to monitor.  Nutrition Discharge Planning: Renal diet    Nutrition Risk Screen    Nutrition Risk Screen: no indicators present    Nutrition/Diet History    Patient Reported Diet/Restrictions/Preferences:  (unknown)  Spiritual, Cultural Beliefs, Faith Practices, Values that Affect Care: no  Food Allergies: NKFA  Factors Affecting Nutritional Intake: decreased appetite    Anthropometrics    Temp: 98.1 °F (36.7 °C)  Height Method: Stated  Height: 5' 6" (167.6 cm)  Height (inches): 66 in  Weight Method: Bed Scale  Weight: 89.1 kg (196 lb 6.9 oz)  Weight (lb): 196.43 lb  Ideal Body Weight (IBW), Male: 142 lb  % Ideal Body Weight, Male (lb): 139.73 %  BMI (Calculated): 31.7  BMI Grade: 30 - 34.9- obesity - grade I       Lab/Procedures/Meds    Pertinent Labs Reviewed: reviewed  Pertinent Medications Reviewed: reviewed    Physical Findings/Assessment         Estimated/Assessed Needs    Weight Used For Calorie Calculations: 89.1 kg (196 lb 6.9 oz)  Energy Calorie Requirements (kcal): 2227 (25kcal/kg, HD)  Energy Need Method: Kcal/kg  Protein Requirements: 51-64 (0.8-1.0g/kg, renal labs)  Weight Used For Protein Calculations: 64.5 kg (142 lb 3.2 oz) (IBW)  Fluid Requirements (mL): 2227  Estimated Fluid Requirement Method: RDA Method  RDA Method (mL): 2227  CHO Requirement: 278      Nutrition Prescription Ordered    Current Diet Order: Renal Diet    Evaluation of Received Nutrient/Fluid Intake    % Kcal Needs: 25%  % Protein Needs: 25%  Energy Calories Required: not meeting needs  Protein Required: not meeting needs  Fluid Required: not meeting needs  Tolerance: tolerating  % Intake of Estimated Energy Needs: 0 - 25 %  % Meal Intake: 0 - 25 %    Nutrition Risk    Level of Risk/Frequency of Follow-up: " moderate     Monitor and Evaluation    Food and Nutrient Intake: energy intake,enteral nutrition intake  Food and Nutrient Adminstration: enteral and parenteral nutrition administration  Anthropometric Measurements: weight,weight change,body mass index  Biochemical Data, Medical Tests and Procedures: glucose/endocrine profile,inflammatory profile,lipid profile,gastrointestinal profile,electrolyte and renal panel  Nutrition-Focused Physical Findings: overall appearance     Nutrition Follow-Up    RD Follow-up?: Yes  Jade Drake RD,[place]

## 2022-01-06 NOTE — ASSESSMENT & PLAN NOTE
Source: GB and Bacteremia  Shock resolved  Sputum culture NGTD  Blood cultures X 2 E.Coli, continue rocephin; transition to enteral augmentin for additional 5 days (total 14d abx course)  Monitor Temp and wbc trend  1/6 p.o. Augmentin for E coli bacteremia.  Kidney failure non oliguric 1.7 L last 24 hours.  Monitor urine output monitor temp WBC.

## 2022-01-06 NOTE — PROGRESS NOTES
[facility] - Telemetry ([facility])  Critical Care Medicine  Progress Note    Patient Name: Lizz Alas  MRN: [id number]  Admission Date: 12/27/2021  Hospital Length of Stay: 9 days  Code Status: Full Code  Attending Provider: Julissa Thompson MD  Primary Care Provider: Primary Doctor No   Principal Problem: LISET (acute kidney injury)    Subjective:     HPI:  Mr Alas is a 72 yo [place]ian male with a PMH of HLD, etoh abuse, tobacco abuse, HTN and PAF.  He presented to [facility] ED yesterday evening via personal vehicle with complaint of abd pain over 3 days and N/V X 1 day.  He does not speak English and was accompanied by family.  In ED Glucose 180, TBili 5.1, LA 1.5 and Abd US with GB distention and sludging.  He had temp 102.9.  He as admitted to Tele and GI consulted diagnosed with acute cholecystitis.  This AM Telemetry reported HR was at 183 and upon assessment patient seemed to be in distress, mouth breathing , O2 was reading 73% pt was shaking and shivering. Called TOM due to pt speaking Upper sorbian [id number], # number [id number], pt was stating he could not breath.  Checked pt temp reading was 98.4 oral .Pt was on 3L then moved to 5L, then  Respiratory came in and put him on breathing treatment albuterol, still couldn't get breathing controlled, called Dr. Lopez and he stated to transfer pt to ICU.      Hospital/ICU Course:  12/28 - In ICU he was awake and alert on BiPAP .40 FiO2 with mild work of breathing but no distress and hemodynamically stable.  GI requested patient intubated and stabilized more so from pulm standpoint then obtain CT Abd and plan to OR for ERCP.  TBili up to 7.1.  He was intubated and supported on [facility] ventilation post explaining all this to daughter at bed side.  Post intubation and sedation became hypotensive requiring CL and AL placements and starting Levophed infusion.   12/29 - ERCP yesterday afternoon.  Increased vasopressor and O2 requirements overnight.  This AM still intubated on [facility] vent  and sedated on Fentanyl infusion.  Still on Vasopressin and Levophed infusions also.  Oliguria with LISET and Tmax last 24 hours up to 102.9.  Blood cultures X 2 + E.Coli.  Discussed care with daughter at bed side and all questions answered.   12/30 - remains intubated and sedated on mech vent. Temp and wbc trend down. Urine output, creatinine, and metabolic acidosis worsening  12/31 - remains intubated and sedated on mech vent with minimal oxygen demand but moderate pressor requirement. Urine output 118ml last 24 hours, creatinine 6.0. continued hyperglycemia 197-237  1/1/2022 - remains intubated and sedated on mechanical vent with support requirement unchanged but pO2 trending down, +18L since admission. Renal function without improvement, only 160ml urine output last 24 hours. Trialysis cath placed for initiation of CRRT, goal filtration and fluid removal  1/2 - self extubated yesterday, tolerating HFNC support. Confusion/Delirium requiring precedex infusion for safety. CRRT initiated but clotted around 0100 after about 3L removed, remains oliguric, electrolytes stable  1/3 - supplemental oxygen weaned to now 8L NC; CRRT clotted again around 2300 but urine output 220 ml last 24 hours; precedex off this morning, delirium improving; HTN requiring treatment  1/4 - off precedex, continued mild confusion without agitation now. Supplemental oxygen weaned to 4L NC. Urine output up to 315ml last 24 hours, creatinine up 6.7, K stable  1/5 patient seen and examined.  Afebrile.  Urine output 645 mL last 24 hours.  Weak.  Poor appetite.  1/6 seen and examined.  Afebrile.  O2 sat 94% on room air.  Urine output 1.7 L last 24 hours.  BM 1/5. creatinine 6.       Interval History:   1/6 seen and examined.  Afebrile.  O2 sat 94% on room air.  Urine output 1.7 L last 24 hours.  BM 1/5. creatinine 6. For Eng catheter and rectal tube removed    Review of Systems   Constitutional: Positive for malaise/fatigue. Negative for chills and  fever.   HENT: Negative for hearing loss and nosebleeds.    Eyes: Negative for discharge.   Respiratory: Positive for cough and shortness of breath.    Cardiovascular: Positive for leg swelling. Negative for chest pain.   Gastrointestinal: Positive for abdominal pain.   Genitourinary: Negative for hematuria.   Musculoskeletal: Negative for falls.   Skin: Negative for itching.   Neurological: Positive for weakness. Negative for speech change, seizures and loss of consciousness.   Endo/Heme/Allergies: Negative for polydipsia. Does not bruise/bleed easily.   Psychiatric/Behavioral: Negative for hallucinations.             Objective:     Vital Signs (Most Recent):  Temp: 98.1 °F (36.7 °C) (01/06/22 1118)  Pulse: (!) 58 (01/06/22 1118)  Resp: 16 (01/06/22 1118)  BP: 134/69 (01/06/22 1118)  SpO2: (!) 94 % (01/06/22 1118) Vital Signs (24h Range):  Temp:  [97.6 °F (36.4 °C)-98.4 °F (36.9 °C)] 98.1 °F (36.7 °C)  Pulse:  [58-92] 58  Resp:  [16-22] 16  SpO2:  [90 %-95 %] 94 %  BP: (113-160)/(65-98) 134/69     Weight: 89.1 kg (196 lb 6.9 oz)  Body mass index is 31.7 kg/m².      Intake/Output Summary (Last 24 hours) at 1/6/2022 1405  Last data filed at 1/6/2022 0535  Gross per 24 hour   Intake 1240 ml   Output 4750 ml   Net -3510 ml       Physical Exam  Vitals and nursing note reviewed.   Constitutional:       General: He is not in acute distress.     Appearance: He is well-developed. He is ill-appearing.   HENT:      Head: Normocephalic and atraumatic.   Neck:      Comments: Right IJ Trialysis catheter  Cardiovascular:      Rate and Rhythm: Normal rate and regular rhythm.   Pulmonary:      Effort: Pulmonary effort is normal.      Breath sounds: Rhonchi present.   Abdominal:      Palpations: Abdomen is soft.      Tenderness: There is abdominal tenderness.   Musculoskeletal:         General: Swelling present.      Cervical back: Neck supple.   Skin:     General: Skin is warm.      Coloration: Skin is pale.   Neurological:       General: No focal deficit present.      Mental Status: He is alert.      Comments: Intermittent confusion   Psychiatric:         Mood and Affect: Mood normal.         Behavior: Behavior normal.         Thought Content: Thought content normal.         Judgment: Judgment normal.         Vents:  Vent Mode: A/C (01/01/22 1309)  Ventilator Initiated: Yes (12/28/21 0824)  Set Rate: 26 BPM (01/01/22 1309)  Vt Set: 450 mL (01/01/22 1309)  Pressure Support: 0 cmH20 (12/28/21 0824)  PEEP/CPAP: 5 cmH20 (01/01/22 1309)  Oxygen Concentration (%): 32 (01/05/22 1956)  Peak Airway Pressure: 25 cmH2O (01/01/22 1309)  Plateau Pressure: 16 cmH20 (01/01/22 1309)  Total Ve: 15 mL (01/01/22 1309)  Negative Inspiratory Force (cm H2O): 0 (01/01/22 1309)  F/VT Ratio<105 (RSBI): (!) 56.22 (01/01/22 1309)    Lines/Drains/Airways     Central Venous Catheter Line            Trialysis (Dialysis) Catheter 01/01/22 0922 right internal jugular 5 days                Significant Labs:    CBC/Anemia Profile:  Recent Labs   Lab 01/05/22 0323   WBC 10.32   HGB 11.9*   HCT 35.0*      MCV 89   RDW 15.1*        Chemistries:  Recent Labs   Lab 01/05/22 0323 01/06/22  0523    136   K 4.1 4.0    100   CO2 18* 24   BUN 98* 71*   CREATININE 8.0* 6.6*   CALCIUM 7.8* 7.6*   ALBUMIN 2.3*  --    PROT 5.5*  --    BILITOT 1.3*  --    ALKPHOS 172*  --    ALT 39  --    AST 27  --    Results for JUNIOR ALONZO (MRN [id number]) as of 1/5/2022 13:36   Ref. Range 1/4/2022 20:59   POC PH Latest Ref Range: 7.35 - 7.45  7.454 (H)   POC PCO2 Latest Ref Range: 35 - 45 mmHg 25.6 (LL)   POC PO2 Latest Ref Range: 80 - 100 mmHg 56 (LL)   POC BE Latest Ref Range: -2 to 2 mmol/L -6   POC HCO3 Latest Ref Range: 24 - 28 mmol/L 18.0 (L)   POC SATURATED O2 Latest Ref Range: 95 - 100 % 91 (L)   FiO2 Unknown 28   Flow Unknown 2   Sample Unknown ARTERIAL   DelSys Unknown Nasal Can   Allens Test Unknown Pass   Site Unknown LR   Mode Unknown SPONT       EKG  12/29/2021    Sinus rhythm with Premature supraventricular complexes   Incomplete right bundle branch block   Prolonged QT   Abnormal ECG   When compared with ECG of 27-DEC-2021 15:30,   Fusion complexes are no longer Present   T wave inversion now evident in Inferior leads         Significant Imaging:    Chest x-ray 01/03/2022      TECHNIQUE:  Single frontal view of the chest was performed.     COMPARISON:  Chest radiograph 01/01/2022     FINDINGS:  ECG monitoring leads overlie the chest.  There is interval removal of an endotracheal tube.  A right internal jugular central venous catheter terminates over the SVC.  A left internal jugular central venous catheter terminates at the junction of the left brachiocephalic vein and SVC.There is interval improvement in bilateral lower lung zone consolidative opacity/atelectasis, however there remains diffuse interstitial opacity throughout the lungs.  No pneumothorax or significant pleural fluid.     The cardiac silhouette is mildly enlarged.     No acute osseous abnormality.     Impression:     Interval removal of an endotracheal tube with improving bibasilar consolidation/atelectasis and persistent diffuse interstitial opacity.         Ultrasound abdomen 12/27/2021    Impression:     Gallbladder distension with internal sludge but without definite stones.  Intra and extrahepatic biliary ductal dilation.  Consider further evaluation with contrast enhanced CT.  Acute cholecystitis or choledocholithiasis not excluded.     Pancreas is largely obscured by bowel gas.         ABG  Recent Labs   Lab 01/04/22 2059   PH 7.454*   PO2 56*   PCO2 25.6*   HCO3 18.0*   BE -6     Assessment/Plan:     Psychiatric  Alcohol abuse  Likely contributing to delirium/confusion - now improving  Continue enteral thiamine/folate/multi vit  Prn ativan  1/5 p.r.n. Ativan time in folate multivitamin    Pulmonary  Hypoxemia  1/6 O2 sat 94% on room air.  Home O2 evaluation in a.m.    Acute respiratory  failure with hypoxia and hypercarbia  Likely secondary to sepsis and atelectasis  weaning NC for goal sat > 92  Encourage TCDB and mobilize when able  1/5 wean O2.  Target sat 92-95%.  Nebs p.r.n..  1/6 home O2 evaluation in a.m.    Cardiac/Vascular  Paroxysmal atrial fibrillation  SR on monitor,  Extrasystoles noted  Not on anticoagulants on home med review  Continue metoprolol for rate and BP control  1/5 continue metoprolol.    Renal/  * LISET (acute kidney injury)  Accurate I/Os, urine output up at 315ml out last 24 hours  Renal dose meds and IVAB  Nephrology following  urine output up; plan eval daily for intermittent HD indication  1/5 urine output 645 mL last 24 hours.  HD today for clearance and volume removal.  Strict I&Os.  Monitor BMP.  1/6 no indication for dialysis.  Creatinine 6. Decreased from 8. 1.7 L urine output.  Monitor urine output and creatinine.  Right IJ Trialysis day 5. Nephrology follow-up.  Status post HD yesterday.      ID  E coli bacteremia  Reviewed case and bacteria with clinical pharmacist; given gram neg and overall clinical improvement  Now tolerating po  Will transition to augmentin for additional 5 days  1/5 bacteremia resolved.  Now p.o. Augmentin.    Severe sepsis with acute organ dysfunction  Source: GB and Bacteremia  Shock resolved  Sputum culture NGTD  Blood cultures X 2 E.Coli, continue rocephin; transition to enteral augmentin for additional 5 days (total 14d abx course)  Monitor Temp and wbc trend  1/6 p.o. Augmentin for E coli bacteremia.  Kidney failure non oliguric 1.7 L last 24 hours.  Monitor urine output monitor temp WBC.      Acute cholangitis  IVAB  POD #7 S/P ERCP  Will need Cholecystectomy as outpt once clinically improved  01/05/2022 status post ERCP with CBD stones removal sphincterotomy and placement of 2 stents.  E coli bacteremia treated with IV antibiotic now on p.o. Augmentin.  1/6 afebrile.  Continue p.o. Augmentin.  GI  follow-up.      Hematology  Thrombocytopenia  resolved  1/5 platelet 244. On subQ heparin for DVT prophylaxis.      Other  Tobacco use  encourage tobacco cessation  Cont LABA and ICS nebs  On Nicotine patch  1/5 nicotine patches     Critical Care Daily Checklist:    A: Awake: RASS Goal/Actual Goal: RASS Goal: 0-->alert and calm  Actual: [place] Agitation Sedation Scale (RASS): Alert and calm   B: Spontaneous Breathing Trial Performed?     C: SAT & SBT Coordinated?  Not intubated                      D: Delirium: CAM-ICU Overall CAM-ICU: Negative   E: Early Mobility Performed? Yes   F: Feeding Goal: Goals: 1. Enteral Nutrition initiation within 24 hours.  Status: Nutrition Goal Status: new   Current Diet Order   Procedures    Diet renal      AS: Analgesia/Sedation Not sedated   T: Thromboembolic Prophylaxis Subcutaneous heparin   H: HOB > 300 Yes   U: Stress Ulcer Prophylaxis (if needed) Famotidine   G: Glucose Control Fingerstick p.r.n.   B: Bowel Function Stool Occurrence: 2   I: Indwelling Catheter (Lines & Eng) Necessity Critically keep Eng   D: De-escalation of Antimicrobials/Pharmacotherapies P.o. Augmentin    Plan for the day/ETD No HD today    Code Status:  Family/Goals of Care: Full Code  Daughter at bedside     Critical Care Time: 35 minutes  Critical secondary to Patient has a condition that poses threat to life and bodily function: Acute Renal Failure      Critical care was time spent personally by me on the following activities: development of treatment plan with patient or surrogate and bedside caregivers, discussions with consultants, evaluation of patient's response to treatment, examination of patient, ordering and performing treatments and interventions, ordering and review of laboratory studies, ordering and review of radiographic studies, pulse oximetry, re-evaluation of patient's condition. This critical care time did not overlap with that of any other provider or involve time for any  procedures.     Jeffrey Blum MD  Critical Care Medicine  [facility] - Telemetry ([facility])

## 2022-01-06 NOTE — NURSING
Pt produced 575 ml of urine throughout shift through hernandez catheter. Hernandez was removed and condom cath placed per MD order. Will continue to monitor.

## 2022-01-06 NOTE — ASSESSMENT & PLAN NOTE
- Continue home antihypertensives.     12/31  On pressors    1/4  Lopressor 25 mg PO BID, adjust as needed   May have to resume home Amlodipine 5m g PO QD, will monitor    1/5  MTP 50 mg PO BID  Amlodipine 5 mg PO QHS    1/6  Cont MTP and nightly Amlodipine

## 2022-01-06 NOTE — ASSESSMENT & PLAN NOTE
Patient with Hypoxic Respiratory failure which is Acute.  he is not on home oxygen. Supplemental oxygen was provided and noted- Oxygen Concentration (%):  [32] 32.   Signs/symptoms of respiratory failure include- tachypnea, increased work of breathing, respiratory distress, use of accessory muscles and cyanosis. Contributing diagnoses includes - COPD Labs and images were reviewed. Patient Has recent ABG, which has been reviewed. Will treat underlying causes and adjust management of respiratory failure as indicated    12/31  On 40 % FIO2 and PEEP 5 [facility] ventilation  Stable.  Ongoing renal and circulatory failure being addressed prior to considering weaning trials    1/1  On Mech Vent  ICU on board    1/2  On Vapotherm  CXR in AM  Pulm toilet as tolerated    1/3  Being weaned down on Vapotherm  Diffuse interstitial opacities throughout lungs, will likely improve with RRT  Repeat CXR as needed    1/4  Improving  PT; OOB to chair as tolerated  Incentive spirometry  Breathing treatment  Wean supplemental O2 as toelrated    1/5  Cont 3L nasal canula and wean as tolerated    1/4  Cont supplemental O2  Incentive spirometry  PT/OT, OOB  HD&volume pulling per nephro

## 2022-01-06 NOTE — ASSESSMENT & PLAN NOTE
IVAB  POD #7 S/P ERCP  Will need Cholecystectomy as outpt once clinically improved  01/05/2022 status post ERCP with CBD stones removal sphincterotomy and placement of 2 stents.  E coli bacteremia treated with IV antibiotic now on p.o. Augmentin.  1/6 afebrile.  Continue p.o. Augmentin.  GI follow-up.

## 2022-01-06 NOTE — ASSESSMENT & PLAN NOTE
Pressors  Fluids  ABX  ICU  Pulmonary CC  Intubated  Sedated      12/31  On sole pressor, Norepinephrine currently 0.14 mcg/kg/min, wean/titrate as per ICU team  On appropriate antibiotics: Ceftriaxone 1 gm IV Q12 for Sensitive E coli    1/1/22  On low dose Norepinephrine as he is getting CRRT  Source control achieved    1/2  Off Pressors  SBP acceptable    1/3, 4  Resolved  Off Pressors    1/6  Resolved   decreased ability to use legs for bridging/pushing/impaired ability to control trunk for mobility

## 2022-01-06 NOTE — ASSESSMENT & PLAN NOTE
Worsening  Cr 3.3  Monitor    12/31  2 to shock  Nephro on board  Avoid nephrotoxins  Cont NaHCo3 gtt    1/1/22  On CRRT started today   Nephro on board  Mild hypokalemia and hyponatremia from LISET: CRRT    1/2  Net positive about 18L since admit  Ongoing CRRT  Avoid nephrotoxic agents    1/3  Getting RRT  scR 4.7  Oliguric UOP last 24 slightly over 227  Got CRRT past approx 48 hrs (-ve slightly over 2L)    1/4  Dialysis break yesterday to see intrinsic function: sCR up to 6.7, no dangerous electrolyte or acid imbalance today  uop 315 ml last 24 hour  Nephro on board    1/5  Increased sCR  To get RRT today      1/6  This is now the principle problem as Sepsis and shock have resolved  Nephro on board, duration of RRT not clear at this point. However so far in between HD his scr rises considerably. Whether intrinsic renal function will return or when remains to be seen  UOP last 24 documented as 1750 cc. This is a marked improvement  Cont Condom cath for strict monitoring UOP

## 2022-01-06 NOTE — PT/OT/SLP EVAL
Physical Therapy Evaluation    Patient Name:  Lizz Alas   MRN:  [id number]    Recommendations:     Discharge Recommendations:  home health PT   Discharge Equipment Recommendations: bath bench,walker, rolling   Barriers to discharge: None    Assessment:     Lizz Alas is a 71 y.o. male admitted with a medical diagnosis of LISET (acute kidney injury).  He presents with the following impairments/functional limitations:  weakness,impaired endurance,impaired balance,gait instability,impaired functional mobilty,decreased coordination,decreased safety awareness.    Rehab Prognosis: Good; patient would benefit from acute skilled PT services to address these deficits and reach maximum level of function.    Recent Surgery: Procedure(s) (LRB):  ERCP (ENDOSCOPIC RETROGRADE CHOLANGIOPANCREATOGRAPHY) (N/A) 9 Days Post-Op    Plan:     During this hospitalization, patient to be seen 3 x/week to address the identified rehab impairments via gait training,therapeutic activities,therapeutic exercises and progress toward the following goals:    · Plan of Care Expires:  01/20/22    Subjective     Chief Complaint: SOB  Patient/Family Comments/goals:   Pain/Comfort:  · Pain Rating 1: 0/10    Patients cultural, spiritual, Rastafari conflicts given the current situation:      Living Environment:  PT LIVES WITH DAUGHTER, 1 STORY HOUSE NO STEPS, AMB INDEP COMMUNITY DISTANCES, DOES NOT DRIVE, INDEP WITH ADL'S  Prior to admission, patients level of function was INDEP.  Equipment used at home: none.  DME owned (not currently used): none.  Upon discharge, patient will have assistance from DAUGHTER.    Objective:     Communicated with NURSE PÉREZ prior to session.  Patient found supine with telemetry,peripheral IV,oxygen,bed alarm (JOSE SYS)  upon PT entry to room.    General Precautions: Standard, fall,respiratory (Armenian SPEAKING ONLY) DAUGHTER PRESENT FOR TRANSLATION  Orthopedic Precautions:N/A   Braces: N/A  Respiratory Status: Nasal  cannula, flow 4 L/min    Exams:  · Cognitive Exam:  Patient is oriented to Person, Place, Time and Situation  · Postural Exam:  Patient presented with the following abnormalities:    · -       Rounded shoulders  · Sensation:    · -       Intact  · RLE ROM: WFL  · RLE Strength: WFL  · LLE ROM: WFL  · LLE Strength: WFL    Functional Mobility:  · Bed Mobility:     · Rolling Left:  contact guard assistance  · Rolling Right: contact guard assistance  · Scooting: contact guard assistance  · Supine to Sit: contact guard assistance  · Sit to Supine: contact guard assistance  · Transfers:     · Sit to Stand:  minimum assistance with no AD  · Gait: PT AMB 60' X 2 TRIALS WITH MODA/HHA, DAUGHTER REFUSED TO USE RW, CUES FOR UPRIGHT POSTURE AND DEEP BREATHING, INCREASED SOB WITH EXERTION, NO GROSS LOB  · Balance: POOR    Therapeutic Activities and Exercises:   PT EDUCATED IN ROLE OF P.T. AND POC, PT ENCOURAGED TO INCREASE TIME OOB IN CHAIR, PT WILL BENEFIT FROM RW USE DURING RECOVERY, PT ASSISTED BACK TO SUPINE FOR REST    AM-PAC 6 CLICK MOBILITY  Total Score:15     Patient left HOB elevated with all lines intact, call button in reach, bed alarm on and NURSE notified.    GOALS:   Multidisciplinary Problems     Physical Therapy Goals        Problem: Physical Therapy Goal    Goal Priority Disciplines Outcome Goal Variances Interventions   Physical Therapy Goal     PT, PT/OT      Description: LTG'S TO BE MET IN 14 DAYS (1-20-22)  1. PT WILL REQUIRE SPV FOR BED MOBILITY  2. PT WILL REQUIRE CGA FOR TF'S  3. PT WILL ' WITH RW AND CGA                   History:     Past Medical History:   Diagnosis Date    Hyperlipemia     Hypertension     Other lesions of oral mucosa     PAF (paroxysmal atrial fibrillation)     Thrombocytopenia        Past Surgical History:   Procedure Laterality Date    ERCP N/A 12/28/2021    Procedure: ERCP (ENDOSCOPIC RETROGRADE CHOLANGIOPANCREATOGRAPHY);  Surgeon: Melchor Sarmiento MD;   Location[facility] OR;  Service: Endoscopy;  Laterality: N/A;    FRACTURE SURGERY      HERNIA REPAIR         Time Tracking:     PT Received On: 01/06/22  PT Start Time: 0750     PT Stop Time: 0813  PT Total Time (min): 23 min     Billable Minutes: Evaluation 15 and Therapeutic Activity 8    01/06/2022

## 2022-01-06 NOTE — ASSESSMENT & PLAN NOTE
Likely secondary to sepsis and atelectasis  weaning NC for goal sat > 92  Encourage TCDB and mobilize when able  1/5 wean O2.  Target sat 92-95%.  Nebs p.r.n..  1/6 home O2 evaluation in a.m.

## 2022-01-06 NOTE — PT/OT/SLP EVAL
"Occupational Therapy   Evaluation    Name: Lizz Alas  MRN: [id number]  Admitting Diagnosis:  LISET (acute kidney injury)  Recent Surgery: Procedure(s) (LRB):  ERCP (ENDOSCOPIC RETROGRADE CHOLANGIOPANCREATOGRAPHY) (N/A) 9 Days Post-Op    Recommendations:     Discharge Recommendations: home health OT  Discharge Equipment Recommendations:  bath bench,walker, rolling  Barriers to discharge:  None    Assessment:     Lizz Alas is a 71 y.o. male with a medical diagnosis of LISET (acute kidney injury).  He presents with the following performance deficits affecting function: weakness,impaired endurance,impaired self care skills,impaired functional mobilty,impaired balance,decreased safety awareness,edema,impaired cardiopulmonary response to activity.      Rehab Prognosis: Good; patient would benefit from acute skilled OT services to address these deficits and reach maximum level of function.       Plan:     Patient to be seen 2 x/week to address the above listed problems via self-care/home management,therapeutic activities,therapeutic exercises  · Plan of Care Expires: 01/20/22  · Plan of Care Reviewed with: patient,daughter    Subjective     Chief Complaint: Daughter reported "He needs to move."  Patient/Family Comments/goals: return to PLOF    Patient's daughter present throughout. She declined OT use of JOSE R  and stated that she would serve as this role. She provided all below listed PLOF.    Occupational Profile:  Living Environment: Patient resides in a one story home with no steps to enter, with his daughter.  Previous level of function: Patient was fully independent with ADLs and ambulation prior to admission. He was an active swimmer and enjoyed fishing and gardening. Patient does not drive while in [place]  Roles and Routines: n/a  Equipment Used at Home:  none  Assistance upon Discharge: daughter    Pain/Comfort:  · Pain Rating 1: 0/10    Objective:     Communicated with: NurseJanice, prior to " session.  Patient found supine with telemetry,peripheral IV,oxygen,bed alarm (manuel sys) upon OT entry to room.    General Precautions: Standard, airborne,respiratory   Orthopedic Precautions:N/A   Braces: N/A  Respiratory Status: Nasal cannula, flow 4 L/min    Bed Mobility:    · Patient completed Supine to Sit with contact guard assistance  · Patient completed Sit to Supine with minimum assistance    Functional Mobility/Transfers:  · Patient completed Sit <> Stand Transfer with minimum assistance  with  rolling walker   · Functional Mobility: Patient completed x150ft functional mobility with mod HHA and O2 in place to increase dynamic standing balance and activity tolerance needed for ADL completion. Daughter provided max encouragement  and pushed patient to increase mobility throughout.    Activities of Daily Living:  · Grooming: setup .  · Upper Body Dressing: minimum assistance .    Cognitive/Visual Perceptual:  Cognitive/Psychosocial Skills:     -       Oriented to: Person and Place   -       Follows Commands/attention:Follows one-step commands    Physical Exam:  Balance:    -       sitting: fair, static standing: poor +, dynamic standing: poor  Upper Extremity Range of Motion:     -       Right Upper Extremity: WFL  -       Left Upper Extremity: WFL  Upper Extremity Strength:    -       Right Upper Extremity: Deficits: grossly 4-/5  -       Left Upper Extremity: Deficits: grossly 4-/5   Strength:    -       Right Upper Extremity: Deficits: fair  -       Left Upper Extremity: Deficits: fair    AMPAC 6 Click ADL:  AMPAC Total Score: 16    Treatment & Education:  Patient provided with education on role of OT in acute setting and benefits of participation. Educated on safe techniques to use to increase independence with mobility and decrease fall risk. Educated on importance of OOB activity. Daughter reported that she needed to leave to go home at end of session and that she would have nursing put patient in  chair when she got back as she did not feel he was safe to sit up without supervision.  Education:    Patient left supine with all lines intact, call button in reach and bed alarm on    GOALS:   Multidisciplinary Problems     Occupational Therapy Goals        Problem: Occupational Therapy Goal    Goal Priority Disciplines Outcome Interventions   Occupational Therapy Goal     OT, PT/OT     Description: Goals to be met by: 1/20/22     Patient will increase functional independence with ADLs by performing:    Toileting from toilet with Supervision for hygiene and clothing management.   Toilet transfer to toilet with Supervision.  Increased functional strength in B UE grossly by 1/2 MM grade.                     History:     Past Medical History:   Diagnosis Date    Hyperlipemia     Hypertension     Other lesions of oral mucosa     PAF (paroxysmal atrial fibrillation)     Thrombocytopenia        Past Surgical History:   Procedure Laterality Date    ERCP N/A 12/28/2021    Procedure: ERCP (ENDOSCOPIC RETROGRADE CHOLANGIOPANCREATOGRAPHY);  Surgeon: Melchor Sarmiento MD;  Location: [facility];  Service: Endoscopy;  Laterality: N/A;    FRACTURE SURGERY      HERNIA REPAIR         Time Tracking:     OT Date of Treatment: 01/06/22  OT Start Time: 0755  OT Stop Time: 0820  OT Total Time (min): 25 min    Billable Minutes:Evaluation 15  Therapeutic Activity 10    1/6/2022

## 2022-01-06 NOTE — ASSESSMENT & PLAN NOTE
ABX  ICU  Follow Blood Cultures  Pressors as indicated      12/31  Sensitive E coli bacteremia on ceftriaxone IV  Afebrile  Resolved leucocytosis  Repeat blood culture on 12/29 NGTC x 2 days  Appropriate source control achieved S/P ERCP, stone retrieval and sphincterectomy.  Panculture as needed      1/1, 2, 3  Repeat culture NGTD  Cont cefriaxone 1 gm IV Q12    1/4  Final repeat blood culture Negative  Cont Ceftriaxone    1/5, 1/6  On Augmentin PO

## 2022-01-06 NOTE — ASSESSMENT & PLAN NOTE
- Abdominal US showed: Gallbladder distension with internal sludge but without definite stones. Intra and extrahepatic biliary ductal dilation. Acute cholecystitis or choledocholithiasis not excluded.  - GI consult. Plans for ERCP in AM. Will keep NPO.  - Blood cultures obtained in the ED. Continue empiric IV Zosyn.   - IV hydration.  - Analgesics and antiemetics as needed.   - Follow labs.     12/28  ERCP planned  Await intervention per GI  Now Intubated    12/29  S/p ERCP  GI on consult  Stent placed per GI  Abx  Follow Cultures    12/30  GI on board  S/p ERCP  Abx  Improving clinically  Remains on Pressors    12/31, 1/1, 1/2, 3, 4  S/P source control as above  Cont ceftriaxone    1/5, 6  Cont Augmentin PO

## 2022-01-06 NOTE — PROGRESS NOTES
[facility] Medicine  Progress Note    Patient Name: Lizz Alas  MRN: [id number]  Patient Class: IP- Inpatient   Admission Date: 12/27/2021  Length of Stay: 9 days  Attending Physician: Julissa Thompson MD  Primary Care Provider: Primary Doctor No        Subjective:     Principal Problem:LISET (acute kidney injury)        HPI:  Lizz Alas is a 71 y.o. Danish speaking male patient with a PMHx of PAF, HLD, HTN, thrombocytopenia, alcohol abuse, and tobacco use who presents to the Emergency Department for intermittent epigastric abdominal pain which onset gradually 3 days PTA. Today, the pt reports that his pain has been constant. His symptoms are constant and moderate in severity. No mitigating or exacerbating factors reported. Associated sxs include fever (102 F), nausea, and vomiting. Patient denies any diarrhea, constipation, SOB, weakness, fever, chills, and all other sxs at this time. Initial work-up in the ED showed: Normal WBC and lactic, , , alk phos 184, T bili 5.1, lipase 11, glucose 180, plt 119, sodium 134, COVID negative. Abdominal US showed gallbladder distension with internal sludge but without definite stones, intra and extrahepatic biliary ductal dilation which may represent acute cholecystitis or choledocholithiasis. Blood cultures obtained in the ED, and 1.5 L IV fluids and IV Zosyn given. Case was discussed with GI per the ED, who recommend NPO, IV abx, and ERCP in AM. Hospital Medicine was contacted for admission and patient placed in Observation.       Overview/Hospital Course:  Patient 72 yo male admitted to hospital with suspected cholangitis. Patient initiated on IV abx, fluids, O2. Patient with a rapid decompensation shourtly after admit, rapid response called, patient hypoxic in the 60's placed on bipap with good effect. Patient transferred to ICU. Patient with a progressive decline, febrile episodes to 102.9. Severe septic shock, pressors and  fluids on board. Patient evaluated by GI who recommended ERCP - additional CT abdomen. Patient scheduled for CT and this has been delayed pending stability. Patient intubated for airway protection.  12/29- Patient remains intubated, sedated, on pressors. Discussed POC with family at bedside. Patient evaluated by surgery who recommend o/p surgical followup on recovery for elective cholecystectomy.. Repeat blood cultures in progress, abx infusing.  12/30 - Patient wbc declining but remains on pressors. He is intubated and sedated. Family at bedside. Worsening renal function with cr 4.4 today. Nephrology on consult. Discussed with CC Team  12/31: On sole pressor Norepinephrine 0.14mcg/kg/min, afebrile, leucocytosis is resolved, scR is bumped to 6.0. Patient is oliguric (118 cc urine in last 24 hour). On Fentanyl gtt and NaHCO3 gtt   Daughter Hortencia  is at bedside and was updated.   AC 26/450/40/5  1/1/22 patient seen, daughter Hortencia at bedside, currently on CRRT, Norepinephrine at 0.02mcg/kg/min, fentanyl 100mcg/Hr. AC 26/450/40/5. Patient more awake today, opens eyes spontaneously, is squeezing his daughter's hand  1/2 About afternoon yesterday he self extubated, did not require immediate re-intubation as he was doing rel well resp wise, he is currently on Vapo therm 35LPM 80% FIO2, precedex drip to help with agitation (history of ETOH misuse), getting CRRT, family at bedside, whilst confused he is oriented to selpf/family, conversing with family  1/3 patient seen, was sleeping calmly early this AM, family not at bedside, on Vapotherm 2oLPM at 45% FIO2, not dyspneic. CRRT off. Remains afebrile  1/4 patient seen, down to 4L oxygen, awake and alert, off Precedex gtt, been off pressors  1/5 seen in the ICU. Awake and alert. Not in distress.  PT/OT. D/C urethral catheter, place condom cath  1/6 Transferred from ICU, doing stable on the floor. Met on 4L this morning awake and alert, not particularly interested  in his breakfast. (Apparently the dialect on [place] is different than his, therefore with communication gaps he was able to tell me directly today that he is not in any pain).        Interval History: no overnight events    Review of Systems   Constitutional: Positive for fatigue. Negative for fever.   HENT: Negative for congestion, rhinorrhea and tinnitus.    Respiratory: Negative for cough and shortness of breath.    Cardiovascular: Negative for chest pain.   Gastrointestinal: Negative for constipation, diarrhea and vomiting.   Endocrine: Negative for polyuria.   Genitourinary: Negative for dysuria, flank pain and hematuria.   Musculoskeletal: Negative for back pain.   Allergic/Immunologic: Negative for immunocompromised state.   Neurological: Negative for tremors, seizures and numbness.   Hematological: Negative for adenopathy.   Psychiatric/Behavioral: Negative for agitation, behavioral problems and confusion.     Objective:     Vital Signs (Most Recent):  Temp: 98.3 °F (36.8 °C) (01/06/22 0729)  Pulse: 65 (01/06/22 0729)  Resp: (!) 22 (01/06/22 0729)  BP: (!) 145/69 (01/06/22 0729)  SpO2: (!) 93 % (01/06/22 0729) Vital Signs (24h Range):  Temp:  [97.6 °F (36.4 °C)-98.5 °F (36.9 °C)] 98.3 °F (36.8 °C)  Pulse:  [58-92] 65  Resp:  [16-30] 22  SpO2:  [90 %-95 %] 93 %  BP: (113-164)/() 145/69     Weight: 89.1 kg (196 lb 6.9 oz)  Body mass index is 31.7 kg/m².    Intake/Output Summary (Last 24 hours) at 1/6/2022 0826  Last data filed at 1/6/2022 0535  Gross per 24 hour   Intake 1240 ml   Output 4750 ml   Net -3510 ml      Physical Exam  Vitals reviewed.   Constitutional:       Appearance: Normal appearance.   HENT:      Head: Normocephalic and atraumatic.      Mouth/Throat:      Mouth: Mucous membranes are moist.   Eyes:      General: No scleral icterus.     Pupils: Pupils are equal, round, and reactive to light.   Cardiovascular:      Rate and Rhythm: Normal rate and regular rhythm.      Pulses: Normal pulses.       Heart sounds: No murmur heard.  No gallop.    Pulmonary:      Effort: Pulmonary effort is normal. No respiratory distress.      Comments: Reduced entry at bases  Abdominal:      General: Bowel sounds are normal. There is distension.      Palpations: Abdomen is soft. There is no mass.      Tenderness: There is no abdominal tenderness. There is no guarding or rebound.      Hernia: No hernia is present.   Musculoskeletal:      Cervical back: Normal range of motion.      Right lower leg: Edema present.      Left lower leg: Edema present.      Comments: 1+ pitting edema  Edema both hands, stable   Skin:     General: Skin is warm and dry.      Capillary Refill: Capillary refill takes less than 2 seconds.   Neurological:      Mental Status: He is alert.         Significant Labs:   All pertinent labs within the past 24 hours have been reviewed.  BMP:   Recent Labs   Lab 01/06/22  0523   GLU 79      K 4.0      CO2 24   BUN 71*   CREATININE 6.6*   CALCIUM 7.6*       Significant Imaging: I have reviewed all pertinent imaging results/findings within the past 24 hours.      Assessment/Plan:      * LISET (acute kidney injury)  Worsening  Cr 3.3  Monitor    12/31  2 to shock  Nephro on board  Avoid nephrotoxins  Cont NaHCo3 gtt    1/1/22  On CRRT started today   Nephro on board  Mild hypokalemia and hyponatremia from LISET: CRRT    1/2  Net positive about 18L since admit  Ongoing CRRT  Avoid nephrotoxic agents    1/3  Getting RRT  scR 4.7  Oliguric UOP last 24 slightly over 227  Got CRRT past approx 48 hrs (-ve slightly over 2L)    1/4  Dialysis break yesterday to see intrinsic function: sCR up to 6.7, no dangerous electrolyte or acid imbalance today  uop 315 ml last 24 hour  Nephro on board    1/5  Increased sCR  To get RRT today      1/6  This is now the principle problem as Sepsis and shock have resolved  Nephro on board, duration of RRT not clear at this point. However so far in between HD his scr rises  considerably. Whether intrinsic renal function will return or when remains to be seen  UOP last 24 documented as 1750 cc. This is a marked improvement  Cont Condom cath for strict monitoring UOP    Severe sepsis with acute organ dysfunction  Pressors  Fluids  ABX  ICU  Pulmonary CC  Intubated  Sedated      12/31  On sole pressor, Norepinephrine currently 0.14 mcg/kg/min, wean/titrate as per ICU team  On appropriate antibiotics: Ceftriaxone 1 gm IV Q12 for Sensitive E coli    1/1/22  On low dose Norepinephrine as he is getting CRRT  Source control achieved    1/2  Off Pressors  SBP acceptable    1/3, 4  Resolved  Off Pressors    1/6  Resolved    E coli bacteremia  ABX  ICU  Follow Blood Cultures  Pressors as indicated      12/31  Sensitive E coli bacteremia on ceftriaxone IV  Afebrile  Resolved leucocytosis  Repeat blood culture on 12/29 NGTC x 2 days  Appropriate source control achieved S/P ERCP, stone retrieval and sphincterectomy.  Panculture as needed      1/1, 2, 3  Repeat culture NGTD  Cont cefriaxone 1 gm IV Q12    1/4  Final repeat blood culture Negative  Cont Ceftriaxone    1/5, 1/6  On Augmentin PO    Acute cholangitis  - Abdominal US showed: Gallbladder distension with internal sludge but without definite stones. Intra and extrahepatic biliary ductal dilation. Acute cholecystitis or choledocholithiasis not excluded.  - GI consult. Plans for ERCP in AM. Will keep NPO.  - Blood cultures obtained in the ED. Continue empiric IV Zosyn.   - IV hydration.  - Analgesics and antiemetics as needed.   - Follow labs.     12/28  ERCP planned  Await intervention per GI  Now Intubated    12/29  S/p ERCP  GI on consult  Stent placed per GI  Abx  Follow Cultures    12/30  GI on board  S/p ERCP  Abx  Improving clinically  Remains on Pressors    12/31, 1/1, 1/2, 3, 4  S/P source control as above  Cont ceftriaxone    1/5, 6  Cont Augmentin PO    Acute respiratory failure with hypoxia and hypercarbia  Patient with Hypoxic  Respiratory failure which is Acute.  he is not on home oxygen. Supplemental oxygen was provided and noted- Oxygen Concentration (%):  [32] 32.   Signs/symptoms of respiratory failure include- tachypnea, increased work of breathing, respiratory distress, use of accessory muscles and cyanosis. Contributing diagnoses includes - COPD Labs and images were reviewed. Patient Has recent ABG, which has been reviewed. Will treat underlying causes and adjust management of respiratory failure as indicated    12/31  On 40 % FIO2 and PEEP 5 [facility] ventilation  Stable.  Ongoing renal and circulatory failure being addressed prior to considering weaning trials    1/1  On Mech Vent  ICU on board    1/2  On Vapotherm  CXR in AM  Pulm toilet as tolerated    1/3  Being weaned down on Vapotherm  Diffuse interstitial opacities throughout lungs, will likely improve with RRT  Repeat CXR as needed    1/4  Improving  PT; OOB to chair as tolerated  Incentive spirometry  Breathing treatment  Wean supplemental O2 as toelrated    1/5  Cont 3L nasal canula and wean as tolerated    1/4  Cont supplemental O2  Incentive spirometry  PT/OT, OOB  HD&volume pulling per nephro    Thrombocytopenia  Stable-jyothi  2 to Sepsis  Hold chemcial DVTp    1/1  Stable      1/2  Likely combination of ETOH misuse and sepsis  Platelet count is stable 73K, not bleeding  No need for transfusion    1/3  Improved to 114K today    1/4, 5  Resolved    Paroxysmal atrial fibrillation  - Remains in sinus rhythm on admission. Monitor telemetry.  - Continue home BB.  - Patient is not on long-term AC. Will defer to his primary cardiologist.       1/5  Cont MTP    Primary hypertension  - Continue home antihypertensives.     12/31  On pressors    1/4  Lopressor 25 mg PO BID, adjust as needed   May have to resume home Amlodipine 5m g PO QD, will monitor    1/5  MTP 50 mg PO BID  Amlodipine 5 mg PO QHS    1/6  Cont MTP and nightly Amlodipine    Tobacco use  - Assistance with smoking  cessation was offered, including:  [x]  Medications  [x]  Counseling  [x]  Printed Information on Smoking Cessation  []  Referral to a Smoking Cessation Program  - Patient was counseled regarding smoking for 3-10 minutes.    Alcohol abuse  - Monitor for withdrawal/DTs. IV Ativan if needed.     1/2  With some delirium likely from aute illness and ICU stay, in addition to possibly some withdrawal: Precedex gtt  Bedside swallow eval when possible: oral folate/thiamine +- oral Benzodiapines as needed    1/5  PRN Low dose Librium        VTE Risk Mitigation (From admission, onward)         Ordered     heparin (porcine) injection 5,000 Units  Every 8 hours         01/03/22 0750     heparin (porcine) injection 2,000 Units  As needed (PRN)         01/01/22 2321     IP VTE HIGH RISK PATIENT  Once         12/27/21 2247     Place sequential compression device  Until discontinued         12/27/21 2247     Reason for No Pharmacological VTE Prophylaxis  Once        Question Answer Comment   Reasons: Risk of Bleeding    Reasons: Thrombocytopenia        12/27/21 2247                Discharge Planning   FLAQUITA:      Code Status: Full Code   Is the patient medically ready for discharge?:     Reason for patient still in hospital (select all that apply): Treatment  Discharge Plan A: Home with family                  Julissa Thompson MD  Department of Hospital Medicine   [facility] - Telemetry ([facility])

## 2022-01-06 NOTE — ASSESSMENT & PLAN NOTE
Accurate I/Os, urine output up at 315ml out last 24 hours  Renal dose meds and IVAB  Nephrology following  urine output up; plan eval daily for intermittent HD indication  1/5 urine output 645 mL last 24 hours.  HD today for clearance and volume removal.  Strict I&Os.  Monitor BMP.  1/6 no indication for dialysis.  Creatinine 6. Decreased from 8. 1.7 L urine output.  Monitor urine output and creatinine.  Right IJ Trialysis day 5. Nephrology follow-up.  Status post HD yesterday.

## 2022-01-07 LAB
ANION GAP SERPL CALC-SCNC: 13 MMOL/L (ref 8–16)
BUN SERPL-MCNC: 84 MG/DL (ref 8–23)
CALCIUM SERPL-MCNC: 7.8 MG/DL (ref 8.7–10.5)
CHLORIDE SERPL-SCNC: 100 MMOL/L (ref 95–110)
CO2 SERPL-SCNC: 22 MMOL/L (ref 23–29)
CREAT SERPL-MCNC: 8 MG/DL (ref 0.5–1.4)
EST. GFR  (AFRICAN AMERICAN): 7 ML/MIN/1.73 M^2
EST. GFR  (NON AFRICAN AMERICAN): 6 ML/MIN/1.73 M^2
GLUCOSE SERPL-MCNC: 75 MG/DL (ref 70–110)
POCT GLUCOSE: 141 MG/DL (ref 70–110)
POCT GLUCOSE: 89 MG/DL (ref 70–110)
POCT GLUCOSE: 93 MG/DL (ref 70–110)
POTASSIUM SERPL-SCNC: 4.1 MMOL/L (ref 3.5–5.1)
SODIUM SERPL-SCNC: 135 MMOL/L (ref 136–145)

## 2022-01-07 PROCEDURE — 25000003 PHARM REV CODE 250: Performed by: NURSE PRACTITIONER

## 2022-01-07 PROCEDURE — 94799 UNLISTED PULMONARY SVC/PX: CPT

## 2022-01-07 PROCEDURE — 25000003 PHARM REV CODE 250: Performed by: INTERNAL MEDICINE

## 2022-01-07 PROCEDURE — 21400001 HC TELEMETRY ROOM

## 2022-01-07 PROCEDURE — 80048 BASIC METABOLIC PNL TOTAL CA: CPT | Performed by: INTERNAL MEDICINE

## 2022-01-07 PROCEDURE — 94640 AIRWAY INHALATION TREATMENT: CPT

## 2022-01-07 PROCEDURE — 27000221 HC OXYGEN, UP TO 24 HOURS

## 2022-01-07 PROCEDURE — S4991 NICOTINE PATCH NONLEGEND: HCPCS | Performed by: NURSE PRACTITIONER

## 2022-01-07 PROCEDURE — 94761 N-INVAS EAR/PLS OXIMETRY MLT: CPT

## 2022-01-07 PROCEDURE — 80100016 HC MAINTENANCE HEMODIALYSIS

## 2022-01-07 PROCEDURE — 99232 SBSQ HOSP IP/OBS MODERATE 35: CPT | Mod: ,,, | Performed by: INTERNAL MEDICINE

## 2022-01-07 PROCEDURE — 25000242 PHARM REV CODE 250 ALT 637 W/ HCPCS: Performed by: NURSE PRACTITIONER

## 2022-01-07 PROCEDURE — 90935 HEMODIALYSIS ONE EVALUATION: CPT | Mod: ,,, | Performed by: INTERNAL MEDICINE

## 2022-01-07 PROCEDURE — 27000207 HC ISOLATION

## 2022-01-07 PROCEDURE — 63600175 PHARM REV CODE 636 W HCPCS: Performed by: NURSE PRACTITIONER

## 2022-01-07 PROCEDURE — 36415 COLL VENOUS BLD VENIPUNCTURE: CPT | Performed by: INTERNAL MEDICINE

## 2022-01-07 PROCEDURE — 99232 PR SUBSEQUENT HOSPITAL CARE,LEVL II: ICD-10-PCS | Mod: ,,, | Performed by: INTERNAL MEDICINE

## 2022-01-07 PROCEDURE — 90935 PR HEMODIALYSIS, ONE EVALUATION: ICD-10-PCS | Mod: ,,, | Performed by: INTERNAL MEDICINE

## 2022-01-07 RX ADMIN — TRAZODONE HYDROCHLORIDE 25 MG: 50 TABLET ORAL at 08:01

## 2022-01-07 RX ADMIN — HEPARIN SODIUM 5000 UNITS: 5000 INJECTION INTRAVENOUS; SUBCUTANEOUS at 05:01

## 2022-01-07 RX ADMIN — BUDESONIDE 0.5 MG: 0.5 INHALANT ORAL at 08:01

## 2022-01-07 RX ADMIN — ARFORMOTEROL TARTRATE 15 MCG: 15 SOLUTION RESPIRATORY (INHALATION) at 08:01

## 2022-01-07 RX ADMIN — FOLIC ACID 1 MG: 1 TABLET ORAL at 08:01

## 2022-01-07 RX ADMIN — FAMOTIDINE 20 MG: 20 TABLET ORAL at 08:01

## 2022-01-07 RX ADMIN — HEPARIN SODIUM 5000 UNITS: 5000 INJECTION INTRAVENOUS; SUBCUTANEOUS at 03:01

## 2022-01-07 RX ADMIN — MULTIPLE VITAMINS W/ MINERALS TAB 1 TABLET: TAB at 08:01

## 2022-01-07 RX ADMIN — HEPARIN SODIUM 5000 UNITS: 5000 INJECTION INTRAVENOUS; SUBCUTANEOUS at 10:01

## 2022-01-07 RX ADMIN — AMOXICILLIN AND CLAVULANATE POTASSIUM 500 MG: 500; 125 TABLET, FILM COATED ORAL at 06:01

## 2022-01-07 RX ADMIN — METOPROLOL TARTRATE 50 MG: 50 TABLET, FILM COATED ORAL at 08:01

## 2022-01-07 RX ADMIN — NICOTINE 1 PATCH: 21 PATCH, EXTENDED RELEASE TRANSDERMAL at 08:01

## 2022-01-07 RX ADMIN — AMLODIPINE BESYLATE 5 MG: 5 TABLET ORAL at 08:01

## 2022-01-07 NOTE — ASSESSMENT & PLAN NOTE
Patient with Hypoxic Respiratory failure which is Acute.  he is not on home oxygen. Supplemental oxygen was provided and noted- Oxygen Concentration (%):  [32] 32.   Signs/symptoms of respiratory failure include- tachypnea, increased work of breathing, respiratory distress, use of accessory muscles and cyanosis. Contributing diagnoses includes - COPD Labs and images were reviewed. Patient Has recent ABG, which has been reviewed. Will treat underlying causes and adjust management of respiratory failure as indicated    12/31  On 40 % FIO2 and PEEP 5 [facility] ventilation  Stable.  Ongoing renal and circulatory failure being addressed prior to considering weaning trials    1/1  On Mech Vent  ICU on board    1/2  On Vapotherm  CXR in AM  Pulm toilet as tolerated    1/3  Being weaned down on Vapotherm  Diffuse interstitial opacities throughout lungs, will likely improve with RRT  Repeat CXR as needed    1/4  Improving  PT; OOB to chair as tolerated  Incentive spirometry  Breathing treatment  Wean supplemental O2 as toelrated    1/5  Cont 3L nasal canula and wean as tolerated    1/4  Cont supplemental O2  Incentive spirometry  PT/OT, OOB  HD&volume pulling per nephro    1/7, 8  Resolved

## 2022-01-07 NOTE — PT/OT/SLP PROGRESS
Physical Therapy      Patient Name:  Lizz Alas   MRN:  [id number]    ATTEMPTED P.T. TX. THIS AM, PT LEAVING FOR HEMODIALYSIS, WILL ASSESS PT NEXT VISIT, CONT PER POC    Ramandeep Rojas, PT  1/7/2022  1034

## 2022-01-07 NOTE — ASSESSMENT & PLAN NOTE
Accurate I/Os, urine output up at 315ml out last 24 hours  Renal dose meds and IVAB  Nephrology following  urine output up; plan eval daily for intermittent HD indication  1/5 urine output 645 mL last 24 hours.  HD today for clearance and volume removal.  Strict I&Os.  Monitor BMP.  1/6 no indication for dialysis.  Creatinine 6. Decreased from 8. 1.7 L urine output.  Monitor urine output and creatinine.  Right IJ Trialysis day 5. Nephrology follow-up.  Status post HD yesterday.    1/7 HD 3 times a week as per Nephrology recommendations.  Improved urine output clearance not improving creatinine 8.

## 2022-01-07 NOTE — PROGRESS NOTES
Hemodialysis completed.  Treatment time: 3hr.  Net UF: 1L.  No meds were given while on HD.  Rt IJ CVC was accessed and used without difficulty.  The pt was evaluated by Dr. Lr while on HD.

## 2022-01-07 NOTE — ASSESSMENT & PLAN NOTE
Likely secondary to sepsis and atelectasis  weaning NC for goal sat > 92  Encourage TCDB and mobilize when able  1/5 wean O2.  Target sat 92-95%.  Nebs p.r.n..  1/6 home O2 evaluation in a.m.  1/7 home O2 eval prior to DC

## 2022-01-07 NOTE — ASSESSMENT & PLAN NOTE
Worsening  Cr 3.3  Monitor    12/31  2 to shock  Nephro on board  Avoid nephrotoxins  Cont NaHCo3 gtt    1/1/22  On CRRT started today   Nephro on board  Mild hypokalemia and hyponatremia from LISET: CRRT    1/2  Net positive about 18L since admit  Ongoing CRRT  Avoid nephrotoxic agents    1/3  Getting RRT  scR 4.7  Oliguric UOP last 24 slightly over 227  Got CRRT past approx 48 hrs (-ve slightly over 2L)    1/4  Dialysis break yesterday to see intrinsic function: sCR up to 6.7, no dangerous electrolyte or acid imbalance today  uop 315 ml last 24 hour  Nephro on board    1/5  Increased sCR  To get RRT today      1/6  This is now the principle problem as Sepsis and shock have resolved  Nephro on board, duration of RRT not clear at this point. However so far in between HD his scr rises considerably. Whether intrinsic renal function will return or when remains to be seen  UOP last 24 documented as 1750 cc. This is a marked improvement  Cont Condom cath for strict monitoring UOP    1/7  Vasc consult for tunneled catheter  Social work consult to get him outpatient HD  MWF HD per nephro  Avoid nephrotoxins    1/8  To get tunneled cath on Monday, jorge luis rios appreciated  Now on MWF schedule, intrinsic renal function yet to return, will check BMP in AM  Avoid nephrotoxic agents  Had 1 L pulled yesterday

## 2022-01-07 NOTE — PT/OT/SLP PROGRESS
Occupational Therapy      Patient Name:  Lizz Alas   MRN:  [id number]    Patient not seen today secondary to PT IN DIALYSIS @1015.  Will follow-up ON LATER DATE     1/7/2022   Deedee Duke, OT   1015

## 2022-01-07 NOTE — PROGRESS NOTES
[facility] ([facility])  Nephrology  Progress Note    Patient Name: Lizz Alas  MRN: [id number]  Admission Date: 12/27/2021  Hospital Length of Stay: 10 days  Attending Provider: Julissa Thompson MD   Primary Care Physician: Primary Doctor No  Principal Problem:LISET (acute kidney injury)    Consults  Subjective:     Interval History:     Patient was seen in dialysis.  Continues to have good urine output however no evidence of clearance.  Will plan to continue Monday Wednesday Friday dialysis with close monitoring for evidence of recovery.    Review of patient's allergies indicates:  No Known Allergies  Current Facility-Administered Medications   Medication Frequency    0.9%  NaCl infusion PRN    acetaminophen oral solution 650 mg Q6H PRN    albuterol-ipratropium 2.5 mg-0.5 mg/3 mL nebulizer solution 3 mL Q4H PRN    aluminum-magnesium hydroxide-simethicone 200-200-20 mg/5 mL suspension 30 mL QID PRN    amLODIPine tablet 5 mg QHS    amoxicillin-clavulanate 500-125mg per tablet 500 mg Daily    arformoteroL nebulizer solution 15 mcg BID    budesonide nebulizer solution 0.5 mg Q12H    dextrose 50% injection 12.5 g PRN    famotidine tablet 20 mg Daily    folic acid tablet 1 mg Daily    glucagon (human recombinant) injection 1 mg PRN    heparin (porcine) injection 2,000 Units PRN    heparin (porcine) injection 5,000 Units Q8H    hydrALAZINE injection 20 mg Q4H PRN    metoprolol tartrate (LOPRESSOR) tablet 50 mg BID    multivitamin tablet Daily    naloxone 0.4 mg/mL injection 0.02 mg PRN    nicotine 21 mg/24 hr 1 patch Daily    ondansetron injection 4 mg Q6H PRN    oxazepam capsule 10 mg TID PRN    oxyCODONE-acetaminophen 5-325 mg per tablet 1 tablet Q8H PRN    polyethylene glycol packet 17 g Every other day    promethazine tablet 25 mg Q6H PRN    simethicone chewable tablet 80 mg TID PRN    sodium chloride 0.9% bolus 250 mL PRN    sodium chloride 0.9% flush 10 mL Q12H PRN    thiamine  tablet 100 mg Daily       Objective:     Vital Signs (Most Recent):  Temp: 98.4 °F (36.9 °C) (01/07/22 1020)  Pulse: (!) 55 (01/07/22 1120)  Resp: 20 (01/07/22 1020)  BP: 128/86 (01/07/22 1120)  SpO2: 95 % (01/07/22 0832)  O2 Device (Oxygen Therapy): nasal cannula (01/07/22 1020) Vital Signs (24h Range):  Temp:  [98.1 °F (36.7 °C)-98.5 °F (36.9 °C)] 98.4 °F (36.9 °C)  Pulse:  [54-83] 55  Resp:  [18-22] 20  SpO2:  [93 %-95 %] 95 %  BP: (122-141)/(67-86) 128/86     Weight: 86.9 kg (191 lb 9.3 oz) (01/07/22 0423)  Body mass index is 30.92 kg/m².  Body surface area is 2.01 meters squared.    I/O last 3 completed shifts:  In: 240 [P.O.:240]  Out: 1775 [Urine:1775]        Significant Labs:sure  BMP:   Recent Labs   Lab 01/03/22  0434 01/04/22  0438 01/07/22  0557   GLU 91  91   < > 75   *  132*   < > 135*   K 3.9  3.9   < > 4.1   CL 99  99   < > 100   CO2 18*  18*   < > 22*   BUN 49*  49*   < > 84*   CREATININE 4.7*  4.7*   < > 8.0*   CALCIUM 8.1*  8.1*   < > 7.8*   MG 2.4  --   --     < > = values in this interval not displayed.     CMP:   Recent Labs   Lab 01/05/22  0323 01/06/22  0523 01/07/22  0557      < > 75   CALCIUM 7.8*   < > 7.8*   ALBUMIN 2.3*  --   --    PROT 5.5*  --   --       < > 135*   K 4.1   < > 4.1   CO2 18*   < > 22*      < > 100   BUN 98*   < > 84*   CREATININE 8.0*   < > 8.0*   ALKPHOS 172*  --   --    ALT 39  --   --    AST 27  --   --    BILITOT 1.3*  --   --     < > = values in this interval not displayed.     All labs within the past 24 hours have been reviewed.    Significant Imaging:  Reviewed    Assessment/Plan:     Active Diagnoses:    Diagnosis Date Noted POA    PRINCIPAL PROBLEM:  LISET (acute kidney injury) [N17.9] 12/29/2021 No    Hypoxemia [R09.02] 01/06/2022 Unknown    Acute respiratory failure with hypoxia and hypercarbia [J96.01, J96.02] 12/28/2021 Yes    Severe sepsis with acute organ dysfunction [A41.9, R65.20] 12/28/2021 Yes    E coli bacteremia  [R78.81, B96.20] 12/28/2021 Yes    Acute cholangitis [K83.09] 12/27/2021 Yes    Tobacco use [Z72.0] 12/27/2021 Yes     Chronic    Alcohol abuse [F10.10] 12/27/2021 Yes     Chronic    Primary hypertension [I10] 12/27/2021 Yes     Chronic    Paroxysmal atrial fibrillation [I48.0] 12/27/2021 Yes     Chronic      Problems Resolved During this Admission:    Diagnosis Date Noted Date Resolved POA    Septic shock [A41.9, R65.21]  01/05/2022 Yes    On mechanically assisted ventilation [Z99.11] 12/30/2021 01/02/2022 Not Applicable    Acute hyperglycemia [R73.9] 12/29/2021 01/05/2022 Yes    Electrolyte imbalance [E87.8] 12/28/2021 01/02/2022 Yes       1. LISET:  Continues dialysis dependent.  Seen on dialysis.  Will continue Monday Wednesday Friday dialysis unless otherwise indicated.    Thank you for your consult.     Thor Lr MD  Nephrology  [facility] ([facility])

## 2022-01-07 NOTE — PLAN OF CARE
Consult noted for LTAC, Rehab or SNF.  Patient does not qualify for LTAC.  PT/OT recommending home health.  Patient is not a documented immigrant.  He is only eligible for Emergency Medicaid for hospital bill.  Not eligible for post acute services including hemodialysis.

## 2022-01-07 NOTE — PROGRESS NOTES
[facility] ([facility])  Critical Care Medicine  Progress Note    Patient Name: Lizz Alas  MRN: [id number]  Admission Date: 12/27/2021  Hospital Length of Stay: 10 days  Code Status: Full Code  Attending Provider: Julissa Thompson MD  Primary Care Provider: Primary Doctor No   Principal Problem: LISTE (acute kidney injury)    Subjective:     HPI:  Mr Alas is a 70 yo [place] male with a PMH of HLD, etoh abuse, tobacco abuse, HTN and PAF.  He presented to [facility] ED yesterday evening via personal vehicle with complaint of abd pain over 3 days and N/V X 1 day.  He does not speak English and was accompanied by family.  In ED Glucose 180, TBili 5.1, LA 1.5 and Abd US with GB distention and sludging.  He had temp 102.9.  He as admitted to Tele and GI consulted diagnosed with acute cholecystitis.  This AM Telemetry reported HR was at 183 and upon assessment patient seemed to be in distress, mouth breathing , O2 was reading 73% pt was shaking and shivering. Called TOM due to pt speaking Ukrainian [id number], # number [phone], pt was stating he could not breath.  Checked pt temp reading was 98.4 oral .Pt was on 3L then moved to 5L, then  Respiratory came in and put him on breathing treatment albuterol, still couldn't get breathing controlled, called Dr. Lopez and he stated to transfer pt to ICU.      Hospital/ICU Course:  12/28 - In ICU he was awake and alert on BiPAP .40 FiO2 with mild work of breathing but no distress and hemodynamically stable.  GI requested patient intubated and stabilized more so from pulm standpoint then obtain CT Abd and plan to OR for ERCP.  TBili up to 7.1.  He was intubated and supported on [facility] ventilation post explaining all this to daughter at bed side.  Post intubation and sedation became hypotensive requiring CL and AL placements and starting Levophed infusion.   12/29 - ERCP yesterday afternoon.  Increased vasopressor and O2 requirements overnight.  This AM still intubated on [facility] vent  and sedated on Fentanyl infusion.  Still on Vasopressin and Levophed infusions also.  Oliguria with LISET and Tmax last 24 hours up to 102.9.  Blood cultures X 2 + E.Coli.  Discussed care with daughter at bed side and all questions answered.   12/30 - remains intubated and sedated on mech vent. Temp and wbc trend down. Urine output, creatinine, and metabolic acidosis worsening  12/31 - remains intubated and sedated on mech vent with minimal oxygen demand but moderate pressor requirement. Urine output 118ml last 24 hours, creatinine 6.0. continued hyperglycemia 197-237  1/1/2022 - remains intubated and sedated on mechanical vent with support requirement unchanged but pO2 trending down, +18L since admission. Renal function without improvement, only 160ml urine output last 24 hours. Trialysis cath placed for initiation of CRRT, goal filtration and fluid removal  1/2 - self extubated yesterday, tolerating HFNC support. Confusion/Delirium requiring precedex infusion for safety. CRRT initiated but clotted around 0100 after about 3L removed, remains oliguric, electrolytes stable  1/3 - supplemental oxygen weaned to now 8L NC; CRRT clotted again around 2300 but urine output 220 ml last 24 hours; precedex off this morning, delirium improving; HTN requiring treatment  1/4 - off precedex, continued mild confusion without agitation now. Supplemental oxygen weaned to 4L NC. Urine output up to 315ml last 24 hours, creatinine up 6.7, K stable  1/5 patient seen and examined.  Afebrile.  Urine output 645 mL last 24 hours.  Weak.  Poor appetite.  1/6 seen and examined.  Afebrile.  O2 sat 94% on room air.  Urine output 1.7 L last 24 hours.  BM 1/5. creatinine 6.   1/7 O2 sat 95% on 3 to per minute.  Afebrile.  Having dialysis today.  HD for clearance.  Creatinine 8      Interval History:   1/7 O2 sat 95% on 3 to per minute.  Afebrile.  Having dialysis today.  HD for clearance.  Creatinine 8  Review of Systems   Constitutional: Positive  for malaise/fatigue. Negative for chills and fever.   HENT: Negative for hearing loss and nosebleeds.    Eyes: Negative for discharge.   Respiratory: Positive for cough and shortness of breath.    Cardiovascular: Positive for leg swelling. Negative for chest pain.   Gastrointestinal: Positive for abdominal pain.   Genitourinary: Negative for hematuria.   Musculoskeletal: Negative for falls.   Skin: Negative for itching.   Neurological: Positive for weakness. Negative for speech change, seizures and loss of consciousness.   Endo/Heme/Allergies: Negative for polydipsia. Does not bruise/bleed easily.   Psychiatric/Behavioral: Negative for hallucinations.             Objective:     Vital Signs (Most Recent):  Temp: 98.6 °F (37 °C) (01/07/22 1344)  Pulse: (!) 55 (01/07/22 1344)  Resp: 18 (01/07/22 1344)  BP: 134/71 (01/07/22 1344)  SpO2: 95 % (01/07/22 0832) Vital Signs (24h Range):  Temp:  [98.1 °F (36.7 °C)-98.6 °F (37 °C)] 98.6 °F (37 °C)  Pulse:  [44-83] 55  Resp:  [18-20] 18  SpO2:  [93 %-95 %] 95 %  BP: (122-151)/(67-89) 134/71     Weight: 86.9 kg (191 lb 9.3 oz)  Body mass index is 30.92 kg/m².      Intake/Output Summary (Last 24 hours) at 1/7/2022 1536  Last data filed at 1/7/2022 1335  Gross per 24 hour   Intake 740 ml   Output 1800 ml   Net -1060 ml       Physical Exam  Vitals and nursing note reviewed.   Constitutional:       General: He is not in acute distress.     Appearance: He is well-developed. He is ill-appearing.   HENT:      Head: Normocephalic and atraumatic.   Neck:      Comments: Right IJ Trialysis catheter  Cardiovascular:      Rate and Rhythm: Normal rate and regular rhythm.   Pulmonary:      Effort: Pulmonary effort is normal.      Breath sounds: Rhonchi present.   Abdominal:      Palpations: Abdomen is soft.      Tenderness: There is abdominal tenderness.   Musculoskeletal:         General: Swelling present.      Cervical back: Neck supple.   Skin:     General: Skin is warm.      Coloration:  Skin is pale.   Neurological:      General: No focal deficit present.      Mental Status: He is alert.      Comments: Intermittent confusion   Psychiatric:         Mood and Affect: Mood normal.         Behavior: Behavior normal.         Thought Content: Thought content normal.         Judgment: Judgment normal.         Vents:  Vent Mode: A/C (01/01/22 1309)  Ventilator Initiated: Yes (12/28/21 0824)  Set Rate: 26 BPM (01/01/22 1309)  Vt Set: 450 mL (01/01/22 1309)  Pressure Support: 0 cmH20 (12/28/21 0824)  PEEP/CPAP: 5 cmH20 (01/01/22 1309)  Oxygen Concentration (%): 32 (01/06/22 2040)  Peak Airway Pressure: 25 cmH2O (01/01/22 1309)  Plateau Pressure: 16 cmH20 (01/01/22 1309)  Total Ve: 15 mL (01/01/22 1309)  Negative Inspiratory Force (cm H2O): 0 (01/01/22 1309)  F/VT Ratio<105 (RSBI): (!) 56.22 (01/01/22 1309)    Lines/Drains/Airways     Central Venous Catheter Line            Trialysis (Dialysis) Catheter 01/01/22 0922 right internal jugular 6 days                Significant Labs:    CBC/Anemia Profile:  No results for input(s): WBC, HGB, HCT, PLT, MCV, RDW, IRON, FERRITIN, RETIC, FOLATE, [id number], OCCULTBLOOD in the last 48 hours.     Chemistries:  Recent Labs   Lab 01/06/22  0523 01/07/22  0557    135*   K 4.0 4.1    100   CO2 24 22*   BUN 71* 84*   CREATININE 6.6* 8.0*   CALCIUM 7.6* 7.8*   Results for JUNIOR ALONZO (MRN [id number]) as of 1/5/2022 13:36   Ref. Range 1/4/2022 20:59   POC PH Latest Ref Range: 7.35 - 7.45  7.454 (H)   POC PCO2 Latest Ref Range: 35 - 45 mmHg 25.6 (LL)   POC PO2 Latest Ref Range: 80 - 100 mmHg 56 (LL)   POC BE Latest Ref Range: -2 to 2 mmol/L -6   POC HCO3 Latest Ref Range: 24 - 28 mmol/L 18.0 (L)   POC SATURATED O2 Latest Ref Range: 95 - 100 % 91 (L)   FiO2 Unknown 28   Flow Unknown 2   Sample Unknown ARTERIAL   DelSys Unknown Nasal Can   Allens Test Unknown Pass   Site Unknown LR   Mode Unknown SPONT       EKG 12/29/2021    Sinus rhythm with Premature  supraventricular complexes   Incomplete right bundle branch block   Prolonged QT   Abnormal ECG   When compared with ECG of 27-DEC-2021 15:30,   Fusion complexes are no longer Present   T wave inversion now evident in Inferior leads         Significant Imaging:    Chest x-ray 01/03/2022      TECHNIQUE:  Single frontal view of the chest was performed.     COMPARISON:  Chest radiograph 01/01/2022     FINDINGS:  ECG monitoring leads overlie the chest.  There is interval removal of an endotracheal tube.  A right internal jugular central venous catheter terminates over the SVC.  A left internal jugular central venous catheter terminates at the junction of the left brachiocephalic vein and SVC.There is interval improvement in bilateral lower lung zone consolidative opacity/atelectasis, however there remains diffuse interstitial opacity throughout the lungs.  No pneumothorax or significant pleural fluid.     The cardiac silhouette is mildly enlarged.     No acute osseous abnormality.     Impression:     Interval removal of an endotracheal tube with improving bibasilar consolidation/atelectasis and persistent diffuse interstitial opacity.         Ultrasound abdomen 12/27/2021    Impression:     Gallbladder distension with internal sludge but without definite stones.  Intra and extrahepatic biliary ductal dilation.  Consider further evaluation with contrast enhanced CT.  Acute cholecystitis or choledocholithiasis not excluded.     Pancreas is largely obscured by bowel gas.         ABG  Recent Labs   Lab 01/04/22 2059   PH 7.454*   PO2 56*   PCO2 25.6*   HCO3 18.0*   BE -6     Assessment/Plan:     Psychiatric  Alcohol abuse  Likely contributing to delirium/confusion - now improving  Continue enteral thiamine/folate/multi vit  Prn ativan  1/5 p.r.n. Ativan time in folate multivitamin    Pulmonary  Hypoxemia  1/6 O2 sat 94% on room air.  Home O2 evaluation in a.m.  1/7 home O2 eval prior to dc    Acute respiratory failure with  hypoxia and hypercarbia  Likely secondary to sepsis and atelectasis  weaning NC for goal sat > 92  Encourage TCDB and mobilize when able  1/5 wean O2.  Target sat 92-95%.  Nebs p.r.n..  1/6 home O2 evaluation in a.m.  1/7 home O2 eval prior to DC    Cardiac/Vascular  Primary hypertension  1/7 p.o. metoprolol and amlodipine    Paroxysmal atrial fibrillation  SR on monitor,  Extrasystoles noted  Not on anticoagulants on home med review  Continue metoprolol for rate and BP control  1/5 continue metoprolol.  1/7 continue same    Renal/  * LISET (acute kidney injury)  Accurate I/Os, urine output up at 315ml out last 24 hours  Renal dose meds and IVAB  Nephrology following  urine output up; plan eval daily for intermittent HD indication  1/5 urine output 645 mL last 24 hours.  HD today for clearance and volume removal.  Strict I&Os.  Monitor BMP.  1/6 no indication for dialysis.  Creatinine 6. Decreased from 8. 1.7 L urine output.  Monitor urine output and creatinine.  Right IJ Trialysis day 5. Nephrology follow-up.  Status post HD yesterday.    1/7 HD 3 times a week as per Nephrology recommendations.  Improved urine output clearance not improving creatinine 8.     ID  E coli bacteremia  Reviewed case and bacteria with clinical pharmacist; given gram neg and overall clinical improvement  Now tolerating po  Will transition to augmentin for additional 5 days  1/5 bacteremia resolved.  Now p.o. Augmentin.    Severe sepsis with acute organ dysfunction  Source: GB and Bacteremia  Shock resolved  Sputum culture NGTD  Blood cultures X 2 E.Coli, continue rocephin; transition to enteral augmentin for additional 5 days (total 14d abx course)  Monitor Temp and wbc trend  1/6 p.o. Augmentin for E coli bacteremia.  Kidney failure non oliguric 1.7 L last 24 hours.  Monitor urine output monitor temp WBC.      Acute cholangitis  IVAB  POD #7 S/P ERCP  Will need Cholecystectomy as outpt once clinically improved  01/05/2022 status post  ERCP with CBD stones removal sphincterotomy and placement of 2 stents.  E coli bacteremia treated with IV antibiotic now on p.o. Augmentin.  1/6 afebrile.  Continue p.o. Augmentin.  GI follow-up.  1/7 complete Augmentin course      Hematology  Thrombocytopenia  resolved  1/5 platelet 244. On subQ heparin for DVT prophylaxis.      Other  Tobacco use  encourage tobacco cessation  Cont LABA and ICS nebs  On Nicotine patch  1/5 nicotine patches    Jeffrey Blum MD  Critical Care Medicine  [facility] - Telemetry ([facility])

## 2022-01-07 NOTE — SUBJECTIVE & OBJECTIVE
Interval History:   1/7 O2 sat 95% on 3 to per minute.  Afebrile.  Having dialysis today.  HD for clearance.  Creatinine 8  Review of Systems   Constitutional: Positive for malaise/fatigue. Negative for chills and fever.   HENT: Negative for hearing loss and nosebleeds.    Eyes: Negative for discharge.   Respiratory: Positive for cough and shortness of breath.    Cardiovascular: Positive for leg swelling. Negative for chest pain.   Gastrointestinal: Positive for abdominal pain.   Genitourinary: Negative for hematuria.   Musculoskeletal: Negative for falls.   Skin: Negative for itching.   Neurological: Positive for weakness. Negative for speech change, seizures and loss of consciousness.   Endo/Heme/Allergies: Negative for polydipsia. Does not bruise/bleed easily.   Psychiatric/Behavioral: Negative for hallucinations.             Objective:     Vital Signs (Most Recent):  Temp: 98.6 °F (37 °C) (01/07/22 1344)  Pulse: (!) 55 (01/07/22 1344)  Resp: 18 (01/07/22 1344)  BP: 134/71 (01/07/22 1344)  SpO2: 95 % (01/07/22 0832) Vital Signs (24h Range):  Temp:  [98.1 °F (36.7 °C)-98.6 °F (37 °C)] 98.6 °F (37 °C)  Pulse:  [44-83] 55  Resp:  [18-20] 18  SpO2:  [93 %-95 %] 95 %  BP: (122-151)/(67-89) 134/71     Weight: 86.9 kg (191 lb 9.3 oz)  Body mass index is 30.92 kg/m².      Intake/Output Summary (Last 24 hours) at 1/7/2022 1536  Last data filed at 1/7/2022 1335  Gross per 24 hour   Intake 740 ml   Output 1800 ml   Net -1060 ml       Physical Exam  Vitals and nursing note reviewed.   Constitutional:       General: He is not in acute distress.     Appearance: He is well-developed. He is ill-appearing.   HENT:      Head: Normocephalic and atraumatic.   Neck:      Comments: Right IJ Trialysis catheter  Cardiovascular:      Rate and Rhythm: Normal rate and regular rhythm.   Pulmonary:      Effort: Pulmonary effort is normal.      Breath sounds: Rhonchi present.   Abdominal:      Palpations: Abdomen is soft.      Tenderness:  There is abdominal tenderness.   Musculoskeletal:         General: Swelling present.      Cervical back: Neck supple.   Skin:     General: Skin is warm.      Coloration: Skin is pale.   Neurological:      General: No focal deficit present.      Mental Status: He is alert.      Comments: Intermittent confusion   Psychiatric:         Mood and Affect: Mood normal.         Behavior: Behavior normal.         Thought Content: Thought content normal.         Judgment: Judgment normal.         Vents:  Vent Mode: A/C (01/01/22 1309)  Ventilator Initiated: Yes (12/28/21 0824)  Set Rate: 26 BPM (01/01/22 1309)  Vt Set: 450 mL (01/01/22 1309)  Pressure Support: 0 cmH20 (12/28/21 0824)  PEEP/CPAP: 5 cmH20 (01/01/22 1309)  Oxygen Concentration (%): 32 (01/06/22 2040)  Peak Airway Pressure: 25 cmH2O (01/01/22 1309)  Plateau Pressure: 16 cmH20 (01/01/22 1309)  Total Ve: 15 mL (01/01/22 1309)  Negative Inspiratory Force (cm H2O): 0 (01/01/22 1309)  F/VT Ratio<105 (RSBI): (!) 56.22 (01/01/22 1309)    Lines/Drains/Airways     Central Venous Catheter Line            Trialysis (Dialysis) Catheter 01/01/22 0922 right internal jugular 6 days                Significant Labs:    CBC/Anemia Profile:  No results for input(s): WBC, HGB, HCT, PLT, MCV, RDW, IRON, FERRITIN, RETIC, FOLATE, [id number], OCCULTBLOOD in the last 48 hours.     Chemistries:  Recent Labs   Lab 01/06/22  0523 01/07/22  0557    135*   K 4.0 4.1    100   CO2 24 22*   BUN 71* 84*   CREATININE 6.6* 8.0*   CALCIUM 7.6* 7.8*   Results for JUNIOR ALONZO (MRN [id number]) as of 1/5/2022 13:36   Ref. Range 1/4/2022 20:59   POC PH Latest Ref Range: 7.35 - 7.45  7.454 (H)   POC PCO2 Latest Ref Range: 35 - 45 mmHg 25.6 (LL)   POC PO2 Latest Ref Range: 80 - 100 mmHg 56 (LL)   POC BE Latest Ref Range: -2 to 2 mmol/L -6   POC HCO3 Latest Ref Range: 24 - 28 mmol/L 18.0 (L)   POC SATURATED O2 Latest Ref Range: 95 - 100 % 91 (L)   FiO2 Unknown 28   Flow Unknown 2   Sample Unknown  ARTERIAL   DelSys Unknown Nasal Can   Allens Test Unknown Pass   Site Unknown LR   Mode Unknown SPONT       EKG 12/29/2021    Sinus rhythm with Premature supraventricular complexes   Incomplete right bundle branch block   Prolonged QT   Abnormal ECG   When compared with ECG of 27-DEC-2021 15:30,   Fusion complexes are no longer Present   T wave inversion now evident in Inferior leads         Significant Imaging:    Chest x-ray 01/03/2022      TECHNIQUE:  Single frontal view of the chest was performed.     COMPARISON:  Chest radiograph 01/01/2022     FINDINGS:  ECG monitoring leads overlie the chest.  There is interval removal of an endotracheal tube.  A right internal jugular central venous catheter terminates over the SVC.  A left internal jugular central venous catheter terminates at the junction of the left brachiocephalic vein and SVC.There is interval improvement in bilateral lower lung zone consolidative opacity/atelectasis, however there remains diffuse interstitial opacity throughout the lungs.  No pneumothorax or significant pleural fluid.     The cardiac silhouette is mildly enlarged.     No acute osseous abnormality.     Impression:     Interval removal of an endotracheal tube with improving bibasilar consolidation/atelectasis and persistent diffuse interstitial opacity.         Ultrasound abdomen 12/27/2021    Impression:     Gallbladder distension with internal sludge but without definite stones.  Intra and extrahepatic biliary ductal dilation.  Consider further evaluation with contrast enhanced CT.  Acute cholecystitis or choledocholithiasis not excluded.     Pancreas is largely obscured by bowel gas.

## 2022-01-07 NOTE — ASSESSMENT & PLAN NOTE
SR on monitor,  Extrasystoles noted  Not on anticoagulants on home med review  Continue metoprolol for rate and BP control  1/5 continue metoprolol.  1/7 continue same

## 2022-01-07 NOTE — ASSESSMENT & PLAN NOTE
IVAB  POD #7 S/P ERCP  Will need Cholecystectomy as outpt once clinically improved  01/05/2022 status post ERCP with CBD stones removal sphincterotomy and placement of 2 stents.  E coli bacteremia treated with IV antibiotic now on p.o. Augmentin.  1/6 afebrile.  Continue p.o. Augmentin.  GI follow-up.  1/7 complete Augmentin course

## 2022-01-07 NOTE — ASSESSMENT & PLAN NOTE
- Abdominal US showed: Gallbladder distension with internal sludge but without definite stones. Intra and extrahepatic biliary ductal dilation. Acute cholecystitis or choledocholithiasis not excluded.  - GI consult. Plans for ERCP in AM. Will keep NPO.  - Blood cultures obtained in the ED. Continue empiric IV Zosyn.   - IV hydration.  - Analgesics and antiemetics as needed.   - Follow labs.     12/28  ERCP planned  Await intervention per GI  Now Intubated    12/29  S/p ERCP  GI on consult  Stent placed per GI  Abx  Follow Cultures    12/30  GI on board  S/p ERCP  Abx  Improving clinically  Remains on Pressors    12/31, 1/1, 1/2, 3, 4  S/P source control as above  Cont ceftriaxone    1/5, 6, 7  Cont Augmentin PO

## 2022-01-07 NOTE — ASSESSMENT & PLAN NOTE
- Continue home antihypertensives.     12/31  On pressors    1/4  Lopressor 25 mg PO BID, adjust as needed   May have to resume home Amlodipine 5m g PO QD, will monitor    1/5  MTP 50 mg PO BID  Amlodipine 5 mg PO QHS    1/6, 7  Cont MTP and nightly Amlodipine    1/8  Cont reduced dose MTP  Increase Amlodipine to 5 mg BID  If needed add PO Hydralazine

## 2022-01-08 LAB
BASOPHILS # BLD AUTO: 0.04 K/UL (ref 0–0.2)
BASOPHILS NFR BLD: 0.5 % (ref 0–1.9)
DIFFERENTIAL METHOD: ABNORMAL
EOSINOPHIL # BLD AUTO: 0.2 K/UL (ref 0–0.5)
EOSINOPHIL NFR BLD: 2.6 % (ref 0–8)
ERYTHROCYTE [DISTWIDTH] IN BLOOD BY AUTOMATED COUNT: 15 % (ref 11.5–14.5)
HCT VFR BLD AUTO: 28.3 % (ref 40–54)
HGB BLD-MCNC: 9.4 G/DL (ref 14–18)
IMM GRANULOCYTES # BLD AUTO: 0.12 K/UL (ref 0–0.04)
IMM GRANULOCYTES NFR BLD AUTO: 1.6 % (ref 0–0.5)
LYMPHOCYTES # BLD AUTO: 1.3 K/UL (ref 1–4.8)
LYMPHOCYTES NFR BLD: 17.5 % (ref 18–48)
MCH RBC QN AUTO: 30.6 PG (ref 27–31)
MCHC RBC AUTO-ENTMCNC: 33.2 G/DL (ref 32–36)
MCV RBC AUTO: 92 FL (ref 82–98)
MONOCYTES # BLD AUTO: 0.7 K/UL (ref 0.3–1)
MONOCYTES NFR BLD: 9.6 % (ref 4–15)
NEUTROPHILS # BLD AUTO: 5.2 K/UL (ref 1.8–7.7)
NEUTROPHILS NFR BLD: 68.2 % (ref 38–73)
NRBC BLD-RTO: 0 /100 WBC
PLATELET # BLD AUTO: 231 K/UL (ref 150–450)
PMV BLD AUTO: 10.5 FL (ref 9.2–12.9)
POCT GLUCOSE: 74 MG/DL (ref 70–110)
RBC # BLD AUTO: 3.07 M/UL (ref 4.6–6.2)
WBC # BLD AUTO: 7.58 K/UL (ref 3.9–12.7)

## 2022-01-08 PROCEDURE — 99232 PR SUBSEQUENT HOSPITAL CARE,LEVL II: ICD-10-PCS | Mod: ,,, | Performed by: INTERNAL MEDICINE

## 2022-01-08 PROCEDURE — 25000003 PHARM REV CODE 250: Performed by: NURSE PRACTITIONER

## 2022-01-08 PROCEDURE — 94640 AIRWAY INHALATION TREATMENT: CPT

## 2022-01-08 PROCEDURE — S4991 NICOTINE PATCH NONLEGEND: HCPCS | Performed by: NURSE PRACTITIONER

## 2022-01-08 PROCEDURE — 27000207 HC ISOLATION

## 2022-01-08 PROCEDURE — 87449 NOS EACH ORGANISM AG IA: CPT | Performed by: INTERNAL MEDICINE

## 2022-01-08 PROCEDURE — 99900035 HC TECH TIME PER 15 MIN (STAT)

## 2022-01-08 PROCEDURE — 63600175 PHARM REV CODE 636 W HCPCS: Performed by: NURSE PRACTITIONER

## 2022-01-08 PROCEDURE — 25000003 PHARM REV CODE 250: Performed by: INTERNAL MEDICINE

## 2022-01-08 PROCEDURE — 99232 SBSQ HOSP IP/OBS MODERATE 35: CPT | Mod: ,,, | Performed by: INTERNAL MEDICINE

## 2022-01-08 PROCEDURE — 85025 COMPLETE CBC W/AUTO DIFF WBC: CPT | Performed by: INTERNAL MEDICINE

## 2022-01-08 PROCEDURE — 36415 COLL VENOUS BLD VENIPUNCTURE: CPT | Performed by: INTERNAL MEDICINE

## 2022-01-08 PROCEDURE — 25000242 PHARM REV CODE 250 ALT 637 W/ HCPCS: Performed by: NURSE PRACTITIONER

## 2022-01-08 PROCEDURE — 94761 N-INVAS EAR/PLS OXIMETRY MLT: CPT

## 2022-01-08 PROCEDURE — 21400001 HC TELEMETRY ROOM

## 2022-01-08 PROCEDURE — 97530 THERAPEUTIC ACTIVITIES: CPT | Mod: CQ

## 2022-01-08 PROCEDURE — 97116 GAIT TRAINING THERAPY: CPT | Mod: CQ

## 2022-01-08 PROCEDURE — 94760 N-INVAS EAR/PLS OXIMETRY 1: CPT

## 2022-01-08 PROCEDURE — 27000221 HC OXYGEN, UP TO 24 HOURS

## 2022-01-08 RX ORDER — LOPERAMIDE HYDROCHLORIDE 2 MG/1
2 CAPSULE ORAL ONCE
Status: COMPLETED | OUTPATIENT
Start: 2022-01-08 | End: 2022-01-08

## 2022-01-08 RX ORDER — AMLODIPINE BESYLATE 5 MG/1
5 TABLET ORAL 2 TIMES DAILY
Status: DISCONTINUED | OUTPATIENT
Start: 2022-01-08 | End: 2022-01-11 | Stop reason: HOSPADM

## 2022-01-08 RX ORDER — METOPROLOL TARTRATE 25 MG/1
25 TABLET, FILM COATED ORAL 2 TIMES DAILY
Status: DISCONTINUED | OUTPATIENT
Start: 2022-01-08 | End: 2022-01-11 | Stop reason: HOSPADM

## 2022-01-08 RX ADMIN — ACETAMINOPHEN 650 MG: 160 SOLUTION ORAL at 09:01

## 2022-01-08 RX ADMIN — Medication 4 TABLET: at 04:01

## 2022-01-08 RX ADMIN — METOPROLOL TARTRATE 50 MG: 50 TABLET, FILM COATED ORAL at 08:01

## 2022-01-08 RX ADMIN — HEPARIN SODIUM 5000 UNITS: 5000 INJECTION INTRAVENOUS; SUBCUTANEOUS at 02:01

## 2022-01-08 RX ADMIN — NICOTINE 1 PATCH: 21 PATCH, EXTENDED RELEASE TRANSDERMAL at 08:01

## 2022-01-08 RX ADMIN — LOPERAMIDE HYDROCHLORIDE 2 MG: 2 CAPSULE ORAL at 02:01

## 2022-01-08 RX ADMIN — ARFORMOTEROL TARTRATE 15 MCG: 15 SOLUTION RESPIRATORY (INHALATION) at 09:01

## 2022-01-08 RX ADMIN — FAMOTIDINE 20 MG: 20 TABLET ORAL at 08:01

## 2022-01-08 RX ADMIN — MULTIPLE VITAMINS W/ MINERALS TAB 1 TABLET: TAB at 08:01

## 2022-01-08 RX ADMIN — AMLODIPINE BESYLATE 5 MG: 5 TABLET ORAL at 09:01

## 2022-01-08 RX ADMIN — HEPARIN SODIUM 5000 UNITS: 5000 INJECTION INTRAVENOUS; SUBCUTANEOUS at 06:01

## 2022-01-08 RX ADMIN — SIMETHICONE 80 MG: 80 TABLET, CHEWABLE ORAL at 09:01

## 2022-01-08 RX ADMIN — METOPROLOL TARTRATE 25 MG: 25 TABLET, FILM COATED ORAL at 09:01

## 2022-01-08 RX ADMIN — THIAMINE HCL TAB 100 MG 100 MG: 100 TAB at 08:01

## 2022-01-08 RX ADMIN — AMOXICILLIN AND CLAVULANATE POTASSIUM 500 MG: 500; 125 TABLET, FILM COATED ORAL at 04:01

## 2022-01-08 RX ADMIN — BUDESONIDE 0.5 MG: 0.5 INHALANT ORAL at 09:01

## 2022-01-08 RX ADMIN — HEPARIN SODIUM 5000 UNITS: 5000 INJECTION INTRAVENOUS; SUBCUTANEOUS at 09:01

## 2022-01-08 RX ADMIN — TRAZODONE HYDROCHLORIDE 25 MG: 50 TABLET ORAL at 09:01

## 2022-01-08 RX ADMIN — FOLIC ACID 1 MG: 1 TABLET ORAL at 08:01

## 2022-01-08 NOTE — PROGRESS NOTES
[facility] Tele[facility] ([facility])  Nephrology  Progress Note    Patient Name: Lizz Alas  MRN: [id number]  Admission Date: 12/27/2021  Hospital Length of Stay: 11 days  Attending Provider: Julissa Thompson MD   Primary Care Physician: Primary Doctor No  Principal Problem:LISET (acute kidney injury)    Consults  Subjective:     Interval History:     Patient was seen in his hospital room.  Sitting on side of the bed resting comfortably eating breakfast.  No acute distress noted.    Continues to have good urine output however no evidence of clearance.  Will plan to continue Monday Wednesday Friday dialysis with close monitoring for evidence of recovery.    Review of systems:  Continues to have some lower extremity swelling however upper extremity swelling has improved.  No nausea vomiting or diarrhea.  No fevers or chills.  No shortness of breath or chest discomfort    Review of patient's allergies indicates:  No Known Allergies  Current Facility-Administered Medications   Medication Frequency    0.9%  NaCl infusion PRN    acetaminophen oral solution 650 mg Q6H PRN    albuterol-ipratropium 2.5 mg-0.5 mg/3 mL nebulizer solution 3 mL Q4H PRN    aluminum-magnesium hydroxide-simethicone 200-200-20 mg/5 mL suspension 30 mL QID PRN    amLODIPine tablet 5 mg QHS    amoxicillin-clavulanate 500-125mg per tablet 500 mg Daily    arformoteroL nebulizer solution 15 mcg BID    budesonide nebulizer solution 0.5 mg Q12H    dextrose 50% injection 12.5 g PRN    famotidine tablet 20 mg Daily    folic acid tablet 1 mg Daily    glucagon (human recombinant) injection 1 mg PRN    heparin (porcine) injection 2,000 Units PRN    heparin (porcine) injection 5,000 Units Q8H    hydrALAZINE injection 20 mg Q4H PRN    metoprolol tartrate (LOPRESSOR) tablet 50 mg BID    multivitamin tablet Daily    naloxone 0.4 mg/mL injection 0.02 mg PRN    nicotine 21 mg/24 hr 1 patch Daily    ondansetron injection 4 mg Q6H PRN    oxazepam capsule  10 mg TID PRN    oxyCODONE-acetaminophen 5-325 mg per tablet 1 tablet Q8H PRN    promethazine tablet 25 mg Q6H PRN    simethicone chewable tablet 80 mg TID PRN    sodium chloride 0.9% bolus 250 mL PRN    sodium chloride 0.9% flush 10 mL Q12H PRN    thiamine tablet 100 mg Daily    trazodone split tablet 25 mg Nightly PRN       Objective:     Vital Signs (Most Recent):  Temp: 99 °F (37.2 °C) (01/08/22 0705)  Pulse: (!) 56 (01/08/22 0917)  Resp: 20 (01/08/22 0917)  BP: (!) 159/74 (01/08/22 0705)  SpO2: 96 % (01/08/22 0917)  O2 Device (Oxygen Therapy): nasal cannula w/ humidification (01/08/22 0917) Vital Signs (24h Range):  Temp:  [97.4 °F (36.3 °C)-99 °F (37.2 °C)] 99 °F (37.2 °C)  Pulse:  [] 56  Resp:  [18-22] 20  SpO2:  [92 %-96 %] 96 %  BP: (122-159)/(64-89) 159/74     Weight: 91.5 kg (201 lb 11.5 oz) (01/08/22 0347)  Body mass index is 32.56 kg/m².  Body surface area is 2.06 meters squared.    I/O last 3 completed shifts:  In: 740 [P.O.:240; Other:500]  Out: 1800 [Urine:300; Other:1500]    Physical exam:  Alert and oriented x3, no acute distress  HEENT:  Pupils equal reactive to light, sclera anicteric, extraocular movements intact  Neck:  no JVD, full range of motion no tenderness  Lungs:  Good air exchange, clear to auscultation  Cardiovascular:  Regular rate and rhythm, no murmurs rubs or gallops  Abdomen:  Nondistended nontender positive bowel sounds  Neuro:  Alert and oriented x3 conversant cooperative and appropriate  Musculoskeletal:  no focal defects, 1+ lower extremity edema bilaterally.    Significant Labs:sure  BMP:   Recent Labs   Lab 01/03/22  0434 01/04/22  0438 01/07/22  0557   GLU 91  91   < > 75   *  132*   < > 135*   K 3.9  3.9   < > 4.1   CL 99  99   < > 100   CO2 18*  18*   < > 22*   BUN 49*  49*   < > 84*   CREATININE 4.7*  4.7*   < > 8.0*   CALCIUM 8.1*  8.1*   < > 7.8*   MG 2.4  --   --     < > = values in this interval not displayed.     CMP:   Recent Labs   Lab  01/05/22  0323 01/06/22  0523 01/07/22  0557      < > 75   CALCIUM 7.8*   < > 7.8*   ALBUMIN 2.3*  --   --    PROT 5.5*  --   --       < > 135*   K 4.1   < > 4.1   CO2 18*   < > 22*      < > 100   BUN 98*   < > 84*   CREATININE 8.0*   < > 8.0*   ALKPHOS 172*  --   --    ALT 39  --   --    AST 27  --   --    BILITOT 1.3*  --   --     < > = values in this interval not displayed.     All labs within the past 24 hours have been reviewed.    Significant Imaging:  Reviewed    Assessment/Plan:     Active Diagnoses:    Diagnosis Date Noted POA    PRINCIPAL PROBLEM:  LISET (acute kidney injury) [N17.9] 12/29/2021 No    Hypoxemia [R09.02] 01/06/2022 Unknown    Acute respiratory failure with hypoxia and hypercarbia [J96.01, J96.02] 12/28/2021 Yes    Severe sepsis with acute organ dysfunction [A41.9, R65.20] 12/28/2021 Yes    E coli bacteremia [R78.81, B96.20] 12/28/2021 Yes    Acute cholangitis [K83.09] 12/27/2021 Yes    Tobacco use [Z72.0] 12/27/2021 Yes     Chronic    Alcohol abuse [F10.10] 12/27/2021 Yes     Chronic    Primary hypertension [I10] 12/27/2021 Yes     Chronic    Paroxysmal atrial fibrillation [I48.0] 12/27/2021 Yes     Chronic      Problems Resolved During this Admission:    Diagnosis Date Noted Date Resolved POA    Septic shock [A41.9, R65.21]  01/05/2022 Yes    On mechanically assisted ventilation [Z99.11] 12/30/2021 01/02/2022 Not Applicable    Acute hyperglycemia [R73.9] 12/29/2021 01/05/2022 Yes    Electrolyte imbalance [E87.8] 12/28/2021 01/02/2022 Yes       1. LISET:  Continues to have good urine output.  No evidence of clearance at this time.  Last dialysis treatment was yesterday.  Will plan to hold dialysis and monitor until Monday.  Will continue Monday Wednesday Friday dialysis unless otherwise indicated.  Hopefully we will see some evidence of renal recovery and clearance.    Cause of acute kidney injury is ATN.    2. Volume status is stable, still some mild lower  extremity edema however improving overall.    Thank you for your consult.     Thor Lr MD  Nephrology  [facility] ([facility])

## 2022-01-08 NOTE — PLAN OF CARE
Plan of Care: RN Shift Summary & Bedside Handover Report  01/08/2022 4:28 PM    Pt admitted on 12/27/2021 for LISET (acute kidney injury) under Julissa Thompson MD service   Code Status Full Code    HEENT HEENT WDL: WDL   Cognitive/ Neuro Cognitive/Neuro/Behavioral WDL: WDL  Level of Consciousness (AVPU): alert  Orientation: oriented x 4  Tigre Coma Scale Score: 15  Additional Documentation: Munday Coma Scale (Group)   Resp Respiratory WDL: WDL except,breath sounds,cough  All Lung Fields Breath Sounds: diminished  Flow (L/min): 2  Oxygen Concentration (%): 36  O2 Device (Oxygen Therapy): nasal cannula w/ humidification   Cardiac Cardiac WDL: WDL  Lead Monitored: Lead II  Rhythm: normal sinus rhythm  Frequency/Ectopy: PVCs  Cardiac/Telemetry Box Number: [id number]   Peripheral/ Neurovascular Peripheral Neurovascular WDL: WDL except,edema   VTE core VTE Required Core Measure: Pharmacological prophylaxis initiated/maintained (Heparin SQ)   GI GI WDL: WDL except,GI symptoms  All Quadrants Bowel Sounds: audible and normoactive  Last Bowel Movement: 01/07/22   Diet Diet renal  Diet NPO Except for: Medication, Ice Chips    Genitourinary WDL: WDL except,voiding ability/characteristics  Voiding Characteristics: voids spontaneously without difficulty  Urine Characteristics: concentrated,yellow   Skin Skin WDL: WDL except  Skin Integrity: bruised (ecchymotic)   Imtiaz Score Imtiaz Score: 20   Musculoskeletal  Musculoskeletal WDL: WDL except,mobility  General Mobility: generalized weakness   Safety Safety WDL: WDL  Safety Factors: ID band on,upper side rails raised x 2,call light in reach,bed in low position,wheels locked  Safety Precautions: emergency equipment at bedside   Fall Risk Score Fall Risk Score: 20   LDA(s) Lines/Drains/Airways       Central Venous Catheter Line              Trialysis (Dialysis) Catheter 01/01/22 0922 right internal jugular 7 days                   Consults Consults (From admission, onward)           Status Ordering Provider     Inpatient consult to Social Work  Once        Provider:  (Not yet assigned)    Completed STEFANY RENEE     Inpatient consult to Vascular Surgery  Once        Provider:  Olayinka Vivar MD    Completed STEFANY RENEE     Inpatient consult to Nephrology  Once        Provider:  Lauri Strange MD    Completed CHAPARRO MATHIS     Inpatient consult to Registered Dietitian/Nutritionist  Once        Provider:  (Not yet assigned)    Completed JOSE EDUARDO PAGAN     Inpatient consult to General Surgery  Once        Provider:  Jaycee Gonzalez MD    Completed CHAPARRO MATHIS     Inpatient consult to Pulmonology  Once        Provider:  Jose Eduardo Pagan NP    Completed CHAPARRO MATHIS     Inpatient consult to Gastroenterology  Once        Provider:  Leroy Pelaez MD    Completed CHAPARRO MATHIS            Shift events    Plan of Care for RN report  Added new medication to support antibiotic needs, plans for dialysis cath placement later in week so NPO after midnight 1/10   Patient/ family updated on plan of care, all questions answered up to now regarding plan of care, will continue to monitor per hourly rounding & unit practice/ policy    Note: Other exception details noted in flowsheet if not present in summary

## 2022-01-08 NOTE — ASSESSMENT & PLAN NOTE
IVAB  POD #7 S/P ERCP  Will need Cholecystectomy as outpt once clinically improved  01/05/2022 status post ERCP with CBD stones removal sphincterotomy and placement of 2 stents.  E coli bacteremia treated with IV antibiotic now on p.o. Augmentin.  1/6 afebrile.  Continue p.o. Augmentin.  GI follow-up.  1/7 complete Augmentin course  1/8 completed treatment with p.o. Augmentin

## 2022-01-08 NOTE — PROGRESS NOTES
[facility] Medicine  Progress Note    Patient Name: Lizz Alas  MRN: [id number]  Patient Class: IP- Inpatient   Admission Date: 12/27/2021  Length of Stay: 11 days  Attending Physician: Julissa Thompson MD  Primary Care Provider: Primary Doctor No        Subjective:     Principal Problem:LISET (acute kidney injury)        HPI:  Lizz Alas is a 71 y.o. Indonesian speaking male patient with a PMHx of PAF, HLD, HTN, thrombocytopenia, alcohol abuse, and tobacco use who presents to the Emergency Department for intermittent epigastric abdominal pain which onset gradually 3 days PTA. Today, the pt reports that his pain has been constant. His symptoms are constant and moderate in severity. No mitigating or exacerbating factors reported. Associated sxs include fever (102 F), nausea, and vomiting. Patient denies any diarrhea, constipation, SOB, weakness, fever, chills, and all other sxs at this time. Initial work-up in the ED showed: Normal WBC and lactic, , , alk phos 184, T bili 5.1, lipase 11, glucose 180, plt 119, sodium 134, COVID negative. Abdominal US showed gallbladder distension with internal sludge but without definite stones, intra and extrahepatic biliary ductal dilation which may represent acute cholecystitis or choledocholithiasis. Blood cultures obtained in the ED, and 1.5 L IV fluids and IV Zosyn given. Case was discussed with GI per the ED, who recommend NPO, IV abx, and ERCP in AM. Hospital Medicine was contacted for admission and patient placed in Observation.       Overview/Hospital Course:  Patient 72 yo male admitted to hospital with suspected cholangitis. Patient initiated on IV abx, fluids, O2. Patient with a rapid decompensation shourtly after admit, rapid response called, patient hypoxic in the 60's placed on bipap with good effect. Patient transferred to ICU. Patient with a progressive decline, febrile episodes to 102.9. Severe septic shock, pressors and  fluids on board. Patient evaluated by GI who recommended ERCP - additional CT abdomen. Patient scheduled for CT and this has been delayed pending stability. Patient intubated for airway protection.  12/29- Patient remains intubated, sedated, on pressors. Discussed POC with family at bedside. Patient evaluated by surgery who recommend o/p surgical followup on recovery for elective cholecystectomy.. Repeat blood cultures in progress, abx infusing.  12/30 - Patient wbc declining but remains on pressors. He is intubated and sedated. Family at bedside. Worsening renal function with cr 4.4 today. Nephrology on consult. Discussed with CC Team  12/31: On sole pressor Norepinephrine 0.14mcg/kg/min, afebrile, leucocytosis is resolved, scR is bumped to 6.0. Patient is oliguric (118 cc urine in last 24 hour). On Fentanyl gtt and NaHCO3 gtt   Daughter Hortencia  is at bedside and was updated.   AC 26/450/40/5  1/1/22 patient seen, daughter Hortencia at bedside, currently on CRRT, Norepinephrine at 0.02mcg/kg/min, fentanyl 100mcg/Hr. AC 26/450/40/5. Patient more awake today, opens eyes spontaneously, is squeezing his daughter's hand  1/2 About afternoon yesterday he self extubated, did not require immediate re-intubation as he was doing rel well resp wise, he is currently on Vapo therm 35LPM 80% FIO2, precedex drip to help with agitation (history of ETOH misuse), getting CRRT, family at bedside, whilst confused he is oriented to selpf/family, conversing with family  1/3 patient seen, was sleeping calmly early this AM, family not at bedside, on Vapotherm 2oLPM at 45% FIO2, not dyspneic. CRRT off. Remains afebrile  1/4 patient seen, down to 4L oxygen, awake and alert, off Precedex gtt, been off pressors  1/5 seen in the ICU. Awake and alert. Not in distress.  PT/OT. D/C urethral catheter, place condom cath  1/6 Transferred from ICU, doing stable on the floor. Met on 4L this morning awake and alert, not particularly interested  in his breakfast. (Apparently the dialect on [place] is different than his, therefore with communication gaps he was able to tell me directly today that he is not in any pain).  1/7 Patient seen, he;d just returned from HD, daughter at bedside, mentation and cognition fully restored per daughter. HD is now MWF per nephro.  D/W Nephro who recommend placement of tunneled catheter, so far though good UOP renal function is slow to return, patient will likely need to be on HD for some time goingforward. This MD explained this to daughter who interpreted for patient  PT rec Home with PT.   1/8 patient seen, stable, for Tunneled cath placement on Monday.  working on outpatient HD (her last note says: He is only eligible for Emergency Medicaid for hospital bill.  Not eligible for post acute services including hemodialysis) However if his renal function doesn't return and he is HD dependent then something will have to give. Now on MWF schedule        Interval History: no overnight events    Review of Systems   Constitutional: Negative for fatigue and fever.   HENT: Negative for drooling, rhinorrhea and tinnitus.    Gastrointestinal: Positive for diarrhea. Negative for abdominal pain, constipation, nausea and vomiting.   Genitourinary: Negative for dysuria, frequency and urgency.   Musculoskeletal: Negative for arthralgias and back pain.   Allergic/Immunologic: Negative for immunocompromised state.   Neurological: Negative for light-headedness and headaches.   Hematological: Negative for adenopathy.   Psychiatric/Behavioral: Negative for agitation, behavioral problems and confusion.     Objective:     Vital Signs (Most Recent):  Temp: 97.7 °F (36.5 °C) (01/08/22 1126)  Pulse: 69 (01/08/22 1126)  Resp: 18 (01/08/22 1126)  BP: 138/63 (01/08/22 1126)  SpO2: (!) 90 % (01/08/22 1126) Vital Signs (24h Range):  Temp:  [97.4 °F (36.3 °C)-99 °F (37.2 °C)] 97.7 °F (36.5 °C)  Pulse:  [] 69  Resp:  [18-22] 18  SpO2:  [90  %-96 %] 90 %  BP: (130-159)/(63-89) 138/63     Weight: 91.5 kg (201 lb 11.5 oz)  Body mass index is 32.56 kg/m².    Intake/Output Summary (Last 24 hours) at 1/8/2022 1251  Last data filed at 1/7/2022 1335  Gross per 24 hour   Intake 500 ml   Output 1500 ml   Net -1000 ml      Physical Exam  Vitals reviewed.   Constitutional:       General: He is not in acute distress.     Appearance: Normal appearance.   HENT:      Head: Normocephalic and atraumatic.   Eyes:      Extraocular Movements: Extraocular movements intact.      Pupils: Pupils are equal, round, and reactive to light.   Cardiovascular:      Rate and Rhythm: Normal rate and regular rhythm.      Pulses: Normal pulses.   Pulmonary:      Effort: Pulmonary effort is normal. No respiratory distress.      Breath sounds: No wheezing.   Abdominal:      General: Abdomen is flat. Bowel sounds are normal. There is no distension.      Palpations: Abdomen is soft.      Tenderness: There is no abdominal tenderness. There is no guarding.   Musculoskeletal:      Cervical back: Normal range of motion.      Right lower leg: No edema.      Left lower leg: No edema.   Skin:     General: Skin is warm and dry.      Capillary Refill: Capillary refill takes less than 2 seconds.   Neurological:      General: No focal deficit present.      Mental Status: He is alert and oriented to person, place, and time.   Psychiatric:         Mood and Affect: Mood normal.         Behavior: Behavior normal.         Significant Labs:   BMP:   Recent Labs   Lab 01/07/22  0557   GLU 75   *   K 4.1      CO2 22*   BUN 84*   CREATININE 8.0*   CALCIUM 7.8*     CBC:   Recent Labs   Lab 01/08/22  0602   WBC 7.58   HGB 9.4*   HCT 28.3*          Significant Imaging: I have reviewed all pertinent imaging results/findings within the past 24 hours.      Assessment/Plan:      * LISET (acute kidney injury)  Worsening  Cr 3.3  Monitor    12/31  2 to shock  Nephro on board  Avoid nephrotoxins  Cont  NaHCo3 gtt    1/1/22  On CRRT started today   Nephro on board  Mild hypokalemia and hyponatremia from LISET: CRRT    1/2  Net positive about 18L since admit  Ongoing CRRT  Avoid nephrotoxic agents    1/3  Getting RRT  scR 4.7  Oliguric UOP last 24 slightly over 227  Got CRRT past approx 48 hrs (-ve slightly over 2L)    1/4  Dialysis break yesterday to see intrinsic function: sCR up to 6.7, no dangerous electrolyte or acid imbalance today  uop 315 ml last 24 hour  Nephro on board    1/5  Increased sCR  To get RRT today      1/6  This is now the principle problem as Sepsis and shock have resolved  Nephro on board, duration of RRT not clear at this point. However so far in between HD his scr rises considerably. Whether intrinsic renal function will return or when remains to be seen  UOP last 24 documented as 1750 cc. This is a marked improvement  Cont Condom cath for strict monitoring UOP    1/7  Vasc consult for tunneled catheter  Social work consult to get him outpatient HD  MWF HD per nephro  Avoid nephrotoxins    1/8  To get tunneled cath on Monday, vasc eval appreciated  Now on MWF schedule, intrinsic renal function yet to return, will check BMP in AM  Avoid nephrotoxic agents  Had 1 L pulled yesterday    E coli bacteremia  ABX  ICU  Follow Blood Cultures  Pressors as indicated      12/31  Sensitive E coli bacteremia on ceftriaxone IV  Afebrile  Resolved leucocytosis  Repeat blood culture on 12/29 NGTC x 2 days  Appropriate source control achieved S/P ERCP, stone retrieval and sphincterectomy.  Panculture as needed      1/1, 2, 3  Repeat culture NGTD  Cont cefriaxone 1 gm IV Q12    1/4  Final repeat blood culture Negative  Cont Ceftriaxone    1/5, 1/6  On Augmentin PO    1/8  Cont Augmentin PO      Acute cholangitis  - Abdominal US showed: Gallbladder distension with internal sludge but without definite stones. Intra and extrahepatic biliary ductal dilation. Acute cholecystitis or choledocholithiasis not  excluded.  - GI consult. Plans for ERCP in AM. Will keep NPO.  - Blood cultures obtained in the ED. Continue empiric IV Zosyn.   - IV hydration.  - Analgesics and antiemetics as needed.   - Follow labs.     12/28  ERCP planned  Await intervention per GI  Now Intubated    12/29  S/p ERCP  GI on consult  Stent placed per GI  Abx  Follow Cultures    12/30  GI on board  S/p ERCP  Abx  Improving clinically  Remains on Pressors    12/31, 1/1, 1/2, 3, 4  S/P source control as above  Cont ceftriaxone    1/5, 6, 7  Cont Augmentin PO    Acute respiratory failure with hypoxia and hypercarbia  Patient with Hypoxic Respiratory failure which is Acute.  he is not on home oxygen. Supplemental oxygen was provided and noted- Oxygen Concentration (%):  [32] 32.   Signs/symptoms of respiratory failure include- tachypnea, increased work of breathing, respiratory distress, use of accessory muscles and cyanosis. Contributing diagnoses includes - COPD Labs and images were reviewed. Patient Has recent ABG, which has been reviewed. Will treat underlying causes and adjust management of respiratory failure as indicated    12/31  On 40 % FIO2 and PEEP 5 [facility] ventilation  Stable.  Ongoing renal and circulatory failure being addressed prior to considering weaning trials    1/1  On Mech Vent  ICU on board    1/2  On Vapotherm  CXR in AM  Pulm toilet as tolerated    1/3  Being weaned down on Vapotherm  Diffuse interstitial opacities throughout lungs, will likely improve with RRT  Repeat CXR as needed    1/4  Improving  PT; OOB to chair as tolerated  Incentive spirometry  Breathing treatment  Wean supplemental O2 as toelrated    1/5  Cont 3L nasal canula and wean as tolerated    1/4  Cont supplemental O2  Incentive spirometry  PT/OT, OOB  HD&volume pulling per nephro    1/7, 8  Resolved    Thrombocytopenia  Stable-jyothi  2 to Sepsis  Hold chemcial DVTp    1/1  Stable      1/2  Likely combination of ETOH misuse and sepsis  Platelet count is stable 73K,  not bleeding  No need for transfusion    1/3  Improved to 114K today    1/4, 5  Resolved    Severe sepsis with acute organ dysfunction  Pressors  Fluids  ABX  ICU  Pulmonary CC  Intubated  Sedated      12/31  On sole pressor, Norepinephrine currently 0.14 mcg/kg/min, wean/titrate as per ICU team  On appropriate antibiotics: Ceftriaxone 1 gm IV Q12 for Sensitive E coli    1/1/22  On low dose Norepinephrine as he is getting CRRT  Source control achieved    1/2  Off Pressors  SBP acceptable    1/3, 4  Resolved  Off Pressors    1/6  Resolved    Paroxysmal atrial fibrillation  - Remains in sinus rhythm on admission. Monitor telemetry.  - Continue home BB.  - Patient is not on long-term AC. Will defer to his primary cardiologist.       1/5  Cont MTP    1/8  Cont MTP (dropped to 25 mg PO BID for bradycardia)    Primary hypertension  - Continue home antihypertensives.     12/31  On pressors    1/4  Lopressor 25 mg PO BID, adjust as needed   May have to resume home Amlodipine 5m g PO QD, will monitor    1/5  MTP 50 mg PO BID  Amlodipine 5 mg PO QHS    1/6, 7  Cont MTP and nightly Amlodipine    1/8  Cont reduced dose MTP  Increase Amlodipine to 5 mg BID  If needed add PO Hydralazine    Tobacco use  - Assistance with smoking cessation was offered, including:  [x]  Medications  [x]  Counseling  [x]  Printed Information on Smoking Cessation  []  Referral to a Smoking Cessation Program  - Patient was counseled regarding smoking for 3-10 minutes.    Alcohol abuse  - Monitor for withdrawal/DTs. IV Ativan if needed.     1/2  With some delirium likely from aute illness and ICU stay, in addition to possibly some withdrawal: Precedex gtt  Bedside swallow eval when possible: oral folate/thiamine +- oral Benzodiapines as needed    1/5  PRN Low dose Librium      Hypoxemia  On just 1L oxygen      VTE Risk Mitigation (From admission, onward)         Ordered     heparin (porcine) injection 5,000 Units  Every 8 hours         01/03/22 0750      heparin (porcine) injection 2,000 Units  As needed (PRN)         01/01/22 2321     IP VTE HIGH RISK PATIENT  Once         12/27/21 2247     Place sequential compression device  Until discontinued         12/27/21 2247     Reason for No Pharmacological VTE Prophylaxis  Once        Question Answer Comment   Reasons: Risk of Bleeding    Reasons: Thrombocytopenia        12/27/21 2247                Discharge Planning   FLAQUITA:      Code Status: Full Code   Is the patient medically ready for discharge?: No    Reason for patient still in hospital (select all that apply): Treatment  Discharge Plan A: Home with family                  Julissa Thompson MD  Department of Hospital Medicine   [facility] [facility] ([facility])

## 2022-01-08 NOTE — ASSESSMENT & PLAN NOTE
SR on monitor,  Extrasystoles noted  Not on anticoagulants on home med review  Continue metoprolol for rate and BP control  1/5 continue metoprolol.  1/7 continue same  1/8 continue metoprolol.

## 2022-01-08 NOTE — PT/OT/SLP PROGRESS
Physical Therapy  Treatment    Rostom Evette   MRN: [id number]   Admitting Diagnosis: LISET (acute kidney injury)    PT Received On: 01/08/22  PT Start Time: 1045     PT Stop Time: 1115    PT Total Time (min): 30 min       Billable Minutes:  Gait Training 15 and Therapeutic Activity 15       PT/PTA: PTA     PTA Visit Number: 1       General Precautions: Standard, respiratory,fall  Orthopedic Precautions: N/A   Braces: N/A  Respiratory Status: Oxygen via NC             Subjective:  Communicated with epic and nurse Olayinka,  prior to session.  Pt agreed to tx. Daughter at bedside. Expressed that the pt was sad that no one care from PT yesterday. Reminded them that he was in Hd.     Pain/Comfort  Pain Rating 1: 0/10    Objective:   Patient found with: bed alarm,telemetry,peripheral IV,oxygen    Functional Mobility:  Bed Mobility:     Supine to sit: MIN A   Sit to supine: CGA      Transfers:    Sit to stand :MIN A   Stand to sit: CGA    Gait:     ~300 feet ( around the nurses station + ) with a RW and occassional cues to stop and work on deep breathing due to SOB. No lob.     Therapeutic Activities and Exercises:  Reviewed LE therex for pt to perform on his own. Discussed POC. Worked on static standing while donning a gown. No lob.      AM-PAC 6 CLICK MOBILITY  How much help from another person does this patient currently need?   1 = Unable, Total/Dependent Assistance  2 = A lot, Maximum/Moderate Assistance  3 = A little, Minimum/Contact Guard/Supervision  4 = None, Modified [place]/Independent    Turning over in bed (including adjusting bedclothes, sheets and blankets)?: 4  Sitting down on and standing up from a chair with arms (e.g., wheelchair, bedside commode, etc.): 4  Moving from lying on back to sitting on the side of the bed?: 4  Moving to and from a bed to a chair (including a wheelchair)?: 3  Need to walk in hospital room?: 3  Climbing 3-5 steps with a railing?: 3  Basic Mobility Total Score: 21    AM-PAC Raw  Score CMS G-Code Modifier Level of Impairment Assistance   6 % Total / Unable   7 - 9 CM 80 - 100% Maximal Assist   10 - 14 CL 60 - 80% Moderate Assist   15 - 19 CK 40 - 60% Moderate Assist   20 - 22 CJ 20 - 40% Minimal Assist   23 CI 1-20% SBA / CGA   24 CH 0% Independent/ Mod I     Patient left HOB elevated with all lines intact and call button in reach.    Assessment:  Lizz Alas is a 71 y.o. male with a medical diagnosis of LISET (acute kidney injury) and presents with good progress with mobility. Unable to walk on his own at this time due to oxygen needs.    Rehab identified problem list/impairments: Rehab identified problem list/impairments: weakness,gait instability,impaired cardiopulmonary response to activity,impaired endurance,impaired self care skills,impaired functional mobilty,decreased safety awareness,decreased lower extremity function    Rehab potential is good.    Activity tolerance: Good    Discharge recommendations: Discharge Facility/Level of Care Needs: home health PT     Barriers to discharge:      Equipment recommendations: Equipment Needed After Discharge: bedside commode,bath bench,walker, rolling     GOALS:   Multidisciplinary Problems     Physical Therapy Goals        Problem: Physical Therapy Goal    Goal Priority Disciplines Outcome Goal Variances Interventions   Physical Therapy Goal     PT, PT/OT      Description: LTG'S TO BE MET IN 14 DAYS (1-20-22)  1. PT WILL REQUIRE SPV FOR BED MOBILITY  2. PT WILL REQUIRE CGA FOR TF'S  3. PT WILL ' WITH RW AND CGA                   PLAN:    Patient to be seen 3 x/week  to address the above listed problems via gait training,therapeutic activities,therapeutic exercises  Plan of Care expires: 01/20/22  Plan of Care reviewed with: patient,daughter         01/08/2022

## 2022-01-08 NOTE — ASSESSMENT & PLAN NOTE
Accurate I/Os, urine output up at 315ml out last 24 hours  Renal dose meds and IVAB  Nephrology following  urine output up; plan eval daily for intermittent HD indication  1/5 urine output 645 mL last 24 hours.  HD today for clearance and volume removal.  Strict I&Os.  Monitor BMP.  1/6 no indication for dialysis.  Creatinine 6. Decreased from 8. 1.7 L urine output.  Monitor urine output and creatinine.  Right IJ Trialysis day 5. Nephrology follow-up.  Status post HD yesterday.    1/7 HD 3 times a week as per Nephrology recommendations.  Improved urine output clearance not improving creatinine 8.   1/8 Tunnel catheter Monday to continue HD as outpatient.

## 2022-01-08 NOTE — SUBJECTIVE & OBJECTIVE
Interval History: no overnight events    Review of Systems   Constitutional: Negative for fatigue and fever.   HENT: Negative for drooling, rhinorrhea and tinnitus.    Gastrointestinal: Positive for diarrhea. Negative for abdominal pain, constipation, nausea and vomiting.   Genitourinary: Negative for dysuria, frequency and urgency.   Musculoskeletal: Negative for arthralgias and back pain.   Allergic/Immunologic: Negative for immunocompromised state.   Neurological: Negative for light-headedness and headaches.   Hematological: Negative for adenopathy.   Psychiatric/Behavioral: Negative for agitation, behavioral problems and confusion.     Objective:     Vital Signs (Most Recent):  Temp: 97.7 °F (36.5 °C) (01/08/22 1126)  Pulse: 69 (01/08/22 1126)  Resp: 18 (01/08/22 1126)  BP: 138/63 (01/08/22 1126)  SpO2: (!) 90 % (01/08/22 1126) Vital Signs (24h Range):  Temp:  [97.4 °F (36.3 °C)-99 °F (37.2 °C)] 97.7 °F (36.5 °C)  Pulse:  [] 69  Resp:  [18-22] 18  SpO2:  [90 %-96 %] 90 %  BP: (130-159)/(63-89) 138/63     Weight: 91.5 kg (201 lb 11.5 oz)  Body mass index is 32.56 kg/m².    Intake/Output Summary (Last 24 hours) at 1/8/2022 1251  Last data filed at 1/7/2022 1335  Gross per 24 hour   Intake 500 ml   Output 1500 ml   Net -1000 ml      Physical Exam  Vitals reviewed.   Constitutional:       General: He is not in acute distress.     Appearance: Normal appearance.   HENT:      Head: Normocephalic and atraumatic.   Eyes:      Extraocular Movements: Extraocular movements intact.      Pupils: Pupils are equal, round, and reactive to light.   Cardiovascular:      Rate and Rhythm: Normal rate and regular rhythm.      Pulses: Normal pulses.   Pulmonary:      Effort: Pulmonary effort is normal. No respiratory distress.      Breath sounds: No wheezing.   Abdominal:      General: Abdomen is flat. Bowel sounds are normal. There is no distension.      Palpations: Abdomen is soft.      Tenderness: There is no abdominal  tenderness. There is no guarding.   Musculoskeletal:      Cervical back: Normal range of motion.      Right lower leg: No edema.      Left lower leg: No edema.   Skin:     General: Skin is warm and dry.      Capillary Refill: Capillary refill takes less than 2 seconds.   Neurological:      General: No focal deficit present.      Mental Status: He is alert and oriented to person, place, and time.   Psychiatric:         Mood and Affect: Mood normal.         Behavior: Behavior normal.         Significant Labs:   BMP:   Recent Labs   Lab 01/07/22  0557   GLU 75   *   K 4.1      CO2 22*   BUN 84*   CREATININE 8.0*   CALCIUM 7.8*     CBC:   Recent Labs   Lab 01/08/22  0602   WBC 7.58   HGB 9.4*   HCT 28.3*          Significant Imaging: I have reviewed all pertinent imaging results/findings within the past 24 hours.

## 2022-01-08 NOTE — PLAN OF CARE
Pt ambulated ~300 feet with a RW and CGA for safety. Good progress. Daughter present and translating as needed.

## 2022-01-08 NOTE — SUBJECTIVE & OBJECTIVE
Interval History:   1/8 O 2 sat 90% on 2 liter/minute.  Afebrile.  Urine output 100 mL last 24 hours.  Status post HD yesterday for clearance   Review of Systems   Constitutional: Positive for malaise/fatigue. Negative for chills and fever.   HENT: Negative for hearing loss and nosebleeds.    Eyes: Negative for discharge.   Respiratory: Positive for shortness of breath. Negative for cough.    Cardiovascular: Positive for leg swelling. Negative for chest pain.   Gastrointestinal: Negative for abdominal pain.   Genitourinary: Negative for hematuria.   Musculoskeletal: Negative for falls.   Skin: Negative for itching.   Neurological: Positive for weakness. Negative for speech change, seizures and loss of consciousness.   Endo/Heme/Allergies: Negative for polydipsia. Does not bruise/bleed easily.   Psychiatric/Behavioral: Negative for hallucinations.             Objective:     Vital Signs (Most Recent):  Temp: 97.7 °F (36.5 °C) (01/08/22 1126)  Pulse: 69 (01/08/22 1126)  Resp: 18 (01/08/22 1126)  BP: 138/63 (01/08/22 1126)  SpO2: (!) 90 % (01/08/22 1126) Vital Signs (24h Range):  Temp:  [97.4 °F (36.3 °C)-99 °F (37.2 °C)] 97.7 °F (36.5 °C)  Pulse:  [] 69  Resp:  [18-22] 18  SpO2:  [90 %-96 %] 90 %  BP: (130-159)/(63-74) 138/63     Weight: 91.5 kg (201 lb 11.5 oz)  Body mass index is 32.56 kg/m².    No intake or output data in the 24 hours ending 01/08/22 1336    Physical Exam  Vitals and nursing note reviewed.   Constitutional:       General: He is not in acute distress.     Appearance: He is well-developed. He is ill-appearing.   HENT:      Head: Normocephalic and atraumatic.   Neck:      Comments: Right IJ Trialysis catheter  Cardiovascular:      Rate and Rhythm: Normal rate and regular rhythm.   Pulmonary:      Effort: Pulmonary effort is normal.      Breath sounds: Rhonchi present.   Abdominal:      Palpations: Abdomen is soft.      Tenderness: There is no abdominal tenderness.   Musculoskeletal:          General: Swelling present.      Cervical back: Neck supple.   Skin:     General: Skin is warm.      Coloration: Skin is pale.   Neurological:      General: No focal deficit present.      Mental Status: He is alert.      Comments: Intermittent confusion   Psychiatric:         Mood and Affect: Mood normal.         Behavior: Behavior normal.         Thought Content: Thought content normal.         Judgment: Judgment normal.         Vents:  Vent Mode: A/C (01/01/22 1309)  Ventilator Initiated: Yes (12/28/21 0824)  Set Rate: 26 BPM (01/01/22 1309)  Vt Set: 450 mL (01/01/22 1309)  Pressure Support: 0 cmH20 (12/28/21 0824)  PEEP/CPAP: 5 cmH20 (01/01/22 1309)  Oxygen Concentration (%): 36 (01/07/22 2030)  Peak Airway Pressure: 25 cmH2O (01/01/22 1309)  Plateau Pressure: 16 cmH20 (01/01/22 1309)  Total Ve: 15 mL (01/01/22 1309)  Negative Inspiratory Force (cm H2O): 0 (01/01/22 1309)  F/VT Ratio<105 (RSBI): (!) 56.22 (01/01/22 1309)    Lines/Drains/Airways     Central Venous Catheter Line            Trialysis (Dialysis) Catheter 01/01/22 0922 right internal jugular 7 days                Significant Labs:    CBC/Anemia Profile:  Recent Labs   Lab 01/08/22  0602   WBC 7.58   HGB 9.4*   HCT 28.3*      MCV 92   RDW 15.0*        Chemistries:  Recent Labs   Lab 01/07/22  0557   *   K 4.1      CO2 22*   BUN 84*   CREATININE 8.0*   CALCIUM 7.8*   Results for JUNIOR ALONZO (MRN [id number]) as of 1/5/2022 13:36   Ref. Range 1/4/2022 20:59   POC PH Latest Ref Range: 7.35 - 7.45  7.454 (H)   POC PCO2 Latest Ref Range: 35 - 45 mmHg 25.6 (LL)   POC PO2 Latest Ref Range: 80 - 100 mmHg 56 (LL)   POC BE Latest Ref Range: -2 to 2 mmol/L -6   POC HCO3 Latest Ref Range: 24 - 28 mmol/L 18.0 (L)   POC SATURATED O2 Latest Ref Range: 95 - 100 % 91 (L)   FiO2 Unknown 28   Flow Unknown 2   Sample Unknown ARTERIAL   DelSys Unknown Nasal Can   Allens Test Unknown Pass   Site Unknown LR   Mode Unknown SPONT       EKG 12/29/2021    Sinus  rhythm with Premature supraventricular complexes   Incomplete right bundle branch block   Prolonged QT   Abnormal ECG   When compared with ECG of 27-DEC-2021 15:30,   Fusion complexes are no longer Present   T wave inversion now evident in Inferior leads         Significant Imaging:    Chest x-ray 01/03/2022      TECHNIQUE:  Single frontal view of the chest was performed.     COMPARISON:  Chest radiograph 01/01/2022     FINDINGS:  ECG monitoring leads overlie the chest.  There is interval removal of an endotracheal tube.  A right internal jugular central venous catheter terminates over the SVC.  A left internal jugular central venous catheter terminates at the junction of the left brachiocephalic vein and SVC.There is interval improvement in bilateral lower lung zone consolidative opacity/atelectasis, however there remains diffuse interstitial opacity throughout the lungs.  No pneumothorax or significant pleural fluid.     The cardiac silhouette is mildly enlarged.     No acute osseous abnormality.     Impression:     Interval removal of an endotracheal tube with improving bibasilar consolidation/atelectasis and persistent diffuse interstitial opacity.         Ultrasound abdomen 12/27/2021    Impression:     Gallbladder distension with internal sludge but without definite stones.  Intra and extrahepatic biliary ductal dilation.  Consider further evaluation with contrast enhanced CT.  Acute cholecystitis or choledocholithiasis not excluded.     Pancreas is largely obscured by bowel gas.

## 2022-01-08 NOTE — ASSESSMENT & PLAN NOTE
ABX  ICU  Follow Blood Cultures  Pressors as indicated      12/31  Sensitive E coli bacteremia on ceftriaxone IV  Afebrile  Resolved leucocytosis  Repeat blood culture on 12/29 NGTC x 2 days  Appropriate source control achieved S/P ERCP, stone retrieval and sphincterectomy.  Panculture as needed      1/1, 2, 3  Repeat culture NGTD  Cont cefriaxone 1 gm IV Q12    1/4  Final repeat blood culture Negative  Cont Ceftriaxone    1/5, 1/6  On Augmentin PO    1/8  Cont Augmentin PO

## 2022-01-08 NOTE — PROGRESS NOTES
[facility] - Telemetry ([facility])  Critical Care Medicine  Progress Note    Patient Name: Lizz Alas  MRN: [id number]  Admission Date: 12/27/2021  Hospital Length of Stay: 11 days  Code Status: Full Code  Attending Provider: Julissa Thompson MD  Primary Care Provider: Primary Doctor No   Principal Problem: LISET (acute kidney injury)    Subjective:     HPI:  Mr Alas is a 72 yo [place] male with a PMH of HLD, etoh abuse, tobacco abuse, HTN and PAF.  He presented to [facility] ED yesterday evening via personal vehicle with complaint of abd pain over 3 days and N/V X 1 day.  He does not speak English and was accompanied by family.  In ED Glucose 180, TBili 5.1, LA 1.5 and Abd US with GB distention and sludging.  He had temp 102.9.  He as admitted to Tele and GI consulted diagnosed with acute cholecystitis.  This AM Telemetry reported HR was at 183 and upon assessment patient seemed to be in distress, mouth breathing , O2 was reading 73% pt was shaking and shivering. Called TOM due to pt speaking Telugu [phone], # number [id number], pt was stating he could not breath.  Checked pt temp reading was 98.4 oral .Pt was on 3L then moved to 5L, then  Respiratory came in and put him on breathing treatment albuterol, still couldn't get breathing controlled, called Dr. Lopez and he stated to transfer pt to ICU.      Hospital/ICU Course:  12/28 - In ICU he was awake and alert on BiPAP .40 FiO2 with mild work of breathing but no distress and hemodynamically stable.  GI requested patient intubated and stabilized more so from pulm standpoint then obtain CT Abd and plan to OR for ERCP.  TBili up to 7.1.  He was intubated and supported on [facility] ventilation post explaining all this to daughter at bed side.  Post intubation and sedation became hypotensive requiring CL and AL placements and starting Levophed infusion.   12/29 - ERCP yesterday afternoon.  Increased vasopressor and O2 requirements overnight.  This AM still intubated on [facility] vent  and sedated on Fentanyl infusion.  Still on Vasopressin and Levophed infusions also.  Oliguria with LISET and Tmax last 24 hours up to 102.9.  Blood cultures X 2 + E.Coli.  Discussed care with daughter at bed side and all questions answered.   12/30 - remains intubated and sedated on mech vent. Temp and wbc trend down. Urine output, creatinine, and metabolic acidosis worsening  12/31 - remains intubated and sedated on mech vent with minimal oxygen demand but moderate pressor requirement. Urine output 118ml last 24 hours, creatinine 6.0. continued hyperglycemia 197-237  1/1/2022 - remains intubated and sedated on mechanical vent with support requirement unchanged but pO2 trending down, +18L since admission. Renal function without improvement, only 160ml urine output last 24 hours. Trialysis cath placed for initiation of CRRT, goal filtration and fluid removal  1/2 - self extubated yesterday, tolerating HFNC support. Confusion/Delirium requiring precedex infusion for safety. CRRT initiated but clotted around 0100 after about 3L removed, remains oliguric, electrolytes stable  1/3 - supplemental oxygen weaned to now 8L NC; CRRT clotted again around 2300 but urine output 220 ml last 24 hours; precedex off this morning, delirium improving; HTN requiring treatment  1/4 - off precedex, continued mild confusion without agitation now. Supplemental oxygen weaned to 4L NC. Urine output up to 315ml last 24 hours, creatinine up 6.7, K stable  1/5 patient seen and examined.  Afebrile.  Urine output 645 mL last 24 hours.  Weak.  Poor appetite.  1/6 seen and examined.  Afebrile.  O2 sat 94% on room air.  Urine output 1.7 L last 24 hours.  BM 1/5. creatinine 6.   1/7 O2 sat 95% on 3 to per minute.  Afebrile.  Having dialysis today.  HD for clearance.  Creatinine 8  1/8 O 2 sat 90% on 2 liter/minute.  Afebrile.  Urine output 100 mL last 24 hours.  Status post HD yesterday for clearance       Interval History:   1/8 O 2 sat 90% on 2  liter/minute.  Afebrile.  Urine output 100 mL last 24 hours.  Status post HD yesterday for clearance   Review of Systems   Constitutional: Positive for malaise/fatigue. Negative for chills and fever.   HENT: Negative for hearing loss and nosebleeds.    Eyes: Negative for discharge.   Respiratory: Positive for shortness of breath. Negative for cough.    Cardiovascular: Positive for leg swelling. Negative for chest pain.   Gastrointestinal: Negative for abdominal pain.   Genitourinary: Negative for hematuria.   Musculoskeletal: Negative for falls.   Skin: Negative for itching.   Neurological: Positive for weakness. Negative for speech change, seizures and loss of consciousness.   Endo/Heme/Allergies: Negative for polydipsia. Does not bruise/bleed easily.   Psychiatric/Behavioral: Negative for hallucinations.             Objective:     Vital Signs (Most Recent):  Temp: 97.7 °F (36.5 °C) (01/08/22 1126)  Pulse: 69 (01/08/22 1126)  Resp: 18 (01/08/22 1126)  BP: 138/63 (01/08/22 1126)  SpO2: (!) 90 % (01/08/22 1126) Vital Signs (24h Range):  Temp:  [97.4 °F (36.3 °C)-99 °F (37.2 °C)] 97.7 °F (36.5 °C)  Pulse:  [] 69  Resp:  [18-22] 18  SpO2:  [90 %-96 %] 90 %  BP: (130-159)/(63-74) 138/63     Weight: 91.5 kg (201 lb 11.5 oz)  Body mass index is 32.56 kg/m².    No intake or output data in the 24 hours ending 01/08/22 1336    Physical Exam  Vitals and nursing note reviewed.   Constitutional:       General: He is not in acute distress.     Appearance: He is well-developed. He is ill-appearing.   HENT:      Head: Normocephalic and atraumatic.   Neck:      Comments: Right IJ Trialysis catheter  Cardiovascular:      Rate and Rhythm: Normal rate and regular rhythm.   Pulmonary:      Effort: Pulmonary effort is normal.      Breath sounds: Rhonchi present.   Abdominal:      Palpations: Abdomen is soft.      Tenderness: There is no abdominal tenderness.   Musculoskeletal:         General: Swelling present.      Cervical back:  Neck supple.   Skin:     General: Skin is warm.      Coloration: Skin is pale.   Neurological:      General: No focal deficit present.      Mental Status: He is alert.      Comments: Intermittent confusion   Psychiatric:         Mood and Affect: Mood normal.         Behavior: Behavior normal.         Thought Content: Thought content normal.         Judgment: Judgment normal.         Vents:  Vent Mode: A/C (01/01/22 1309)  Ventilator Initiated: Yes (12/28/21 0824)  Set Rate: 26 BPM (01/01/22 1309)  Vt Set: 450 mL (01/01/22 1309)  Pressure Support: 0 cmH20 (12/28/21 0824)  PEEP/CPAP: 5 cmH20 (01/01/22 1309)  Oxygen Concentration (%): 36 (01/07/22 2030)  Peak Airway Pressure: 25 cmH2O (01/01/22 1309)  Plateau Pressure: 16 cmH20 (01/01/22 1309)  Total Ve: 15 mL (01/01/22 1309)  Negative Inspiratory Force (cm H2O): 0 (01/01/22 1309)  F/VT Ratio<105 (RSBI): (!) 56.22 (01/01/22 1309)    Lines/Drains/Airways       Central Venous Catheter Line              Trialysis (Dialysis) Catheter 01/01/22 0922 right internal jugular 7 days                    Significant Labs:    CBC/Anemia Profile:  Recent Labs   Lab 01/08/22  0602   WBC 7.58   HGB 9.4*   HCT 28.3*      MCV 92   RDW 15.0*        Chemistries:  Recent Labs   Lab 01/07/22  0557   *   K 4.1      CO2 22*   BUN 84*   CREATININE 8.0*   CALCIUM 7.8*   Results for JUNIOR ALONZO (MRN [id number]) as of 1/5/2022 13:36   Ref. Range 1/4/2022 20:59   POC PH Latest Ref Range: 7.35 - 7.45  7.454 (H)   POC PCO2 Latest Ref Range: 35 - 45 mmHg 25.6 (LL)   POC PO2 Latest Ref Range: 80 - 100 mmHg 56 (LL)   POC BE Latest Ref Range: -2 to 2 mmol/L -6   POC HCO3 Latest Ref Range: 24 - 28 mmol/L 18.0 (L)   POC SATURATED O2 Latest Ref Range: 95 - 100 % 91 (L)   FiO2 Unknown 28   Flow Unknown 2   Sample Unknown ARTERIAL   DelSys Unknown Nasal Can   Allens Test Unknown Pass   Site Unknown LR   Mode Unknown SPONT       EKG 12/29/2021    Sinus rhythm with Premature supraventricular  complexes   Incomplete right bundle branch block   Prolonged QT   Abnormal ECG   When compared with ECG of 27-DEC-2021 15:30,   Fusion complexes are no longer Present   T wave inversion now evident in Inferior leads         Significant Imaging:    Chest x-ray 01/03/2022      TECHNIQUE:  Single frontal view of the chest was performed.     COMPARISON:  Chest radiograph 01/01/2022     FINDINGS:  ECG monitoring leads overlie the chest.  There is interval removal of an endotracheal tube.  A right internal jugular central venous catheter terminates over the SVC.  A left internal jugular central venous catheter terminates at the junction of the left brachiocephalic vein and SVC.There is interval improvement in bilateral lower lung zone consolidative opacity/atelectasis, however there remains diffuse interstitial opacity throughout the lungs.  No pneumothorax or significant pleural fluid.     The cardiac silhouette is mildly enlarged.     No acute osseous abnormality.     Impression:     Interval removal of an endotracheal tube with improving bibasilar consolidation/atelectasis and persistent diffuse interstitial opacity.         Ultrasound abdomen 12/27/2021    Impression:     Gallbladder distension with internal sludge but without definite stones.  Intra and extrahepatic biliary ductal dilation.  Consider further evaluation with contrast enhanced CT.  Acute cholecystitis or choledocholithiasis not excluded.     Pancreas is largely obscured by bowel gas.         ABG  Recent Labs   Lab 01/04/22 2059   PH 7.454*   PO2 56*   PCO2 25.6*   HCO3 18.0*   BE -6     Assessment/Plan:     Psychiatric  Alcohol abuse  Likely contributing to delirium/confusion - now improving  Continue enteral thiamine/folate/multi vit  Prn ativan  1/5 p.r.n. Ativan time in folate multivitamin    Pulmonary  Hypoxemia  1/6 O2 sat 94% on room air.  Home O2 evaluation in a.m.  1/7 home O2 eval prior to dc    Acute respiratory failure with hypoxia and  hypercarbia  Likely secondary to sepsis and atelectasis  weaning NC for goal sat > 92  Encourage TCDB and mobilize when able  1/5 wean O2.  Target sat 92-95%.  Nebs p.r.n..  1/6 home O2 evaluation in a.m.  1/7 home O2 eval prior to DC  1/8 O2 sat 90% on 2 liter/minute.  Home O2 evaluation.     Cardiac/Vascular  Primary hypertension  1/7 p.o. metoprolol and amlodipine    Paroxysmal atrial fibrillation  SR on monitor,  Extrasystoles noted  Not on anticoagulants on home med review  Continue metoprolol for rate and BP control  1/5 continue metoprolol.  1/7 continue same  1/8 continue metoprolol.    Renal/  * LISET (acute kidney injury)  Accurate I/Os, urine output up at 315ml out last 24 hours  Renal dose meds and IVAB  Nephrology following  urine output up; plan eval daily for intermittent HD indication  1/5 urine output 645 mL last 24 hours.  HD today for clearance and volume removal.  Strict I&Os.  Monitor BMP.  1/6 no indication for dialysis.  Creatinine 6. Decreased from 8. 1.7 L urine output.  Monitor urine output and creatinine.  Right IJ Trialysis day 5. Nephrology follow-up.  Status post HD yesterday.    1/7 HD 3 times a week as per Nephrology recommendations.  Improved urine output clearance not improving creatinine 8.   1/8 Tunnel catheter Monday to continue HD as outpatient.    ID  E coli bacteremia  Reviewed case and bacteria with clinical pharmacist; given gram neg and overall clinical improvement  Now tolerating po  Will transition to augmentin for additional 5 days  1/5 bacteremia resolved.  Now p.o. Augmentin.    Severe sepsis with acute organ dysfunction  Source: GB and Bacteremia  Shock resolved  Sputum culture NGTD  Blood cultures X 2 E.Coli, continue rocephin; transition to enteral augmentin for additional 5 days (total 14d abx course)  Monitor Temp and wbc trend  1/6 p.o. Augmentin for E coli bacteremia.  Kidney failure non oliguric 1.7 L last 24 hours.  Monitor urine output monitor temp  WBC.      Acute cholangitis  IVAB  POD #7 S/P ERCP  Will need Cholecystectomy as outpt once clinically improved  01/05/2022 status post ERCP with CBD stones removal sphincterotomy and placement of 2 stents.  E coli bacteremia treated with IV antibiotic now on p.o. Augmentin.  1/6 afebrile.  Continue p.o. Augmentin.  GI follow-up.  1/7 complete Augmentin course  1/8 completed treatment with p.o. Augmentin    Hematology  Thrombocytopenia  resolved  1/5 platelet 244. On subQ heparin for DVT prophylaxis.      Other  Tobacco use  encourage tobacco cessation  Cont LABA and ICS nebs  On Nicotine patch  1/5 nicotine patches    Jeffrey Blum MD  Critical Care Medicine  [facility] - Telemetry ([facility])

## 2022-01-08 NOTE — ASSESSMENT & PLAN NOTE
Likely secondary to sepsis and atelectasis  weaning NC for goal sat > 92  Encourage TCDB and mobilize when able  1/5 wean O2.  Target sat 92-95%.  Nebs p.r.n..  1/6 home O2 evaluation in a.m.  1/7 home O2 eval prior to DC  1/8 O2 sat 90% on 2 liter/minute.  Home O2 evaluation.

## 2022-01-08 NOTE — NURSING
Patient had a total of 4 loose Bowel movements on yesterday, and 3 thus far, Notified Dr. Thompson, no new orders given at this time I will continue to monitor patient and notify provider of any changes.    Also, Patient daughter requested sleeping medication for her father on tonight, new orders placed, see MAR. I will also,notify oncoming night shift nurse.

## 2022-01-08 NOTE — ASSESSMENT & PLAN NOTE
- Remains in sinus rhythm on admission. Monitor telemetry.  - Continue home BB.  - Patient is not on long-term AC. Will defer to his primary cardiologist.       1/5  Cont MTP    1/8  Cont MTP (dropped to 25 mg PO BID for bradycardia)

## 2022-01-08 NOTE — CONSULTS
Vas/VSC    Asked to place tunnelled vascular catheter  Pt currently using trialysis catheter  Placed on cath lab schedule for Monday

## 2022-01-09 LAB
ANION GAP SERPL CALC-SCNC: 13 MMOL/L (ref 8–16)
BUN SERPL-MCNC: 66 MG/DL (ref 8–23)
C DIFF GDH STL QL: NEGATIVE
C DIFF TOX A+B STL QL IA: NEGATIVE
CALCIUM SERPL-MCNC: 8.2 MG/DL (ref 8.7–10.5)
CHLORIDE SERPL-SCNC: 98 MMOL/L (ref 95–110)
CO2 SERPL-SCNC: 23 MMOL/L (ref 23–29)
CREAT SERPL-MCNC: 8 MG/DL (ref 0.5–1.4)
EST. GFR  (AFRICAN AMERICAN): 7 ML/MIN/1.73 M^2
EST. GFR  (NON AFRICAN AMERICAN): 6 ML/MIN/1.73 M^2
GLUCOSE SERPL-MCNC: 130 MG/DL (ref 70–110)
POTASSIUM SERPL-SCNC: 4.6 MMOL/L (ref 3.5–5.1)
SODIUM SERPL-SCNC: 134 MMOL/L (ref 136–145)

## 2022-01-09 PROCEDURE — 94799 UNLISTED PULMONARY SVC/PX: CPT

## 2022-01-09 PROCEDURE — 99231 SBSQ HOSP IP/OBS SF/LOW 25: CPT | Mod: ,,, | Performed by: INTERNAL MEDICINE

## 2022-01-09 PROCEDURE — 27000207 HC ISOLATION

## 2022-01-09 PROCEDURE — 80048 BASIC METABOLIC PNL TOTAL CA: CPT | Performed by: INTERNAL MEDICINE

## 2022-01-09 PROCEDURE — 63600175 PHARM REV CODE 636 W HCPCS: Performed by: NURSE PRACTITIONER

## 2022-01-09 PROCEDURE — 25000003 PHARM REV CODE 250: Performed by: INTERNAL MEDICINE

## 2022-01-09 PROCEDURE — 21400001 HC TELEMETRY ROOM

## 2022-01-09 PROCEDURE — 99900035 HC TECH TIME PER 15 MIN (STAT)

## 2022-01-09 PROCEDURE — 27000221 HC OXYGEN, UP TO 24 HOURS

## 2022-01-09 PROCEDURE — 99232 PR SUBSEQUENT HOSPITAL CARE,LEVL II: ICD-10-PCS | Mod: ,,, | Performed by: INTERNAL MEDICINE

## 2022-01-09 PROCEDURE — 94640 AIRWAY INHALATION TREATMENT: CPT

## 2022-01-09 PROCEDURE — 36415 COLL VENOUS BLD VENIPUNCTURE: CPT | Performed by: INTERNAL MEDICINE

## 2022-01-09 PROCEDURE — 94760 N-INVAS EAR/PLS OXIMETRY 1: CPT

## 2022-01-09 PROCEDURE — 99231 PR SUBSEQUENT HOSPITAL CARE,LEVL I: ICD-10-PCS | Mod: ,,, | Performed by: INTERNAL MEDICINE

## 2022-01-09 PROCEDURE — 99232 SBSQ HOSP IP/OBS MODERATE 35: CPT | Mod: ,,, | Performed by: INTERNAL MEDICINE

## 2022-01-09 PROCEDURE — 25000003 PHARM REV CODE 250: Performed by: NURSE PRACTITIONER

## 2022-01-09 PROCEDURE — 25000242 PHARM REV CODE 250 ALT 637 W/ HCPCS: Performed by: NURSE PRACTITIONER

## 2022-01-09 RX ORDER — GLUCAGON 1 MG
1 KIT INJECTION
Status: DISCONTINUED | OUTPATIENT
Start: 2022-01-09 | End: 2022-01-11 | Stop reason: HOSPADM

## 2022-01-09 RX ORDER — LOPERAMIDE HYDROCHLORIDE 2 MG/1
2 CAPSULE ORAL 4 TIMES DAILY PRN
Status: DISCONTINUED | OUTPATIENT
Start: 2022-01-09 | End: 2022-01-11 | Stop reason: HOSPADM

## 2022-01-09 RX ORDER — ATORVASTATIN CALCIUM 10 MG/1
10 TABLET, FILM COATED ORAL NIGHTLY
Status: DISCONTINUED | OUTPATIENT
Start: 2022-01-09 | End: 2022-01-11 | Stop reason: HOSPADM

## 2022-01-09 RX ORDER — IBUPROFEN 200 MG
24 TABLET ORAL
Status: DISCONTINUED | OUTPATIENT
Start: 2022-01-09 | End: 2022-01-11 | Stop reason: HOSPADM

## 2022-01-09 RX ORDER — IBUPROFEN 200 MG
16 TABLET ORAL
Status: DISCONTINUED | OUTPATIENT
Start: 2022-01-09 | End: 2022-01-11 | Stop reason: HOSPADM

## 2022-01-09 RX ORDER — TAMSULOSIN HYDROCHLORIDE 0.4 MG/1
0.4 CAPSULE ORAL NIGHTLY
Status: DISCONTINUED | OUTPATIENT
Start: 2022-01-09 | End: 2022-01-11 | Stop reason: HOSPADM

## 2022-01-09 RX ORDER — AMOXICILLIN AND CLAVULANATE POTASSIUM 500; 125 MG/1; MG/1
1 TABLET, FILM COATED ORAL DAILY
Status: DISCONTINUED | OUTPATIENT
Start: 2022-01-09 | End: 2022-01-11 | Stop reason: HOSPADM

## 2022-01-09 RX ADMIN — FOLIC ACID 1 MG: 1 TABLET ORAL at 08:01

## 2022-01-09 RX ADMIN — Medication 4 TABLET: at 11:01

## 2022-01-09 RX ADMIN — ATORVASTATIN CALCIUM 10 MG: 10 TABLET, FILM COATED ORAL at 09:01

## 2022-01-09 RX ADMIN — THIAMINE HCL TAB 100 MG 100 MG: 100 TAB at 08:01

## 2022-01-09 RX ADMIN — AMOXICILLIN AND CLAVULANATE POTASSIUM 500 MG: 500; 125 TABLET, FILM COATED ORAL at 05:01

## 2022-01-09 RX ADMIN — HEPARIN SODIUM 5000 UNITS: 5000 INJECTION INTRAVENOUS; SUBCUTANEOUS at 09:01

## 2022-01-09 RX ADMIN — BUDESONIDE 0.5 MG: 0.5 INHALANT ORAL at 08:01

## 2022-01-09 RX ADMIN — TAMSULOSIN HYDROCHLORIDE 0.4 MG: 0.4 CAPSULE ORAL at 09:01

## 2022-01-09 RX ADMIN — HEPARIN SODIUM 5000 UNITS: 5000 INJECTION INTRAVENOUS; SUBCUTANEOUS at 05:01

## 2022-01-09 RX ADMIN — ARFORMOTEROL TARTRATE 15 MCG: 15 SOLUTION RESPIRATORY (INHALATION) at 08:01

## 2022-01-09 RX ADMIN — ACETAMINOPHEN 650 MG: 160 SOLUTION ORAL at 09:01

## 2022-01-09 RX ADMIN — Medication 4 TABLET: at 08:01

## 2022-01-09 RX ADMIN — METOPROLOL TARTRATE 25 MG: 25 TABLET, FILM COATED ORAL at 09:01

## 2022-01-09 RX ADMIN — HEPARIN SODIUM 5000 UNITS: 5000 INJECTION INTRAVENOUS; SUBCUTANEOUS at 01:01

## 2022-01-09 RX ADMIN — FAMOTIDINE 20 MG: 20 TABLET ORAL at 08:01

## 2022-01-09 RX ADMIN — Medication 4 TABLET: at 05:01

## 2022-01-09 RX ADMIN — METOPROLOL TARTRATE 25 MG: 25 TABLET, FILM COATED ORAL at 08:01

## 2022-01-09 RX ADMIN — AMLODIPINE BESYLATE 5 MG: 5 TABLET ORAL at 09:01

## 2022-01-09 RX ADMIN — MULTIPLE VITAMINS W/ MINERALS TAB 1 TABLET: TAB at 08:01

## 2022-01-09 RX ADMIN — TRAZODONE HYDROCHLORIDE 25 MG: 50 TABLET ORAL at 09:01

## 2022-01-09 RX ADMIN — AMLODIPINE BESYLATE 5 MG: 5 TABLET ORAL at 08:01

## 2022-01-09 NOTE — ASSESSMENT & PLAN NOTE
- Continue home antihypertensives.     12/31  On pressors    1/4  Lopressor 25 mg PO BID, adjust as needed   May have to resume home Amlodipine 5m g PO QD, will monitor    1/5  MTP 50 mg PO BID  Amlodipine 5 mg PO QHS    1/6, 7  Cont MTP and nightly Amlodipine    1/8  Cont reduced dose MTP  Increase Amlodipine to 5 mg BID  If needed add PO Hydralazine    1/9  Cont MTP/Amlodipine

## 2022-01-09 NOTE — ASSESSMENT & PLAN NOTE
Source: GB and Bacteremia  Shock resolved  Sputum culture NGTD  Blood cultures X 2 E.Coli, continue rocephin; transition to enteral augmentin for additional 5 days (total 14d abx course)  Monitor Temp and wbc trend  1/6 p.o. Augmentin for E coli bacteremia.  Kidney failure non oliguric 1.7 L last 24 hours.  Monitor urine output monitor temp WBC.  1/9 complete p.o. Augmentin

## 2022-01-09 NOTE — ASSESSMENT & PLAN NOTE
- Monitor for withdrawal/DTs. IV Ativan if needed.     1/2  With some delirium likely from aute illness and ICU stay, in addition to possibly some withdrawal: Precedex gtt  Bedside swallow eval when possible: oral folate/thiamine +- oral Benzodiapines as needed    1/5  PRN Low dose Librium      1/9  Out of window for withdrawal

## 2022-01-09 NOTE — ASSESSMENT & PLAN NOTE
Likely secondary to sepsis and atelectasis  weaning NC for goal sat > 92  Encourage TCDB and mobilize when able  1/5 wean O2.  Target sat 92-95%.  Nebs p.r.n..  1/6 home O2 evaluation in a.m.  1/7 home O2 eval prior to DC  1/8 O2 sat 90% on 2 liter/minute.  Home O2 evaluation.   1/9 heavy smoker.  Home O2 evaluation prior to discharge albuterol Atrovent outpatient pulmonology follow-up

## 2022-01-09 NOTE — ASSESSMENT & PLAN NOTE
- Abdominal US showed: Gallbladder distension with internal sludge but without definite stones. Intra and extrahepatic biliary ductal dilation. Acute cholecystitis or choledocholithiasis not excluded.  - GI consult. Plans for ERCP in AM. Will keep NPO.  - Blood cultures obtained in the ED. Continue empiric IV Zosyn.   - IV hydration.  - Analgesics and antiemetics as needed.   - Follow labs.     12/28  ERCP planned  Await intervention per GI  Now Intubated    12/29  S/p ERCP  GI on consult  Stent placed per GI  Abx  Follow Cultures    12/30  GI on board  S/p ERCP  Abx  Improving clinically  Remains on Pressors    12/31, 1/1, 1/2, 3, 4  S/P source control as above  Cont ceftriaxone    1/5, 6, 7  Cont Augmentin PO    1/9  RESOLVED

## 2022-01-09 NOTE — PROGRESS NOTES
[facility] ([facility])  Nephrology  Progress Note    Patient Name: Lizz Alas  MRN: [id number]  Admission Date: 12/27/2021  Hospital Length of Stay: 12 days  Attending Provider: Julissa Thompson MD   Primary Care Physician: Primary Doctor No  Principal Problem:LISET (acute kidney injury)    Consults  Subjective:     Interval History:     Patient was seen in his hospital room.  Sitting on side of the bed resting comfortably eating breakfast.  No acute distress noted.  His daughter was at the bedside.  All nephrology related questions were answered to her satisfaction.    Continues to have good urine output however no evidence of clearance.  Will plan to continue Monday Wednesday Friday dialysis, will plan to hold dialysis tomorrow until his laboratory studies have returned.    Review of systems:  Continues to have some lower extremity swelling however upper extremity swelling has improved.  No nausea vomiting or diarrhea.  No fevers or chills.  No shortness of breath or chest discomfort    Review of patient's allergies indicates:  No Known Allergies  Current Facility-Administered Medications   Medication Frequency    0.9%  NaCl infusion PRN    acetaminophen oral solution 650 mg Q6H PRN    albuterol-ipratropium 2.5 mg-0.5 mg/3 mL nebulizer solution 3 mL Q4H PRN    aluminum-magnesium hydroxide-simethicone 200-200-20 mg/5 mL suspension 30 mL QID PRN    amLODIPine tablet 5 mg BID    amoxicillin-clavulanate 500-125mg per tablet 500 mg Daily    arformoteroL nebulizer solution 15 mcg BID    budesonide nebulizer solution 0.5 mg Q12H    dextrose 50% injection 12.5 g PRN    dextrose 50% injection 25 g PRN    famotidine tablet 20 mg Daily    folic acid tablet 1 mg Daily    glucagon (human recombinant) injection 1 mg PRN    glucose chewable tablet 16 g PRN    glucose chewable tablet 24 g PRN    heparin (porcine) injection 2,000 Units PRN    heparin (porcine) injection 5,000 Units Q8H    hydrALAZINE  injection 20 mg Q4H PRN    Lactobacillus acidoph-L.bulgar 1 million cell tablet 4 tablet TID WM    metoprolol tartrate (LOPRESSOR) tablet 25 mg BID    multivitamin tablet Daily    naloxone 0.4 mg/mL injection 0.02 mg PRN    nicotine 21 mg/24 hr 1 patch Daily    ondansetron injection 4 mg Q6H PRN    oxazepam capsule 10 mg TID PRN    oxyCODONE-acetaminophen 5-325 mg per tablet 1 tablet Q8H PRN    promethazine tablet 25 mg Q6H PRN    simethicone chewable tablet 80 mg TID PRN    sodium chloride 0.9% bolus 250 mL PRN    sodium chloride 0.9% flush 10 mL Q12H PRN    thiamine tablet 100 mg Daily    trazodone split tablet 25 mg Nightly PRN       Objective:     Vital Signs (Most Recent):  Temp: 98.2 °F (36.8 °C) (01/09/22 0749)  Pulse: 66 (01/09/22 0859)  Resp: 18 (01/09/22 0859)  BP: (!) 145/69 (01/09/22 0749)  SpO2: 96 % (01/09/22 0859)  O2 Device (Oxygen Therapy): nasal cannula w/ humidification (01/09/22 0859) Vital Signs (24h Range):  Temp:  [97.7 °F (36.5 °C)-98.9 °F (37.2 °C)] 98.2 °F (36.8 °C)  Pulse:  [60-72] 66  Resp:  [18-20] 18  SpO2:  [90 %-98 %] 96 %  BP: (117-170)/(58-77) 145/69     Weight: 89.4 kg (197 lb 1.5 oz) (01/09/22 0132)  Body mass index is 31.81 kg/m².  Body surface area is 2.04 meters squared.    No intake/output data recorded.    Physical exam:  Alert and oriented x3, no acute distress  HEENT:  Pupils equal reactive to light, sclera anicteric, extraocular movements intact  Neck:  no JVD, full range of motion no tenderness  Lungs:  Good air exchange, clear to auscultation  Cardiovascular:  Regular rate and rhythm, no murmurs rubs or gallops  Abdomen:  Nondistended nontender positive bowel sounds  Neuro:  Alert and oriented x3 conversant cooperative and appropriate  Musculoskeletal:  no focal defects, 1+ lower extremity edema bilaterally.    Significant Labs:sure  BMP:   Recent Labs   Lab 01/03/22  0434 01/04/22  0438 01/07/22  0557   GLU 91  91   < > 75   *  132*   < > 135*   K  3.9  3.9   < > 4.1   CL 99  99   < > 100   CO2 18*  18*   < > 22*   BUN 49*  49*   < > 84*   CREATININE 4.7*  4.7*   < > 8.0*   CALCIUM 8.1*  8.1*   < > 7.8*   MG 2.4  --   --     < > = values in this interval not displayed.     CMP:   Recent Labs   Lab 01/05/22  0323 01/06/22  0523 01/07/22  0557      < > 75   CALCIUM 7.8*   < > 7.8*   ALBUMIN 2.3*  --   --    PROT 5.5*  --   --       < > 135*   K 4.1   < > 4.1   CO2 18*   < > 22*      < > 100   BUN 98*   < > 84*   CREATININE 8.0*   < > 8.0*   ALKPHOS 172*  --   --    ALT 39  --   --    AST 27  --   --    BILITOT 1.3*  --   --     < > = values in this interval not displayed.     All labs within the past 24 hours have been reviewed.    Significant Imaging:  Reviewed    Assessment/Plan:     Active Diagnoses:    Diagnosis Date Noted POA    PRINCIPAL PROBLEM:  LISET (acute kidney injury) [N17.9] 12/29/2021 No    Acute respiratory failure with hypoxia and hypercarbia [J96.01, J96.02] 12/28/2021 Yes    Severe sepsis with acute organ dysfunction [A41.9, R65.20] 12/28/2021 Yes    E coli bacteremia [R78.81, B96.20] 12/28/2021 Yes    Acute cholangitis [K83.09] 12/27/2021 Yes    Tobacco use [Z72.0] 12/27/2021 Yes     Chronic    Alcohol abuse [F10.10] 12/27/2021 Yes     Chronic    Primary hypertension [I10] 12/27/2021 Yes     Chronic    Paroxysmal atrial fibrillation [I48.0] 12/27/2021 Yes     Chronic      Problems Resolved During this Admission:    Diagnosis Date Noted Date Resolved POA    Septic shock [A41.9, R65.21]  01/05/2022 Yes    On mechanically assisted ventilation [Z99.11] 12/30/2021 01/02/2022 Not Applicable    Acute hyperglycemia [R73.9] 12/29/2021 01/05/2022 Yes    Electrolyte imbalance [E87.8] 12/28/2021 01/02/2022 Yes       1. LISET:  Continues to have good urine output, however no longer being measured.    No evidence of clearance at this time.  Last dialysis treatment was Friday.  Will plan to hold dialysis and monitor until  Monday.  Will continue Monday Wednesday Friday dialysis unless otherwise indicated.  Hopefully we will see some evidence of renal recovery and clearance.    Cause of acute kidney injury is ATN.    2. Volume status is stable, still some mild lower extremity edema however improving overall.    Thank you for your consult.     Thor Lr MD  Nephrology  [facility] ([facility])

## 2022-01-09 NOTE — ASSESSMENT & PLAN NOTE
Worsening  Cr 3.3  Monitor    12/31  2 to shock  Nephro on board  Avoid nephrotoxins  Cont NaHCo3 gtt    1/1/22  On CRRT started today   Nephro on board  Mild hypokalemia and hyponatremia from LISET: CRRT    1/2  Net positive about 18L since admit  Ongoing CRRT  Avoid nephrotoxic agents    1/3  Getting RRT  scR 4.7  Oliguric UOP last 24 slightly over 227  Got CRRT past approx 48 hrs (-ve slightly over 2L)    1/4  Dialysis break yesterday to see intrinsic function: sCR up to 6.7, no dangerous electrolyte or acid imbalance today  uop 315 ml last 24 hour  Nephro on board    1/5  Increased sCR  To get RRT today      1/6  This is now the principle problem as Sepsis and shock have resolved  Nephro on board, duration of RRT not clear at this point. However so far in between HD his scr rises considerably. Whether intrinsic renal function will return or when remains to be seen  UOP last 24 documented as 1750 cc. This is a marked improvement  Cont Condom cath for strict monitoring UOP    1/7  Vasc consult for tunneled catheter  Social work consult to get him outpatient HD  MWF HD per nephro  Avoid nephrotoxins    1/8  To get tunneled cath on Monday, jorge luis rios appreciated  Now on MWF schedule, intrinsic renal function yet to return, will check BMP in AM  Avoid nephrotoxic agents  Had 1 L pulled yesterday    1/9  For tunneled catheter placement in AM, NPO past midnight  BMP in AM, last HD on Froday 1/7/22  HD to be MWF, social working working on securing him a HD chair

## 2022-01-09 NOTE — ASSESSMENT & PLAN NOTE
Accurate I/Os, urine output up at 315ml out last 24 hours  Renal dose meds and IVAB  Nephrology following  urine output up; plan eval daily for intermittent HD indication  1/5 urine output 645 mL last 24 hours.  HD today for clearance and volume removal.  Strict I&Os.  Monitor BMP.  1/6 no indication for dialysis.  Creatinine 6. Decreased from 8. 1.7 L urine output.  Monitor urine output and creatinine.  Right IJ Trialysis day 5. Nephrology follow-up.  Status post HD yesterday.    1/7 HD 3 times a week as per Nephrology recommendations.  Improved urine output clearance not improving creatinine 8.   1/8 Tunnel catheter Monday to continue HD as outpatient.  1/9 possible tunneled catheter tomorrow for HD.  HD as per Nephrology.

## 2022-01-09 NOTE — SUBJECTIVE & OBJECTIVE
Interval History:   1/9 O2 sat 96% on 2 liter/minute.  Afebrile.  Last HD was Friday.  Nephrology input noted.  C diff sent for diarrhea negative  Review of Systems   Constitutional: Positive for malaise/fatigue. Negative for chills and fever.   HENT: Negative for hearing loss and nosebleeds.    Eyes: Negative for discharge.   Respiratory: Positive for shortness of breath. Negative for cough.    Cardiovascular: Positive for leg swelling. Negative for chest pain.   Gastrointestinal: Negative for abdominal pain.   Genitourinary: Negative for hematuria.   Musculoskeletal: Negative for falls.   Skin: Negative for itching.   Neurological: Positive for weakness. Negative for speech change, seizures and loss of consciousness.   Endo/Heme/Allergies: Negative for polydipsia. Does not bruise/bleed easily.   Psychiatric/Behavioral: Negative for hallucinations.             Objective:     Vital Signs (Most Recent):  Temp: 98.2 °F (36.8 °C) (01/09/22 0749)  Pulse: 66 (01/09/22 0859)  Resp: 18 (01/09/22 0859)  BP: (!) 145/69 (01/09/22 0749)  SpO2: 96 % (01/09/22 0859) Vital Signs (24h Range):  Temp:  [97.7 °F (36.5 °C)-98.9 °F (37.2 °C)] 98.2 °F (36.8 °C)  Pulse:  [60-72] 66  Resp:  [18-20] 18  SpO2:  [90 %-98 %] 96 %  BP: (117-170)/(58-77) 145/69     Weight: 89.4 kg (197 lb 1.5 oz)  Body mass index is 31.81 kg/m².    No intake or output data in the 24 hours ending 01/09/22 1124    Physical Exam  Vitals and nursing note reviewed.   Constitutional:       General: He is not in acute distress.     Appearance: He is well-developed. He is ill-appearing.   HENT:      Head: Normocephalic and atraumatic.   Neck:      Comments: Right IJ Trialysis catheter  Cardiovascular:      Rate and Rhythm: Normal rate and regular rhythm.   Pulmonary:      Effort: Pulmonary effort is normal.      Breath sounds: Rhonchi present.   Abdominal:      Palpations: Abdomen is soft.      Tenderness: There is no abdominal tenderness.   Musculoskeletal:          General: Swelling present.      Cervical back: Neck supple.   Skin:     General: Skin is warm.      Coloration: Skin is pale.   Neurological:      General: No focal deficit present.      Mental Status: He is alert and oriented to person, place, and time.   Psychiatric:         Mood and Affect: Mood normal.         Behavior: Behavior normal.         Thought Content: Thought content normal.         Judgment: Judgment normal.         Vents:  Vent Mode: A/C (01/01/22 1309)  Ventilator Initiated: Yes (12/28/21 0824)  Set Rate: 26 BPM (01/01/22 1309)  Vt Set: 450 mL (01/01/22 1309)  Pressure Support: 0 cmH20 (12/28/21 0824)  PEEP/CPAP: 5 cmH20 (01/01/22 1309)  Oxygen Concentration (%): 28 (01/08/22 2137)  Peak Airway Pressure: 25 cmH2O (01/01/22 1309)  Plateau Pressure: 16 cmH20 (01/01/22 1309)  Total Ve: 15 mL (01/01/22 1309)  Negative Inspiratory Force (cm H2O): 0 (01/01/22 1309)  F/VT Ratio<105 (RSBI): (!) 56.22 (01/01/22 1309)    Lines/Drains/Airways     Central Venous Catheter Line            Trialysis (Dialysis) Catheter 01/01/22 0922 right internal jugular 8 days                Significant Labs:    CBC/Anemia Profile:  Recent Labs   Lab 01/08/22  0602   WBC 7.58   HGB 9.4*   HCT 28.3*      MCV 92   RDW 15.0*        Chemistries:  No results for input(s): NA, K, CL, CO2, BUN, CREATININE, CALCIUM, ALBUMIN, PROT, BILITOT, ALKPHOS, ALT, AST, GLUCOSE, MG, PHOS in the last 48 hours.Results for JUNIOR ALONZO (MRN [id number]) as of 1/5/2022 13:36   Ref. Range 1/4/2022 20:59   POC PH Latest Ref Range: 7.35 - 7.45  7.454 (H)   POC PCO2 Latest Ref Range: 35 - 45 mmHg 25.6 (LL)   POC PO2 Latest Ref Range: 80 - 100 mmHg 56 (LL)   POC BE Latest Ref Range: -2 to 2 mmol/L -6   POC HCO3 Latest Ref Range: 24 - 28 mmol/L 18.0 (L)   POC SATURATED O2 Latest Ref Range: 95 - 100 % 91 (L)   FiO2 Unknown 28   Flow Unknown 2   Sample Unknown ARTERIAL   DelSys Unknown Nasal Can   Allens Test Unknown Pass   Site Unknown LR   Mode Unknown  SPONT       EKG 12/29/2021    Sinus rhythm with Premature supraventricular complexes   Incomplete right bundle branch block   Prolonged QT   Abnormal ECG   When compared with ECG of 27-DEC-2021 15:30,   Fusion complexes are no longer Present   T wave inversion now evident in Inferior leads         Significant Imaging:    Chest x-ray 01/03/2022      TECHNIQUE:  Single frontal view of the chest was performed.     COMPARISON:  Chest radiograph 01/01/2022     FINDINGS:  ECG monitoring leads overlie the chest.  There is interval removal of an endotracheal tube.  A right internal jugular central venous catheter terminates over the SVC.  A left internal jugular central venous catheter terminates at the junction of the left brachiocephalic vein and SVC.There is interval improvement in bilateral lower lung zone consolidative opacity/atelectasis, however there remains diffuse interstitial opacity throughout the lungs.  No pneumothorax or significant pleural fluid.     The cardiac silhouette is mildly enlarged.     No acute osseous abnormality.     Impression:     Interval removal of an endotracheal tube with improving bibasilar consolidation/atelectasis and persistent diffuse interstitial opacity.         Ultrasound abdomen 12/27/2021    Impression:     Gallbladder distension with internal sludge but without definite stones.  Intra and extrahepatic biliary ductal dilation.  Consider further evaluation with contrast enhanced CT.  Acute cholecystitis or choledocholithiasis not excluded.     Pancreas is largely obscured by bowel gas.

## 2022-01-09 NOTE — ASSESSMENT & PLAN NOTE
encourage tobacco cessation  Cont LABA and ICS nebs  On Nicotine patch  1/5 nicotine patches  1/9 continue nicotine patches

## 2022-01-09 NOTE — PROGRESS NOTES
[facility] - Telemetry ([facility])  Critical Care Medicine  Progress Note    Patient Name: Lizz Alas  MRN: [id number]  Admission Date: 12/27/2021  Hospital Length of Stay: 12 days  Code Status: Full Code  Attending Provider: Julissa Thompson MD  Primary Care Provider: Primary Doctor No   Principal Problem: LISET (acute kidney injury)    Subjective:     HPI:  Mr Alas is a 70 yo Malay male with a PMH of HLD, etoh abuse, tobacco abuse, HTN and PAF.  He presented to [facility] ED yesterday evening via personal vehicle with complaint of abd pain over 3 days and N/V X 1 day.  He does not speak English and was accompanied by family.  In ED Glucose 180, TBili 5.1, LA 1.5 and Abd US with GB distention and sludging.  He had temp 102.9.  He as admitted to Tele and GI consulted diagnosed with acute cholecystitis.  This AM Telemetry reported HR was at 183 and upon assessment patient seemed to be in distress, mouth breathing , O2 was reading 73% pt was shaking and shivering. Called TOM due to pt speaking Japanese [id number], # number [id number], pt was stating he could not breath.  Checked pt temp reading was 98.4 oral .Pt was on 3L then moved to 5L, then  Respiratory came in and put him on breathing treatment albuterol, still couldn't get breathing controlled, called Dr. Lopez and he stated to transfer pt to ICU.      Hospital/ICU Course:  12/28 - In ICU he was awake and alert on BiPAP .40 FiO2 with mild work of breathing but no distress and hemodynamically stable.  GI requested patient intubated and stabilized more so from pulm standpoint then obtain CT Abd and plan to OR for ERCP.  TBili up to 7.1.  He was intubated and supported on [facility] ventilation post explaining all this to daughter at bed side.  Post intubation and sedation became hypotensive requiring CL and AL placements and starting Levophed infusion.   12/29 - ERCP yesterday afternoon.  Increased vasopressor and O2 requirements overnight.  This AM still intubated on [facility] vent  and sedated on Fentanyl infusion.  Still on Vasopressin and Levophed infusions also.  Oliguria with LISET and Tmax last 24 hours up to 102.9.  Blood cultures X 2 + E.Coli.  Discussed care with daughter at bed side and all questions answered.   12/30 - remains intubated and sedated on mech vent. Temp and wbc trend down. Urine output, creatinine, and metabolic acidosis worsening  12/31 - remains intubated and sedated on mech vent with minimal oxygen demand but moderate pressor requirement. Urine output 118ml last 24 hours, creatinine 6.0. continued hyperglycemia 197-237  1/1/2022 - remains intubated and sedated on mechanical vent with support requirement unchanged but pO2 trending down, +18L since admission. Renal function without improvement, only 160ml urine output last 24 hours. Trialysis cath placed for initiation of CRRT, goal filtration and fluid removal  1/2 - self extubated yesterday, tolerating HFNC support. Confusion/Delirium requiring precedex infusion for safety. CRRT initiated but clotted around 0100 after about 3L removed, remains oliguric, electrolytes stable  1/3 - supplemental oxygen weaned to now 8L NC; CRRT clotted again around 2300 but urine output 220 ml last 24 hours; precedex off this morning, delirium improving; HTN requiring treatment  1/4 - off precedex, continued mild confusion without agitation now. Supplemental oxygen weaned to 4L NC. Urine output up to 315ml last 24 hours, creatinine up 6.7, K stable  1/5 patient seen and examined.  Afebrile.  Urine output 645 mL last 24 hours.  Weak.  Poor appetite.  1/6 seen and examined.  Afebrile.  O2 sat 94% on room air.  Urine output 1.7 L last 24 hours.  BM 1/5. creatinine 6.   1/7 O2 sat 95% on 3 to per minute.  Afebrile.  Having dialysis today.  HD for clearance.  Creatinine 8  1/8 O 2 sat 90% on 2 liter/minute.  Afebrile.  Urine output 100 mL last 24 hours.  Status post HD yesterday for clearance   1/9 O2 sat 96% on 2 liter/minute.  Afebrile.   Last HD was Friday.  Nephrology input noted.      Interval History:   1/9 O2 sat 96% on 2 liter/minute.  Afebrile.  Last HD was Friday.  Nephrology input noted.  C diff sent for diarrhea negative  Review of Systems   Constitutional: Positive for malaise/fatigue. Negative for chills and fever.   HENT: Negative for hearing loss and nosebleeds.    Eyes: Negative for discharge.   Respiratory: Positive for shortness of breath. Negative for cough.    Cardiovascular: Positive for leg swelling. Negative for chest pain.   Gastrointestinal: Negative for abdominal pain.   Genitourinary: Negative for hematuria.   Musculoskeletal: Negative for falls.   Skin: Negative for itching.   Neurological: Positive for weakness. Negative for speech change, seizures and loss of consciousness.   Endo/Heme/Allergies: Negative for polydipsia. Does not bruise/bleed easily.   Psychiatric/Behavioral: Negative for hallucinations.             Objective:     Vital Signs (Most Recent):  Temp: 98.2 °F (36.8 °C) (01/09/22 0749)  Pulse: 66 (01/09/22 0859)  Resp: 18 (01/09/22 0859)  BP: (!) 145/69 (01/09/22 0749)  SpO2: 96 % (01/09/22 0859) Vital Signs (24h Range):  Temp:  [97.7 °F (36.5 °C)-98.9 °F (37.2 °C)] 98.2 °F (36.8 °C)  Pulse:  [60-72] 66  Resp:  [18-20] 18  SpO2:  [90 %-98 %] 96 %  BP: (117-170)/(58-77) 145/69     Weight: 89.4 kg (197 lb 1.5 oz)  Body mass index is 31.81 kg/m².    No intake or output data in the 24 hours ending 01/09/22 1124    Physical Exam  Vitals and nursing note reviewed.   Constitutional:       General: He is not in acute distress.     Appearance: He is well-developed. He is ill-appearing.   HENT:      Head: Normocephalic and atraumatic.   Neck:      Comments: Right IJ Trialysis catheter  Cardiovascular:      Rate and Rhythm: Normal rate and regular rhythm.   Pulmonary:      Effort: Pulmonary effort is normal.      Breath sounds: Rhonchi present.   Abdominal:      Palpations: Abdomen is soft.      Tenderness: There is no  abdominal tenderness.   Musculoskeletal:         General: Swelling present.      Cervical back: Neck supple.   Skin:     General: Skin is warm.      Coloration: Skin is pale.   Neurological:      General: No focal deficit present.      Mental Status: He is alert and oriented to person, place, and time.   Psychiatric:         Mood and Affect: Mood normal.         Behavior: Behavior normal.         Thought Content: Thought content normal.         Judgment: Judgment normal.         Vents:  Vent Mode: A/C (01/01/22 1309)  Ventilator Initiated: Yes (12/28/21 0824)  Set Rate: 26 BPM (01/01/22 1309)  Vt Set: 450 mL (01/01/22 1309)  Pressure Support: 0 cmH20 (12/28/21 0824)  PEEP/CPAP: 5 cmH20 (01/01/22 1309)  Oxygen Concentration (%): 28 (01/08/22 2137)  Peak Airway Pressure: 25 cmH2O (01/01/22 1309)  Plateau Pressure: 16 cmH20 (01/01/22 1309)  Total Ve: 15 mL (01/01/22 1309)  Negative Inspiratory Force (cm H2O): 0 (01/01/22 1309)  F/VT Ratio<105 (RSBI): (!) 56.22 (01/01/22 1309)    Lines/Drains/Airways       Central Venous Catheter Line              Trialysis (Dialysis) Catheter 01/01/22 0922 right internal jugular 8 days                    Significant Labs:    CBC/Anemia Profile:  Recent Labs   Lab 01/08/22  0602   WBC 7.58   HGB 9.4*   HCT 28.3*      MCV 92   RDW 15.0*        Chemistries:  No results for input(s): NA, K, CL, CO2, BUN, CREATININE, CALCIUM, ALBUMIN, PROT, BILITOT, ALKPHOS, ALT, AST, GLUCOSE, MG, PHOS in the last 48 hours.Results for JUNIOR ALONZO (MRN [id number]) as of 1/5/2022 13:36   Ref. Range 1/4/2022 20:59   POC PH Latest Ref Range: 7.35 - 7.45  7.454 (H)   POC PCO2 Latest Ref Range: 35 - 45 mmHg 25.6 (LL)   POC PO2 Latest Ref Range: 80 - 100 mmHg 56 (LL)   POC BE Latest Ref Range: -2 to 2 mmol/L -6   POC HCO3 Latest Ref Range: 24 - 28 mmol/L 18.0 (L)   POC SATURATED O2 Latest Ref Range: 95 - 100 % 91 (L)   FiO2 Unknown 28   Flow Unknown 2   Sample Unknown ARTERIAL   DelSys Unknown Nasal Can    Allens Test Unknown Pass   Site Unknown LR   Mode Unknown SPONT       EKG 12/29/2021    Sinus rhythm with Premature supraventricular complexes   Incomplete right bundle branch block   Prolonged QT   Abnormal ECG   When compared with ECG of 27-DEC-2021 15:30,   Fusion complexes are no longer Present   T wave inversion now evident in Inferior leads         Significant Imaging:    Chest x-ray 01/03/2022      TECHNIQUE:  Single frontal view of the chest was performed.     COMPARISON:  Chest radiograph 01/01/2022     FINDINGS:  ECG monitoring leads overlie the chest.  There is interval removal of an endotracheal tube.  A right internal jugular central venous catheter terminates over the SVC.  A left internal jugular central venous catheter terminates at the junction of the left brachiocephalic vein and SVC.There is interval improvement in bilateral lower lung zone consolidative opacity/atelectasis, however there remains diffuse interstitial opacity throughout the lungs.  No pneumothorax or significant pleural fluid.     The cardiac silhouette is mildly enlarged.     No acute osseous abnormality.     Impression:     Interval removal of an endotracheal tube with improving bibasilar consolidation/atelectasis and persistent diffuse interstitial opacity.         Ultrasound abdomen 12/27/2021    Impression:     Gallbladder distension with internal sludge but without definite stones.  Intra and extrahepatic biliary ductal dilation.  Consider further evaluation with contrast enhanced CT.  Acute cholecystitis or choledocholithiasis not excluded.     Pancreas is largely obscured by bowel gas.         ABG  Recent Labs   Lab 01/04/22 2059   PH 7.454*   PO2 56*   PCO2 25.6*   HCO3 18.0*   BE -6     Assessment/Plan:     Psychiatric  Alcohol abuse  Likely contributing to delirium/confusion - now improving  Continue enteral thiamine/folate/multi vit  Prn ativan  1/5 p.r.n. Ativan time in folate multivitamin  1/9 multivitamin folate  thiamine    Pulmonary  Hypoxemia  1/6 O2 sat 94% on room air.  Home O2 evaluation in a.m.  1/7 home O2 eval prior to dc    Acute respiratory failure with hypoxia and hypercarbia  Likely secondary to sepsis and atelectasis  weaning NC for goal sat > 92  Encourage TCDB and mobilize when able  1/5 wean O2.  Target sat 92-95%.  Nebs p.r.n..  1/6 home O2 evaluation in a.m.  1/7 home O2 eval prior to DC  1/8 O2 sat 90% on 2 liter/minute.  Home O2 evaluation.   1/9 heavy smoker.  Home O2 evaluation prior to discharge albuterol Atrovent outpatient pulmonology follow-up    Cardiac/Vascular  Primary hypertension  1/7 p.o. metoprolol and amlodipine    Paroxysmal atrial fibrillation  SR on monitor,  Extrasystoles noted  Not on anticoagulants on home med review  Continue metoprolol for rate and BP control  1/5 continue metoprolol.  1/7 continue same  1/8 continue metoprolol.    Renal/  * LISET (acute kidney injury)  Accurate I/Os, urine output up at 315ml out last 24 hours  Renal dose meds and IVAB  Nephrology following  urine output up; plan eval daily for intermittent HD indication  1/5 urine output 645 mL last 24 hours.  HD today for clearance and volume removal.  Strict I&Os.  Monitor BMP.  1/6 no indication for dialysis.  Creatinine 6. Decreased from 8. 1.7 L urine output.  Monitor urine output and creatinine.  Right IJ Trialysis day 5. Nephrology follow-up.  Status post HD yesterday.    1/7 HD 3 times a week as per Nephrology recommendations.  Improved urine output clearance not improving creatinine 8.   1/8 Tunnel catheter Monday to continue HD as outpatient.  1/9 possible tunneled catheter tomorrow for HD.  HD as per Nephrology.    ID  E coli bacteremia  Reviewed case and bacteria with clinical pharmacist; given gram neg and overall clinical improvement  Now tolerating po  Will transition to augmentin for additional 5 days  1/5 bacteremia resolved.  Now p.o. Augmentin.    Severe sepsis with acute organ  dysfunction  Source: GB and Bacteremia  Shock resolved  Sputum culture NGTD  Blood cultures X 2 E.Coli, continue rocephin; transition to enteral augmentin for additional 5 days (total 14d abx course)  Monitor Temp and wbc trend  1/6 p.o. Augmentin for E coli bacteremia.  Kidney failure non oliguric 1.7 L last 24 hours.  Monitor urine output monitor temp WBC.  1/9 complete p.o. Augmentin    Acute cholangitis  IVAB  POD #7 S/P ERCP  Will need Cholecystectomy as outpt once clinically improved  01/05/2022 status post ERCP with CBD stones removal sphincterotomy and placement of 2 stents.  E coli bacteremia treated with IV antibiotic now on p.o. Augmentin.  1/6 afebrile.  Continue p.o. Augmentin.  GI follow-up.  1/7 complete Augmentin course  1/8 completed treatment with p.o. Augmentin    Hematology  Thrombocytopenia  resolved  1/5 platelet 244. On subQ heparin for DVT prophylaxis.      Other  Tobacco use  encourage tobacco cessation  Cont LABA and ICS nebs  On Nicotine patch  1/5 nicotine patches  1/9 continue nicotine patches  Will sign off please recall Derrick Blum MD  Critical Care Medicine  [facility] - Tele[facility] ([facility])

## 2022-01-09 NOTE — SUBJECTIVE & OBJECTIVE
Interval History: no overnight events    Review of Systems   Constitutional: Negative for chills, diaphoresis and fatigue.   HENT: Negative for drooling, hearing loss, rhinorrhea and sinus pressure.    Respiratory: Negative for cough, choking and shortness of breath.    Cardiovascular: Negative for chest pain.   Gastrointestinal: Negative for abdominal pain, blood in stool, constipation and diarrhea.        Diarrhea is resolved   Genitourinary: Negative for flank pain, hematuria and urgency.   Musculoskeletal: Negative for arthralgias and back pain.   Skin: Negative for color change.   Neurological: Negative for seizures, numbness and headaches.   Hematological: Negative for adenopathy. Does not bruise/bleed easily.   Psychiatric/Behavioral: Negative for agitation and behavioral problems.     Objective:     Vital Signs (Most Recent):  Temp: 98.3 °F (36.8 °C) (01/09/22 1154)  Pulse: (!) 58 (01/09/22 1154)  Resp: 18 (01/09/22 1154)  BP: (!) 143/73 (01/09/22 1154)  SpO2: (!) 93 % (01/09/22 1154) Vital Signs (24h Range):  Temp:  [97.8 °F (36.6 °C)-98.9 °F (37.2 °C)] 98.3 °F (36.8 °C)  Pulse:  [58-72] 58  Resp:  [18-20] 18  SpO2:  [90 %-98 %] 93 %  BP: (117-170)/(58-77) 143/73     Weight: 89.4 kg (197 lb 1.5 oz)  Body mass index is 31.81 kg/m².    Intake/Output Summary (Last 24 hours) at 1/9/2022 1507  Last data filed at 1/9/2022 0950  Gross per 24 hour   Intake 260 ml   Output --   Net 260 ml      Physical Exam  Vitals reviewed.   Constitutional:       General: He is not in acute distress.     Appearance: Normal appearance. He is normal weight.   HENT:      Head: Normocephalic and atraumatic.      Nose: Nose normal.      Mouth/Throat:      Mouth: Mucous membranes are moist.      Pharynx: Oropharynx is clear.   Eyes:      General: No scleral icterus.     Extraocular Movements: Extraocular movements intact.      Pupils: Pupils are equal, round, and reactive to light.   Cardiovascular:      Rate and Rhythm: Normal rate and  regular rhythm.      Pulses: Normal pulses.      Heart sounds: No murmur heard.      Abdominal:      General: Abdomen is flat. Bowel sounds are normal.      Palpations: Abdomen is soft.   Musculoskeletal:      Right lower leg: No edema.      Left lower leg: No edema.      Comments: Marked improvement of edema of both hands  Trace pedal edema   Skin:     General: Skin is warm and dry.      Capillary Refill: Capillary refill takes less than 2 seconds.   Neurological:      General: No focal deficit present.      Mental Status: He is alert.   Psychiatric:         Mood and Affect: Mood normal.         Behavior: Behavior normal.         Significant Labs: All pertinent labs within the past 24 hours have been reviewed.  None    Significant Imaging: n/a

## 2022-01-09 NOTE — ASSESSMENT & PLAN NOTE
- Remains in sinus rhythm on admission. Monitor telemetry.  - Continue home BB.  - Patient is not on long-term AC. Will defer to his primary cardiologist.       1/5  Cont MTP    1/8, 9  Cont MTP (dropped to 25 mg PO BID for bradycardia)

## 2022-01-09 NOTE — ASSESSMENT & PLAN NOTE
ABX  ICU  Follow Blood Cultures  Pressors as indicated      12/31  Sensitive E coli bacteremia on ceftriaxone IV  Afebrile  Resolved leucocytosis  Repeat blood culture on 12/29 NGTC x 2 days  Appropriate source control achieved S/P ERCP, stone retrieval and sphincterectomy.  Panculture as needed      1/1, 2, 3  Repeat culture NGTD  Cont cefriaxone 1 gm IV Q12    1/4  Final repeat blood culture Negative  Cont Ceftriaxone    1/5, 1/6  On Augmentin PO    1/8  Cont Augmentin PO    1/9  Repeat blood culture from 12/29/21 negative  Cont Augmentin PO  Been on Antibiotics since admit # 13

## 2022-01-09 NOTE — ASSESSMENT & PLAN NOTE
Likely contributing to delirium/confusion - now improving  Continue enteral thiamine/folate/multi vit  Prn ativan  1/5 p.r.n. Ativan time in folate multivitamin  1/9 multivitamin folate thiamine

## 2022-01-09 NOTE — PROGRESS NOTES
[facility] Medicine  Progress Note    Patient Name: Lizz Alas  MRN: [id number]  Patient Class: IP- Inpatient   Admission Date: 12/27/2021  Length of Stay: 12 days  Attending Physician: Julissa Thompson MD  Primary Care Provider: Primary Doctor No        Subjective:     Principal Problem:LISET (acute kidney injury)        HPI:  Lizz Alas is a 71 y.o. Yoruba speaking male patient with a PMHx of PAF, HLD, HTN, thrombocytopenia, alcohol abuse, and tobacco use who presents to the Emergency Department for intermittent epigastric abdominal pain which onset gradually 3 days PTA. Today, the pt reports that his pain has been constant. His symptoms are constant and moderate in severity. No mitigating or exacerbating factors reported. Associated sxs include fever (102 F), nausea, and vomiting. Patient denies any diarrhea, constipation, SOB, weakness, fever, chills, and all other sxs at this time. Initial work-up in the ED showed: Normal WBC and lactic, , , alk phos 184, T bili 5.1, lipase 11, glucose 180, plt 119, sodium 134, COVID negative. Abdominal US showed gallbladder distension with internal sludge but without definite stones, intra and extrahepatic biliary ductal dilation which may represent acute cholecystitis or choledocholithiasis. Blood cultures obtained in the ED, and 1.5 L IV fluids and IV Zosyn given. Case was discussed with GI per the ED, who recommend NPO, IV abx, and ERCP in AM. Hospital Medicine was contacted for admission and patient placed in Observation.       Overview/Hospital Course:  Patient 72 yo male admitted to hospital with suspected cholangitis. Patient initiated on IV abx, fluids, O2. Patient with a rapid decompensation shourtly after admit, rapid response called, patient hypoxic in the 60's placed on bipap with good effect. Patient transferred to ICU. Patient with a progressive decline, febrile episodes to 102.9. Severe septic shock, pressors and  fluids on board. Patient evaluated by GI who recommended ERCP - additional CT abdomen. Patient scheduled for CT and this has been delayed pending stability. Patient intubated for airway protection.  12/29- Patient remains intubated, sedated, on pressors. Discussed POC with family at bedside. Patient evaluated by surgery who recommend o/p surgical followup on recovery for elective cholecystectomy.. Repeat blood cultures in progress, abx infusing.  12/30 - Patient wbc declining but remains on pressors. He is intubated and sedated. Family at bedside. Worsening renal function with cr 4.4 today. Nephrology on consult. Discussed with CC Team  12/31: On sole pressor Norepinephrine 0.14mcg/kg/min, afebrile, leucocytosis is resolved, scR is bumped to 6.0. Patient is oliguric (118 cc urine in last 24 hour). On Fentanyl gtt and NaHCO3 gtt   Daughter Hortencia  is at bedside and was updated.   AC 26/450/40/5  1/1/22 patient seen, daughter Hortencia at bedside, currently on CRRT, Norepinephrine at 0.02mcg/kg/min, fentanyl 100mcg/Hr. AC 26/450/40/5. Patient more awake today, opens eyes spontaneously, is squeezing his daughter's hand  1/2 About afternoon yesterday he self extubated, did not require immediate re-intubation as he was doing rel well resp wise, he is currently on Vapo therm 35LPM 80% FIO2, precedex drip to help with agitation (history of ETOH misuse), getting CRRT, family at bedside, whilst confused he is oriented to selpf/family, conversing with family  1/3 patient seen, was sleeping calmly early this AM, family not at bedside, on Vapotherm 2oLPM at 45% FIO2, not dyspneic. CRRT off. Remains afebrile  1/4 patient seen, down to 4L oxygen, awake and alert, off Precedex gtt, been off pressors  1/5 seen in the ICU. Awake and alert. Not in distress.  PT/OT. D/C urethral catheter, place condom cath  1/6 Transferred from ICU, doing stable on the floor. Met on 4L this morning awake and alert, not particularly interested  in his breakfast. (Apparently the dialect on [place] is different than his, therefore with communication gaps he was able to tell me directly today that he is not in any pain).  1/7 Patient seen, he;d just returned from HD, daughter at bedside, mentation and cognition fully restored per daughter. HD is now MWF per nephro.  D/W Nephro who recommend placement of tunneled catheter, so far though good UOP renal function is slow to return, patient will likely need to be on HD for some time goingforward. This MD explained this to daughter who interpreted for patient  PT rec Home with PT.   1/8 patient seen, stable, for Tunneled cath placement on Monday.  working on outpatient HD (her last note says: He is only eligible for Emergency Medicaid for hospital bill.  Not eligible for post acute services including hemodialysis) However if his renal function doesn't return and he is HD dependent then something will have to give. Now on MWF schedule  1/10 patient see, feels good, understand that the plan is for tunneled cath placement in AM, HD is now MWF,  working on securing him a HD chair for outpatient.      Interval History: no overnight events    Review of Systems   Constitutional: Negative for chills, diaphoresis and fatigue.   HENT: Negative for drooling, hearing loss, rhinorrhea and sinus pressure.    Respiratory: Negative for cough, choking and shortness of breath.    Cardiovascular: Negative for chest pain.   Gastrointestinal: Negative for abdominal pain, blood in stool, constipation and diarrhea.        Diarrhea is resolved   Genitourinary: Negative for flank pain, hematuria and urgency.   Musculoskeletal: Negative for arthralgias and back pain.   Skin: Negative for color change.   Neurological: Negative for seizures, numbness and headaches.   Hematological: Negative for adenopathy. Does not bruise/bleed easily.   Psychiatric/Behavioral: Negative for agitation and behavioral problems.      Objective:     Vital Signs (Most Recent):  Temp: 98.3 °F (36.8 °C) (01/09/22 1154)  Pulse: (!) 58 (01/09/22 1154)  Resp: 18 (01/09/22 1154)  BP: (!) 143/73 (01/09/22 1154)  SpO2: (!) 93 % (01/09/22 1154) Vital Signs (24h Range):  Temp:  [97.8 °F (36.6 °C)-98.9 °F (37.2 °C)] 98.3 °F (36.8 °C)  Pulse:  [58-72] 58  Resp:  [18-20] 18  SpO2:  [90 %-98 %] 93 %  BP: (117-170)/(58-77) 143/73     Weight: 89.4 kg (197 lb 1.5 oz)  Body mass index is 31.81 kg/m².    Intake/Output Summary (Last 24 hours) at 1/9/2022 1507  Last data filed at 1/9/2022 0950  Gross per 24 hour   Intake 260 ml   Output --   Net 260 ml      Physical Exam  Vitals reviewed.   Constitutional:       General: He is not in acute distress.     Appearance: Normal appearance. He is normal weight.   HENT:      Head: Normocephalic and atraumatic.      Nose: Nose normal.      Mouth/Throat:      Mouth: Mucous membranes are moist.      Pharynx: Oropharynx is clear.   Eyes:      General: No scleral icterus.     Extraocular Movements: Extraocular movements intact.      Pupils: Pupils are equal, round, and reactive to light.   Cardiovascular:      Rate and Rhythm: Normal rate and regular rhythm.      Pulses: Normal pulses.      Heart sounds: No murmur heard.      Abdominal:      General: Abdomen is flat. Bowel sounds are normal.      Palpations: Abdomen is soft.   Musculoskeletal:      Right lower leg: No edema.      Left lower leg: No edema.      Comments: Marked improvement of edema of both hands  Trace pedal edema   Skin:     General: Skin is warm and dry.      Capillary Refill: Capillary refill takes less than 2 seconds.   Neurological:      General: No focal deficit present.      Mental Status: He is alert.   Psychiatric:         Mood and Affect: Mood normal.         Behavior: Behavior normal.         Significant Labs: All pertinent labs within the past 24 hours have been reviewed.  None    Significant Imaging: n/a      Assessment/Plan:      * LISET (acute  kidney injury)  Worsening  Cr 3.3  Monitor    12/31  2 to shock  Nephro on board  Avoid nephrotoxins  Cont NaHCo3 gtt    1/1/22  On CRRT started today   Nephro on board  Mild hypokalemia and hyponatremia from LISET: CRRT    1/2  Net positive about 18L since admit  Ongoing CRRT  Avoid nephrotoxic agents    1/3  Getting RRT  scR 4.7  Oliguric UOP last 24 slightly over 227  Got CRRT past approx 48 hrs (-ve slightly over 2L)    1/4  Dialysis break yesterday to see intrinsic function: sCR up to 6.7, no dangerous electrolyte or acid imbalance today  uop 315 ml last 24 hour  Nephro on board    1/5  Increased sCR  To get RRT today      1/6  This is now the principle problem as Sepsis and shock have resolved  Nephro on board, duration of RRT not clear at this point. However so far in between HD his scr rises considerably. Whether intrinsic renal function will return or when remains to be seen  UOP last 24 documented as 1750 cc. This is a marked improvement  Cont Condom cath for strict monitoring UOP    1/7  Vasc consult for tunneled catheter  Social work consult to get him outpatient HD  MWF HD per nephro  Avoid nephrotoxins    1/8  To get tunneled cath on Monday, vasc eval appreciated  Now on MWF schedule, intrinsic renal function yet to return, will check BMP in AM  Avoid nephrotoxic agents  Had 1 L pulled yesterday    1/9  For tunneled catheter placement in AM, NPO past midnight  BMP in AM, last HD on Froday 1/7/22  HD to be MWF, social working working on securing him a HD chair    E coli bacteremia  ABX  ICU  Follow Blood Cultures  Pressors as indicated      12/31  Sensitive E coli bacteremia on ceftriaxone IV  Afebrile  Resolved leucocytosis  Repeat blood culture on 12/29 NGTC x 2 days  Appropriate source control achieved S/P ERCP, stone retrieval and sphincterectomy.  Panculture as needed      1/1, 2, 3  Repeat culture NGTD  Cont cefriaxone 1 gm IV Q12    1/4  Final repeat blood culture Negative  Cont Ceftriaxone    1/5,  1/6  On Augmentin PO    1/8  Cont Augmentin PO    1/9  Repeat blood culture from 12/29/21 negative  Cont Augmentin PO  Been on Antibiotics since admit # 13      Acute cholangitis  - Abdominal US showed: Gallbladder distension with internal sludge but without definite stones. Intra and extrahepatic biliary ductal dilation. Acute cholecystitis or choledocholithiasis not excluded.  - GI consult. Plans for ERCP in AM. Will keep NPO.  - Blood cultures obtained in the ED. Continue empiric IV Zosyn.   - IV hydration.  - Analgesics and antiemetics as needed.   - Follow labs.     12/28  ERCP planned  Await intervention per GI  Now Intubated    12/29  S/p ERCP  GI on consult  Stent placed per GI  Abx  Follow Cultures    12/30  GI on board  S/p ERCP  Abx  Improving clinically  Remains on Pressors    12/31, 1/1, 1/2, 3, 4  S/P source control as above  Cont ceftriaxone    1/5, 6, 7  Cont Augmentin PO    1/9  RESOLVED    Acute respiratory failure with hypoxia and hypercarbia  Patient with Hypoxic Respiratory failure which is Acute.  he is not on home oxygen. Supplemental oxygen was provided and noted- Oxygen Concentration (%):  [32] 32.   Signs/symptoms of respiratory failure include- tachypnea, increased work of breathing, respiratory distress, use of accessory muscles and cyanosis. Contributing diagnoses includes - COPD Labs and images were reviewed. Patient Has recent ABG, which has been reviewed. Will treat underlying causes and adjust management of respiratory failure as indicated    12/31  On 40 % FIO2 and PEEP 5 mech ventilation  Stable.  Ongoing renal and circulatory failure being addressed prior to considering weaning trials    1/1  On Mech Vent  ICU on board    1/2  On Vapotherm  CXR in AM  Pulm toilet as tolerated    1/3  Being weaned down on Vapotherm  Diffuse interstitial opacities throughout lungs, will likely improve with RRT  Repeat CXR as needed    1/4  Improving  PT; OOB to chair as tolerated  Incentive  spirometry  Breathing treatment  Wean supplemental O2 as toelrated    1/5  Cont 3L nasal canula and wean as tolerated    1/4  Cont supplemental O2  Incentive spirometry  PT/OT, OOB  HD&volume pulling per nephro    1/7, 8  Resolved    Thrombocytopenia  Stable-jyothi  2 to Sepsis  Hold chemcial DVTp    1/1  Stable      1/2  Likely combination of ETOH misuse and sepsis  Platelet count is stable 73K, not bleeding  No need for transfusion    1/3  Improved to 114K today    1/4, 5  Resolved    Severe sepsis with acute organ dysfunction  Pressors  Fluids  ABX  ICU  Pulmonary CC  Intubated  Sedated      12/31  On sole pressor, Norepinephrine currently 0.14 mcg/kg/min, wean/titrate as per ICU team  On appropriate antibiotics: Ceftriaxone 1 gm IV Q12 for Sensitive E coli    1/1/22  On low dose Norepinephrine as he is getting CRRT  Source control achieved    1/2  Off Pressors  SBP acceptable    1/3, 4  Resolved  Off Pressors    1/6  Resolved    Paroxysmal atrial fibrillation  - Remains in sinus rhythm on admission. Monitor telemetry.  - Continue home BB.  - Patient is not on long-term AC. Will defer to his primary cardiologist.       1/5  Cont MTP    1/8, 9  Cont MTP (dropped to 25 mg PO BID for bradycardia)    Primary hypertension  - Continue home antihypertensives.     12/31  On pressors    1/4  Lopressor 25 mg PO BID, adjust as needed   May have to resume home Amlodipine 5m g PO QD, will monitor    1/5  MTP 50 mg PO BID  Amlodipine 5 mg PO QHS    1/6, 7  Cont MTP and nightly Amlodipine    1/8  Cont reduced dose MTP  Increase Amlodipine to 5 mg BID  If needed add PO Hydralazine    1/9  Cont MTP/Amlodipine    Tobacco use  - Assistance with smoking cessation was offered, including:  [x]  Medications  [x]  Counseling  [x]  Printed Information on Smoking Cessation  []  Referral to a Smoking Cessation Program  - Patient was counseled regarding smoking for 3-10 minutes.    Alcohol abuse  - Monitor for withdrawal/DTs. IV Ativan if  needed.     1/2  With some delirium likely from aute illness and ICU stay, in addition to possibly some withdrawal: Precedex gtt  Bedside swallow eval when possible: oral folate/thiamine +- oral Benzodiapines as needed    1/5  PRN Low dose Librium      1/9  Out of window for withdrawal    Hypoxemia  On just 1L oxygen      VTE Risk Mitigation (From admission, onward)         Ordered     heparin (porcine) injection 5,000 Units  Every 8 hours         01/03/22 0750     heparin (porcine) injection 2,000 Units  As needed (PRN)         01/01/22 2321     IP VTE HIGH RISK PATIENT  Once         12/27/21 2247     Place sequential compression device  Until discontinued         12/27/21 2247     Reason for No Pharmacological VTE Prophylaxis  Once        Question Answer Comment   Reasons: Risk of Bleeding    Reasons: Thrombocytopenia        12/27/21 2247                Discharge Planning   FLAQUITA:      Code Status: Full Code   Is the patient medically ready for discharge?:     Reason for patient still in hospital (select all that apply): Treatment and Other (specify) Tuneled catheter placement. HD  Discharge Plan A: Home with family                  Julissa Thompson MD  Department of Hospital Medicine   [facility] Tele[facility] ([facility])

## 2022-01-10 PROBLEM — R78.81 E COLI BACTEREMIA: Status: RESOLVED | Noted: 2021-12-28 | Resolved: 2022-01-10

## 2022-01-10 PROBLEM — K83.09 ACUTE CHOLANGITIS: Status: RESOLVED | Noted: 2021-12-27 | Resolved: 2022-01-10

## 2022-01-10 PROBLEM — B96.20 E COLI BACTEREMIA: Status: RESOLVED | Noted: 2021-12-28 | Resolved: 2022-01-10

## 2022-01-10 PROBLEM — R65.20 SEVERE SEPSIS WITH ACUTE ORGAN DYSFUNCTION: Status: RESOLVED | Noted: 2021-12-28 | Resolved: 2022-01-10

## 2022-01-10 PROBLEM — A41.9 SEVERE SEPSIS WITH ACUTE ORGAN DYSFUNCTION: Status: RESOLVED | Noted: 2021-12-28 | Resolved: 2022-01-10

## 2022-01-10 LAB
ANION GAP SERPL CALC-SCNC: 13 MMOL/L (ref 8–16)
BASOPHILS # BLD AUTO: 0.05 K/UL (ref 0–0.2)
BASOPHILS NFR BLD: 0.8 % (ref 0–1.9)
BUN SERPL-MCNC: 71 MG/DL (ref 8–23)
CALCIUM SERPL-MCNC: 7.8 MG/DL (ref 8.7–10.5)
CHLORIDE SERPL-SCNC: 99 MMOL/L (ref 95–110)
CO2 SERPL-SCNC: 22 MMOL/L (ref 23–29)
CREAT SERPL-MCNC: 8.4 MG/DL (ref 0.5–1.4)
DIFFERENTIAL METHOD: ABNORMAL
EOSINOPHIL # BLD AUTO: 0.2 K/UL (ref 0–0.5)
EOSINOPHIL NFR BLD: 3.5 % (ref 0–8)
ERYTHROCYTE [DISTWIDTH] IN BLOOD BY AUTOMATED COUNT: 15.2 % (ref 11.5–14.5)
EST. GFR  (AFRICAN AMERICAN): 7 ML/MIN/1.73 M^2
EST. GFR  (NON AFRICAN AMERICAN): 6 ML/MIN/1.73 M^2
GLUCOSE SERPL-MCNC: 82 MG/DL (ref 70–110)
HCT VFR BLD AUTO: 30.3 % (ref 40–54)
HGB BLD-MCNC: 9.5 G/DL (ref 14–18)
IMM GRANULOCYTES # BLD AUTO: 0.06 K/UL (ref 0–0.04)
IMM GRANULOCYTES NFR BLD AUTO: 0.9 % (ref 0–0.5)
LYMPHOCYTES # BLD AUTO: 1.1 K/UL (ref 1–4.8)
LYMPHOCYTES NFR BLD: 16.5 % (ref 18–48)
MCH RBC QN AUTO: 30.5 PG (ref 27–31)
MCHC RBC AUTO-ENTMCNC: 31.4 G/DL (ref 32–36)
MCV RBC AUTO: 97 FL (ref 82–98)
MONOCYTES # BLD AUTO: 0.6 K/UL (ref 0.3–1)
MONOCYTES NFR BLD: 9.9 % (ref 4–15)
NEUTROPHILS # BLD AUTO: 4.4 K/UL (ref 1.8–7.7)
NEUTROPHILS NFR BLD: 68.4 % (ref 38–73)
NRBC BLD-RTO: 0 /100 WBC
PLATELET # BLD AUTO: 204 K/UL (ref 150–450)
PMV BLD AUTO: 10.3 FL (ref 9.2–12.9)
POCT GLUCOSE: 117 MG/DL (ref 70–110)
POCT GLUCOSE: 143 MG/DL (ref 70–110)
POCT GLUCOSE: 89 MG/DL (ref 70–110)
POTASSIUM SERPL-SCNC: 4.9 MMOL/L (ref 3.5–5.1)
RBC # BLD AUTO: 3.11 M/UL (ref 4.6–6.2)
SODIUM SERPL-SCNC: 134 MMOL/L (ref 136–145)
WBC # BLD AUTO: 6.48 K/UL (ref 3.9–12.7)

## 2022-01-10 PROCEDURE — 94640 AIRWAY INHALATION TREATMENT: CPT

## 2022-01-10 PROCEDURE — 25000242 PHARM REV CODE 250 ALT 637 W/ HCPCS: Performed by: SURGERY

## 2022-01-10 PROCEDURE — 25000003 PHARM REV CODE 250: Performed by: NURSE PRACTITIONER

## 2022-01-10 PROCEDURE — 77001 FLUOROGUIDE FOR VEIN DEVICE: CPT | Mod: 26,,, | Performed by: SURGERY

## 2022-01-10 PROCEDURE — 63600175 PHARM REV CODE 636 W HCPCS: Performed by: NURSE PRACTITIONER

## 2022-01-10 PROCEDURE — 85025 COMPLETE CBC W/AUTO DIFF WBC: CPT | Performed by: INTERNAL MEDICINE

## 2022-01-10 PROCEDURE — 25000003 PHARM REV CODE 250: Performed by: INTERNAL MEDICINE

## 2022-01-10 PROCEDURE — 77001 CHG FLUOROGUIDE CNTRL VEN ACCESS,PLACE,REPLACE,REMOVE: ICD-10-PCS | Mod: 26,,, | Performed by: SURGERY

## 2022-01-10 PROCEDURE — 63600175 PHARM REV CODE 636 W HCPCS: Performed by: SURGERY

## 2022-01-10 PROCEDURE — 99900035 HC TECH TIME PER 15 MIN (STAT)

## 2022-01-10 PROCEDURE — 25000003 PHARM REV CODE 250: Performed by: SURGERY

## 2022-01-10 PROCEDURE — 36415 COLL VENOUS BLD VENIPUNCTURE: CPT | Performed by: INTERNAL MEDICINE

## 2022-01-10 PROCEDURE — 36558 INSERT TUNNELED CV CATH: CPT | Performed by: SURGERY

## 2022-01-10 PROCEDURE — 99232 SBSQ HOSP IP/OBS MODERATE 35: CPT | Mod: ,,, | Performed by: INTERNAL MEDICINE

## 2022-01-10 PROCEDURE — C1769 GUIDE WIRE: HCPCS | Performed by: SURGERY

## 2022-01-10 PROCEDURE — 27800903 OPTIME MED/SURG SUP & DEVICES OTHER IMPLANTS: Performed by: SURGERY

## 2022-01-10 PROCEDURE — 21400001 HC TELEMETRY ROOM

## 2022-01-10 PROCEDURE — 80048 BASIC METABOLIC PNL TOTAL CA: CPT | Performed by: INTERNAL MEDICINE

## 2022-01-10 PROCEDURE — 99232 PR SUBSEQUENT HOSPITAL CARE,LEVL II: ICD-10-PCS | Mod: ,,, | Performed by: INTERNAL MEDICINE

## 2022-01-10 PROCEDURE — 27000221 HC OXYGEN, UP TO 24 HOURS

## 2022-01-10 PROCEDURE — 25000242 PHARM REV CODE 250 ALT 637 W/ HCPCS: Performed by: NURSE PRACTITIONER

## 2022-01-10 PROCEDURE — 94761 N-INVAS EAR/PLS OXIMETRY MLT: CPT

## 2022-01-10 RX ORDER — LIDOCAINE HYDROCHLORIDE 20 MG/ML
INJECTION, SOLUTION INFILTRATION; PERINEURAL
Status: DISCONTINUED | OUTPATIENT
Start: 2022-01-10 | End: 2022-01-10

## 2022-01-10 RX ORDER — HEPARIN SODIUM 1000 [USP'U]/ML
INJECTION, SOLUTION INTRAVENOUS; SUBCUTANEOUS
Status: DISCONTINUED | OUTPATIENT
Start: 2022-01-10 | End: 2022-01-10

## 2022-01-10 RX ADMIN — ARFORMOTEROL TARTRATE 15 MCG: 15 SOLUTION RESPIRATORY (INHALATION) at 08:01

## 2022-01-10 RX ADMIN — ATORVASTATIN CALCIUM 10 MG: 10 TABLET, FILM COATED ORAL at 08:01

## 2022-01-10 RX ADMIN — THIAMINE HCL TAB 100 MG 100 MG: 100 TAB at 08:01

## 2022-01-10 RX ADMIN — AMLODIPINE BESYLATE 5 MG: 5 TABLET ORAL at 08:01

## 2022-01-10 RX ADMIN — FAMOTIDINE 20 MG: 20 TABLET ORAL at 08:01

## 2022-01-10 RX ADMIN — AMOXICILLIN AND CLAVULANATE POTASSIUM 500 MG: 500; 125 TABLET, FILM COATED ORAL at 05:01

## 2022-01-10 RX ADMIN — HEPARIN SODIUM 5000 UNITS: 5000 INJECTION INTRAVENOUS; SUBCUTANEOUS at 05:01

## 2022-01-10 RX ADMIN — MULTIPLE VITAMINS W/ MINERALS TAB 1 TABLET: TAB at 08:01

## 2022-01-10 RX ADMIN — FOLIC ACID 1 MG: 1 TABLET ORAL at 08:01

## 2022-01-10 RX ADMIN — ONDANSETRON 4 MG: 2 INJECTION INTRAMUSCULAR; INTRAVENOUS at 02:01

## 2022-01-10 RX ADMIN — METOPROLOL TARTRATE 25 MG: 25 TABLET, FILM COATED ORAL at 08:01

## 2022-01-10 RX ADMIN — HEPARIN SODIUM 5000 UNITS: 5000 INJECTION INTRAVENOUS; SUBCUTANEOUS at 01:01

## 2022-01-10 RX ADMIN — Medication 4 TABLET: at 08:01

## 2022-01-10 RX ADMIN — HEPARIN SODIUM 5000 UNITS: 5000 INJECTION INTRAVENOUS; SUBCUTANEOUS at 11:01

## 2022-01-10 RX ADMIN — BUDESONIDE 0.5 MG: 0.5 INHALANT ORAL at 08:01

## 2022-01-10 RX ADMIN — TRAZODONE HYDROCHLORIDE 25 MG: 50 TABLET ORAL at 08:01

## 2022-01-10 RX ADMIN — TAMSULOSIN HYDROCHLORIDE 0.4 MG: 0.4 CAPSULE ORAL at 08:01

## 2022-01-10 RX ADMIN — Medication 4 TABLET: at 05:01

## 2022-01-10 RX ADMIN — Medication 4 TABLET: at 01:01

## 2022-01-10 NOTE — NURSING
Patient is having sneezing and sore throat and wants a Covid swab test done. Swab already done during admission.  Jenn bautista.

## 2022-01-10 NOTE — PLAN OF CARE
Care plan reviewed with patient. Able to get out of bed with assist. Up in the chair today. Free from falls. No other complaints made.

## 2022-01-10 NOTE — OP NOTE
Date of service 01/10/2022  Procedure:  Right internal jugular Vas-Cath exchange  Attending surgeon:  Dr. Ghazala OLIVAS  Anesthesia:  Local  Specimens:  None  EBL:  Minimal  Complications:  None  Medications given none    Indications 71-year-old male receiving dialysis via a right internal jugular vein Trialysis catheter asked to exchange for a tunnel catheter    Procedure:  After discussion with the patient the risks benefits potential complications he agreed and signed informed consent is brought to the special procedure room placed in supine position and after adequate monitoring prepped and draped the patient's right neck and upper chest in the standard surgical fashion to include the previously placed dialysis catheter I then removed the Trialysis catheter over a 035 J-wire which was position under fluoroscopy I then tunneled the Vas-Cath in the subcutaneous tract serially dilated the tract over the J-wire and then advanced the tip of the Vas-Cath through the peel-away sheath and positioned its tip in the SVC atrial junction the peel-away sheath was removed all ports the Vas-Cath were flushed and locked with heparinized saline sterile dressings were applied patient tolerated the procedure and transferred recovery room stable signs

## 2022-01-10 NOTE — PROGRESS NOTES
[facility] Tele[facility] ([facility])  Nephrology  Progress Note    Patient Name: Lizz Alas  MRN: [id number]  Admission Date: 12/27/2021  Hospital Length of Stay: 13 days  Attending Provider: Nitesh Lopez MD   Primary Care Physician: Primary Doctor No  Principal Problem:LISET (acute kidney injury)    Consults  Subjective:     Interval History:     Patient was seen in his hospital room.  In bed resting comfortably.  No acute distress noted.  His daughter was at the bedside.  All nephrology related questions were answered to her satisfaction.    Review of systems:  Continues to have some lower extremity swelling however upper extremity swelling has improved.  No nausea vomiting or diarrhea.  No fevers or chills.  No shortness of breath or chest discomfort    Review of patient's allergies indicates:  No Known Allergies  Current Facility-Administered Medications   Medication Frequency    0.9%  NaCl infusion PRN    acetaminophen oral solution 650 mg Q6H PRN    albuterol-ipratropium 2.5 mg-0.5 mg/3 mL nebulizer solution 3 mL Q4H PRN    aluminum-magnesium hydroxide-simethicone 200-200-20 mg/5 mL suspension 30 mL QID PRN    amLODIPine tablet 5 mg BID    amoxicillin-clavulanate 500-125mg per tablet 500 mg Daily    arformoteroL nebulizer solution 15 mcg BID    atorvastatin tablet 10 mg QHS    budesonide nebulizer solution 0.5 mg Q12H    dextrose 50% injection 12.5 g PRN    dextrose 50% injection 25 g PRN    famotidine tablet 20 mg Daily    folic acid tablet 1 mg Daily    glucagon (human recombinant) injection 1 mg PRN    glucose chewable tablet 16 g PRN    glucose chewable tablet 24 g PRN    heparin (porcine) injection 2,000 Units PRN    heparin (porcine) injection 5,000 Units Q8H    heparin (porcine) injection PRN    hydrALAZINE injection 20 mg Q4H PRN    Lactobacillus acidoph-L.bulgar 1 million cell tablet 4 tablet TID WM    LIDOcaine HCL 20 mg/ml (2%) injection PRN    loperamide capsule 2 mg QID PRN     metoprolol tartrate (LOPRESSOR) tablet 25 mg BID    multivitamin tablet Daily    naloxone 0.4 mg/mL injection 0.02 mg PRN    nicotine 21 mg/24 hr 1 patch Daily    ondansetron injection 4 mg Q6H PRN    oxazepam capsule 10 mg TID PRN    oxyCODONE-acetaminophen 5-325 mg per tablet 1 tablet Q8H PRN    promethazine tablet 25 mg Q6H PRN    simethicone chewable tablet 80 mg TID PRN    sodium chloride 0.9% bolus 250 mL PRN    sodium chloride 0.9% flush 10 mL Q12H PRN    tamsulosin 24 hr capsule 0.4 mg QHS    thiamine tablet 100 mg Daily    trazodone split tablet 25 mg Nightly PRN       Objective:     Vital Signs (Most Recent):  Temp: 96.3 °F (35.7 °C) (01/10/22 0706)  Pulse: 72 (01/10/22 0816)  Resp: 18 (01/10/22 0816)  BP: (!) 158/74 (01/10/22 0706)  SpO2: 96 % (01/10/22 0816)  O2 Device (Oxygen Therapy): nasal cannula (01/10/22 0955) Vital Signs (24h Range):  Temp:  [96.3 °F (35.7 °C)-99.1 °F (37.3 °C)] 96.3 °F (35.7 °C)  Pulse:  [58-74] 72  Resp:  [17-19] 18  SpO2:  [90 %-96 %] 96 %  BP: (117-158)/(58-74) 158/74     Weight: 93.1 kg (205 lb 4 oz) (01/10/22 0011)  Body mass index is 33.13 kg/m².  Body surface area is 2.08 meters squared.    I/O last 3 completed shifts:  In: 510 [P.O.:510]  Out: -     Physical exam:  Alert and oriented x3, no acute distress  HEENT:  Pupils equal reactive to light, sclera anicteric, extraocular movements intact  Neck:  no JVD, full range of motion no tenderness  Lungs:  Good air exchange, clear to auscultation  Cardiovascular:  Regular rate and rhythm, no murmurs rubs or gallops  Abdomen:  Nondistended nontender positive bowel sounds  Neuro:  Alert and oriented x3 conversant cooperative and appropriate  Musculoskeletal:  no focal defects, 1+ lower extremity edema bilaterally.    Significant Labs:sure  BMP:   Recent Labs   Lab 01/10/22  0444   GLU 82   *   K 4.9   CL 99   CO2 22*   BUN 71*   CREATININE 8.4*   CALCIUM 7.8*     CMP:   Recent Labs   Lab 01/05/22  4522  01/06/22  0523 01/10/22  0444      < > 82   CALCIUM 7.8*   < > 7.8*   ALBUMIN 2.3*  --   --    PROT 5.5*  --   --       < > 134*   K 4.1   < > 4.9   CO2 18*   < > 22*      < > 99   BUN 98*   < > 71*   CREATININE 8.0*   < > 8.4*   ALKPHOS 172*  --   --    ALT 39  --   --    AST 27  --   --    BILITOT 1.3*  --   --     < > = values in this interval not displayed.     All labs within the past 24 hours have been reviewed.    Significant Imaging:  Reviewed    Assessment/Plan:     Active Diagnoses:    Diagnosis Date Noted POA    PRINCIPAL PROBLEM:  LISET (acute kidney injury) [N17.9] 12/29/2021 No    Severe sepsis with acute organ dysfunction [A41.9, R65.20] 12/28/2021 Yes    E coli bacteremia [R78.81, B96.20] 12/28/2021 Yes    Acute cholangitis [K83.09] 12/27/2021 Yes    Tobacco use [Z72.0] 12/27/2021 Yes     Chronic    Alcohol abuse [F10.10] 12/27/2021 Yes     Chronic    Primary hypertension [I10] 12/27/2021 Yes     Chronic    Paroxysmal atrial fibrillation [I48.0] 12/27/2021 Yes     Chronic      Problems Resolved During this Admission:    Diagnosis Date Noted Date Resolved POA    Septic shock [A41.9, R65.21]  01/05/2022 Yes    On mechanically assisted ventilation [Z99.11] 12/30/2021 01/02/2022 Not Applicable    Acute hyperglycemia [R73.9] 12/29/2021 01/05/2022 Yes    Electrolyte imbalance [E87.8] 12/28/2021 01/02/2022 Yes       1. LISET:  Continues to have good urine output, however no longer being measured.    Creatinine is essentially stable at around 8.0.  Still with inadequate clearance to stop dialysis.  To having tunneled dialysis catheter placed today.  Will plan dialysis again in the morning mainly for clearance.    Cause of acute kidney injury is ATN.    2. Volume status is stable, still some mild lower extremity edema however improving overall.    3. Potassium is stable at 4.9.    Thank you for your consult.     Thor Lr MD  Nephrology  [facility] - Telemetry ([facility])

## 2022-01-10 NOTE — PLAN OF CARE
Care plan reviewed with patient using Martti. Able to get out of bed with assist. Up in the chair today. Free from falls. Vas cath in place. No other complaints made.

## 2022-01-10 NOTE — PT/OT/SLP PROGRESS
Physical Therapy      Patient Name:  Lizz Alas   MRN:  [id number]    Patient not seen today secondary to pt currently leaving for Vas-Cath at 9:50am. Will continue efforts.     Fariba Porter, PT/OT  1/10/2022

## 2022-01-11 VITALS
RESPIRATION RATE: 24 BRPM | BODY MASS INDEX: 32.99 KG/M2 | OXYGEN SATURATION: 96 % | DIASTOLIC BLOOD PRESSURE: 71 MMHG | HEART RATE: 54 BPM | SYSTOLIC BLOOD PRESSURE: 134 MMHG | TEMPERATURE: 98 F | HEIGHT: 66 IN | WEIGHT: 205.25 LBS

## 2022-01-11 LAB
ALBUMIN SERPL BCP-MCNC: 2.6 G/DL (ref 3.5–5.2)
ANION GAP SERPL CALC-SCNC: 13 MMOL/L (ref 8–16)
BUN SERPL-MCNC: 77 MG/DL (ref 8–23)
CALCIUM SERPL-MCNC: 8.2 MG/DL (ref 8.7–10.5)
CHLORIDE SERPL-SCNC: 97 MMOL/L (ref 95–110)
CO2 SERPL-SCNC: 22 MMOL/L (ref 23–29)
CREAT SERPL-MCNC: 9.4 MG/DL (ref 0.5–1.4)
EST. GFR  (AFRICAN AMERICAN): 6 ML/MIN/1.73 M^2
EST. GFR  (NON AFRICAN AMERICAN): 5 ML/MIN/1.73 M^2
GLUCOSE SERPL-MCNC: 89 MG/DL (ref 70–110)
PHOSPHATE SERPL-MCNC: 9.4 MG/DL (ref 2.7–4.5)
POTASSIUM SERPL-SCNC: 5.6 MMOL/L (ref 3.5–5.1)
SODIUM SERPL-SCNC: 132 MMOL/L (ref 136–145)

## 2022-01-11 PROCEDURE — 25000003 PHARM REV CODE 250: Performed by: SURGERY

## 2022-01-11 PROCEDURE — 27000221 HC OXYGEN, UP TO 24 HOURS

## 2022-01-11 PROCEDURE — 90935 PR HEMODIALYSIS, ONE EVALUATION: ICD-10-PCS | Mod: ,,, | Performed by: INTERNAL MEDICINE

## 2022-01-11 PROCEDURE — 25000003 PHARM REV CODE 250: Performed by: NURSE PRACTITIONER

## 2022-01-11 PROCEDURE — 99900035 HC TECH TIME PER 15 MIN (STAT)

## 2022-01-11 PROCEDURE — 25000003 PHARM REV CODE 250: Performed by: INTERNAL MEDICINE

## 2022-01-11 PROCEDURE — 25000242 PHARM REV CODE 250 ALT 637 W/ HCPCS: Performed by: SURGERY

## 2022-01-11 PROCEDURE — 63600175 PHARM REV CODE 636 W HCPCS: Performed by: SURGERY

## 2022-01-11 PROCEDURE — 90935 HEMODIALYSIS ONE EVALUATION: CPT | Mod: ,,, | Performed by: INTERNAL MEDICINE

## 2022-01-11 PROCEDURE — 63600175 PHARM REV CODE 636 W HCPCS: Performed by: INTERNAL MEDICINE

## 2022-01-11 PROCEDURE — 94640 AIRWAY INHALATION TREATMENT: CPT

## 2022-01-11 PROCEDURE — 80069 RENAL FUNCTION PANEL: CPT | Performed by: INTERNAL MEDICINE

## 2022-01-11 PROCEDURE — 94761 N-INVAS EAR/PLS OXIMETRY MLT: CPT

## 2022-01-11 PROCEDURE — 80100016 HC MAINTENANCE HEMODIALYSIS

## 2022-01-11 PROCEDURE — 36415 COLL VENOUS BLD VENIPUNCTURE: CPT | Performed by: INTERNAL MEDICINE

## 2022-01-11 RX ORDER — TAMSULOSIN HYDROCHLORIDE 0.4 MG/1
0.4 CAPSULE ORAL NIGHTLY
Qty: 30 CAPSULE | Refills: 11 | Status: SHIPPED | OUTPATIENT
Start: 2022-01-11 | End: 2023-01-11

## 2022-01-11 RX ORDER — FOLIC ACID 1 MG/1
1 TABLET ORAL DAILY
Qty: 30 TABLET | Refills: 11 | Status: SHIPPED | OUTPATIENT
Start: 2022-01-12 | End: 2023-01-12

## 2022-01-11 RX ORDER — SEVELAMER CARBONATE 800 MG/1
1600 TABLET, FILM COATED ORAL
Status: DISCONTINUED | OUTPATIENT
Start: 2022-01-11 | End: 2022-01-11 | Stop reason: HOSPADM

## 2022-01-11 RX ORDER — METOPROLOL TARTRATE 25 MG/1
25 TABLET, FILM COATED ORAL 2 TIMES DAILY
Qty: 60 TABLET | Refills: 11 | Status: SHIPPED | OUTPATIENT
Start: 2022-01-11 | End: 2023-01-11

## 2022-01-11 RX ORDER — SEVELAMER CARBONATE 800 MG/1
1600 TABLET, FILM COATED ORAL
Qty: 180 TABLET | Refills: 11 | Status: SHIPPED | OUTPATIENT
Start: 2022-01-11 | End: 2023-01-11

## 2022-01-11 RX ORDER — LANOLIN ALCOHOL/MO/W.PET/CERES
100 CREAM (GRAM) TOPICAL DAILY
Qty: 30 TABLET | Refills: 11 | Status: SHIPPED | OUTPATIENT
Start: 2022-01-12

## 2022-01-11 RX ORDER — HEPARIN SODIUM 1000 [USP'U]/ML
3800 INJECTION, SOLUTION INTRAVENOUS; SUBCUTANEOUS
Status: DISCONTINUED | OUTPATIENT
Start: 2022-01-11 | End: 2022-01-11 | Stop reason: HOSPADM

## 2022-01-11 RX ORDER — TALC
6 POWDER (GRAM) TOPICAL NIGHTLY PRN
Status: DISCONTINUED | OUTPATIENT
Start: 2022-01-11 | End: 2022-01-11 | Stop reason: HOSPADM

## 2022-01-11 RX ADMIN — BUDESONIDE 0.5 MG: 0.5 INHALANT ORAL at 08:01

## 2022-01-11 RX ADMIN — Medication 4 TABLET: at 08:01

## 2022-01-11 RX ADMIN — Medication 6 MG: at 01:01

## 2022-01-11 RX ADMIN — AMLODIPINE BESYLATE 5 MG: 5 TABLET ORAL at 08:01

## 2022-01-11 RX ADMIN — FOLIC ACID 1 MG: 1 TABLET ORAL at 08:01

## 2022-01-11 RX ADMIN — SEVELAMER CARBONATE 1600 MG: 800 TABLET, FILM COATED ORAL at 12:01

## 2022-01-11 RX ADMIN — MULTIPLE VITAMINS W/ MINERALS TAB 1 TABLET: TAB at 08:01

## 2022-01-11 RX ADMIN — FAMOTIDINE 20 MG: 20 TABLET ORAL at 08:01

## 2022-01-11 RX ADMIN — HEPARIN SODIUM 3800 UNITS: 1000 INJECTION, SOLUTION INTRAVENOUS; SUBCUTANEOUS at 09:01

## 2022-01-11 RX ADMIN — Medication 4 TABLET: at 12:01

## 2022-01-11 RX ADMIN — HEPARIN SODIUM 5000 UNITS: 5000 INJECTION INTRAVENOUS; SUBCUTANEOUS at 05:01

## 2022-01-11 RX ADMIN — HYDRALAZINE HYDROCHLORIDE 20 MG: 20 INJECTION, SOLUTION INTRAMUSCULAR; INTRAVENOUS at 12:01

## 2022-01-11 RX ADMIN — METOPROLOL TARTRATE 25 MG: 25 TABLET, FILM COATED ORAL at 08:01

## 2022-01-11 RX ADMIN — ARFORMOTEROL TARTRATE 15 MCG: 15 SOLUTION RESPIRATORY (INHALATION) at 08:01

## 2022-01-11 RX ADMIN — THIAMINE HCL TAB 100 MG 100 MG: 100 TAB at 08:01

## 2022-01-11 NOTE — PLAN OF CARE
Patient AAOX 4 VSS.  Citlaly at beside for interpretation   Patient remained afebrile throughout shift.  Patient remained free of falls this shift  Patient 0/10 of pain this shift  Plan of care reviewed.  Patient verbalized understanding.  Patient moving/turning with assistance   Frequent weight shifting encouraged.  Patient NSR on monitor  Bed low, side rails up x2, wheels locked, call light in reach.  Bed alarm maintained for safety.  Patient instructed to call for assistance.  Hourly rounding completed.  Will continue to monitor.

## 2022-01-11 NOTE — NURSING
This nurse in role of PCT this shift.    Comfort needs met. Trash emptied. Room tidied. Hourly rounding and I&O's continue.    Safety precautions in place.     Patient advised to contact medical staff with any further needs.

## 2022-01-11 NOTE — DISCHARGE SUMMARY
[facility]  Discharge Summary      Patient Name: Lizz Alas  MRN: [id number]  Patient Class: IP- Inpatient  Admission Date: 12/27/2021  Hospital Length of Stay: 14 days  Discharge Date and Time: No discharge date for patient encounter.  Attending Physician: Nitesh Lopez MD   Discharging Provider: Nitesh Lopez MD  Primary Care Provider: Primary Doctor No      HPI:   Lizz Alas is a 71 y.o. Uzbek speaking male patient with a PMHx of PAF, HLD, HTN, thrombocytopenia, alcohol abuse, and tobacco use who presents to the Emergency Department for intermittent epigastric abdominal pain which onset gradually 3 days PTA. Today, the pt reports that his pain has been constant. His symptoms are constant and moderate in severity. No mitigating or exacerbating factors reported. Associated sxs include fever (102 F), nausea, and vomiting. Patient denies any diarrhea, constipation, SOB, weakness, fever, chills, and all other sxs at this time. Initial work-up in the ED showed: Normal WBC and lactic, , , alk phos 184, T bili 5.1, lipase 11, glucose 180, plt 119, sodium 134, COVID negative. Abdominal US showed gallbladder distension with internal sludge but without definite stones, intra and extrahepatic biliary ductal dilation which may represent acute cholecystitis or choledocholithiasis. Blood cultures obtained in the ED, and 1.5 L IV fluids and IV Zosyn given. Case was discussed with GI per the ED, who recommend NPO, IV abx, and ERCP in AM. Hospital Medicine was contacted for admission and patient placed in Observation.       Procedure(s) (LRB):  REPLACEMENT, CATHETER, DIALYSIS, OVER GUIDEWIRE, USING EXISTING VENOUS ACCESS (N/A)      Hospital Course:   Patient 70 yo male admitted to hospital with suspected cholangitis. Patient initiated on IV abx, fluids, O2. Patient with a rapid decompensation shourtly after admit, rapid response called, patient hypoxic in the 60's  placed on bipap with good effect. Patient transferred to ICU. Patient with a progressive decline, febrile episodes to 102.9. Severe septic shock, pressors and fluids on board. Patient evaluated by GI who recommended ERCP - additional CT abdomen. Patient scheduled for CT and this has been delayed pending stability. Patient intubated for airway protection.  12/29- Patient remains intubated, sedated, on pressors. Discussed POC with family at bedside. Patient evaluated by surgery who recommend o/p surgical followup on recovery for elective cholecystectomy.. Repeat blood cultures in progress, abx infusing.  12/30 - Patient wbc declining but remains on pressors. He is intubated and sedated. Family at bedside. Worsening renal function with cr 4.4 today. Nephrology on consult. Discussed with CC Team  12/31: On sole pressor Norepinephrine 0.14mcg/kg/min, afebrile, leucocytosis is resolved, scR is bumped to 6.0. Patient is oliguric (118 cc urine in last 24 hour). On Fentanyl gtt and NaHCO3 gtt   Daughter Hortencia  is at bedside and was updated.   AC 26/450/40/5  1/1/22 patient seen, daughter Hortencia at bedside, currently on CRRT, Norepinephrine at 0.02mcg/kg/min, fentanyl 100mcg/Hr. AC 26/450/40/5. Patient more awake today, opens eyes spontaneously, is squeezing his daughter's hand  1/2 About afternoon yesterday he self extubated, did not require immediate re-intubation as he was doing rel well resp wise, he is currently on Vapo therm 35LPM 80% FIO2, precedex drip to help with agitation (history of ETOH misuse), getting CRRT, family at bedside, whilst confused he is oriented to selpf/family, conversing with family  1/3 patient seen, was sleeping calmly early this AM, family not at bedside, on Vapotherm 2oLPM at 45% FIO2, not dyspneic. CRRT off. Remains afebrile  1/4 patient seen, down to 4L oxygen, awake and alert, off Precedex gtt, been off pressors  1/5 seen in the ICU. Awake and alert. Not in distress.  PT/OT. D/C  urethral catheter, place condom cath  1/6 Transferred from ICU, doing stable on the floor. Met on 4L this morning awake and alert, not particularly interested in his breakfast. (Apparently the dialect on [place] is different than his, therefore with communication gaps he was able to tell me directly today that he is not in any pain).  1/7 Patient seen, he;d just returned from HD, daughter at bedside, mentation and cognition fully restored per daughter. HD is now MWF per nephro.  D/W Nephro who recommend placement of tunneled catheter, so far though good UOP renal function is slow to return, patient will likely need to be on HD for some time goingforward. This MD explained this to daughter who interpreted for patient  PT rec Home with PT.   1/8 patient seen, stable, for Tunneled cath placement on Monday.  working on outpatient HD (her last note says: He is only eligible for Emergency Medicaid for hospital bill.  Not eligible for post acute services including hemodialysis) However if his renal function doesn't return and he is HD dependent then something will have to give. Now on MWF schedule  1/10 patient see, feels good, understand that the plan is for tunneled cath placement in AM, HD is now MWF,  working on securing him a HD chair for outpatient.  Patient stable for discharge with plan to present to ED for ongoing dialysis. Patient cannot obtain a scheduled chair time for HD due to his payor / immigration status. Discussed in detail with the patient and family the recommendation from Nephrology. Patient will require HD for the forseeable future to be performed 2 times weekly. Per Nephrology. There is no way to determine if the patients renal function will improve negating the need for HD. Patient is stable post ERCP, Sepsis and his symptoms have resolved. Patient to discharge to home today as detailed above.       Goals of Care Treatment Preferences:  Code Status: Full Code      Consults:    Consults (From admission, onward)        Status Ordering Provider     Inpatient consult to Social Work  Once        Provider:  (Not yet assigned)    Completed STEFANY RENEE     Inpatient consult to Vascular Surgery  Once        Provider:  Olayinka Vivar MD    Completed STEFANY RENEE     Inpatient consult to Nephrology  Once        Provider:  Lauri Strange MD    Completed CHAPARRO MATHIS     Inpatient consult to Registered Dietitian/Nutritionist  Once        Provider:  (Not yet assigned)    Completed JOSE EDUARDO PAGAN     Inpatient consult to General Surgery  Once        Provider:  Jaycee Gonzalez MD    Completed CHAPARRO MATHIS     Inpatient consult to Pulmonology  Once        Provider:  Jose Eduardo Pagan NP    Completed CHAPARRO MATHIS     Inpatient consult to Gastroenterology  Once        Provider:  Leroy Pelaez MD    Completed CHAPARRO MATHIS          No new Assessment & Plan notes have been filed under this hospital service since the last note was generated.  Service: Hospital Medicine    Final Active Diagnoses:    Diagnosis Date Noted POA    PRINCIPAL PROBLEM:  LISET (acute kidney injury) [N17.9] 12/29/2021 No    Tobacco use [Z72.0] 12/27/2021 Yes     Chronic    Alcohol abuse [F10.10] 12/27/2021 Yes     Chronic    Primary hypertension [I10] 12/27/2021 Yes     Chronic    Paroxysmal atrial fibrillation [I48.0] 12/27/2021 Yes     Chronic      Problems Resolved During this Admission:    Diagnosis Date Noted Date Resolved POA    Septic shock [A41.9, R65.21]  01/05/2022 Yes    On mechanically assisted ventilation [Z99.11] 12/30/2021 01/02/2022 Not Applicable    Acute hyperglycemia [R73.9] 12/29/2021 01/05/2022 Yes    Electrolyte imbalance [E87.8] 12/28/2021 01/02/2022 Yes    Severe sepsis with acute organ dysfunction [A41.9, R65.20] 12/28/2021 01/10/2022 Yes    E coli bacteremia [R78.81, B96.20] 12/28/2021 01/10/2022 Yes    Acute cholangitis [K83.09] 12/27/2021 01/10/2022  Yes       Discharged Condition: stable    Disposition:     Follow Up:   Follow-up Information     Jaycee Gonzalez MD In 2 months.    Specialty: General Surgery  Contact information:  [address]  [place] [address]  [phone]             Melchor Sarmiento MD In 2 months.    Specialty: Gastroenterology  Contact information:  [address] [place] [address]  [phone]             Dialysis.    Why: Patient should follow up at any Emergency Room for acute Dialysis as indicated. Patient schedule at present is 2 times weekly..           [facility] [place]. Go on 1/19/2022.    Why: Establish Care - Sliding scale fees offered.  Appointment scheduled for 1:20 PM   Contact information:  [facility] [place]  [address]                      Patient Instructions:      Activity as tolerated       Significant Diagnostic Studies: Labs:   BMP:   Recent Labs   Lab 01/09/22  1557 01/10/22  0444 01/11/22  0453   * 82 89   * 134* 132*   K 4.6 4.9 5.6*   CL 98 99 97   CO2 23 22* 22*   BUN 66* 71* 77*   CREATININE 8.0* 8.4* 9.4*   CALCIUM 8.2* 7.8* 8.2*   , CMP   Recent Labs   Lab 01/09/22  1557 01/10/22  0444 01/11/22  0453   * 134* 132*   K 4.6 4.9 5.6*   CL 98 99 97   CO2 23 22* 22*   * 82 89   BUN 66* 71* 77*   CREATININE 8.0* 8.4* 9.4*   CALCIUM 8.2* 7.8* 8.2*   ALBUMIN  --   --  2.6*   ANIONGAP 13 13 13   ESTGFRAFRICA 7* 7* 6*   EGFRNONAA 6* 6* 5*   , CBC   Recent Labs   Lab 01/10/22  0444   WBC 6.48   HGB 9.5*   HCT 30.3*      , Troponin No results for input(s): TROPONINI in the last 168 hours. and All labs within the past 24 hours have been reviewed    Pending Diagnostic Studies:     None         Medications:  Reconciled Home Medications:      Medication List      START taking these medications    folic acid 1 MG tablet  Commonly known as: FOLVITE  Take 1 tablet (1 mg total) by mouth once daily.  Start taking on:  January 12, 2022     metoprolol tartrate 25 MG tablet  Commonly known as: LOPRESSOR  Take 1 tablet (25 mg total) by mouth 2 (two) times daily.     sevelamer carbonate 800 mg Tab  Commonly known as: RENVELA  Take 2 tablets (1,600 mg total) by mouth 3 (three) times daily with meals.     tamsulosin 0.4 mg Cap  Commonly known as: FLOMAX  Take 1 capsule (0.4 mg total) by mouth every evening.     thiamine 100 MG tablet  Take 1 tablet (100 mg total) by mouth once daily.  Start taking on: January 12, 2022        CONTINUE taking these medications    amLODIPine 5 MG tablet  Commonly known as: NORVASC  Take 5 mg by mouth once daily.     hydroCHLOROthiazide 12.5 mg capsule  Commonly known as: MICROZIDE  Take 12.5 mg by mouth once daily.     ondansetron 4 MG tablet  Commonly known as: ZOFRAN  Take 1 tablet (4 mg total) by mouth every 6 (six) hours. For nausea     rosuvastatin 20 MG tablet  Commonly known as: CRESTOR  Take 20 mg by mouth once daily.        STOP taking these medications    azithromycin 250 MG tablet  Commonly known as: Z-AJAY     bisoprolol 5 MG tablet  Commonly known as: ZEBETA     ibuprofen 800 MG tablet  Commonly known as: ADVIL,MOTRIN            Indwelling Lines/Drains at time of discharge:   Lines/Drains/Airways     Central Venous Catheter Line                 Hemodialysis Catheter 01/10/22 1015 right internal jugular 1 day                Time spent on the discharge of patient: 85 minutes         Nitesh Lopez MD  Department of Hospital Medicine  [facility] - Telemetry ([facility])

## 2022-01-11 NOTE — PT/OT/SLP PROGRESS
Physical Therapy      Patient Name:  Lizz Alas   MRN:  [id number]    PT attempted tx at 8:45am, pt currently leaving for dialysis per Nurse Moya. PT will follow up on next visit.     Fariba Porter, PT/OT  1/11/2022

## 2022-01-11 NOTE — PLAN OF CARE
[facility] - Telemetry (Hospital)  Discharge Final Note    Primary Care Provider: Primary Doctor No    Expected Discharge Date: 1/11/2022    Final Discharge Note (most recent)     Final Note - 01/11/22 1529        Final Note    Assessment Type Final Discharge Note     Anticipated Discharge Disposition Home or Self Care     What phone number can be called within the next 1-3 days to see how you are doing after discharge? --   [phone]    Hospital Resources/Appts/Education Provided Appointments scheduled and added to AVS        Post-Acute Status    Discharge Delays None known at this time               Pt not eligible for post acute services due to being uninsured and patient's citizenship status. Yaneth verfieid with [facility] that they do not offer services to undocumented citizens as well.     Yaneth arranged PCP appointment with [facility] [place] as they offer sliding scale fees for uninsured patients. Marr faxed records to [facility]. Appointment verbalized to pt's daughter, Mikael, and added to AVS.     No additional CM needs for discharge.     Important Message from Medicare             Contact Info     Jaycee Gonzalez MD   Specialty: General Surgery    [address]  [place] [address]   Phone: [phone]       Next Steps: Follow up in 2 month(s)    Melchor Sarmiento MD   Specialty: Gastroenterology    [address] [place] [address] [address] [place] [address]   Phone: [phone]       Next Steps: Follow up in 2 month(s)    Dialysis        Next Steps: Follow up    Instructions: Patient should follow up at any Emergency Room for acute Dialysis as indicated. Patient schedule at present is 2 times weekly..    [facility] [place] [facility] [place]  [address] [place] [address]        Next Steps: Go on 1/19/2022    Instructions: Establish Care - Sliding scale fees offered.  Appointment scheduled for 1:20 PM

## 2022-01-11 NOTE — ASSESSMENT & PLAN NOTE
Worsening  Cr 3.3  Monitor    12/31  2 to shock  Nephro on board  Avoid nephrotoxins  Cont NaHCo3 gtt    1/1/22  On CRRT started today   Nephro on board  Mild hypokalemia and hyponatremia from LISET: CRRT    1/2  Net positive about 18L since admit  Ongoing CRRT  Avoid nephrotoxic agents    1/3  Getting RRT  scR 4.7  Oliguric UOP last 24 slightly over 227  Got CRRT past approx 48 hrs (-ve slightly over 2L)    1/4  Dialysis break yesterday to see intrinsic function: sCR up to 6.7, no dangerous electrolyte or acid imbalance today  uop 315 ml last 24 hour  Nephro on board    1/5  Increased sCR  To get RRT today      1/6  This is now the principle problem as Sepsis and shock have resolved  Nephro on board, duration of RRT not clear at this point. However so far in between HD his scr rises considerably. Whether intrinsic renal function will return or when remains to be seen  UOP last 24 documented as 1750 cc. This is a marked improvement  Cont Condom cath for strict monitoring UOP    1/7  Vasc consult for tunneled catheter  Social work consult to get him outpatient HD  MWF HD per nephro  Avoid nephrotoxins    1/8  To get tunneled cath on Monday, jorge luis rios appreciated  Now on MWF schedule, intrinsic renal function yet to return, will check BMP in AM  Avoid nephrotoxic agents  Had 1 L pulled yesterday    1/9  For tunneled catheter placement in AM, NPO past midnight  BMP in AM, last HD on Froday 1/7/22  HD to be MWF, social working working on securing him a HD chair    1/10  Tunneled Cath Placed  HD as O/P PRN  No HD center due to patient acceptance  Advised to follow up with ED as needed  Plan for HD in AM and d/c to home with family

## 2022-01-11 NOTE — PROGRESS NOTES
[facility] - Telemetry ([facility])  Nephrology  Progress Note    Patient Name: Lizz Alas  MRN: [id number]  Admission Date: 12/27/2021  Hospital Length of Stay: 14 days  Attending Provider: Nitesh Lopez MD   Primary Care Physician: Primary Doctor No  Principal Problem:LISET (acute kidney injury)    Consults  Subjective:     Interval History:     Patient was seen on dialysis.  In bed resting comfortably.  No acute distress noted.    Review of systems:  Continues to have some lower extremity swelling however upper extremity swelling has improved.  No nausea vomiting or diarrhea.  No fevers or chills.  No shortness of breath or chest discomfort    Review of patient's allergies indicates:  No Known Allergies  Current Facility-Administered Medications   Medication Frequency    0.9%  NaCl infusion PRN    acetaminophen oral solution 650 mg Q6H PRN    albuterol-ipratropium 2.5 mg-0.5 mg/3 mL nebulizer solution 3 mL Q4H PRN    aluminum-magnesium hydroxide-simethicone 200-200-20 mg/5 mL suspension 30 mL QID PRN    amLODIPine tablet 5 mg BID    amoxicillin-clavulanate 500-125mg per tablet 500 mg Daily    arformoteroL nebulizer solution 15 mcg BID    atorvastatin tablet 10 mg QHS    budesonide nebulizer solution 0.5 mg Q12H    dextrose 50% injection 12.5 g PRN    dextrose 50% injection 25 g PRN    famotidine tablet 20 mg Daily    folic acid tablet 1 mg Daily    glucagon (human recombinant) injection 1 mg PRN    glucose chewable tablet 16 g PRN    glucose chewable tablet 24 g PRN    heparin (porcine) injection 2,000 Units PRN    heparin (porcine) injection 3,800 Units PRN    heparin (porcine) injection 5,000 Units Q8H    hydrALAZINE injection 20 mg Q4H PRN    Lactobacillus acidoph-L.bulgar 1 million cell tablet 4 tablet TID WM    loperamide capsule 2 mg QID PRN    melatonin tablet 6 mg Nightly PRN    metoprolol tartrate (LOPRESSOR) tablet 25 mg BID    multivitamin tablet Daily    naloxone 0.4 mg/mL  injection 0.02 mg PRN    nicotine 21 mg/24 hr 1 patch Daily    ondansetron injection 4 mg Q6H PRN    oxazepam capsule 10 mg TID PRN    oxyCODONE-acetaminophen 5-325 mg per tablet 1 tablet Q8H PRN    promethazine tablet 25 mg Q6H PRN    sevelamer carbonate tablet 1,600 mg TID WM    simethicone chewable tablet 80 mg TID PRN    sodium chloride 0.9% bolus 250 mL PRN    sodium chloride 0.9% flush 10 mL Q12H PRN    tamsulosin 24 hr capsule 0.4 mg QHS    thiamine tablet 100 mg Daily    trazodone split tablet 25 mg Nightly PRN       Objective:     Vital Signs (Most Recent):  Temp: 97.8 °F (36.6 °C) (01/11/22 0850)  Pulse: (!) 58 (01/11/22 1140)  Resp: 20 (01/11/22 1140)  BP: 129/68 (01/11/22 1140)  SpO2: (!) 93 % (01/11/22 0802)  O2 Device (Oxygen Therapy): nasal cannula (01/11/22 0850) Vital Signs (24h Range):  Temp:  [97.8 °F (36.6 °C)-98.7 °F (37.1 °C)] 97.8 °F (36.6 °C)  Pulse:  [51-74] 58  Resp:  [16-26] 20  SpO2:  [91 %-96 %] 93 %  BP: (123-164)/(60-97) 129/68     Weight: 93.1 kg (205 lb 4 oz) (01/10/22 0011)  Body mass index is 33.13 kg/m².  Body surface area is 2.08 meters squared.    I/O last 3 completed shifts:  In: 410 [P.O.:410]  Out: -     Physical exam:  Alert and oriented x3, no acute distress  HEENT:  Pupils equal reactive to light, sclera anicteric, extraocular movements intact  Neck:  no JVD, full range of motion no tenderness  Lungs:  Good air exchange, clear to auscultation  Cardiovascular:  Regular rate and rhythm, no murmurs rubs or gallops  Abdomen:  Nondistended nontender positive bowel sounds  Neuro:  Alert and oriented x3 conversant cooperative and appropriate  Musculoskeletal:  no focal defects, 1+ lower extremity edema bilaterally.    Significant Labs:sure  BMP:   Recent Labs   Lab 01/11/22  0453   GLU 89   *   K 5.6*   CL 97   CO2 22*   BUN 77*   CREATININE 9.4*   CALCIUM 8.2*     CMP:   Recent Labs   Lab 01/05/22  0323 01/06/22  0523 01/11/22  0453      < > 89   CALCIUM  7.8*   < > 8.2*   ALBUMIN 2.3*  --  2.6*   PROT 5.5*  --   --       < > 132*   K 4.1   < > 5.6*   CO2 18*   < > 22*      < > 97   BUN 98*   < > 77*   CREATININE 8.0*   < > 9.4*   ALKPHOS 172*  --   --    ALT 39  --   --    AST 27  --   --    BILITOT 1.3*  --   --     < > = values in this interval not displayed.     All labs within the past 24 hours have been reviewed.    Significant Imaging:  Reviewed    Assessment/Plan:     Active Diagnoses:    Diagnosis Date Noted POA    PRINCIPAL PROBLEM:  LISET (acute kidney injury) [N17.9] 12/29/2021 No    Tobacco use [Z72.0] 12/27/2021 Yes     Chronic    Alcohol abuse [F10.10] 12/27/2021 Yes     Chronic    Primary hypertension [I10] 12/27/2021 Yes     Chronic    Paroxysmal atrial fibrillation [I48.0] 12/27/2021 Yes     Chronic      Problems Resolved During this Admission:    Diagnosis Date Noted Date Resolved POA    Septic shock [A41.9, R65.21]  01/05/2022 Yes    On mechanically assisted ventilation [Z99.11] 12/30/2021 01/02/2022 Not Applicable    Acute hyperglycemia [R73.9] 12/29/2021 01/05/2022 Yes    Electrolyte imbalance [E87.8] 12/28/2021 01/02/2022 Yes    Severe sepsis with acute organ dysfunction [A41.9, R65.20] 12/28/2021 01/10/2022 Yes    E coli bacteremia [R78.81, B96.20] 12/28/2021 01/10/2022 Yes    Acute cholangitis [K83.09] 12/27/2021 01/10/2022 Yes       1. LISET:  Seen on dialysis.  Will plan to continue Tuesday Thursday Saturday schedule unless otherwise indicated.  Had a tunneled dialysis catheter placed yesterday.    Cause of acute kidney injury is ATN.    2. Volume status is stable, still some mild lower extremity edema is improving with ultrafiltration.    3. Potassium is mildly elevated at 5.6.  Should improve with dialysis.    4. Hyperphosphatemia:  Will start Renvela with meals.    Thank you for your consult.     Thor Lr MD  Nephrology  [facility] - Telemetry ([facility])

## 2022-01-11 NOTE — PT/OT/SLP PROGRESS
Occupational Therapy      Patient Name:  Lizz Alas   MRN:  [id number]    Patient not seen today secondary to patient transporting to HD via nurse, Nita. Will continue efforts.    Venice Hall OT    1/11/2022   0840

## 2022-01-11 NOTE — NURSING
Discharge summary, health teachings, prescribed meds and instructions given to patient with family at bedside--verbalized understanding. IV removed-no complications noted. Tele monitor removed and returned to the monitor room. All questions answered with regards to discharge instructions. Reminded of the importance of follow-up appointments.

## 2022-01-11 NOTE — SUBJECTIVE & OBJECTIVE
Interval History: Patient improved - on room air. Plan for HD as o/p prn. Patient received tunnelled cath today - No events. Plan for d/c tomorrow after HD.    Review of Systems   Constitutional: Negative for chills, diaphoresis and fatigue.   HENT: Negative for drooling, hearing loss, rhinorrhea and sinus pressure.    Respiratory: Negative for cough, choking and shortness of breath.    Cardiovascular: Negative for chest pain.   Gastrointestinal: Negative for abdominal pain, blood in stool, constipation and diarrhea.        Diarrhea is resolved   Genitourinary: Negative for flank pain, hematuria and urgency.   Musculoskeletal: Negative for arthralgias and back pain.   Skin: Negative for color change.   Neurological: Negative for seizures, numbness and headaches.   Hematological: Negative for adenopathy. Does not bruise/bleed easily.   Psychiatric/Behavioral: Negative for agitation and behavioral problems.     Objective:     Vital Signs (Most Recent):  Temp: 98.7 °F (37.1 °C) (01/10/22 1625)  Pulse: 68 (01/10/22 1625)  Resp: 16 (01/10/22 1625)  BP: 136/66 (01/10/22 1625)  SpO2: (!) 93 % (01/10/22 1625) Vital Signs (24h Range):  Temp:  [96.3 °F (35.7 °C)-99.1 °F (37.3 °C)] 98.7 °F (37.1 °C)  Pulse:  [58-74] 68  Resp:  [14-19] 16  SpO2:  [90 %-96 %] 93 %  BP: (117-158)/(58-74) 136/66     Weight: 93.1 kg (205 lb 4 oz)  Body mass index is 33.13 kg/m².    Intake/Output Summary (Last 24 hours) at 1/10/2022 1854  Last data filed at 1/10/2022 1343  Gross per 24 hour   Intake 150 ml   Output --   Net 150 ml      Physical Exam  Vitals reviewed.   Constitutional:       General: He is not in acute distress.     Appearance: Normal appearance. He is normal weight.   HENT:      Head: Normocephalic and atraumatic.      Nose: Nose normal.      Mouth/Throat:      Mouth: Mucous membranes are moist.      Pharynx: Oropharynx is clear.   Eyes:      General: No scleral icterus.     Extraocular Movements: Extraocular movements intact.       Pupils: Pupils are equal, round, and reactive to light.   Cardiovascular:      Rate and Rhythm: Normal rate and regular rhythm.      Pulses: Normal pulses.      Heart sounds: No murmur heard.      Abdominal:      General: Abdomen is flat. Bowel sounds are normal.      Palpations: Abdomen is soft.   Musculoskeletal:      Right lower leg: No edema.      Left lower leg: No edema.      Comments: Marked improvement of edema of both hands  Trace pedal edema   Skin:     General: Skin is warm and dry.      Capillary Refill: Capillary refill takes less than 2 seconds.   Neurological:      General: No focal deficit present.      Mental Status: He is alert.   Psychiatric:         Mood and Affect: Mood normal.         Behavior: Behavior normal.         Significant Labs:   All pertinent labs within the past 24 hours have been reviewed.  Blood Culture: No results for input(s): LABBLOO in the last 48 hours.  BMP:   Recent Labs   Lab 01/10/22  0444   GLU 82   *   K 4.9   CL 99   CO2 22*   BUN 71*   CREATININE 8.4*   CALCIUM 7.8*     CBC:   Recent Labs   Lab 01/10/22  0444   WBC 6.48   HGB 9.5*   HCT 30.3*        CMP:   Recent Labs   Lab 01/09/22  1557 01/10/22  0444   * 134*   K 4.6 4.9   CL 98 99   CO2 23 22*   * 82   BUN 66* 71*   CREATININE 8.0* 8.4*   CALCIUM 8.2* 7.8*   ANIONGAP 13 13   EGFRNONAA 6* 6*       Significant Imaging: I have reviewed all pertinent imaging results/findings within the past 24 hours.

## 2022-01-11 NOTE — PLAN OF CARE
Patient received hd today. Net removal 1 liter. No access issues. Tolerated. Dr. Lr visited during hd.

## 2022-01-11 NOTE — ASSESSMENT & PLAN NOTE
- Remains in sinus rhythm on admission. Monitor telemetry.  - Continue home BB.  - Patient is not on long-term AC. Will defer to his primary cardiologist.       1/5  Cont MTP    1/8, 9, 10  Cont MTP (dropped to 25 mg PO BID for bradycardia)

## 2022-01-11 NOTE — ASSESSMENT & PLAN NOTE
- Continue home antihypertensives.     12/31  On pressors    1/4  Lopressor 25 mg PO BID, adjust as needed   May have to resume home Amlodipine 5m g PO QD, will monitor    1/5  MTP 50 mg PO BID  Amlodipine 5 mg PO QHS    1/6, 7  Cont MTP and nightly Amlodipine    1/8  Cont reduced dose MTP  Increase Amlodipine to 5 mg BID  If needed add PO Hydralazine    1/9, 1/10  Cont MTP/Amlodipine

## 2022-01-11 NOTE — PROGRESS NOTES
[facility] Medicine  Progress Note    Patient Name: Lizz Alas  MRN: [id number]  Patient Class: IP- Inpatient   Admission Date: 12/27/2021  Length of Stay: 13 days  Attending Physician: Nitesh Lopez MD  Primary Care Provider: Primary Doctor No        Subjective:     Principal Problem:LISET (acute kidney injury)        HPI:  Lizz Alas is a 71 y.o. German speaking male patient with a PMHx of PAF, HLD, HTN, thrombocytopenia, alcohol abuse, and tobacco use who presents to the Emergency Department for intermittent epigastric abdominal pain which onset gradually 3 days PTA. Today, the pt reports that his pain has been constant. His symptoms are constant and moderate in severity. No mitigating or exacerbating factors reported. Associated sxs include fever (102 F), nausea, and vomiting. Patient denies any diarrhea, constipation, SOB, weakness, fever, chills, and all other sxs at this time. Initial work-up in the ED showed: Normal WBC and lactic, , , alk phos 184, T bili 5.1, lipase 11, glucose 180, plt 119, sodium 134, COVID negative. Abdominal US showed gallbladder distension with internal sludge but without definite stones, intra and extrahepatic biliary ductal dilation which may represent acute cholecystitis or choledocholithiasis. Blood cultures obtained in the ED, and 1.5 L IV fluids and IV Zosyn given. Case was discussed with GI per the ED, who recommend NPO, IV abx, and ERCP in AM. Hospital Medicine was contacted for admission and patient placed in Observation.       Overview/Hospital Course:  Patient 72 yo male admitted to hospital with suspected cholangitis. Patient initiated on IV abx, fluids, O2. Patient with a rapid decompensation shourtly after admit, rapid response called, patient hypoxic in the 60's placed on bipap with good effect. Patient transferred to ICU. Patient with a progressive decline, febrile episodes to 102.9. Severe septic shock, pressors and  fluids on board. Patient evaluated by GI who recommended ERCP - additional CT abdomen. Patient scheduled for CT and this has been delayed pending stability. Patient intubated for airway protection.  12/29- Patient remains intubated, sedated, on pressors. Discussed POC with family at bedside. Patient evaluated by surgery who recommend o/p surgical followup on recovery for elective cholecystectomy.. Repeat blood cultures in progress, abx infusing.  12/30 - Patient wbc declining but remains on pressors. He is intubated and sedated. Family at bedside. Worsening renal function with cr 4.4 today. Nephrology on consult. Discussed with CC Team  12/31: On sole pressor Norepinephrine 0.14mcg/kg/min, afebrile, leucocytosis is resolved, scR is bumped to 6.0. Patient is oliguric (118 cc urine in last 24 hour). On Fentanyl gtt and NaHCO3 gtt   Daughter Hortencia  is at bedside and was updated.   AC 26/450/40/5  1/1/22 patient seen, daughter Hortencia at bedside, currently on CRRT, Norepinephrine at 0.02mcg/kg/min, fentanyl 100mcg/Hr. AC 26/450/40/5. Patient more awake today, opens eyes spontaneously, is squeezing his daughter's hand  1/2 About afternoon yesterday he self extubated, did not require immediate re-intubation as he was doing rel well resp wise, he is currently on Vapo therm 35LPM 80% FIO2, precedex drip to help with agitation (history of ETOH misuse), getting CRRT, family at bedside, whilst confused he is oriented to selpf/family, conversing with family  1/3 patient seen, was sleeping calmly early this AM, family not at bedside, on Vapotherm 2oLPM at 45% FIO2, not dyspneic. CRRT off. Remains afebrile  1/4 patient seen, down to 4L oxygen, awake and alert, off Precedex gtt, been off pressors  1/5 seen in the ICU. Awake and alert. Not in distress.  PT/OT. D/C urethral catheter, place condom cath  1/6 Transferred from ICU, doing stable on the floor. Met on 4L this morning awake and alert, not particularly interested  in his breakfast. (Apparently the dialect on [place] is different than his, therefore with communication gaps he was able to tell me directly today that he is not in any pain).  1/7 Patient seen, he;d just returned from HD, daughter at bedside, mentation and cognition fully restored per daughter. HD is now MWF per nephro.  D/W Nephro who recommend placement of tunneled catheter, so far though good UOP renal function is slow to return, patient will likely need to be on HD for some time goingforward. This MD explained this to daughter who interpreted for patient  PT rec Home with PT.   1/8 patient seen, stable, for Tunneled cath placement on Monday.  working on outpatient HD (her last note says: He is only eligible for Emergency Medicaid for hospital bill.  Not eligible for post acute services including hemodialysis) However if his renal function doesn't return and he is HD dependent then something will have to give. Now on MWF schedule  1/10 patient see, feels good, understand that the plan is for tunneled cath placement in AM, HD is now MWF,  working on securing him a HD chair for outpatient.      Interval History: Patient improved - on room air. Plan for HD as o/p prn. Patient received tunnelled cath today - No events. Plan for d/c tomorrow after HD.    Review of Systems   Constitutional: Negative for chills, diaphoresis and fatigue.   HENT: Negative for drooling, hearing loss, rhinorrhea and sinus pressure.    Respiratory: Negative for cough, choking and shortness of breath.    Cardiovascular: Negative for chest pain.   Gastrointestinal: Negative for abdominal pain, blood in stool, constipation and diarrhea.        Diarrhea is resolved   Genitourinary: Negative for flank pain, hematuria and urgency.   Musculoskeletal: Negative for arthralgias and back pain.   Skin: Negative for color change.   Neurological: Negative for seizures, numbness and headaches.   Hematological: Negative for  adenopathy. Does not bruise/bleed easily.   Psychiatric/Behavioral: Negative for agitation and behavioral problems.     Objective:     Vital Signs (Most Recent):  Temp: 98.7 °F (37.1 °C) (01/10/22 1625)  Pulse: 68 (01/10/22 1625)  Resp: 16 (01/10/22 1625)  BP: 136/66 (01/10/22 1625)  SpO2: (!) 93 % (01/10/22 1625) Vital Signs (24h Range):  Temp:  [96.3 °F (35.7 °C)-99.1 °F (37.3 °C)] 98.7 °F (37.1 °C)  Pulse:  [58-74] 68  Resp:  [14-19] 16  SpO2:  [90 %-96 %] 93 %  BP: (117-158)/(58-74) 136/66     Weight: 93.1 kg (205 lb 4 oz)  Body mass index is 33.13 kg/m².    Intake/Output Summary (Last 24 hours) at 1/10/2022 1854  Last data filed at 1/10/2022 1343  Gross per 24 hour   Intake 150 ml   Output --   Net 150 ml      Physical Exam  Vitals reviewed.   Constitutional:       General: He is not in acute distress.     Appearance: Normal appearance. He is normal weight.   HENT:      Head: Normocephalic and atraumatic.      Nose: Nose normal.      Mouth/Throat:      Mouth: Mucous membranes are moist.      Pharynx: Oropharynx is clear.   Eyes:      General: No scleral icterus.     Extraocular Movements: Extraocular movements intact.      Pupils: Pupils are equal, round, and reactive to light.   Cardiovascular:      Rate and Rhythm: Normal rate and regular rhythm.      Pulses: Normal pulses.      Heart sounds: No murmur heard.      Abdominal:      General: Abdomen is flat. Bowel sounds are normal.      Palpations: Abdomen is soft.   Musculoskeletal:      Right lower leg: No edema.      Left lower leg: No edema.      Comments: Marked improvement of edema of both hands  Trace pedal edema   Skin:     General: Skin is warm and dry.      Capillary Refill: Capillary refill takes less than 2 seconds.   Neurological:      General: No focal deficit present.      Mental Status: He is alert.   Psychiatric:         Mood and Affect: Mood normal.         Behavior: Behavior normal.         Significant Labs:   All pertinent labs within the  past 24 hours have been reviewed.  Blood Culture: No results for input(s): LABBLOO in the last 48 hours.  BMP:   Recent Labs   Lab 01/10/22  0444   GLU 82   *   K 4.9   CL 99   CO2 22*   BUN 71*   CREATININE 8.4*   CALCIUM 7.8*     CBC:   Recent Labs   Lab 01/10/22  0444   WBC 6.48   HGB 9.5*   HCT 30.3*        CMP:   Recent Labs   Lab 01/09/22  1557 01/10/22  0444   * 134*   K 4.6 4.9   CL 98 99   CO2 23 22*   * 82   BUN 66* 71*   CREATININE 8.0* 8.4*   CALCIUM 8.2* 7.8*   ANIONGAP 13 13   EGFRNONAA 6* 6*       Significant Imaging: I have reviewed all pertinent imaging results/findings within the past 24 hours.      Assessment/Plan:      * LISET (acute kidney injury)  Worsening  Cr 3.3  Monitor    12/31  2 to shock  Nephro on board  Avoid nephrotoxins  Cont NaHCo3 gtt    1/1/22  On CRRT started today   Nephro on board  Mild hypokalemia and hyponatremia from LISET: CRRT    1/2  Net positive about 18L since admit  Ongoing CRRT  Avoid nephrotoxic agents    1/3  Getting RRT  scR 4.7  Oliguric UOP last 24 slightly over 227  Got CRRT past approx 48 hrs (-ve slightly over 2L)    1/4  Dialysis break yesterday to see intrinsic function: sCR up to 6.7, no dangerous electrolyte or acid imbalance today  uop 315 ml last 24 hour  Nephro on board    1/5  Increased sCR  To get RRT today      1/6  This is now the principle problem as Sepsis and shock have resolved  Nephro on board, duration of RRT not clear at this point. However so far in between HD his scr rises considerably. Whether intrinsic renal function will return or when remains to be seen  UOP last 24 documented as 1750 cc. This is a marked improvement  Cont Condom cath for strict monitoring UOP    1/7  Vasc consult for tunneled catheter  Social work consult to get him outpatient HD  MWF HD per nephro  Avoid nephrotoxins    1/8  To get tunneled cath on Monday, jorge luis rios appreciated  Now on MWF schedule, intrinsic renal function yet to return, will  check BMP in AM  Avoid nephrotoxic agents  Had 1 L pulled yesterday    1/9  For tunneled catheter placement in AM, NPO past midnight  BMP in AM, last HD on Froday 1/7/22  HD to be MWF, social working working on securing him a HD chair    1/10  Tunneled Cath Placed  HD as O/P PRN  No HD center due to patient acceptance  Advised to follow up with ED as needed  Plan for HD in AM and d/c to home with family    Hypoxemia  On just 1L oxygen    Thrombocytopenia  Stable-jyothi  2 to Sepsis  Hold chemcial DVTp    1/1  Stable      1/2  Likely combination of ETOH misuse and sepsis  Platelet count is stable 73K, not bleeding  No need for transfusion    1/3  Improved to 114K today    1/4, 5  Resolved    Acute respiratory failure with hypoxia and hypercarbia  Patient with Hypoxic Respiratory failure which is Acute.  he is not on home oxygen. Supplemental oxygen was provided and noted- Oxygen Concentration (%):  [32] 32.   Signs/symptoms of respiratory failure include- tachypnea, increased work of breathing, respiratory distress, use of accessory muscles and cyanosis. Contributing diagnoses includes - COPD Labs and images were reviewed. Patient Has recent ABG, which has been reviewed. Will treat underlying causes and adjust management of respiratory failure as indicated    12/31  On 40 % FIO2 and PEEP 5 mech ventilation  Stable.  Ongoing renal and circulatory failure being addressed prior to considering weaning trials    1/1  On Mech Vent  ICU on board    1/2  On Vapotherm  CXR in AM  Pulm toilet as tolerated    1/3  Being weaned down on Vapotherm  Diffuse interstitial opacities throughout lungs, will likely improve with RRT  Repeat CXR as needed    1/4  Improving  PT; OOB to chair as tolerated  Incentive spirometry  Breathing treatment  Wean supplemental O2 as toelrated    1/5  Cont 3L nasal canula and wean as tolerated    1/4  Cont supplemental O2  Incentive spirometry  PT/OT, OOB  HD&volume pulling per nephro    1/7,  8  Resolved    Alcohol abuse  - Monitor for withdrawal/DTs. IV Ativan if needed.     1/2  With some delirium likely from aute illness and ICU stay, in addition to possibly some withdrawal: Precedex gtt  Bedside swallow eval when possible: oral folate/thiamine +- oral Benzodiapines as needed    1/5  PRN Low dose Librium      1/9  Out of window for withdrawal    1/10  Cessation advised    Tobacco use  - Assistance with smoking cessation was offered, including:  [x]  Medications  [x]  Counseling  [x]  Printed Information on Smoking Cessation  []  Referral to a Smoking Cessation Program  - Patient was counseled regarding smoking for 3-10 minutes.    Primary hypertension  - Continue home antihypertensives.     12/31  On pressors    1/4  Lopressor 25 mg PO BID, adjust as needed   May have to resume home Amlodipine 5m g PO QD, will monitor    1/5  MTP 50 mg PO BID  Amlodipine 5 mg PO QHS    1/6, 7  Cont MTP and nightly Amlodipine    1/8  Cont reduced dose MTP  Increase Amlodipine to 5 mg BID  If needed add PO Hydralazine    1/9, 1/10  Cont MTP/Amlodipine    Paroxysmal atrial fibrillation  - Remains in sinus rhythm on admission. Monitor telemetry.  - Continue home BB.  - Patient is not on long-term AC. Will defer to his primary cardiologist.       1/5  Cont MTP    1/8, 9, 10  Cont MTP (dropped to 25 mg PO BID for bradycardia)      VTE Risk Mitigation (From admission, onward)         Ordered     heparin (porcine) injection 5,000 Units  Every 8 hours         01/03/22 0750     heparin (porcine) injection 2,000 Units  As needed (PRN)         01/01/22 2321     IP VTE HIGH RISK PATIENT  Once         12/27/21 2247     Place sequential compression device  Until discontinued         12/27/21 2247     Reason for No Pharmacological VTE Prophylaxis  Once        Question Answer Comment   Reasons: Risk of Bleeding    Reasons: Thrombocytopenia        12/27/21 2247                Discharge Planning   FLAQUITA:      Code Status: Full Code   Is  the patient medically ready for discharge?:     Reason for patient still in hospital (select all that apply): Patient trending condition and Pending disposition  Discharge Plan A: Home with family                  Nitesh Lopez MD  Department of Hospital Medicine   [facility] ([facility])

## 2022-01-11 NOTE — PT/OT/SLP PROGRESS
Occupational Therapy      Patient Name:  Lizz Alas   MRN:  [id number]    Patient not seen today secondary to  PT IN PROCEDURE . WILL FOLLOW AT LATER DATE,  .  Deedee Duke OT  10:10  1/10/2022

## 2022-01-11 NOTE — ASSESSMENT & PLAN NOTE
- Monitor for withdrawal/DTs. IV Ativan if needed.     1/2  With some delirium likely from aute illness and ICU stay, in addition to possibly some withdrawal: Precedex gtt  Bedside swallow eval when possible: oral folate/thiamine +- oral Benzodiapines as needed    1/5  PRN Low dose Librium      1/9  Out of window for withdrawal    1/10  Cessation advised

## 2022-01-12 ENCOUNTER — PATIENT OUTREACH (OUTPATIENT)
Dept: ADMINISTRATIVE | Facility: CLINIC | Age: 72
End: 2022-01-12
Payer: MEDICAID

## 2022-01-12 NOTE — PROGRESS NOTES
C3 nurse spoke with Lizz Alas's daughter, Mikael Alas, for a TCC post hospital discharge follow up call. The patient has a scheduled HOSFU appointment with [facility] on 1/19/2022 @ 1320. Daughter states that they did call and confirm appointment yesterday.

## 2022-01-12 NOTE — PATIENT INSTRUCTIONS
Patient Education       Acute Kidney Injury Discharge Instructions   About this topic   The kidneys are bean-shaped organs in the middle of your back, just above your waist. They filter your blood to get rid of waste products and extra fluid from your body. The waste is turned into urine.     Acute kidney injury is a sudden injury or illness that causes problems with your kidneys. They shut down and stop filtering the blood. You may also hear it called acute kidney failure. Many things can cause this to happen like:  · Low blood flow to the kidneys. This may happen because of low blood pressure or heart failure or other causes.  · Damage to the kidneys from infections, drugs, or injuries  · The path for urine to leave the body is blocked. Kidney stones, cancer, and prostate problems are some conditions that may cause a block.  · Pregnancy or certain diseases may cause damage to the kidneys.  The doctor may use many tests to find out the cause of the kidney problems. Care is based on the cause and how much kidney damage there is. Most often with treatment, the kidneys will start to work on their own again. This may take weeks or months. Treatment may include drugs, dialysis, and diet changes.  What care is needed at home?   · Ask your doctor what you need to do when you go home. Make sure you ask questions if you do not understand what the doctor says. This way you will know what you need to do.  · Make sure to take all of the drugs ordered by your doctor.  · Keep your legs at or above the level of your heart when in bed. This may help with your body's blood flow and reduce swelling in your feet.  What follow-up care is needed?   Your doctor may ask you to make visits to the office to check on your progress. Be sure to keep these visits.  What drugs may be needed?   The doctor may order drugs to:  · Prevent or fight an infection  · Remove body fluids  · Control potassium levels in your blood  · Give you more calcium  in your blood  Will physical activity be limited?   Talk to your doctor about the right amount of activity for you. Your doctor may want you to do more or less activity. If surgery was done, avoid strenuous activities and lifting heavy objects.  What changes to diet are needed?   You may have to limit how much you drink. You may also need to limit some kinds of foods to lessen the work of your healing kidneys. Your doctor may have you meet with a dietitian to help you plan your meals.     What problems could happen?   · Long-term kidney failure. This is also called chronic kidney failure or chronic renal failure.  · Heart problems  · High blood pressure  · Anemia (low blood counts)  · Muscle weakness  · Fluid buildup  · Bleeding  · Death  When do I need to call the doctor?   · Signs of infection. These include a fever of 100.4°F (38°C) or higher, chills, pain with passing urine.  · Swelling of your ankles, feet, or face  · No urine for more than 6 hours  · Sudden chest pain or breathing problems  · Very bad belly pain, upset stomach, or throwing up  · Very bad weakness of the legs  · You are not feeling better in 2 to 3 days or you are feeling worse  Helpful tips   If you have a family member or relative with long-term kidney disease, talk to your doctor about how often you should have your kidneys checked.  Teach Back: Helping You Understand   The Teach Back Method helps you understand the information we are giving you. After you talk with the staff, tell them in your own words what you learned. This helps to make sure the staff has described each thing clearly. It also helps to explain things that may have been confusing. Before going home, make sure you can do these:  · I can tell you about my condition.  · I can tell you what changes I need to make with my diet, drugs, or activities.  · I can tell you what I will do if I have a fever, swelling, no urine, chest pain, or trouble breathing.  Where can I learn more?    [employer]  http://www.kidney.org/atoz/pdf/choosing_treat.pdf   Last Reviewed Date   2020-02-13  Consumer Information Use and Disclaimer   This information is not specific medical advice and does not replace information you receive from your health care provider. This is only a brief summary of general information. It does NOT include all information about conditions, illnesses, injuries, tests, procedures, treatments, therapies, discharge instructions or life-style choices that may apply to you. You must talk with your health care provider for complete information about your health and treatment options. This information should not be used to decide whether or not to accept your health care providers advice, instructions or recommendations. Only your health care provider has the knowledge and training to provide advice that is right for you.  Copyright   Copyright © 2022 [employer] and its affiliates and/or licensors. All rights reserved.    Kathya teaching reviewed with Mikael Alas . She verbalized understanding.    Education was provided based on the patient's discharge diagnosis using the attached Kathya patient education as a reference.

## 2022-01-13 ENCOUNTER — HOSPITAL ENCOUNTER (EMERGENCY)
Facility: HOSPITAL | Age: 72
Discharge: HOME | End: 2022-01-13
Attending: EMERGENCY MEDICINE
Payer: MEDICAID

## 2022-01-13 VITALS
RESPIRATION RATE: 21 BRPM | BODY MASS INDEX: 33.13 KG/M2 | SYSTOLIC BLOOD PRESSURE: 181 MMHG | DIASTOLIC BLOOD PRESSURE: 92 MMHG | OXYGEN SATURATION: 95 % | HEIGHT: 66 IN | HEART RATE: 66 BPM | TEMPERATURE: 98 F

## 2022-01-13 DIAGNOSIS — N18.6 END STAGE RENAL DISEASE: ICD-10-CM

## 2022-01-13 DIAGNOSIS — Z99.2 ENCOUNTER FOR DIALYSIS: Primary | ICD-10-CM

## 2022-01-13 LAB
ALBUMIN SERPL BCP-MCNC: 2.9 G/DL (ref 3.5–5.2)
ANION GAP SERPL CALC-SCNC: 15 MMOL/L (ref 8–16)
BUN SERPL-MCNC: 52 MG/DL (ref 8–23)
CALCIUM SERPL-MCNC: 8.1 MG/DL (ref 8.7–10.5)
CHLORIDE SERPL-SCNC: 99 MMOL/L (ref 95–110)
CO2 SERPL-SCNC: 20 MMOL/L (ref 23–29)
CREAT SERPL-MCNC: 8.1 MG/DL (ref 0.5–1.4)
EST. GFR  (AFRICAN AMERICAN): 7 ML/MIN/1.73 M^2
EST. GFR  (NON AFRICAN AMERICAN): 6 ML/MIN/1.73 M^2
GLUCOSE SERPL-MCNC: 99 MG/DL (ref 70–110)
PHOSPHATE SERPL-MCNC: 6.7 MG/DL (ref 2.7–4.5)
POTASSIUM SERPL-SCNC: 5 MMOL/L (ref 3.5–5.1)
SODIUM SERPL-SCNC: 134 MMOL/L (ref 136–145)

## 2022-01-13 PROCEDURE — 80100016 HC MAINTENANCE HEMODIALYSIS

## 2022-01-13 PROCEDURE — G0257 UNSCHED DIALYSIS ESRD PT HOS: HCPCS

## 2022-01-13 PROCEDURE — 80069 RENAL FUNCTION PANEL: CPT | Performed by: EMERGENCY MEDICINE

## 2022-01-13 PROCEDURE — 99283 EMERGENCY DEPT VISIT LOW MDM: CPT | Mod: 25

## 2022-01-13 RX ORDER — SODIUM CHLORIDE 9 MG/ML
INJECTION, SOLUTION INTRAVENOUS ONCE
Status: DISCONTINUED | OUTPATIENT
Start: 2022-01-13 | End: 2022-01-13 | Stop reason: HOSPADM

## 2022-01-13 NOTE — ED PROVIDER NOTES
SCRIBE #1 NOTE: I, Molly Moreland, am scribing for, and in the presence of, Alonzo Echols MD. I have scribed the entire note.       History     Chief Complaint   Patient presents with    Fluid Retention     Pt coming in for Dialysys for fluid retention. Denies SOB or CP, normally does dialysis Tuesday and Thursday, just discharged from hospital per family     Review of patient's allergies indicates:  No Known Allergies      History of Present Illness     HPI    1/13/2022, 8:11 AM  History obtained from the daughter and patient      History of Present Illness: Lizz Alas is a 71 y.o. male patient with a PMHx of HTN and HLD who presents to the Emergency Department for dialysis. Per family, pt had a recent prolonged hospital stay after having renal failure. Pt is reported to be asymptomatic at this time. Symptoms are constant and moderate in severity. No mitigating or exacerbating factors reported. Associated sxs include LE edema (chronic). Patient denies any CP, SOB, fever, N/V/D. chils, and all other sxs at this time. No further complaints or concerns at this time.       Arrival mode: Personal vehicle    PCP: Primary Doctor No        Past Medical History:  Past Medical History:   Diagnosis Date    Hyperlipemia     Hypertension     Other lesions of oral mucosa     PAF (paroxysmal atrial fibrillation)     Thrombocytopenia        Past Surgical History:  Past Surgical History:   Procedure Laterality Date    ERCP N/A 12/28/2021    Procedure: ERCP (ENDOSCOPIC RETROGRADE CHOLANGIOPANCREATOGRAPHY);  Surgeon: Melchor Sarmiento MD;  Location: [facility] OR;  Service: Endoscopy;  Laterality: N/A;    FRACTURE SURGERY      HERNIA REPAIR      REPLACEMENT OF DIALYSIS CATHETER OVER GUIDEWIRE THROUGH EXISTING VENOUS ACCESS N/A 1/10/2022    Procedure: REPLACEMENT, CATHETER, DIALYSIS, OVER GUIDEWIRE, USING EXISTING VENOUS ACCESS;  Surgeon: Olayinka Vivar MD;  Location: [facility] CATH LAB;  Service: Vascular;  Laterality: N/A;          Family History:  No family history on file.    Social History:  Social History     Tobacco Use    Smoking status: Current Every Day Smoker     Packs/day: 1.50     Years: 45.00     Pack years: 67.50     Types: Cigarettes    Smokeless tobacco: Never Used   Substance and Sexual Activity    Alcohol use: Yes     Comment: 2-3 glasses of whiskey daily    Drug use: Never    Sexual activity: Not on file        Review of Systems     Review of Systems   Constitutional: Negative for chills and fever.   HENT: Negative for sore throat.    Respiratory: Negative for shortness of breath.    Cardiovascular: Negative for chest pain.   Gastrointestinal: Negative for diarrhea, nausea and vomiting.   Genitourinary: Negative for dysuria.   Musculoskeletal: Negative for back pain.        (+) LE edema (chronic)   Skin: Negative for rash.   Neurological: Negative for weakness.   Hematological: Does not bruise/bleed easily.   All other systems reviewed and are negative.       Physical Exam     Initial Vitals [01/13/22 0748]   BP Pulse Resp Temp SpO2   (!) 161/76 68 20 97.9 °F (36.6 °C) (!) 92 %      MAP       --          Physical Exam  Nursing Notes and Vital Signs Reviewed.  Constitutional: Patient is in no acute distress. Well-developed and well-nourished.  Head: Atraumatic. Normocephalic.  Eyes: PERRL. EOM intact. Conjunctivae are not pale. No scleral icterus.  ENT: Mucous membranes are moist. Oropharynx is clear and symmetric.    Neck: Supple. Full ROM. No lymphadenopathy.  Cardiovascular: Regular rate. Regular rhythm. No murmurs, rubs, or gallops. Distal pulses are 2+ and symmetric.  Pulmonary/Chest: No respiratory distress. Clear to auscultation bilaterally. No wheezing or rales.  Abdominal: Soft and non-distended.  There is no tenderness.  No rebound, guarding, or rigidity. Good bowel sounds.  Genitourinary: No CVA tenderness  Musculoskeletal: Moves all extremities. No obvious deformities. Chronic LE edema. No calf  "tenderness.  Skin: Warm and dry.  Neurological:  Alert, awake, and appropriate.  Normal speech.  No acute focal neurological deficits are appreciated.  Psychiatric: Normal affect. Good eye contact. Appropriate in content.     ED Course   Procedures  ED Vital Signs:  Vitals:    01/13/22 0748 01/13/22 0903 01/13/22 0929 01/13/22 1003   BP: (!) 161/76 (!) 176/84  (!) 168/94   Pulse: 68 (!) 58 61 62   Resp: 20 (!) 24 (!) 26 20   Temp: 97.9 °F (36.6 °C)      TempSrc: Oral      SpO2: (!) 92% (!) 93% 96% 95%   Height: 5' 6" (1.676 m)       01/13/22 1032   BP: (!) 174/87   Pulse: (!) 59   Resp: (!) 21   Temp:    TempSrc:    SpO2: 95%   Height:        Abnormal Lab Results:  Labs Reviewed   RENAL FUNCTION PANEL - Abnormal; Notable for the following components:       Result Value    Sodium 134 (*)     CO2 20 (*)     BUN 52 (*)     Calcium 8.1 (*)     Creatinine 8.1 (*)     Albumin 2.9 (*)     Phosphorus 6.7 (*)     eGFR if  7 (*)     eGFR if non  6 (*)     All other components within normal limits        All Lab Results:  Results for orders placed or performed during the hospital encounter of 01/13/22   Renal function panel   Result Value Ref Range    Glucose 99 70 - 110 mg/dL    Sodium 134 (L) 136 - 145 mmol/L    Potassium 5.0 3.5 - 5.1 mmol/L    Chloride 99 95 - 110 mmol/L    CO2 20 (L) 23 - 29 mmol/L    BUN 52 (H) 8 - 23 mg/dL    Calcium 8.1 (L) 8.7 - 10.5 mg/dL    Creatinine 8.1 (H) 0.5 - 1.4 mg/dL    Albumin 2.9 (L) 3.5 - 5.2 g/dL    Phosphorus 6.7 (H) 2.7 - 4.5 mg/dL    eGFR if African American 7 (A) >60 mL/min/1.73 m^2    eGFR if non African American 6 (A) >60 mL/min/1.73 m^2    Anion Gap 15 8 - 16 mmol/L         Imaging Results:  Imaging Results    None                   The Emergency Provider reviewed the vital signs and test results, which are outlined above.     ED Discussion     8:59 AM: Discussed pt's case with Dr. Lr (Nephrology) who recommends a renal panel and will arrange " dialysis    10:43 AM: Reassessed pt at this time. Pt will receive dialysis then be discharged afterwards. Discussed with pt all pertinent ED information and results. Discussed pt dx and plan of tx. Gave pt all f/u and return to the ED instructions. All questions and concerns were addressed at this time. Pt expresses understanding of information and instructions, and is comfortable with plan to discharge after dialysis. Pt is stable for discharge.    I discussed with patient and/or family/caretaker that evaluation in the ED does not suggest any emergent or life threatening medical conditions requiring immediate intervention beyond what was provided in the ED, and I believe patient is safe for discharge.  Regardless, an unremarkable evaluation in the ED does not preclude the development or presence of a serious of life threatening condition. As such, patient was instructed to return immediately for any worsening or change in current symptoms.         Medical Decision Making:   Clinical Tests:   Lab Tests: Ordered and Reviewed  Patient presenting for regularly scheduled dialysis.  Patient will receive dialysis and be discharged           ED Medication(s):  Medications   0.9%  NaCl infusion (has no administration in time range)   sodium chloride 0.9% bolus 250 mL (has no administration in time range)       New Prescriptions    No medications on file        Follow-up Information     Follow-up with the ED for dialysis as scheduled.                           Scribe Attestation:   Scribe #1: I performed the above scribed service and the documentation accurately describes the services I performed. I attest to the accuracy of the note.     Attending:   Physician Attestation Statement for Scribe #1: I, Alonzo Echols MD, personally performed the services described in this documentation, as scribed by Molly Moreland, in my presence, and it is both accurate and complete.           Clinical Impression       ICD-10-CM ICD-9-CM    1. Encounter for dialysis  Z99.2 V56.0   2. End stage renal disease  N18.6 585.6       Disposition:   Disposition: Discharged  Condition: Stable         Alonzo Echols MD  01/13/22 1142

## 2022-01-17 ENCOUNTER — NURSE TRIAGE (OUTPATIENT)
Dept: ADMINISTRATIVE | Facility: CLINIC | Age: 72
End: 2022-01-17
Payer: MEDICAID

## 2022-01-17 ENCOUNTER — HOSPITAL ENCOUNTER (OUTPATIENT)
Facility: HOSPITAL | Age: 72
Discharge: HOME | End: 2022-01-18
Attending: EMERGENCY MEDICINE | Admitting: STUDENT IN AN ORGANIZED HEALTH CARE EDUCATION/TRAINING PROGRAM
Payer: MEDICAID

## 2022-01-17 DIAGNOSIS — N18.6 ESRD ON HEMODIALYSIS: Chronic | ICD-10-CM

## 2022-01-17 DIAGNOSIS — R06.02 SHORTNESS OF BREATH: ICD-10-CM

## 2022-01-17 DIAGNOSIS — I48.0 PAROXYSMAL ATRIAL FIBRILLATION: Chronic | ICD-10-CM

## 2022-01-17 DIAGNOSIS — N18.6 ESRD (END STAGE RENAL DISEASE): ICD-10-CM

## 2022-01-17 DIAGNOSIS — J81.1 PULMONARY EDEMA: Primary | ICD-10-CM

## 2022-01-17 DIAGNOSIS — I10 PRIMARY HYPERTENSION: Chronic | ICD-10-CM

## 2022-01-17 DIAGNOSIS — J81.0 ACUTE PULMONARY EDEMA: ICD-10-CM

## 2022-01-17 DIAGNOSIS — Z99.2 ESRD ON HEMODIALYSIS: Chronic | ICD-10-CM

## 2022-01-17 LAB
ALBUMIN SERPL BCP-MCNC: 2.7 G/DL (ref 3.5–5.2)
ALP SERPL-CCNC: 82 U/L (ref 55–135)
ALT SERPL W/O P-5'-P-CCNC: 15 U/L (ref 10–44)
ANION GAP SERPL CALC-SCNC: 15 MMOL/L (ref 8–16)
AST SERPL-CCNC: 13 U/L (ref 10–40)
BASOPHILS # BLD AUTO: 0.04 K/UL (ref 0–0.2)
BASOPHILS NFR BLD: 0.6 % (ref 0–1.9)
BILIRUB SERPL-MCNC: 0.7 MG/DL (ref 0.1–1)
BNP SERPL-MCNC: 443 PG/ML (ref 0–99)
BUN SERPL-MCNC: 76 MG/DL (ref 8–23)
CALCIUM SERPL-MCNC: 8.3 MG/DL (ref 8.7–10.5)
CHLORIDE SERPL-SCNC: 102 MMOL/L (ref 95–110)
CO2 SERPL-SCNC: 19 MMOL/L (ref 23–29)
CREAT SERPL-MCNC: 8.1 MG/DL (ref 0.5–1.4)
CTP QC/QA: YES
DIFFERENTIAL METHOD: ABNORMAL
EOSINOPHIL # BLD AUTO: 0.8 K/UL (ref 0–0.5)
EOSINOPHIL NFR BLD: 11.6 % (ref 0–8)
ERYTHROCYTE [DISTWIDTH] IN BLOOD BY AUTOMATED COUNT: 14.2 % (ref 11.5–14.5)
EST. GFR  (AFRICAN AMERICAN): 7 ML/MIN/1.73 M^2
EST. GFR  (NON AFRICAN AMERICAN): 6 ML/MIN/1.73 M^2
GLUCOSE SERPL-MCNC: 98 MG/DL (ref 70–110)
HCT VFR BLD AUTO: 27.7 % (ref 40–54)
HGB BLD-MCNC: 9.2 G/DL (ref 14–18)
IMM GRANULOCYTES # BLD AUTO: 0.03 K/UL (ref 0–0.04)
IMM GRANULOCYTES NFR BLD AUTO: 0.5 % (ref 0–0.5)
LYMPHOCYTES # BLD AUTO: 0.9 K/UL (ref 1–4.8)
LYMPHOCYTES NFR BLD: 14.4 % (ref 18–48)
MCH RBC QN AUTO: 30.7 PG (ref 27–31)
MCHC RBC AUTO-ENTMCNC: 33.2 G/DL (ref 32–36)
MCV RBC AUTO: 92 FL (ref 82–98)
MONOCYTES # BLD AUTO: 0.7 K/UL (ref 0.3–1)
MONOCYTES NFR BLD: 10.8 % (ref 4–15)
NEUTROPHILS # BLD AUTO: 4 K/UL (ref 1.8–7.7)
NEUTROPHILS NFR BLD: 62.1 % (ref 38–73)
NRBC BLD-RTO: 0 /100 WBC
PLATELET # BLD AUTO: 170 K/UL (ref 150–450)
PMV BLD AUTO: 9.8 FL (ref 9.2–12.9)
POTASSIUM SERPL-SCNC: 4.7 MMOL/L (ref 3.5–5.1)
PROT SERPL-MCNC: 6.5 G/DL (ref 6–8.4)
RBC # BLD AUTO: 3 M/UL (ref 4.6–6.2)
SARS-COV-2 RDRP RESP QL NAA+PROBE: NEGATIVE
SODIUM SERPL-SCNC: 136 MMOL/L (ref 136–145)
TROPONIN I SERPL DL<=0.01 NG/ML-MCNC: 0.01 NG/ML (ref 0–0.03)
WBC # BLD AUTO: 6.46 K/UL (ref 3.9–12.7)

## 2022-01-17 PROCEDURE — 25000003 PHARM REV CODE 250: Performed by: NURSE PRACTITIONER

## 2022-01-17 PROCEDURE — 63600175 PHARM REV CODE 636 W HCPCS: Performed by: NURSE PRACTITIONER

## 2022-01-17 PROCEDURE — 96374 THER/PROPH/DIAG INJ IV PUSH: CPT

## 2022-01-17 PROCEDURE — U0002 COVID-19 LAB TEST NON-CDC: HCPCS | Performed by: EMERGENCY MEDICINE

## 2022-01-17 PROCEDURE — 99291 CRITICAL CARE FIRST HOUR: CPT | Mod: 25

## 2022-01-17 PROCEDURE — 96372 THER/PROPH/DIAG INJ SC/IM: CPT | Mod: 59

## 2022-01-17 PROCEDURE — 83880 ASSAY OF NATRIURETIC PEPTIDE: CPT | Performed by: EMERGENCY MEDICINE

## 2022-01-17 PROCEDURE — G0378 HOSPITAL OBSERVATION PER HR: HCPCS

## 2022-01-17 PROCEDURE — 80053 COMPREHEN METABOLIC PANEL: CPT | Performed by: EMERGENCY MEDICINE

## 2022-01-17 PROCEDURE — 84484 ASSAY OF TROPONIN QUANT: CPT | Performed by: EMERGENCY MEDICINE

## 2022-01-17 PROCEDURE — 93010 EKG 12-LEAD: ICD-10-PCS | Mod: ,,, | Performed by: INTERNAL MEDICINE

## 2022-01-17 PROCEDURE — 93005 ELECTROCARDIOGRAM TRACING: CPT

## 2022-01-17 PROCEDURE — 93010 ELECTROCARDIOGRAM REPORT: CPT | Mod: ,,, | Performed by: INTERNAL MEDICINE

## 2022-01-17 PROCEDURE — 85025 COMPLETE CBC W/AUTO DIFF WBC: CPT | Performed by: EMERGENCY MEDICINE

## 2022-01-17 PROCEDURE — 63600175 PHARM REV CODE 636 W HCPCS: Performed by: FAMILY MEDICINE

## 2022-01-17 RX ORDER — TAMSULOSIN HYDROCHLORIDE 0.4 MG/1
0.4 CAPSULE ORAL NIGHTLY
Status: DISCONTINUED | OUTPATIENT
Start: 2022-01-17 | End: 2022-01-18 | Stop reason: HOSPADM

## 2022-01-17 RX ORDER — ONDANSETRON 8 MG/1
8 TABLET, ORALLY DISINTEGRATING ORAL EVERY 8 HOURS PRN
Status: DISCONTINUED | OUTPATIENT
Start: 2022-01-17 | End: 2022-01-18 | Stop reason: HOSPADM

## 2022-01-17 RX ORDER — SEVELAMER CARBONATE 800 MG/1
1600 TABLET, FILM COATED ORAL
Status: DISCONTINUED | OUTPATIENT
Start: 2022-01-18 | End: 2022-01-18 | Stop reason: HOSPADM

## 2022-01-17 RX ORDER — BISACODYL 10 MG
10 SUPPOSITORY, RECTAL RECTAL DAILY PRN
Status: DISCONTINUED | OUTPATIENT
Start: 2022-01-17 | End: 2022-01-18 | Stop reason: HOSPADM

## 2022-01-17 RX ORDER — ACETAMINOPHEN 325 MG/1
650 TABLET ORAL EVERY 6 HOURS PRN
Status: DISCONTINUED | OUTPATIENT
Start: 2022-01-17 | End: 2022-01-18 | Stop reason: HOSPADM

## 2022-01-17 RX ORDER — SODIUM CHLORIDE 0.9 % (FLUSH) 0.9 %
10 SYRINGE (ML) INJECTION EVERY 12 HOURS PRN
Status: DISCONTINUED | OUTPATIENT
Start: 2022-01-17 | End: 2022-01-18 | Stop reason: HOSPADM

## 2022-01-17 RX ORDER — THIAMINE HCL 100 MG
100 TABLET ORAL DAILY
Status: DISCONTINUED | OUTPATIENT
Start: 2022-01-18 | End: 2022-01-18 | Stop reason: HOSPADM

## 2022-01-17 RX ORDER — FUROSEMIDE 10 MG/ML
80 INJECTION INTRAMUSCULAR; INTRAVENOUS
Status: COMPLETED | OUTPATIENT
Start: 2022-01-17 | End: 2022-01-17

## 2022-01-17 RX ORDER — FOLIC ACID 1 MG/1
1 TABLET ORAL DAILY
Status: DISCONTINUED | OUTPATIENT
Start: 2022-01-18 | End: 2022-01-18 | Stop reason: HOSPADM

## 2022-01-17 RX ORDER — METOPROLOL TARTRATE 25 MG/1
25 TABLET, FILM COATED ORAL 2 TIMES DAILY
Status: DISCONTINUED | OUTPATIENT
Start: 2022-01-17 | End: 2022-01-18 | Stop reason: HOSPADM

## 2022-01-17 RX ORDER — AMLODIPINE BESYLATE 5 MG/1
5 TABLET ORAL DAILY
Status: DISCONTINUED | OUTPATIENT
Start: 2022-01-18 | End: 2022-01-18 | Stop reason: HOSPADM

## 2022-01-17 RX ORDER — MAG HYDROX/ALUMINUM HYD/SIMETH 200-200-20
30 SUSPENSION, ORAL (FINAL DOSE FORM) ORAL 4 TIMES DAILY PRN
Status: DISCONTINUED | OUTPATIENT
Start: 2022-01-17 | End: 2022-01-18 | Stop reason: HOSPADM

## 2022-01-17 RX ORDER — HEPARIN SODIUM 5000 [USP'U]/ML
5000 INJECTION, SOLUTION INTRAVENOUS; SUBCUTANEOUS EVERY 8 HOURS
Status: DISCONTINUED | OUTPATIENT
Start: 2022-01-17 | End: 2022-01-18 | Stop reason: HOSPADM

## 2022-01-17 RX ORDER — ATORVASTATIN CALCIUM 40 MG/1
80 TABLET, FILM COATED ORAL DAILY
Status: DISCONTINUED | OUTPATIENT
Start: 2022-01-18 | End: 2022-01-18 | Stop reason: HOSPADM

## 2022-01-17 RX ADMIN — TAMSULOSIN HYDROCHLORIDE 0.4 MG: 0.4 CAPSULE ORAL at 10:01

## 2022-01-17 RX ADMIN — HEPARIN SODIUM 5000 UNITS: 5000 INJECTION INTRAVENOUS; SUBCUTANEOUS at 10:01

## 2022-01-17 RX ADMIN — METOPROLOL TARTRATE 25 MG: 25 TABLET, FILM COATED ORAL at 10:01

## 2022-01-17 RX ADMIN — FUROSEMIDE 80 MG: 10 INJECTION, SOLUTION INTRAVENOUS at 08:01

## 2022-01-18 VITALS
TEMPERATURE: 98 F | RESPIRATION RATE: 18 BRPM | HEIGHT: 70 IN | SYSTOLIC BLOOD PRESSURE: 158 MMHG | HEART RATE: 58 BPM | DIASTOLIC BLOOD PRESSURE: 77 MMHG | BODY MASS INDEX: 26.99 KG/M2 | OXYGEN SATURATION: 95 % | WEIGHT: 188.5 LBS

## 2022-01-18 PROCEDURE — 25000003 PHARM REV CODE 250: Performed by: NURSE PRACTITIONER

## 2022-01-18 PROCEDURE — G0378 HOSPITAL OBSERVATION PER HR: HCPCS

## 2022-01-18 PROCEDURE — 63600175 PHARM REV CODE 636 W HCPCS: Mod: JG | Performed by: INTERNAL MEDICINE

## 2022-01-18 PROCEDURE — 63600175 PHARM REV CODE 636 W HCPCS: Performed by: INTERNAL MEDICINE

## 2022-01-18 PROCEDURE — 63600175 PHARM REV CODE 636 W HCPCS: Performed by: NURSE PRACTITIONER

## 2022-01-18 PROCEDURE — 96372 THER/PROPH/DIAG INJ SC/IM: CPT | Mod: 59

## 2022-01-18 PROCEDURE — 99215 PR OFFICE/OUTPT VISIT, EST, LEVL V, 40-54 MIN: ICD-10-PCS | Mod: ,,, | Performed by: INTERNAL MEDICINE

## 2022-01-18 PROCEDURE — G0257 UNSCHED DIALYSIS ESRD PT HOS: HCPCS

## 2022-01-18 PROCEDURE — 99215 OFFICE O/P EST HI 40 MIN: CPT | Mod: ,,, | Performed by: INTERNAL MEDICINE

## 2022-01-18 RX ORDER — SODIUM CHLORIDE 9 MG/ML
INJECTION, SOLUTION INTRAVENOUS ONCE
Status: CANCELLED | OUTPATIENT
Start: 2022-01-18 | End: 2022-01-18

## 2022-01-18 RX ORDER — FUROSEMIDE 40 MG/1
40 TABLET ORAL 2 TIMES DAILY
Qty: 60 TABLET | Refills: 0 | OUTPATIENT
Start: 2022-01-18 | End: 2022-02-12

## 2022-01-18 RX ORDER — FUROSEMIDE 40 MG/1
40 TABLET ORAL 2 TIMES DAILY
Status: DISCONTINUED | OUTPATIENT
Start: 2022-01-18 | End: 2022-01-18 | Stop reason: HOSPADM

## 2022-01-18 RX ORDER — SODIUM CHLORIDE 9 MG/ML
INJECTION, SOLUTION INTRAVENOUS
Status: CANCELLED | OUTPATIENT
Start: 2022-01-18

## 2022-01-18 RX ORDER — HEPARIN SODIUM 5000 [USP'U]/ML
INJECTION, SOLUTION INTRAVENOUS; SUBCUTANEOUS
Status: DISPENSED
Start: 2022-01-18 | End: 2022-01-19

## 2022-01-18 RX ORDER — HEPARIN SODIUM 1000 [USP'U]/ML
4000 INJECTION, SOLUTION INTRAVENOUS; SUBCUTANEOUS
Status: DISCONTINUED | OUTPATIENT
Start: 2022-01-18 | End: 2022-01-18 | Stop reason: HOSPADM

## 2022-01-18 RX ADMIN — HEPARIN SODIUM 5000 UNITS: 5000 INJECTION INTRAVENOUS; SUBCUTANEOUS at 02:01

## 2022-01-18 RX ADMIN — ATORVASTATIN CALCIUM 80 MG: 40 TABLET, FILM COATED ORAL at 09:01

## 2022-01-18 RX ADMIN — SEVELAMER CARBONATE 1600 MG: 800 TABLET, FILM COATED ORAL at 07:01

## 2022-01-18 RX ADMIN — FOLIC ACID 1 MG: 1 TABLET ORAL at 09:01

## 2022-01-18 RX ADMIN — Medication 100 MG: at 09:01

## 2022-01-18 RX ADMIN — METOPROLOL TARTRATE 25 MG: 25 TABLET, FILM COATED ORAL at 09:01

## 2022-01-18 RX ADMIN — EPOETIN ALFA-EPBX 4000 UNITS: 4000 INJECTION, SOLUTION INTRAVENOUS; SUBCUTANEOUS at 01:01

## 2022-01-18 RX ADMIN — HEPARIN SODIUM 5000 UNITS: 5000 INJECTION INTRAVENOUS; SUBCUTANEOUS at 06:01

## 2022-01-18 RX ADMIN — HEPARIN SODIUM 4000 UNITS: 1000 INJECTION, SOLUTION INTRAVENOUS; SUBCUTANEOUS at 01:01

## 2022-01-18 RX ADMIN — AMLODIPINE BESYLATE 5 MG: 5 TABLET ORAL at 09:01

## 2022-01-18 NOTE — ED PROVIDER NOTES
SCRIBE #1 NOTE: I, Julee Walters, am scribing for, and in the presence of, Maria G Celeste MD. I have scribed the HPI, ROS, and PEx.     SCRIBE #2 NOTE: I, Deysi Cordon, am scribing for, and in the presence of,  Kristal Loomis MD. I have scribed the remaining portions of the note not scribed by Scribe #1.     History      Chief Complaint   Patient presents with    Shortness of Breath     Patient is having increased BLE swelling and shortness of breath. Patient does dialysis twice a week. Last dialysis was Thursday and he feels like he needs dialysis now. Is scheduled for dialysis tomorrow.        Review of patient's allergies indicates:  No Known Allergies     HPI   HPI    1/17/2022, 7:37 PM   History obtained from the patient      History of Present Illness: Lizz Alas is a 71 y.o. male patient with PMHx of ERSD, HLD, HTN, PAF, and thrombocytopenia who presents to the Emergency Department for SOB which onset gradually this AM. Symptoms are constant and moderate in severity. No mitigating or exacerbating factors reported. Associated sxs include BLE swelling. Patient denies any fever, chills, N/V/D, HA, numbness, weakness, numbness, abd pain, cough, congestion, dizziness, and all other sxs at this time. Pt is a current dialysis pt and receives dialysis in the ED every 3 days. Pt was last dialyzed 3 days ago. Pt also only been on dialysis for 2 weeks. No further complaints or concerns at this time.        Arrival mode: Personal vehicle     PCP: Primary Doctor No       Past Medical History:  Past Medical History:   Diagnosis Date    ESRD (end stage renal disease)     Hyperlipemia     Hypertension     Other lesions of oral mucosa     PAF (paroxysmal atrial fibrillation)     Renal disorder     Thrombocytopenia        Past Surgical History:  Past Surgical History:   Procedure Laterality Date    ERCP N/A 12/28/2021    Procedure: ERCP (ENDOSCOPIC RETROGRADE CHOLANGIOPANCREATOGRAPHY);  Surgeon: Melchor YAN  MD Torsten;  Location: [facility] OR;  Service: Endoscopy;  Laterality: N/A;    FRACTURE SURGERY      HERNIA REPAIR      REPLACEMENT OF DIALYSIS CATHETER OVER GUIDEWIRE THROUGH EXISTING VENOUS ACCESS N/A 1/10/2022    Procedure: REPLACEMENT, CATHETER, DIALYSIS, OVER GUIDEWIRE, USING EXISTING VENOUS ACCESS;  Surgeon: Olayinka Vivar MD;  Location: [facility] CATH LAB;  Service: Vascular;  Laterality: N/A;         Family History:  History reviewed. No pertinent family history.    Social History:  Social History     Tobacco Use    Smoking status: Current Every Day Smoker     Packs/day: 1.50     Years: 45.00     Pack years: 67.50     Types: Cigarettes    Smokeless tobacco: Never Used   Substance and Sexual Activity    Alcohol use: Yes     Comment: 2-3 glasses of whiskey daily    Drug use: Never    Sexual activity: Not on file       ROS   Review of Systems   Constitutional: Negative for chills, diaphoresis and fever.   HENT: Negative for congestion and sore throat.    Eyes: Negative for visual disturbance.   Respiratory: Positive for shortness of breath. Negative for cough and wheezing.    Cardiovascular: Positive for leg swelling (bilateral). Negative for chest pain.   Gastrointestinal: Negative for abdominal pain, diarrhea, nausea and vomiting.   Genitourinary: Negative for dysuria and hematuria.   Musculoskeletal: Negative for back pain.   Skin: Negative for rash.   Neurological: Negative for dizziness, syncope, weakness, numbness and headaches.   Hematological: Does not bruise/bleed easily.   All other systems reviewed and are negative.      Physical Exam      Initial Vitals [01/17/22 1911]   BP Pulse Resp Temp SpO2   (!) 180/81 68 (!) 24 97.6 °F (36.4 °C) (!) 94 %      MAP       --          Physical Exam  Nursing Notes and Vital Signs Reviewed.  Constitutional: Patient is in no acute distress. Well-developed and well-nourished.  Head: Atraumatic. Normocephalic.  Eyes: PERRL. EOM intact. Conjunctivae are not pale. No  scleral icterus.  ENT: Mucous membranes are moist. Oropharynx is clear and symmetric.    Neck: Supple. Full ROM. No lymphadenopathy.  Cardiovascular: Regular rate. Regular rhythm. No murmurs, rubs, or gallops. Distal pulses are 2+ and symmetric. Vascular catheter present in right upper chest.  Pulmonary/Chest: No respiratory distress. Clear to auscultation bilaterally. No wheezing or rales.  Abdominal: Soft and non-distended.  There is no tenderness.  No rebound, guarding, or rigidity. Good bowel sounds.  Genitourinary: No CVA tenderness  Musculoskeletal: Moves all extremities. No obvious deformities. 2+ - 3+ edema in BLE. No calf tenderness.  Skin: Warm and dry.  Neurological:  Alert, awake, and appropriate.  Normal speech.  No acute focal neurological deficits are appreciated.  Psychiatric: Normal affect. Good eye contact. Appropriate in content.    ED Course    Critical Care    Date/Time: 1/17/2022 9:28 PM  Performed by: Kristal Loomis MD  Authorized by: Kristal Loomis MD   Direct patient critical care time: 15 minutes  Additional history critical care time: 10 minutes  Ordering / reviewing critical care time: 10 minutes  Documentation critical care time: 5 minutes  Consulting other physicians critical care time: 5 minutes  Total critical care time (exclusive of procedural time) : 45 minutes  Critical care time was exclusive of separately billable procedures and treating other patients and teaching time.  Critical care was necessary to treat or prevent imminent or life-threatening deterioration of the following conditions: ESRD and fluid overload.  Critical care was time spent personally by me on the following activities: blood draw for specimens, development of treatment plan with patient or surrogate, discussions with consultants, interpretation of cardiac output measurements, evaluation of patient's response to treatment, examination of patient, obtaining history from patient or surrogate, ordering and  "performing treatments and interventions, ordering and review of laboratory studies, ordering and review of radiographic studies, pulse oximetry, re-evaluation of patient's condition and review of old charts.        ED Vital Signs:  Vitals:    01/17/22 1911 01/17/22 1950 01/17/22 2005 01/17/22 2117   BP: (!) 180/81  (!) 180/88 (!) 189/91   Pulse: 68 68 69 74   Resp: (!) 24  (!) 22 (!) 22   Temp: 97.6 °F (36.4 °C)      SpO2: (!) 94%  96% 98%   Weight: 85.5 kg (188 lb 7.9 oz)      Height: 5' 10.08" (1.78 m)          Abnormal Lab Results:  Labs Reviewed   CBC W/ AUTO DIFFERENTIAL - Abnormal; Notable for the following components:       Result Value    RBC 3.00 (*)     Hemoglobin 9.2 (*)     Hematocrit 27.7 (*)     Lymph # 0.9 (*)     Eos # 0.8 (*)     Lymph % 14.4 (*)     Eosinophil % 11.6 (*)     All other components within normal limits   COMPREHENSIVE METABOLIC PANEL - Abnormal; Notable for the following components:    CO2 19 (*)     BUN 76 (*)     Creatinine 8.1 (*)     Calcium 8.3 (*)     Albumin 2.7 (*)     eGFR if  7 (*)     eGFR if non  6 (*)     All other components within normal limits   B-TYPE NATRIURETIC PEPTIDE - Abnormal; Notable for the following components:     (*)     All other components within normal limits   SARS-COV-2 RDRP GENE - Normal    Narrative:     This test utilizes isothermal nucleic acid amplification   technology to detect the SARS-CoV-2 RdRp nucleic acid segment.   The analytical sensitivity (limit of detection) is 125 genome   equivalents/mL.   A POSITIVE result implies infection with the SARS-CoV-2 virus;   the patient is presumed to be contagious.     A NEGATIVE result means that SARS-CoV-2 nucleic acids are not   present above the limit of detection. A NEGATIVE result should be   treated as presumptive. It does not rule out the possibility of   COVID-19 and should not be the sole basis for treatment decisions.   If COVID-19 is strongly suspected " based on clinical and exposure   history, re-testing using an alternate molecular assay should be   considered.   This test is only for use under the Food and Drug   Administration s Emergency Use Authorization (EUA).   Commercial kits are provided by [employer].   Performance characteristics of the EUA have been independently   verified by [facility] Department of   Pathology and Laboratory Medicine.   _________________________________________________________________   The authorized Fact Sheet for Healthcare Providers and the authorized Fact   Sheet for Patients of the ID NOW COVID-19 are available on the FDA   website:     https://www.fda.gov/media/830338/download  https://www.fda.gov/media/740164/download       TROPONIN I        All Lab Results:  Results for orders placed or performed during the hospital encounter of 01/17/22   CBC auto differential   Result Value Ref Range    WBC 6.46 3.90 - 12.70 K/uL    RBC 3.00 (L) 4.60 - 6.20 M/uL    Hemoglobin 9.2 (L) 14.0 - 18.0 g/dL    Hematocrit 27.7 (L) 40.0 - 54.0 %    MCV 92 82 - 98 fL    MCH 30.7 27.0 - 31.0 pg    MCHC 33.2 32.0 - 36.0 g/dL    RDW 14.2 11.5 - 14.5 %    Platelets 170 150 - 450 K/uL    MPV 9.8 9.2 - 12.9 fL    Immature Granulocytes 0.5 0.0 - 0.5 %    Gran # (ANC) 4.0 1.8 - 7.7 K/uL    Immature Grans (Abs) 0.03 0.00 - 0.04 K/uL    Lymph # 0.9 (L) 1.0 - 4.8 K/uL    Mono # 0.7 0.3 - 1.0 K/uL    Eos # 0.8 (H) 0.0 - 0.5 K/uL    Baso # 0.04 0.00 - 0.20 K/uL    nRBC 0 0 /100 WBC    Gran % 62.1 38.0 - 73.0 %    Lymph % 14.4 (L) 18.0 - 48.0 %    Mono % 10.8 4.0 - 15.0 %    Eosinophil % 11.6 (H) 0.0 - 8.0 %    Basophil % 0.6 0.0 - 1.9 %    Differential Method Automated    Comprehensive metabolic panel   Result Value Ref Range    Sodium 136 136 - 145 mmol/L    Potassium 4.7 3.5 - 5.1 mmol/L    Chloride 102 95 - 110 mmol/L    CO2 19 (L) 23 - 29 mmol/L    Glucose 98 70 - 110 mg/dL    BUN 76 (H) 8 - 23 mg/dL    Creatinine 8.1 (H) 0.5 - 1.4 mg/dL     Calcium 8.3 (L) 8.7 - 10.5 mg/dL    Total Protein 6.5 6.0 - 8.4 g/dL    Albumin 2.7 (L) 3.5 - 5.2 g/dL    Total Bilirubin 0.7 0.1 - 1.0 mg/dL    Alkaline Phosphatase 82 55 - 135 U/L    AST 13 10 - 40 U/L    ALT 15 10 - 44 U/L    Anion Gap 15 8 - 16 mmol/L    eGFR if African American 7 (A) >60 mL/min/1.73 m^2    eGFR if non African American 6 (A) >60 mL/min/1.73 m^2   Troponin I #1   Result Value Ref Range    Troponin I 0.015 0.000 - 0.026 ng/mL   BNP   Result Value Ref Range     (H) 0 - 99 pg/mL   POCT COVID-19 Rapid Screening   Result Value Ref Range    POC Rapid COVID Negative Negative     Acceptable Yes        Imaging Results:  Imaging Results          X-Ray Chest AP Portable (Final result)  Result time 01/17/22 19:43:33    Final result by Thor Bansal MD (01/17/22 19:43:33)                 Impression:      See above.      Electronically signed by: Thor Bansal MD  Date:    01/17/2022  Time:    19:43             Narrative:    EXAMINATION:  XR CHEST AP PORTABLE    CLINICAL HISTORY:  SOB, shortness of breath;    COMPARISON:  01/03/2022 chest x-ray    FINDINGS:  A right dual lumen vascular catheter is present.    The heart size is normal.    There are hazy infiltrates compatible with pulmonary edema/CHF.                                      The EKG was ordered, reviewed, and independently interpreted by the ED provider.  Interpretation time: 1929  Rate: 63 BPM  Rhythm: normal sinus rhythm with sinus arrhythmia  Interpretation: Incomplete RBBB. No STEMI.      The Emergency Provider reviewed the vital signs and test results, which are outlined above.    ED Discussion     8:00 PM: Dr. Celeste transfers care of patient to Dr. Loomis pending lab results.    8:10 PM: Dr. Celeste spoke to Dr. Issa (nephrology) who recommends, if pt is stable and his potassium is fine, admit to obs and she will see him in the morning. If pt is unstable or his potassium is elevated, let her know. Also  recommends give a dose of lasix right now    9:25 PM: Discussed case with Paige Whitaker NP ([facility]). Dr. Larsen agrees with current care and management of pt and accepts admission.   Admitting Service: Hospital Medicine  Admitting Physician: Dr. Larsen  Admit to: obs med surg    9:26 PM: Re-evaluated pt. I have discussed test results, shared treatment plan, and the need for admission with patient and family at bedside. Pt and family express understanding at this time and agree with all information. All questions answered. Pt and family have no further questions or concerns at this time. Pt is ready for admit.    ED Course as of 01/17/22 2129 Mon Jan 17, 2022 2124 Patient not hypoxic at bedside, blood work is at baseline.  Will admit under observation to Hospital Medicine and Nephrology consultation in the morning.  Given dose of Lasix.  Vitals are stable. [SANDRA]      ED Course User Index  [SANDRA] Kristal Loomis MD       ED Medication(s):  Medications   furosemide injection 80 mg (80 mg Intravenous Given 1/17/22 2051)     New Prescriptions    No medications on file            Medical Decision Making    Medical Decision Making:   Clinical Tests:   Lab Tests: Ordered and Reviewed  Radiological Study: Ordered and Reviewed  Medical Tests: Ordered and Reviewed           Scribe Attestation:   Scribe #1: I performed the above scribed service and the documentation accurately describes the services I performed. I attest to the accuracy of the note.    Attending:   Physician Attestation Statement for Scribe #1: I, Maria G Celeste MD, personally performed the services described in this documentation, as scribed by Julee Walters, in my presence, and it is both accurate and complete.       Scribe Attestation:   Scribe #2: I performed the above scribed service and the documentation accurately describes the services I performed. I attest to the accuracy of the note.    Attending Attestation:           Physician  "Attestation for Scribe:    Physician Attestation Statement for Scribe #2: I, Kristal Loomis MD, reviewed documentation, as scribed by Deysi Loomis in my presence, and it is both accurate and complete. I also acknowledge and confirm the content of the note done by Scribe #1.          Clinical Impression       ICD-10-CM ICD-9-CM   1. ESRD (end stage renal disease)  N18.6 585.6   2. Shortness of breath  R06.02 786.05   3. Acute pulmonary edema  J81.0 518.4       Disposition:   Disposition: Placed in Observation  Condition: Fair  Portions of this note may have been created with voice recognition software. Occasional "wrong-word" or "sound-a-like" substitutions may have occurred due to the inherent limitations of voice recognition software. Please, read the note carefully and recognize, using context, where substitutions have occurred.          Kristal Loomis MD  01/18/22 0453    "

## 2022-01-18 NOTE — TELEPHONE ENCOUNTER
pts nephew calling with pt having new swelling in bilat legs and is having trouble walking on them. Reports he is supposed to have dialysis tomorrow. Per protocol advised of ED NOW. Verbalized understanding.     Reason for Disposition   [1] Can't walk or can barely walk AND [2] new-onset    Additional Information   Negative: SEVERE difficulty breathing (e.g., struggling for each breath, speaks in single words)   Negative: Looks like a broken bone or dislocated joint (e.g., crooked or deformed)   Negative: Sounds like a life-threatening emergency to the triager   Negative: Difficulty breathing at rest   Negative: Entire foot is cool or blue in comparison to other side    Protocols used: LEG SWELLING AND EDEMA-A-AH

## 2022-01-18 NOTE — SUBJECTIVE & OBJECTIVE
Past Medical History:   Diagnosis Date    ESRD (end stage renal disease)     Hyperlipemia     Hypertension     Other lesions of oral mucosa     PAF (paroxysmal atrial fibrillation)     Renal disorder     Thrombocytopenia        Past Surgical History:   Procedure Laterality Date    ERCP N/A 12/28/2021    Procedure: ERCP (ENDOSCOPIC RETROGRADE CHOLANGIOPANCREATOGRAPHY);  Surgeon: Melchor Sarmiento MD;  Location: [facility] OR;  Service: Endoscopy;  Laterality: N/A;    FRACTURE SURGERY      HERNIA REPAIR      REPLACEMENT OF DIALYSIS CATHETER OVER GUIDEWIRE THROUGH EXISTING VENOUS ACCESS N/A 1/10/2022    Procedure: REPLACEMENT, CATHETER, DIALYSIS, OVER GUIDEWIRE, USING EXISTING VENOUS ACCESS;  Surgeon: Olayinka Vivar MD;  Location: [facility] CATH LAB;  Service: Vascular;  Laterality: N/A;       Review of patient's allergies indicates:  No Known Allergies    No current facility-administered medications on file prior to encounter.     Current Outpatient Medications on File Prior to Encounter   Medication Sig    amLODIPine (NORVASC) 5 MG tablet Take 5 mg by mouth once daily.    folic acid (FOLVITE) 1 MG tablet Take 1 tablet (1 mg total) by mouth once daily.    hydroCHLOROthiazide (MICROZIDE) 12.5 mg capsule Take 12.5 mg by mouth once daily.    metoprolol tartrate (LOPRESSOR) 25 MG tablet Take 1 tablet (25 mg total) by mouth 2 (two) times daily.    ondansetron (ZOFRAN) 4 MG tablet Take 1 tablet (4 mg total) by mouth every 6 (six) hours. For nausea    rosuvastatin (CRESTOR) 20 MG tablet Take 20 mg by mouth once daily.    sevelamer carbonate (RENVELA) 800 mg Tab Take 2 tablets (1,600 mg total) by mouth 3 (three) times daily with meals.    tamsulosin (FLOMAX) 0.4 mg Cap Take 1 capsule (0.4 mg total) by mouth every evening.    thiamine 100 MG tablet Take 1 tablet (100 mg total) by mouth once daily.     Family History    Reviewed and not pertinent.        Tobacco Use    Smoking status: Current Every Day Smoker      Packs/day: 1.50     Years: 45.00     Pack years: 67.50     Types: Cigarettes    Smokeless tobacco: Never Used   Substance and Sexual Activity    Alcohol use: Yes     Comment: 2-3 glasses of whiskey daily    Drug use: Never    Sexual activity: Not on file     Review of Systems   Constitutional: Negative for chills, diaphoresis, fatigue, fever and unexpected weight change.   HENT: Negative for congestion, postnasal drip, rhinorrhea, sinus pressure and sore throat.    Respiratory: Positive for shortness of breath. Negative for cough and wheezing.    Cardiovascular: Positive for leg swelling. Negative for chest pain and palpitations.   Gastrointestinal: Negative for abdominal pain, diarrhea, nausea and vomiting.   Genitourinary: Negative for dysuria and hematuria.   Musculoskeletal: Negative for arthralgias, back pain and myalgias.   Skin: Negative for pallor and rash.   Neurological: Negative for dizziness, syncope, weakness, light-headedness, numbness and headaches.   Psychiatric/Behavioral: Negative for confusion.   All other systems reviewed and are negative.    Objective:     Vital Signs (Most Recent):  Temp: 97.6 °F (36.4 °C) (01/17/22 1911)  Pulse: 74 (01/17/22 2117)  Resp: (!) 22 (01/17/22 2117)  BP: (!) 189/91 (01/17/22 2117)  SpO2: 98 % (01/17/22 2117) Vital Signs (24h Range):  Temp:  [97.6 °F (36.4 °C)] 97.6 °F (36.4 °C)  Pulse:  [68-74] 74  Resp:  [22-24] 22  SpO2:  [94 %-98 %] 98 %  BP: (180-189)/(81-91) 189/91     Weight: 85.5 kg (188 lb 7.9 oz)  Body mass index is 26.99 kg/m².    Physical Exam  Vitals and nursing note reviewed.   Constitutional:       General: He is awake. He is not in acute distress.     Appearance: Normal appearance. He is well-developed. He is not diaphoretic.   HENT:      Head: Normocephalic and atraumatic.   Eyes:      Conjunctiva/sclera: Conjunctivae normal.   Cardiovascular:      Rate and Rhythm: Normal rate and regular rhythm.  No extrasystoles are present.     Heart sounds:  S1 normal and S2 normal. No murmur heard.       Comments: Vas Cath to RCW.  Pulmonary:      Effort: Pulmonary effort is normal. No tachypnea.      Breath sounds: Normal breath sounds and air entry. No wheezing, rhonchi or rales.   Abdominal:      General: Bowel sounds are normal. There is no distension.      Palpations: Abdomen is soft.      Tenderness: There is no abdominal tenderness.   Musculoskeletal:         General: No tenderness or deformity. Normal range of motion.      Cervical back: Normal range of motion and neck supple.      Right lower leg: 3+ Pitting Edema present.      Left lower leg: 3+ Pitting Edema present.   Skin:     General: Skin is warm and dry.      Capillary Refill: Capillary refill takes less than 2 seconds.      Findings: No erythema or rash.   Neurological:      General: No focal deficit present.      Mental Status: He is alert and oriented to person, place, and time.   Psychiatric:         Mood and Affect: Mood and affect normal.         Behavior: Behavior normal. Behavior is cooperative.             Significant Labs:  Results for orders placed or performed during the hospital encounter of 01/17/22   CBC auto differential   Result Value Ref Range    WBC 6.46 3.90 - 12.70 K/uL    RBC 3.00 (L) 4.60 - 6.20 M/uL    Hemoglobin 9.2 (L) 14.0 - 18.0 g/dL    Hematocrit 27.7 (L) 40.0 - 54.0 %    MCV 92 82 - 98 fL    MCH 30.7 27.0 - 31.0 pg    MCHC 33.2 32.0 - 36.0 g/dL    RDW 14.2 11.5 - 14.5 %    Platelets 170 150 - 450 K/uL    MPV 9.8 9.2 - 12.9 fL    Immature Granulocytes 0.5 0.0 - 0.5 %    Gran # (ANC) 4.0 1.8 - 7.7 K/uL    Immature Grans (Abs) 0.03 0.00 - 0.04 K/uL    Lymph # 0.9 (L) 1.0 - 4.8 K/uL    Mono # 0.7 0.3 - 1.0 K/uL    Eos # 0.8 (H) 0.0 - 0.5 K/uL    Baso # 0.04 0.00 - 0.20 K/uL    nRBC 0 0 /100 WBC    Gran % 62.1 38.0 - 73.0 %    Lymph % 14.4 (L) 18.0 - 48.0 %    Mono % 10.8 4.0 - 15.0 %    Eosinophil % 11.6 (H) 0.0 - 8.0 %    Basophil % 0.6 0.0 - 1.9 %    Differential Method  Automated    Comprehensive metabolic panel   Result Value Ref Range    Sodium 136 136 - 145 mmol/L    Potassium 4.7 3.5 - 5.1 mmol/L    Chloride 102 95 - 110 mmol/L    CO2 19 (L) 23 - 29 mmol/L    Glucose 98 70 - 110 mg/dL    BUN 76 (H) 8 - 23 mg/dL    Creatinine 8.1 (H) 0.5 - 1.4 mg/dL    Calcium 8.3 (L) 8.7 - 10.5 mg/dL    Total Protein 6.5 6.0 - 8.4 g/dL    Albumin 2.7 (L) 3.5 - 5.2 g/dL    Total Bilirubin 0.7 0.1 - 1.0 mg/dL    Alkaline Phosphatase 82 55 - 135 U/L    AST 13 10 - 40 U/L    ALT 15 10 - 44 U/L    Anion Gap 15 8 - 16 mmol/L    eGFR if African American 7 (A) >60 mL/min/1.73 m^2    eGFR if non African American 6 (A) >60 mL/min/1.73 m^2   Troponin I #1   Result Value Ref Range    Troponin I 0.015 0.000 - 0.026 ng/mL   BNP   Result Value Ref Range     (H) 0 - 99 pg/mL   POCT COVID-19 Rapid Screening   Result Value Ref Range    POC Rapid COVID Negative Negative     Acceptable Yes       All pertinent labs within the past 24 hours have been reviewed.    Significant Imaging:  Imaging Results          X-Ray Chest AP Portable (Final result)  Result time 01/17/22 19:43:33    Final result by Thor Bansal MD (01/17/22 19:43:33)                 Impression:      See above.      Electronically signed by: Thor Bansal MD  Date:    01/17/2022  Time:    19:43             Narrative:    EXAMINATION:  XR CHEST AP PORTABLE    CLINICAL HISTORY:  SOB, shortness of breath;    COMPARISON:  01/03/2022 chest x-ray    FINDINGS:  A right dual lumen vascular catheter is present.    The heart size is normal.    There are hazy infiltrates compatible with pulmonary edema/CHF.                               I have reviewed all pertinent imaging results/findings within the past 24 hours.       Results for orders placed or performed during the hospital encounter of 01/17/22   EKG 12-lead    Collection Time: 01/17/22  7:29 PM    Narrative    Test Reason : R06.02,    Vent. Rate : 063 BPM     Atrial Rate :  063 BPM     P-R Int : 148 ms          QRS Dur : 094 ms      QT Int : 428 ms       P-R-T Axes : 007 015 040 degrees     QTc Int : 437 ms    Normal sinus rhythm with sinus arrhythmia  Incomplete right bundle branch block  Borderline Abnormal ECG  When compared with ECG of 29-DEC-2021 05:47,  Premature supraventricular complexes are no longer Present  T wave inversion no longer evident in Inferior leads    Referred By: [email or web address]   SELF           Confirmed By:

## 2022-01-18 NOTE — PROGRESS NOTES
01/18/22 0939   Pre-Hemodialysis Assessment   Treatment Status Started   Pre-Treatment Weight 85.5 kg (188 lb 7.9 oz)   Gross Bleach Negative Yes   Total Chlorine is less than 0.1 ppm Yes   Machine Number [id number]   Alarms Verified Yes   Reverse Osmosis Number main   Dialyzer Lot # [id number]   Tubing Lot # [id number]   Reverse Osmosis Machine Log Completed Yes   Extracorporeal Circuit Tested for Integrity Yes   pH 7   Machine Temperature 96.8 °F (36 °C)   Dialyzer F-160   Machine Conductivity 14.2   Meter Conductivity 14   Sodium Modeling 0   UF Profile 0   Dialysate Na (mEq/L) 138   Dialysate K (mEq/L) 2   Dialysate CA (mEq/L) 2.5   Dialysate HCO3 (mEq/L) 35   Prime Ordered (mL) 250 mL   Treatment Initiation with Dialysis Precautions All connections secured;Saline line double clamped;Venous parameters set;Arterial parameters set;Prime given;Air foam detector engaged   Prime Amount Given (mL) 250 mL   Net UF Goal 3000   Total UF Goal 3500   Pre-Hemodialysis Comments 3.5 hour I-HDTx initiated   UF Rate 1143   RO # / DI #  main   RO Rejection Within Limits? Yes   Timeout Performed 0937        Hemodialysis Catheter 01/10/22 1015 right internal jugular   Placement Date/Time: 01/10/22 1015   Present Prior to Hospital Arrival?: No  Hand Hygiene: Performed  Barrier Precautions: Performed  Skin Antisepsis: ChloraPrep  Hemodialysis Catheter Type: Tunneled catheter  Location: right internal jugular  Cathete...   Verification by X-ray Other (Comment)   Site Assessment No drainage;No redness;No swelling;No warmth   Line Securement Device Secured with sutureless device   Dressing Type Biopatch in place;Central line dressing;Transparent (Tegaderm)   Dressing Status Clean;Dry;Intact   Dressing Intervention Integrity maintained   Date on Dressing 01/18/22   Dressing Due to be Changed 01/25/22   Venous Patency/Care flushed w/o difficulty;heparin locked;blood return present   Arterial Patency/Care flushed w/o difficulty;heparin  locked;blood return present   Waveform Other (Comments)  (n/a)   Line Necessity Review CRRT/HD   During Hemodialysis Assessment   Blood Flow Rate (mL/min) 400 mL/min   Dialysate Flow Rate (mL/min) 800 ml/min   Ultrafiltration Rate (mL/Hr) 1143 mL/Hr   Arteriovenous Lines Secure Yes   Arterial Pressure (mmHg) -123 mmHg   Venous Pressure (mmHg) 101   Blood Volume Processed (Liters) 0 L   UF Removed (mL) 0 mL   TMP 30   Venous Line in Air Detector Yes   Intake (mL) 250 mL   Transducer Dry Yes   Access Visible Yes    notified of access issue? N/A   Heparin given? N/A   Intra-Hemodialysis Comments hd tx started.

## 2022-01-18 NOTE — ED NOTES
Pt. In bed resting comfortably with no complaints voiced or any visible distress noted.  Daughter in this am to check on the status of her dad.  Update given with permission of dad.  Daughter states she has to go to work today.  Pt. In NAD.

## 2022-01-18 NOTE — PROGRESS NOTES
01/18/22 1324   Vital Signs   Temp 98 °F (36.7 °C)   Temp src Axillary   Pulse 61   Heart Rate Source Monitor   Resp (!) 22   SpO2 95 %   Pulse Oximetry Type Intermittent   Oximetry Probe Site Intact   Flow (L/min) 4   Oxygen Concentration (%) 36   O2 Device (Oxygen Therapy) nasal cannula   BP (!) 150/98   MAP (mmHg) 120   BP Location Right arm   BP Method Automatic   Patient Position Lying   During Hemodialysis Assessment   Blood Flow Rate (mL/min) 400 mL/min   Dialysate Flow Rate (mL/min) 800 ml/min   Ultrafiltration Rate (mL/Hr) 1145 mL/Hr   Arteriovenous Lines Secure Yes   Arterial Pressure (mmHg) -174 mmHg   Venous Pressure (mmHg) 132   Blood Volume Processed (Liters) 0 L   UF Removed (mL) 4008 mL   TMP 85   Venous Line in Air Detector Yes   Intake (mL) 250 mL   Transducer Dry Yes   Access Visible Yes    notified of access issue? N/A   Heparin given? N/A   Intra-Hemodialysis Comments hd tx completed   Post-Hemodialysis Assessment   Rinseback Volume (mL) 250 mL   Blood Volume Processed (Liters) 73.5 L   Dialyzer Clearance Heavily streaked   Duration of Treatment (minutes) 210 minutes   Hemodialysis Intake (mL) 500 mL   Total UF (mL) 4008 mL   Net Fluid Removal 3508   Patient Response to Treatment well tolerated,   Post-Treatment Weight 82 kg (180 lb 12.2 oz)   Treatment Weight Change -3.51   Arterial bleeding stop time (min) 0 min   Venous bleeding stop time (min) 0 min   Post-Hemodialysis Comments blood reperfused and post hd tx line care done according to P&P. RTF with primary ER RN as same.

## 2022-01-18 NOTE — DISCHARGE SUMMARY
[facility]– [place] Medicine  Discharge Summary      Patient Name: Lizz Alas  MRN: [id number]  Patient Class: OP- Observation  Admission Date: 1/17/2022  Hospital Length of Stay: 0 days  Discharge Date and Time: 1/18/2022  3:28 PM  Attending Physician: Dr. Sonia Duron    Discharging Provider: Pili Peraza NP  Primary Care Provider: Primary Doctor No      HPI:   Lizz Alas is a 71 y.o. male with PMHx of ERSD on HD, HLD, HTN, PAF, anemia of CKD, and h/o alcohol abuse who presented to the Emergency Department with c/o SOB that has progressively worsened since this AM. No aggravating or alleviating factors. Associated BLE edema. Denies CP, palpitations, wheezing, cough, orthopnea, PND, weight gain, ABD pain, N/V/D, back pain, HA, lightheadedness, dizziness, syncope, weakness, rhinorrhea, sore throat, congestion, fever or chills. Patient reports he started dialysis 2 weeks ago and receives HD in the ED every 3 days. He was last dialyzed 3 days ago. Work-up in the ED showed: creatinine 8.1, BUN 76, potassium 4.7, , troponin 0.015, COVID negative. CXR consistent with pulmonary edema. EKG unrevealing. Case discussed with Nephrology per the ED, who recommended IV Lasix x 1 dose and will dialysis in the AM. He received 80 mg IV Lasix in the ED. Hospital Medicine was contacted for admission and patient placed in Observation.  Patient is a Full Code.       * No surgery found *      Hospital Course:   Pt admitted to Observation for Pulmonary edema in the setting of ESRD.  Nephrology consulted.  Patient received HD via existing Vas Cath and therapy tolerated without complication.  Patient and family counseled on the importance of maintaining compliance with dietary restrictions and prescribed medications with understanding verbalized.  Vital signs improved and no signs of acute distress appreciated upon exam.  Vas Cath sight intact. Case discussed with Nephrology and patient cleared for discharge. Patient seen  and examined in the stable for discharge to home accompanied by son.  Medications reconciled.  Patient instructed to follow up and establish with primary care provider upon discharge for further evaluation.  Patient scheduled to return to ED on Thursday for repeat HD therapy.        Goals of Care Treatment Preferences:  Code Status: Full Code      Consults:   Consults (From admission, onward)        Status Ordering Provider     Inpatient consult to Nephrology  Once        Provider:  MD Eugenie Odell CELESTE B.          Final Active Diagnoses:    Diagnosis Date Noted POA    PRINCIPAL PROBLEM:  Pulmonary edema [J81.1] 01/17/2022 Yes    ESRD on hemodialysis [N18.6, Z99.2] 01/17/2022 Not Applicable     Chronic    Paroxysmal atrial fibrillation [I48.0] 12/27/2021 Yes     Chronic    Primary hypertension [I10] 12/27/2021 Yes     Chronic      Problems Resolved During this Admission:       Discharged Condition: stable    Disposition: Home or Self Care    Follow Up:   Follow-up Information     [facility] In 3 days.    Why: -hospital follow up   Contact information:  [address]  [facility] [address]  [phone]                       Patient Instructions:      Diet renal     Notify your health care provider if you experience any of the following:  temperature >100.4     Notify your health care provider if you experience any of the following:  persistent nausea and vomiting or diarrhea     Notify your health care provider if you experience any of the following:  increased confusion or weakness     Notify your health care provider if you experience any of the following:  persistent dizziness, light-headedness, or visual disturbances     Notify your health care provider if you experience any of the following:  redness, tenderness, or signs of infection (pain, swelling, redness, odor or green/yellow discharge around incision site)     Notify your health care provider if you  experience any of the following:  severe uncontrolled pain     Notify your health care provider if you experience any of the following:  difficulty breathing or increased cough     Activity as tolerated       Significant Diagnostic Studies: Labs: All labs within the past 24 hours have been reviewed    Pending Diagnostic Studies:     None         Medications:  Reconciled Home Medications:      Medication List      START taking these medications    furosemide 40 MG tablet  Commonly known as: LASIX  Take 1 tablet (40 mg total) by mouth 2 (two) times daily.        CONTINUE taking these medications    amLODIPine 5 MG tablet  Commonly known as: NORVASC  Take 5 mg by mouth once daily.     folic acid 1 MG tablet  Commonly known as: FOLVITE  Take 1 tablet (1 mg total) by mouth once daily.     metoprolol tartrate 25 MG tablet  Commonly known as: LOPRESSOR  Take 1 tablet (25 mg total) by mouth 2 (two) times daily.     rosuvastatin 20 MG tablet  Commonly known as: CRESTOR  Take 20 mg by mouth once daily.     sevelamer carbonate 800 mg Tab  Commonly known as: RENVELA  Take 2 tablets (1,600 mg total) by mouth 3 (three) times daily with meals.     tamsulosin 0.4 mg Cap  Commonly known as: FLOMAX  Take 1 capsule (0.4 mg total) by mouth every evening.     thiamine 100 MG tablet  Take 1 tablet (100 mg total) by mouth once daily.        STOP taking these medications    hydroCHLOROthiazide 12.5 mg capsule  Commonly known as: MICROZIDE     ondansetron 4 MG tablet  Commonly known as: ZOFRAN            Indwelling Lines/Drains at time of discharge:   Lines/Drains/Airways     Central Venous Catheter Line                 Hemodialysis Catheter 01/10/22 1015 right internal jugular 8 days                Time spent on the discharge of patient: > 30 minutes         Pili Peraza NP  Department of Hospital Medicine  '[place] [facility]

## 2022-01-18 NOTE — PLAN OF CARE
Call out for emergent hdtx , 4 hour I-HDTX now in progress as ordered for Dr Issa. Pt consented for treatment.

## 2022-01-18 NOTE — H&P
[facility]  [facility] Medicine  History & Physical    Patient Name: Lizz Alas  MRN: [id number]  Patient Class: OP- Observation  Admission Date: 1/17/2022  Attending Physician: Sonia Duron MD   Primary Care Provider: Primary Doctor No         Patient information was obtained from patient, relative(s), past medical records and ER records.     Subjective:     Principal Problem:Pulmonary edema    Chief Complaint:   Chief Complaint   Patient presents with    Shortness of Breath     Patient is having increased BLE swelling and shortness of breath. Patient does dialysis twice a week. Last dialysis was Thursday and he feels like he needs dialysis now. Is scheduled for dialysis tomorrow.         HPI: Lizz Alas is a 71 y.o. male with PMHx of ERSD on HD, HLD, HTN, PAF, anemia of CKD, and h/o alcohol abuse who presented to the Emergency Department with c/o SOB that has progressively worsened since this AM. No aggravating or alleviating factors. Associated BLE edema. Denies CP, palpitations, wheezing, cough, orthopnea, PND, weight gain, ABD pain, N/V/D, back pain, HA, lightheadedness, dizziness, syncope, weakness, rhinorrhea, sore throat, congestion, fever or chills. Patient reports he started dialysis 2 weeks ago and receives HD in the ED every 3 days. He was last dialyzed 3 days ago. Work-up in the ED showed: creatinine 8.1, BUN 76, potassium 4.7, , troponin 0.015, COVID negative. CXR consistent with pulmonary edema. EKG unrevealing. Case discussed with Nephrology per the ED, who recommended IV Lasix x 1 dose and will dialysis in the AM. He received 80 mg IV Lasix in the ED. Hospital Medicine was contacted for admission and patient placed in Observation.  Patient is a Full Code.       Past Medical History:   Diagnosis Date    ESRD (end stage renal disease)     Hyperlipemia     Hypertension     Other lesions of oral mucosa     PAF (paroxysmal atrial fibrillation)     Renal disorder      Thrombocytopenia        Past Surgical History:   Procedure Laterality Date    ERCP N/A 12/28/2021    Procedure: ERCP (ENDOSCOPIC RETROGRADE CHOLANGIOPANCREATOGRAPHY);  Surgeon: Melchor Sarmiento MD;  Location: [facility] OR;  Service: Endoscopy;  Laterality: N/A;    FRACTURE SURGERY      HERNIA REPAIR      REPLACEMENT OF DIALYSIS CATHETER OVER GUIDEWIRE THROUGH EXISTING VENOUS ACCESS N/A 1/10/2022    Procedure: REPLACEMENT, CATHETER, DIALYSIS, OVER GUIDEWIRE, USING EXISTING VENOUS ACCESS;  Surgeon: Olayinka Vivar MD;  Location: [facility] CATH LAB;  Service: Vascular;  Laterality: N/A;       Review of patient's allergies indicates:  No Known Allergies    No current facility-administered medications on file prior to encounter.     Current Outpatient Medications on File Prior to Encounter   Medication Sig    amLODIPine (NORVASC) 5 MG tablet Take 5 mg by mouth once daily.    folic acid (FOLVITE) 1 MG tablet Take 1 tablet (1 mg total) by mouth once daily.    hydroCHLOROthiazide (MICROZIDE) 12.5 mg capsule Take 12.5 mg by mouth once daily.    metoprolol tartrate (LOPRESSOR) 25 MG tablet Take 1 tablet (25 mg total) by mouth 2 (two) times daily.    ondansetron (ZOFRAN) 4 MG tablet Take 1 tablet (4 mg total) by mouth every 6 (six) hours. For nausea    rosuvastatin (CRESTOR) 20 MG tablet Take 20 mg by mouth once daily.    sevelamer carbonate (RENVELA) 800 mg Tab Take 2 tablets (1,600 mg total) by mouth 3 (three) times daily with meals.    tamsulosin (FLOMAX) 0.4 mg Cap Take 1 capsule (0.4 mg total) by mouth every evening.    thiamine 100 MG tablet Take 1 tablet (100 mg total) by mouth once daily.     Family History    Reviewed and not pertinent.        Tobacco Use    Smoking status: Current Every Day Smoker     Packs/day: 1.50     Years: 45.00     Pack years: 67.50     Types: Cigarettes    Smokeless tobacco: Never Used   Substance and Sexual Activity    Alcohol use: Yes     Comment: 2-3 glasses of whiskey daily     Drug use: Never    Sexual activity: Not on file     Review of Systems   Constitutional: Negative for chills, diaphoresis, fatigue, fever and unexpected weight change.   HENT: Negative for congestion, postnasal drip, rhinorrhea, sinus pressure and sore throat.    Respiratory: Positive for shortness of breath. Negative for cough and wheezing.    Cardiovascular: Positive for leg swelling. Negative for chest pain and palpitations.   Gastrointestinal: Negative for abdominal pain, diarrhea, nausea and vomiting.   Genitourinary: Negative for dysuria and hematuria.   Musculoskeletal: Negative for arthralgias, back pain and myalgias.   Skin: Negative for pallor and rash.   Neurological: Negative for dizziness, syncope, weakness, light-headedness, numbness and headaches.   Psychiatric/Behavioral: Negative for confusion.   All other systems reviewed and are negative.    Objective:     Vital Signs (Most Recent):  Temp: 97.6 °F (36.4 °C) (01/17/22 1911)  Pulse: 74 (01/17/22 2117)  Resp: (!) 22 (01/17/22 2117)  BP: (!) 189/91 (01/17/22 2117)  SpO2: 98 % (01/17/22 2117) Vital Signs (24h Range):  Temp:  [97.6 °F (36.4 °C)] 97.6 °F (36.4 °C)  Pulse:  [68-74] 74  Resp:  [22-24] 22  SpO2:  [94 %-98 %] 98 %  BP: (180-189)/(81-91) 189/91     Weight: 85.5 kg (188 lb 7.9 oz)  Body mass index is 26.99 kg/m².    Physical Exam  Vitals and nursing note reviewed.   Constitutional:       General: He is awake. He is not in acute distress.     Appearance: Normal appearance. He is well-developed. He is not diaphoretic.   HENT:      Head: Normocephalic and atraumatic.   Eyes:      Conjunctiva/sclera: Conjunctivae normal.   Cardiovascular:      Rate and Rhythm: Normal rate and regular rhythm.  No extrasystoles are present.     Heart sounds: S1 normal and S2 normal. No murmur heard.       Comments: Vas Cath to RCW.  Pulmonary:      Effort: Pulmonary effort is normal. No tachypnea.      Breath sounds: Normal breath sounds and air entry. No wheezing,  rhonchi or rales.   Abdominal:      General: Bowel sounds are normal. There is no distension.      Palpations: Abdomen is soft.      Tenderness: There is no abdominal tenderness.   Musculoskeletal:         General: No tenderness or deformity. Normal range of motion.      Cervical back: Normal range of motion and neck supple.      Right lower leg: 3+ Pitting Edema present.      Left lower leg: 3+ Pitting Edema present.   Skin:     General: Skin is warm and dry.      Capillary Refill: Capillary refill takes less than 2 seconds.      Findings: No erythema or rash.   Neurological:      General: No focal deficit present.      Mental Status: He is alert and oriented to person, place, and time.   Psychiatric:         Mood and Affect: Mood and affect normal.         Behavior: Behavior normal. Behavior is cooperative.             Significant Labs:  Results for orders placed or performed during the hospital encounter of 01/17/22   CBC auto differential   Result Value Ref Range    WBC 6.46 3.90 - 12.70 K/uL    RBC 3.00 (L) 4.60 - 6.20 M/uL    Hemoglobin 9.2 (L) 14.0 - 18.0 g/dL    Hematocrit 27.7 (L) 40.0 - 54.0 %    MCV 92 82 - 98 fL    MCH 30.7 27.0 - 31.0 pg    MCHC 33.2 32.0 - 36.0 g/dL    RDW 14.2 11.5 - 14.5 %    Platelets 170 150 - 450 K/uL    MPV 9.8 9.2 - 12.9 fL    Immature Granulocytes 0.5 0.0 - 0.5 %    Gran # (ANC) 4.0 1.8 - 7.7 K/uL    Immature Grans (Abs) 0.03 0.00 - 0.04 K/uL    Lymph # 0.9 (L) 1.0 - 4.8 K/uL    Mono # 0.7 0.3 - 1.0 K/uL    Eos # 0.8 (H) 0.0 - 0.5 K/uL    Baso # 0.04 0.00 - 0.20 K/uL    nRBC 0 0 /100 WBC    Gran % 62.1 38.0 - 73.0 %    Lymph % 14.4 (L) 18.0 - 48.0 %    Mono % 10.8 4.0 - 15.0 %    Eosinophil % 11.6 (H) 0.0 - 8.0 %    Basophil % 0.6 0.0 - 1.9 %    Differential Method Automated    Comprehensive metabolic panel   Result Value Ref Range    Sodium 136 136 - 145 mmol/L    Potassium 4.7 3.5 - 5.1 mmol/L    Chloride 102 95 - 110 mmol/L    CO2 19 (L) 23 - 29 mmol/L    Glucose 98 70 - 110  mg/dL    BUN 76 (H) 8 - 23 mg/dL    Creatinine 8.1 (H) 0.5 - 1.4 mg/dL    Calcium 8.3 (L) 8.7 - 10.5 mg/dL    Total Protein 6.5 6.0 - 8.4 g/dL    Albumin 2.7 (L) 3.5 - 5.2 g/dL    Total Bilirubin 0.7 0.1 - 1.0 mg/dL    Alkaline Phosphatase 82 55 - 135 U/L    AST 13 10 - 40 U/L    ALT 15 10 - 44 U/L    Anion Gap 15 8 - 16 mmol/L    eGFR if African American 7 (A) >60 mL/min/1.73 m^2    eGFR if non African American 6 (A) >60 mL/min/1.73 m^2   Troponin I #1   Result Value Ref Range    Troponin I 0.015 0.000 - 0.026 ng/mL   BNP   Result Value Ref Range     (H) 0 - 99 pg/mL   POCT COVID-19 Rapid Screening   Result Value Ref Range    POC Rapid COVID Negative Negative     Acceptable Yes       All pertinent labs within the past 24 hours have been reviewed.    Significant Imaging:  Imaging Results          X-Ray Chest AP Portable (Final result)  Result time 01/17/22 19:43:33    Final result by Thor Bansal MD (01/17/22 19:43:33)                 Impression:      See above.      Electronically signed by: Thor Bansal MD  Date:    01/17/2022  Time:    19:43             Narrative:    EXAMINATION:  XR CHEST AP PORTABLE    CLINICAL HISTORY:  SOB, shortness of breath;    COMPARISON:  01/03/2022 chest x-ray    FINDINGS:  A right dual lumen vascular catheter is present.    The heart size is normal.    There are hazy infiltrates compatible with pulmonary edema/CHF.                               I have reviewed all pertinent imaging results/findings within the past 24 hours.       Results for orders placed or performed during the hospital encounter of 01/17/22   EKG 12-lead    Collection Time: 01/17/22  7:29 PM    Narrative    Test Reason : R06.02,    Vent. Rate : 063 BPM     Atrial Rate : 063 BPM     P-R Int : 148 ms          QRS Dur : 094 ms      QT Int : 428 ms       P-R-T Axes : 007 015 040 degrees     QTc Int : 437 ms    Normal sinus rhythm with sinus arrhythmia  Incomplete right bundle branch  block  Borderline Abnormal ECG  When compared with ECG of 29-DEC-2021 05:47,  Premature supraventricular complexes are no longer Present  T wave inversion no longer evident in Inferior leads    Referred By: [email or web address]   SELF           Confirmed By:                  Assessment/Plan:     * Pulmonary edema  - 80 mg IV Lasix given in the ED. Further volume management via HD.  - Strict I&Os, daily weights, Na/fluid restriction.     ESRD on hemodialysis  - Nephrology consult. Plans to dialyze in AM.     Primary hypertension  - Continue home antihypertensives.     Paroxysmal atrial fibrillation  - Remains in sinus rhythm on admission. Monitor telemetry.  - Continue home BB.  - Patient is not on chronic AC. Will defer to his primary cardiologist.       VTE Risk Mitigation (From admission, onward)         Ordered     heparin (porcine) injection 5,000 Units  Every 8 hours         01/17/22 2152     IP VTE HIGH RISK PATIENT  Once         01/17/22 2152     Place sequential compression device  Until discontinued         01/17/22 2152                   Paige Whitaker NP  Department of Hospital Medicine   [facility] - Emergency Dept.

## 2022-01-18 NOTE — ASSESSMENT & PLAN NOTE
- 80 mg IV Lasix given in the ED. Further volume management via HD.  - Strict I&Os, daily weights, Na/fluid restriction.

## 2022-01-18 NOTE — PLAN OF CARE
Pt is ready for discharge. AVS explained. All questions and concerns addressed. Teach back done. Pt verbalized understanding. IV removed.

## 2022-01-18 NOTE — CONSULTS
Lizz Alas is a 71 y.o. male for whom nephrology consult has been requested to evaluate and give opinion.     HPI:H/O Acute renal failure sec to biliary sepsis,liver failure became HD dependent for clearence since 01/01/2022 presented to ER for HD last PM with C/O SOB,last HD was on Thursday,receiving HD 2/week Monday & Thursdays,Pt does speaks Yoruba,spoke to his Nephew on the phone & also History obtained from medical records,he was given IV lasix in ER.Pt seen on HD,comfortable.    PAST MEDICAL HISTORY:  He  has a past medical history of ESRD (end stage renal disease), Hyperlipemia, Hypertension, Other lesions of oral mucosa, PAF (paroxysmal atrial fibrillation), Renal disorder, and Thrombocytopenia.    PAST SURGICAL HISTORY:  He  has a past surgical history that includes Hernia repair; Fracture surgery; ERCP (N/A, 12/28/2021); and Replacement of dialysis catheter over guidewire through existing venous access (N/A, 1/10/2022).    SOCIAL HISTORY:  He  reports that he has been smoking cigarettes. He has a 67.50 pack-year smoking history. He has never used smokeless tobacco. He reports current alcohol use. He reports that he does not use drugs.    FAMILY MEDICAL HISTORY:  His family history is not on file.    Review of patient's allergies indicates:  No Known Allergies     amLODIPine  5 mg Oral Daily    atorvastatin  80 mg Oral Daily    folic acid  1 mg Oral Daily    heparin (porcine)  5,000 Units Subcutaneous Q8H    metoprolol tartrate  25 mg Oral BID    sevelamer carbonate  1,600 mg Oral TID WM    tamsulosin  0.4 mg Oral QHS    thiamine  100 mg Oral Daily       Prior to Admission medications    Medication Sig Start Date End Date Taking? Authorizing Provider   amLODIPine (NORVASC) 5 MG tablet Take 5 mg by mouth once daily.    Historical Provider   folic acid (FOLVITE) 1 MG tablet Take 1 tablet (1 mg total) by mouth once daily. 1/12/22 1/12/23  Nitesh Lopez MD   hydroCHLOROthiazide (MICROZIDE) 12.5 mg  capsule Take 12.5 mg by mouth once daily.    Historical Provider   metoprolol tartrate (LOPRESSOR) 25 MG tablet Take 1 tablet (25 mg total) by mouth 2 (two) times daily. 1/11/22 1/11/23  Nitesh Lopez MD   ondansetron (ZOFRAN) 4 MG tablet Take 1 tablet (4 mg total) by mouth every 6 (six) hours. For nausea 10/9/20   Enriqueta Solis Do, MD   rosuvastatin (CRESTOR) 20 MG tablet Take 20 mg by mouth once daily.    Historical Provider   sevelamer carbonate (RENVELA) 800 mg Tab Take 2 tablets (1,600 mg total) by mouth 3 (three) times daily with meals. 1/11/22 1/11/23  Nitesh Lopez MD   tamsulosin (FLOMAX) 0.4 mg Cap Take 1 capsule (0.4 mg total) by mouth every evening. 1/11/22 1/11/23  Nitesh Lopez MD   thiamine 100 MG tablet Take 1 tablet (100 mg total) by mouth once daily. 1/12/22   Nitesh Lopez MD            Review of Systems   Constitutional: Negative.    HENT: Negative.    Eyes: Negative.    Respiratory: Positive for shortness of breath.    Cardiovascular: Positive for leg swelling.   Gastrointestinal: Negative.    Genitourinary: Negative.    Musculoskeletal: Negative.    Skin: Negative.    Neurological: Negative.    Endo/Heme/Allergies: Negative.    Psychiatric/Behavioral: Negative.            No intake or output data in the 24 hours ending 01/18/22 1103    PHYSICAL EXAM:  Physical Exam  Eyes:      Pupils: Pupils are equal, round, and reactive to light.   Cardiovascular:      Heart sounds: S1 normal and S2 normal.   Pulmonary:      Effort: No respiratory distress.   Abdominal:      General: Bowel sounds are normal.   Musculoskeletal:         General: No swelling.      Right lower leg: No edema.      Left lower leg: No edema.   Skin:     General: Skin is warm.   Neurological:      Mental Status: He is alert.   Psychiatric:         Mood and Affect: Mood is anxious.              Recent Labs   Lab 01/13/22  0906 01/17/22 2000   * 136   K 5.0 4.7   CL 99 102   CO2 20* 19*   BUN 52* 76*    CREATININE 8.1* 8.1*   CALCIUM 8.1* 8.3*   PHOS 6.7*  --      Recent Labs   Lab 01/17/22 2000   WBC 6.46   HGB 9.2*   HCT 27.7*          IMPRESSION AND RECOMMENDATIONS:    Acute renal failure:requiting HD for clearence & volume,tolerating  UF well  Metabolic Acidosis :on HD  HTN:To optimise volume status  Anemia:Hb low will give epogen    Thank you for allowing me to participate in this patient care.

## 2022-01-18 NOTE — HPI
Lizz Alas is a 71 y.o. male with PMHx of ERSD on HD, HLD, HTN, PAF, anemia of CKD, and h/o alcohol abuse who presented to the Emergency Department with c/o SOB that has progressively worsened since this AM. No aggravating or alleviating factors. Associated BLE edema. Denies CP, palpitations, wheezing, cough, orthopnea, PND, weight gain, ABD pain, N/V/D, back pain, HA, lightheadedness, dizziness, syncope, weakness, rhinorrhea, sore throat, congestion, fever or chills. Patient reports he started dialysis 2 weeks ago and receives HD in the ED every 3 days. He was last dialyzed 3 days ago. Work-up in the ED showed: creatinine 8.1, BUN 76, potassium 4.7, , troponin 0.015, COVID negative. CXR consistent with pulmonary edema. EKG unrevealing. Case discussed with Nephrology per the ED, who recommended IV Lasix x 1 dose and will dialysis in the AM. He received 80 mg IV Lasix in the ED. Hospital Medicine was contacted for admission and patient placed in Observation.  Patient is a Full Code.

## 2022-01-18 NOTE — ASSESSMENT & PLAN NOTE
- Remains in sinus rhythm on admission. Monitor telemetry.  - Continue home BB.  - Patient is not on chronic AC. Will defer to his primary cardiologist.

## 2022-01-20 ENCOUNTER — HOSPITAL ENCOUNTER (EMERGENCY)
Facility: HOSPITAL | Age: 72
Discharge: LEFT BEFORE DISCHARGE | End: 2022-01-20
Attending: FAMILY MEDICINE
Payer: MEDICAID

## 2022-01-20 VITALS
HEIGHT: 70 IN | RESPIRATION RATE: 18 BRPM | SYSTOLIC BLOOD PRESSURE: 158 MMHG | BODY MASS INDEX: 27.05 KG/M2 | OXYGEN SATURATION: 94 % | TEMPERATURE: 98 F | HEART RATE: 67 BPM | DIASTOLIC BLOOD PRESSURE: 75 MMHG

## 2022-01-20 DIAGNOSIS — N18.6 ESRD (END STAGE RENAL DISEASE) ON DIALYSIS: Primary | ICD-10-CM

## 2022-01-20 DIAGNOSIS — Z99.2 ESRD (END STAGE RENAL DISEASE) ON DIALYSIS: Primary | ICD-10-CM

## 2022-01-20 LAB
ANION GAP SERPL CALC-SCNC: 15 MMOL/L (ref 8–16)
BUN SERPL-MCNC: 59 MG/DL (ref 8–23)
CALCIUM SERPL-MCNC: 8.5 MG/DL (ref 8.7–10.5)
CHLORIDE SERPL-SCNC: 98 MMOL/L (ref 95–110)
CO2 SERPL-SCNC: 26 MMOL/L (ref 23–29)
CREAT SERPL-MCNC: 5.6 MG/DL (ref 0.5–1.4)
EST. GFR  (AFRICAN AMERICAN): 11 ML/MIN/1.73 M^2
EST. GFR  (NON AFRICAN AMERICAN): 9 ML/MIN/1.73 M^2
GLUCOSE SERPL-MCNC: 100 MG/DL (ref 70–110)
POTASSIUM SERPL-SCNC: 5 MMOL/L (ref 3.5–5.1)
SODIUM SERPL-SCNC: 139 MMOL/L (ref 136–145)

## 2022-01-20 PROCEDURE — 99283 EMERGENCY DEPT VISIT LOW MDM: CPT | Mod: 25

## 2022-01-20 PROCEDURE — 80048 BASIC METABOLIC PNL TOTAL CA: CPT | Performed by: EMERGENCY MEDICINE

## 2022-01-20 NOTE — ED PROVIDER NOTES
RAZIA #1 NOTE: I, Osvaldo Corbin, am scribing for, and in the presence of, Ramandeep Otoole MD. I have scribed the entire note.       History     Chief Complaint   Patient presents with    Dialysis     Pt presents to the ER requesting dialysis, normal dialysis Tuesday/ Thursday/ Saturday     Review of patient's allergies indicates:  No Known Allergies      History of Present Illness     HPI    1/20/2022, 5:45 PM  History obtained from the patient.  Pt eloped before being seen.      History of Present Illness: Lizz Alas is a 71 y.o. male patient with a PMHx of ESRD, HTN, HLD, and renal disorder who presents to the Emergency Department for evaluation of dialysis which onset gradually today. Pt is requesting dialysis, and he is a normal Tuesday/Thusrday/Saturday dialysis patient. Pt eloped before being seen. No further complaints or concerns at this time.       Arrival mode: Personal vehicle    PCP: Primary Doctor No        Past Medical History:  Past Medical History:   Diagnosis Date    ESRD (end stage renal disease)     Hyperlipemia     Hypertension     Other lesions of oral mucosa     PAF (paroxysmal atrial fibrillation)     Renal disorder     Thrombocytopenia        Past Surgical History:  Past Surgical History:   Procedure Laterality Date    ERCP N/A 12/28/2021    Procedure: ERCP (ENDOSCOPIC RETROGRADE CHOLANGIOPANCREATOGRAPHY);  Surgeon: Melchor Sarmiento MD;  Location: [facility] OR;  Service: Endoscopy;  Laterality: N/A;    FRACTURE SURGERY      HERNIA REPAIR      REPLACEMENT OF DIALYSIS CATHETER OVER GUIDEWIRE THROUGH EXISTING VENOUS ACCESS N/A 1/10/2022    Procedure: REPLACEMENT, CATHETER, DIALYSIS, OVER GUIDEWIRE, USING EXISTING VENOUS ACCESS;  Surgeon: Olayinka Vivar MD;  Location: [facility] CATH LAB;  Service: Vascular;  Laterality: N/A;         Family History:  No family history on file.    Social History:  Social History     Tobacco Use    Smoking status: Current Every Day Smoker     Packs/day:  "1.50     Years: 45.00     Pack years: 67.50     Types: Cigarettes    Smokeless tobacco: Never Used   Substance and Sexual Activity    Alcohol use: Yes     Comment: 2-3 glasses of whiskey daily    Drug use: Never    Sexual activity: Not on file        Review of Systems     Review of Systems   Reason unable to perform ROS: pt eloped.      Physical Exam     Initial Vitals [01/20/22 1409]   BP Pulse Resp Temp SpO2   (!) 158/75 67 18 98 °F (36.7 °C) (!) 94 %      MAP       --          Physical Exam  Nursing Notes and Vital Signs Reviewed. Physical Exam cannot be done due to pt elopement.     ED Course   Procedures  ED Vital Signs:  Vitals:    01/20/22 1409   BP: (!) 158/75   Pulse: 67   Resp: 18   Temp: 98 °F (36.7 °C)   TempSrc: Oral   SpO2: (!) 94%   Height: 5' 10" (1.778 m)       Abnormal Lab Results:  Labs Reviewed   BASIC METABOLIC PANEL - Abnormal; Notable for the following components:       Result Value    BUN 59 (*)     Creatinine 5.6 (*)     Calcium 8.5 (*)     eGFR if  11 (*)     eGFR if non  9 (*)     All other components within normal limits        All Lab Results:  Results for orders placed or performed during the hospital encounter of 01/20/22   Basic metabolic panel   Result Value Ref Range    Sodium 139 136 - 145 mmol/L    Potassium 5.0 3.5 - 5.1 mmol/L    Chloride 98 95 - 110 mmol/L    CO2 26 23 - 29 mmol/L    Glucose 100 70 - 110 mg/dL    BUN 59 (H) 8 - 23 mg/dL    Creatinine 5.6 (H) 0.5 - 1.4 mg/dL    Calcium 8.5 (L) 8.7 - 10.5 mg/dL    Anion Gap 15 8 - 16 mmol/L    eGFR if African American 11 (A) >60 mL/min/1.73 m^2    eGFR if non African American 9 (A) >60 mL/min/1.73 m^2         Imaging Results:  Imaging Results    None                 The Emergency Provider reviewed the vital signs and test results, which are outlined above.     ED Discussion     8:10 PM: Patient has eloped at this time.          Medical Decision Making:   Clinical Tests:   Lab Tests: Ordered and " Reviewed           ED Medication(s):  Medications - No data to display    Discharge Medication List as of 1/20/2022  7:27 PM                  Scribe Attestation:   Scribe #1: I performed the above scribed service and the documentation accurately describes the services I performed. I attest to the accuracy of the note.     Attending:   Physician Attestation Statement for Scribe #1: I, Ramandeep Otoole MD, personally performed the services described in this documentation, as scribed by Osvaldo Corbin, in my presence, and it is both accurate and complete.           Clinical Impression     Dialysis      Disposition:   Disposition: Eloped  Condition: Stable         Ramandeep Otoole MD  01/22/22 0943

## 2022-01-20 NOTE — HOSPITAL COURSE
Pt admitted to Observation for Pulmonary edema in the setting of ESRD.  Nephrology consulted.  Patient received HD and tolerated without complication.  Patient and family counseled on the importance of maintaining compliance with dietary restrictions and prescribed medications with understanding verbalized.  Vital signs improved and no signs of acute distress appreciated upon exam. Patient seen and examined in the stable for discharge to home accompanied by son.  Medications reconciled.  Patient instructed to follow up and establish with primary care provider upon discharge for further evaluation.  Patient scheduled to return to ED on Thursday for repeat HD therapy.

## 2022-01-21 ENCOUNTER — HOSPITAL ENCOUNTER (EMERGENCY)
Facility: HOSPITAL | Age: 72
Discharge: HOME | End: 2022-01-21
Attending: EMERGENCY MEDICINE
Payer: MEDICAID

## 2022-01-21 VITALS
HEART RATE: 99 BPM | RESPIRATION RATE: 20 BRPM | TEMPERATURE: 98 F | BODY MASS INDEX: 25.69 KG/M2 | OXYGEN SATURATION: 99 % | SYSTOLIC BLOOD PRESSURE: 150 MMHG | DIASTOLIC BLOOD PRESSURE: 87 MMHG | WEIGHT: 179 LBS

## 2022-01-21 DIAGNOSIS — N18.6 ESRD (END STAGE RENAL DISEASE): Primary | ICD-10-CM

## 2022-01-21 PROCEDURE — G0257 UNSCHED DIALYSIS ESRD PT HOS: HCPCS

## 2022-01-21 PROCEDURE — 99283 EMERGENCY DEPT VISIT LOW MDM: CPT | Mod: 25

## 2022-01-21 PROCEDURE — 80100014 HC HEMODIALYSIS 1:1

## 2022-01-21 PROCEDURE — 90935 PR HEMODIALYSIS, ONE EVALUATION: ICD-10-PCS | Mod: ,,, | Performed by: INTERNAL MEDICINE

## 2022-01-21 PROCEDURE — 90935 HEMODIALYSIS ONE EVALUATION: CPT | Mod: ,,, | Performed by: INTERNAL MEDICINE

## 2022-01-21 RX ORDER — SODIUM CHLORIDE 9 MG/ML
INJECTION, SOLUTION INTRAVENOUS
Status: CANCELLED | OUTPATIENT
Start: 2022-01-21

## 2022-01-21 RX ORDER — SODIUM CHLORIDE 9 MG/ML
INJECTION, SOLUTION INTRAVENOUS ONCE
Status: CANCELLED | OUTPATIENT
Start: 2022-01-21 | End: 2022-01-21

## 2022-01-21 NOTE — PROGRESS NOTES
Asked to dialyze patient for his routine ESRD needs; was seen and examined in HD acute unit and arranged his HD for 3 hrs; 2K bath; 2 liters UF. OK for dc post HD if stable.  Call with interim concerns; [phone].      Lauri Strange MD

## 2022-01-21 NOTE — ED PROVIDER NOTES
SCRIBE #1 NOTE: I, Ivan Arias, am scribing for, and in the presence of, Maria G Celeste MD. I have scribed the entire note.      History      Chief Complaint   Patient presents with    Emergency Dialysis     Pt is here for dialysis. He was recently here for gallstones and had a infection that affected his kidney function. He was recommended to return by his nephrologist. Pt speaks Aribic and has his nephew translate for him.       Review of patient's allergies indicates:  No Known Allergies     HPI   HPI    1/21/2022, 9:20 AM   History obtained from the patient and his daughter and       History of Present Illness: iLzz Alas is a 71 y.o. male patient with a PMHx of ESRD who presents to the Emergency Department for dialysis. Pt last dialyzed 3 days ago (Tuesday), but has been unable to get established with an outpatient dialysis center. Pt denies any sxs at this time. No mitigating or exacerbating factors reported. No associated sxs reported. Patient denies any fever, chills, n/v/d, SOB, CP, weakness, numbness, dizziness, headache, and all other sxs at this time. No further complaints or concerns at this time.     Arrival mode: Personal vehicle    PCP: Primary Doctor No       Past Medical History:  Past Medical History:   Diagnosis Date    ESRD (end stage renal disease)     Hyperlipemia     Hypertension     Other lesions of oral mucosa     PAF (paroxysmal atrial fibrillation)     Renal disorder     Thrombocytopenia        Past Surgical History:  Past Surgical History:   Procedure Laterality Date    ERCP N/A 12/28/2021    Procedure: ERCP (ENDOSCOPIC RETROGRADE CHOLANGIOPANCREATOGRAPHY);  Surgeon: Melchor Sarmiento MD;  Location: [facility];  Service: Endoscopy;  Laterality: N/A;    FRACTURE SURGERY      HERNIA REPAIR      REPLACEMENT OF DIALYSIS CATHETER OVER GUIDEWIRE THROUGH EXISTING VENOUS ACCESS N/A 1/10/2022    Procedure: REPLACEMENT, CATHETER, DIALYSIS, OVER GUIDEWIRE, USING  EXISTING VENOUS ACCESS;  Surgeon: Olaynika Vivar MD;  Location: [facility] CATH LAB;  Service: Vascular;  Laterality: N/A;         Family History:  No family history on file.    Social History:  Social History     Tobacco Use    Smoking status: Current Every Day Smoker     Packs/day: 1.50     Years: 45.00     Pack years: 67.50     Types: Cigarettes    Smokeless tobacco: Never Used   Substance and Sexual Activity    Alcohol use: Yes     Comment: 2-3 glasses of whiskey daily    Drug use: Never    Sexual activity: Not on file       ROS   Review of Systems   Constitutional: Negative for chills and fever.   HENT: Negative for sore throat.    Respiratory: Negative for shortness of breath.    Cardiovascular: Negative for chest pain.   Gastrointestinal: Negative for diarrhea, nausea and vomiting.   Genitourinary: Negative for dysuria.   Musculoskeletal: Negative for back pain.   Skin: Negative for rash.   Neurological: Negative for dizziness, weakness, light-headedness, numbness and headaches.   Hematological: Does not bruise/bleed easily.   All other systems reviewed and are negative.    Physical Exam      Initial Vitals [01/21/22 0752]   BP Pulse Resp Temp SpO2   132/62 (!) 56 18 97.6 °F (36.4 °C) 96 %      MAP       --          Physical Exam  Nursing Notes and Vital Signs Reviewed.  Constitutional: Patient is in no acute distress. Well-developed and well-nourished.  Head: Atraumatic. Normocephalic.  Eyes: PERRL. EOM intact. Conjunctivae are not pale. No scleral icterus.  ENT: Mucous membranes are moist. Oropharynx is clear and symmetric.    Neck: Supple. Full ROM.  Cardiovascular: Regular rate. Regular rhythm. No murmurs, rubs, or gallops. Distal pulses are 2+ and symmetric.  Pulmonary/Chest: No respiratory distress. Clear to auscultation bilaterally. No wheezing or rales.  Abdominal: Soft and non-distended.  There is no tenderness.  No rebound, guarding, or rigidity.   Musculoskeletal: Moves all extremities. No obvious  deformities. No edema.  Skin: Warm and dry. Vas Cath Right upper chest wall  Neurological:  Alert, awake, and appropriate.  Normal speech.  No acute focal neurological deficits are appreciated.  Psychiatric: Normal affect. Good eye contact. Appropriate in content.    ED Course    Procedures  ED Vital Signs:  Vitals:    01/21/22 0752 01/21/22 1022 01/21/22 1026 01/21/22 1030   BP: 132/62 (!) 164/95     Pulse: (!) 56 (!) 52 (!) 51 (!) 51   Resp: 18 20     Temp: 97.6 °F (36.4 °C) 97.5 °F (36.4 °C)     TempSrc: Oral Axillary     SpO2: 96% 99%     Weight: 81.2 kg (179 lb 0.2 oz)       01/21/22 1100   BP:    Pulse: 60   Resp:    Temp:    TempSrc:    SpO2:    Weight:        Abnormal Lab Results:  Labs Reviewed - No data to display   Imaging Results:  Imaging Results          X-Ray Chest PA And Lateral (Final result)  Result time 01/21/22 11:00:56    Final result by Nishant Vogel MD (01/21/22 11:00:56)                 Impression:      See above.      Electronically signed by: Nishant Vogel  Date:    01/21/2022  Time:    11:00             Narrative:    EXAMINATION:  XR CHEST PA AND LATERAL    CLINICAL HISTORY:  End stage renal disease    COMPARISON:  01/17/2022    FINDINGS:  The heart is normal in size.  Right-sided Vas-Cath in place.  Previously described interstitial edema from prior exam is nearly completely resolved.  No new infiltrates identified.                                        The Emergency Provider reviewed the vital signs and test results, which are outlined above.    ED Discussion     9:58 AM: Discussed pt's case with Dr. Strange (Nephrology), who will arrange for dialysis.    2:10 PM: Reassessed pt at this time. Patient's daughter will come pick him up, stable for discharge.  Discussed with pt all pertinent ED information and results. Discussed pt dx and plan of tx. Gave pt all f/u and return to the ED instructions. All questions and concerns were addressed at this time. Pt expresses understanding of  information and instructions, and is comfortable with plan to discharge. Pt is stable for discharge.    I discussed with patient and/or family/caretaker that evaluation in the ED does not suggest any emergent or life threatening medical conditions requiring immediate intervention beyond what was provided in the ED, and I believe patient is safe for discharge.  Regardless, an unremarkable evaluation in the ED does not preclude the development or presence of a serious of life threatening condition. As such, patient was instructed to return immediately for any worsening or change in current symptoms.         ED Medication(s):  Medications - No data to display       New Prescriptions    No medications on file         Medical Decision Making    Medical Decision Making:   Clinical Tests:   Radiological Study: Ordered and Reviewed           Scribe Attestation:   Scribe #1: I performed the above scribed service and the documentation accurately describes the services I performed. I attest to the accuracy of the note.    Attending:   Physician Attestation Statement for Scribe #1: I, Maria G Celeste MD, personally performed the services described in this documentation, as scribed by Ivan Arias, in my presence, and it is both accurate and complete.          Clinical Impression       ICD-10-CM ICD-9-CM   1. ESRD (end stage renal disease)  N18.6 585.6       Disposition:   Disposition: Discharged  Condition: Stable         Maria G Celeste MD  01/21/22 1060

## 2022-01-24 ENCOUNTER — HOSPITAL ENCOUNTER (EMERGENCY)
Facility: HOSPITAL | Age: 72
Discharge: HOME | End: 2022-01-24
Attending: EMERGENCY MEDICINE
Payer: MEDICAID

## 2022-01-24 VITALS
WEIGHT: 173.75 LBS | DIASTOLIC BLOOD PRESSURE: 74 MMHG | TEMPERATURE: 98 F | HEIGHT: 70 IN | BODY MASS INDEX: 24.87 KG/M2 | RESPIRATION RATE: 18 BRPM | SYSTOLIC BLOOD PRESSURE: 145 MMHG | HEART RATE: 69 BPM | OXYGEN SATURATION: 100 %

## 2022-01-24 DIAGNOSIS — Z99.2 END STAGE RENAL DISEASE ON DIALYSIS: Primary | ICD-10-CM

## 2022-01-24 DIAGNOSIS — N18.6 END STAGE RENAL DISEASE ON DIALYSIS: Primary | ICD-10-CM

## 2022-01-24 DIAGNOSIS — N18.9 CHRONIC RENAL FAILURE: ICD-10-CM

## 2022-01-24 LAB
ALBUMIN SERPL BCP-MCNC: 3.4 G/DL (ref 3.5–5.2)
ALP SERPL-CCNC: 83 U/L (ref 55–135)
ALT SERPL W/O P-5'-P-CCNC: 10 U/L (ref 10–44)
ANION GAP SERPL CALC-SCNC: 12 MMOL/L (ref 8–16)
ANISOCYTOSIS BLD QL SMEAR: SLIGHT
AST SERPL-CCNC: 12 U/L (ref 10–40)
BASOPHILS # BLD AUTO: 0.11 K/UL (ref 0–0.2)
BASOPHILS NFR BLD: 1.4 % (ref 0–1.9)
BILIRUB SERPL-MCNC: 0.8 MG/DL (ref 0.1–1)
BUN SERPL-MCNC: 57 MG/DL (ref 8–23)
CALCIUM SERPL-MCNC: 9 MG/DL (ref 8.7–10.5)
CHLORIDE SERPL-SCNC: 100 MMOL/L (ref 95–110)
CO2 SERPL-SCNC: 27 MMOL/L (ref 23–29)
CREAT SERPL-MCNC: 5.4 MG/DL (ref 0.5–1.4)
DIFFERENTIAL METHOD: ABNORMAL
EOSINOPHIL # BLD AUTO: 2.1 K/UL (ref 0–0.5)
EOSINOPHIL NFR BLD: 26.2 % (ref 0–8)
ERYTHROCYTE [DISTWIDTH] IN BLOOD BY AUTOMATED COUNT: 13.9 % (ref 11.5–14.5)
EST. GFR  (AFRICAN AMERICAN): 11 ML/MIN/1.73 M^2
EST. GFR  (NON AFRICAN AMERICAN): 10 ML/MIN/1.73 M^2
GLUCOSE SERPL-MCNC: 98 MG/DL (ref 70–110)
HCT VFR BLD AUTO: 32.3 % (ref 40–54)
HGB BLD-MCNC: 10.4 G/DL (ref 14–18)
IMM GRANULOCYTES # BLD AUTO: 0.04 K/UL (ref 0–0.04)
IMM GRANULOCYTES NFR BLD AUTO: 0.5 % (ref 0–0.5)
LYMPHOCYTES # BLD AUTO: 1.2 K/UL (ref 1–4.8)
LYMPHOCYTES NFR BLD: 14.7 % (ref 18–48)
MCH RBC QN AUTO: 30.1 PG (ref 27–31)
MCHC RBC AUTO-ENTMCNC: 32.2 G/DL (ref 32–36)
MCV RBC AUTO: 94 FL (ref 82–98)
MONOCYTES # BLD AUTO: 0.7 K/UL (ref 0.3–1)
MONOCYTES NFR BLD: 8 % (ref 4–15)
NEUTROPHILS # BLD AUTO: 4 K/UL (ref 1.8–7.7)
NEUTROPHILS NFR BLD: 49.2 % (ref 38–73)
NRBC BLD-RTO: 0 /100 WBC
PLATELET # BLD AUTO: 188 K/UL (ref 150–450)
PLATELET BLD QL SMEAR: ABNORMAL
PMV BLD AUTO: 9.9 FL (ref 9.2–12.9)
POIKILOCYTOSIS BLD QL SMEAR: SLIGHT
POTASSIUM SERPL-SCNC: 5.4 MMOL/L (ref 3.5–5.1)
PROT SERPL-MCNC: 7.6 G/DL (ref 6–8.4)
RBC # BLD AUTO: 3.45 M/UL (ref 4.6–6.2)
SODIUM SERPL-SCNC: 139 MMOL/L (ref 136–145)
WBC # BLD AUTO: 8.14 K/UL (ref 3.9–12.7)

## 2022-01-24 PROCEDURE — 93010 EKG 12-LEAD: ICD-10-PCS | Mod: ,,, | Performed by: INTERNAL MEDICINE

## 2022-01-24 PROCEDURE — 93005 ELECTROCARDIOGRAM TRACING: CPT

## 2022-01-24 PROCEDURE — 96374 THER/PROPH/DIAG INJ IV PUSH: CPT

## 2022-01-24 PROCEDURE — 99285 EMERGENCY DEPT VISIT HI MDM: CPT | Mod: 25

## 2022-01-24 PROCEDURE — 90935 HEMODIALYSIS ONE EVALUATION: CPT | Mod: ,,, | Performed by: INTERNAL MEDICINE

## 2022-01-24 PROCEDURE — 93010 ELECTROCARDIOGRAM REPORT: CPT | Mod: ,,, | Performed by: INTERNAL MEDICINE

## 2022-01-24 PROCEDURE — 90935 PR HEMODIALYSIS, ONE EVALUATION: ICD-10-PCS | Mod: ,,, | Performed by: INTERNAL MEDICINE

## 2022-01-24 PROCEDURE — 85025 COMPLETE CBC W/AUTO DIFF WBC: CPT | Performed by: NURSE PRACTITIONER

## 2022-01-24 PROCEDURE — 63600175 PHARM REV CODE 636 W HCPCS: Performed by: INTERNAL MEDICINE

## 2022-01-24 PROCEDURE — 80053 COMPREHEN METABOLIC PANEL: CPT | Performed by: NURSE PRACTITIONER

## 2022-01-24 RX ORDER — SODIUM CHLORIDE 9 MG/ML
INJECTION, SOLUTION INTRAVENOUS ONCE
Status: DISCONTINUED | OUTPATIENT
Start: 2022-01-24 | End: 2022-01-24 | Stop reason: HOSPADM

## 2022-01-24 RX ORDER — HEPARIN SODIUM 1000 [USP'U]/ML
4000 INJECTION, SOLUTION INTRAVENOUS; SUBCUTANEOUS ONCE
Status: COMPLETED | OUTPATIENT
Start: 2022-01-24 | End: 2022-01-24

## 2022-01-24 RX ADMIN — HEPARIN SODIUM 4000 UNITS: 1000 INJECTION, SOLUTION INTRAVENOUS; SUBCUTANEOUS at 12:01

## 2022-01-24 NOTE — FIRST PROVIDER EVALUATION
"Medical screening exam completed.  I have conducted a focused provider triage encounter, findings are as follows:    71 year old male requesting dialysis.  Pt does not have established dialysis center to perform dialysis.  Last dialyzed 3 days ago. No complaints.     Vitals:    01/24/22 0745   BP: (!) 142/89   BP Location: Right arm   Patient Position: Sitting   Pulse: (!) 59   Resp: 20   Temp: 97.6 °F (36.4 °C)   TempSrc: Oral   SpO2: 96%   Weight: 78.8 kg (173 lb 11.6 oz)   Height: 5' 10" (1.778 m)         "

## 2022-01-24 NOTE — PROGRESS NOTES
01/24/22 1207   Vital Signs   Pulse (!) 52   BP (!) 150/81   MAP (mmHg) 112   BP Location Right arm   BP Method Automatic   Patient Position Lying   Pre-Hemodialysis Assessment   Treatment Status Started   Pre-Treatment Weight 78.8 kg (173 lb 11.6 oz)   Gross Bleach Negative Yes   Total Chlorine is less than 0.1 ppm Yes   Machine Number [id number]   Alarms Verified Yes   Reverse Osmosis Number y   Dialyzer Lot # [id number]   Tubing Lot # [id number]   Reverse Osmosis Machine Log Completed Yes   Extracorporeal Circuit Tested for Integrity Yes   pH 7   Machine Temperature 96.8 °F (36 °C)   Dialyzer F-160   Machine Conductivity 13.9   Meter Conductivity 14.2   Sodium Modeling 0   UF Profile 0   Dialysate Na (mEq/L) 140   Dialysate K (mEq/L) 2   Dialysate CA (mEq/L) 2.5   Dialysate HCO3 (mEq/L) 35   Prime Ordered (mL) 250 mL   Treatment Initiation with Dialysis Precautions All connections secured;Saline line double clamped;Venous parameters set;Arterial parameters set;Prime given;Air foam detector engaged   Prime Amount Given (mL) 250 mL   Net UF Goal 2000   Total UF Goal 2500   Pre-Hemodialysis Comments 3 hour I-HDTx initiated   UF Rate 833   RO # / DI #  main   RO Rejection Within Limits? Yes   Timeout Performed 1202   During Hemodialysis Assessment   Blood Flow Rate (mL/min) 400 mL/min   Dialysate Flow Rate (mL/min) 800 ml/min   Ultrafiltration Rate (mL/Hr) 833 mL/Hr   Arteriovenous Lines Secure Yes   Arterial Pressure (mmHg) -154 mmHg   Venous Pressure (mmHg) 112   Blood Volume Processed (Liters) 0 L   UF Removed (mL) 0 mL   TMP 19   Venous Line in Air Detector Yes   Intake (mL) 250 mL   Transducer Dry Yes   Access Visible Yes    notified of access issue? N/A   Heparin given? N/A   Intra-Hemodialysis Comments hd tx started.

## 2022-01-24 NOTE — NURSING
Chemistries now posted, noted with K+ 5.4, orders rec'd per Dr Strange for I-Hemodialysis treatment now. Awaiting arrival from the ER.

## 2022-01-24 NOTE — PLAN OF CARE
Spoke with pt's daughter via phone for POC. She understands that pt will have to dialyze in the ED when he needs it and she reported that he will be getting his green card in February.     Cj Parada LMSW 1/24/2022 1:26 PM

## 2022-01-24 NOTE — ED PROVIDER NOTES
SCRIBE #1 NOTE: I, Reyna Kimball, am scribing for, and in the presence of, Abebe Kaye MD. I have scribed the entire note.      History      Chief Complaint   Patient presents with    dialysis     Pt coming for dialysis, last dialysis was 2 days ago. Denies any complaints at present.       Review of patient's allergies indicates:  No Known Allergies     HPI   HPI    1/24/2022, 9:50 AM   History obtained from the patient with the use of MARTTI      History of Present Illness: Lizz Alas is a 71 y.o. male patient with a PMHx of ESRD, HLD, HTN, PAF, renal disorder, and thrombocytopenia who presents to the Emergency Department requesting dialysis. The pt has not established a facility to perform dialysis yet and last dialyzed 3 days ago. No associated sxs reported. Patient denies any fever, chills, CP, SOB, weakness, dizziness, headaches, and all other sxs at this time. No prior Tx reported. No further complaints or concerns at this time.         Arrival mode: Personal vehicle     PCP: Primary Doctor No       Past Medical History:  Past Medical History:   Diagnosis Date    ESRD (end stage renal disease)     ESRD (end stage renal disease) on dialysis     Hyperlipemia     Hypertension     Other lesions of oral mucosa     PAF (paroxysmal atrial fibrillation)     Renal disorder     Thrombocytopenia        Past Surgical History:  Past Surgical History:   Procedure Laterality Date    ERCP N/A 12/28/2021    Procedure: ERCP (ENDOSCOPIC RETROGRADE CHOLANGIOPANCREATOGRAPHY);  Surgeon: Melchor Sarmiento MD;  Location: [facility] OR;  Service: Endoscopy;  Laterality: N/A;    FRACTURE SURGERY      HERNIA REPAIR      REPLACEMENT OF DIALYSIS CATHETER OVER GUIDEWIRE THROUGH EXISTING VENOUS ACCESS N/A 1/10/2022    Procedure: REPLACEMENT, CATHETER, DIALYSIS, OVER GUIDEWIRE, USING EXISTING VENOUS ACCESS;  Surgeon: Olayinka Vivar MD;  Location: [facility] CATH LAB;  Service: Vascular;  Laterality: N/A;         Family  History:  Family History   Problem Relation Age of Onset    No Known Problems Mother     No Known Problems Father        Social History:  Social History     Tobacco Use    Smoking status: Current Every Day Smoker     Packs/day: 1.50     Years: 45.00     Pack years: 67.50     Types: Cigarettes    Smokeless tobacco: Never Used   Substance and Sexual Activity    Alcohol use: Yes     Comment: 2-3 glasses of whiskey daily    Drug use: Never    Sexual activity: Not on file       ROS   Review of Systems   Constitutional: Negative for chills and fever.   HENT: Negative for sore throat.    Respiratory: Negative for shortness of breath.    Cardiovascular: Negative for chest pain.   Gastrointestinal: Negative for nausea.   Genitourinary: Negative for dysuria.   Musculoskeletal: Negative for back pain.   Skin: Negative for rash.   Neurological: Negative for dizziness, weakness and headaches.   Hematological: Does not bruise/bleed easily.   All other systems reviewed and are negative.    Physical Exam      Initial Vitals [01/24/22 0745]   BP Pulse Resp Temp SpO2   (!) 142/89 (!) 59 20 97.6 °F (36.4 °C) 96 %      MAP       --          Physical Exam  Nursing Notes and Vital Signs Reviewed.  Constitutional: Patient is in no acute distress. Well-developed and well-nourished.  Head: Atraumatic. Normocephalic.  Eyes: PERRL. EOM intact. Conjunctivae are not pale. No scleral icterus.  ENT: Mucous membranes are moist. Oropharynx is clear and symmetric.    Neck: Supple. Full ROM. No lymphadenopathy.  Cardiovascular: Regular rate. Regular rhythm. No murmurs, rubs, or gallops. Distal pulses are 2+ and symmetric.  Pulmonary/Chest: No respiratory distress. Clear to auscultation bilaterally. No wheezing or rales.  Abdominal: Soft and non-distended.  There is no tenderness.  No rebound, guarding, or rigidity.   Musculoskeletal: Moves all extremities. No obvious deformities. No edema.  Skin: Warm and dry.  Neurological:  Alert, awake, and  "appropriate.  Normal speech.  No acute focal neurological deficits are appreciated.  Psychiatric: Normal affect. Good eye contact. Appropriate in content.    ED Course    Procedures  ED Vital Signs:  Vitals:    01/24/22 0745 01/24/22 1115 01/24/22 1202 01/24/22 1207   BP: (!) 142/89 (!) 195/93 (!) 149/95 (!) 150/81   Pulse: (!) 59 61 (!) 58 (!) 52   Resp: 20 18 20    Temp: 97.6 °F (36.4 °C)  97.7 °F (36.5 °C)    TempSrc: Oral  Axillary    SpO2: 96% 98% 99%    Weight: 78.8 kg (173 lb 11.6 oz)      Height: 5' 10" (1.778 m)       01/24/22 1209 01/24/22 1215 01/24/22 1230 01/24/22 1300   BP: (!) 166/105 133/77 111/68 131/79   Pulse: (!) 53 (!) 53 (!) 54 (!) 59   Resp:       Temp:       TempSrc:       SpO2:       Weight:       Height:        01/24/22 1330 01/24/22 1400 01/24/22 1430 01/24/22 1500   BP: 131/82 (!) 158/92 122/81 (!) 166/93   Pulse: (!) 59 (!) 59 74 81   Resp:       Temp:       TempSrc:       SpO2:       Weight:       Height:        01/24/22 1600   BP: (!) 145/74   Pulse: 69   Resp: 18   Temp:    TempSrc:    SpO2: 100%   Weight:    Height:        Abnormal Lab Results:  Labs Reviewed   CBC W/ AUTO DIFFERENTIAL - Abnormal; Notable for the following components:       Result Value    RBC 3.45 (*)     Hemoglobin 10.4 (*)     Hematocrit 32.3 (*)     Eos # 2.1 (*)     Lymph % 14.7 (*)     Eosinophil % 26.2 (*)     All other components within normal limits   COMPREHENSIVE METABOLIC PANEL - Abnormal; Notable for the following components:    Potassium 5.4 (*)     BUN 57 (*)     Creatinine 5.4 (*)     Albumin 3.4 (*)     eGFR if  11 (*)     eGFR if non  10 (*)     All other components within normal limits        All Lab Results:  Results for orders placed or performed during the hospital encounter of 01/24/22   CBC auto differential   Result Value Ref Range    WBC 8.14 3.90 - 12.70 K/uL    RBC 3.45 (L) 4.60 - 6.20 M/uL    Hemoglobin 10.4 (L) 14.0 - 18.0 g/dL    Hematocrit 32.3 (L) 40.0 " - 54.0 %    MCV 94 82 - 98 fL    MCH 30.1 27.0 - 31.0 pg    MCHC 32.2 32.0 - 36.0 g/dL    RDW 13.9 11.5 - 14.5 %    Platelets 188 150 - 450 K/uL    MPV 9.9 9.2 - 12.9 fL    Immature Granulocytes 0.5 0.0 - 0.5 %    Gran # (ANC) 4.0 1.8 - 7.7 K/uL    Immature Grans (Abs) 0.04 0.00 - 0.04 K/uL    Lymph # 1.2 1.0 - 4.8 K/uL    Mono # 0.7 0.3 - 1.0 K/uL    Eos # 2.1 (H) 0.0 - 0.5 K/uL    Baso # 0.11 0.00 - 0.20 K/uL    nRBC 0 0 /100 WBC    Gran % 49.2 38.0 - 73.0 %    Lymph % 14.7 (L) 18.0 - 48.0 %    Mono % 8.0 4.0 - 15.0 %    Eosinophil % 26.2 (H) 0.0 - 8.0 %    Basophil % 1.4 0.0 - 1.9 %    Platelet Estimate Appears normal     Aniso Slight     Poik Slight     Differential Method Automated    Comprehensive metabolic panel   Result Value Ref Range    Sodium 139 136 - 145 mmol/L    Potassium 5.4 (H) 3.5 - 5.1 mmol/L    Chloride 100 95 - 110 mmol/L    CO2 27 23 - 29 mmol/L    Glucose 98 70 - 110 mg/dL    BUN 57 (H) 8 - 23 mg/dL    Creatinine 5.4 (H) 0.5 - 1.4 mg/dL    Calcium 9.0 8.7 - 10.5 mg/dL    Total Protein 7.6 6.0 - 8.4 g/dL    Albumin 3.4 (L) 3.5 - 5.2 g/dL    Total Bilirubin 0.8 0.1 - 1.0 mg/dL    Alkaline Phosphatase 83 55 - 135 U/L    AST 12 10 - 40 U/L    ALT 10 10 - 44 U/L    Anion Gap 12 8 - 16 mmol/L    eGFR if African American 11 (A) >60 mL/min/1.73 m^2    eGFR if non African American 10 (A) >60 mL/min/1.73 m^2         Imaging Results:  Imaging Results          X-Ray Chest 1 View (Final result)  Result time 01/24/22 08:37:29    Final result by Nitesh Land III, MD (01/24/22 08:37:29)                 Impression:      Stable chest x-ray.      Electronically signed by: Nitesh Land MD  Date:    01/24/2022  Time:    08:37             Narrative:    EXAMINATION:  XR CHEST 1 VIEW    CLINICAL HISTORY:  Chronic kidney disease, unspecified    FINDINGS:  Comparison is made with the most recent prior chest x-ray.  The cardiomediastinal silhouette is within normal limits for AP technique. Right jugular Vas-Cath  remains well positioned in the SVC.  Stable low-grade patchy bilateral reticular opacities.  No new infiltrate, pleural effusion or pneumothorax identified.                               The EKG was ordered, reviewed, and independently interpreted by the ED provider.  Interpretation time: 8:25  Rate: 59 BPM  Rhythm: Undetermined rhythm   Interpretation: Incomplete right bundle branch block. No STEMI.           The Emergency Provider reviewed the vital signs and test results, which are outlined above.    ED Discussion   9:22 AM: Discussed pt's case with Dr. Strange (Nephrology) who is now aware of the pt's case.    12:49 PM: Discussed pt's case with Cj Barrow who believes that the pt is undocumented and is working on the pt's case.    4:19 PM: SW states pt is waiting on green card and a SS# that he will need before he gets coverage to arrange scheduled dialysis.  Reassessed pt at this time. Discussed with pt all pertinent ED information and results. Discussed pt dx and plan of tx. Gave pt all f/u and return to the ED instructions. All questions and concerns were addressed at this time. Pt expresses understanding of information and instructions, and is comfortable with plan to discharge. Pt is stable for discharge.    I discussed with patient and/or family/caretaker that evaluation in the ED does not suggest any emergent or life threatening medical conditions requiring immediate intervention beyond what was provided in the ED, and I believe patient is safe for discharge.  Regardless, an unremarkable evaluation in the ED does not preclude the development or presence of a serious of life threatening condition. As such, patient was instructed to return immediately for any worsening or change in current symptoms.         ED Medication(s):  Medications   heparin (porcine) injection 4,000 Units (4,000 Units Intravenous Given 1/24/22 1205)        Follow-up Information     Your primary care physician In 3 days.                        Discharge Medication List as of 1/24/2022  4:21 PM            Medical Decision Making    Medical Decision Making:   Clinical Tests:   Lab Tests: Ordered and Reviewed  Radiological Study: Ordered and Reviewed  Medical Tests: Ordered and Reviewed           Scribe Attestation:   Scribe #1: I performed the above scribed service and the documentation accurately describes the services I performed. I attest to the accuracy of the note.    Attending:   Physician Attestation Statement for Scribe #1: I, Abebe Kaye MD, personally performed the services described in this documentation, as scribed by Reyna Kimball, in my presence, and it is both accurate and complete.          Clinical Impression       ICD-10-CM ICD-9-CM   1. End stage renal disease on dialysis  N18.6 585.6    Z99.2 V45.11   2. Chronic renal failure  N18.9 585.9       Disposition:   Disposition: Discharged  Condition: Stable         Abebe Kaye MD  01/25/22 0910

## 2022-01-24 NOTE — PROGRESS NOTES
Asked by ER colleague to dialyze patient for ongoing ESRD needs; labs reviewed.  Last HD was Friday 1/21/22; OK for dc after HD provided SW/CM assists in securing long term plan for this patient with no outpatient HD clinic.  Call with interim concerns [phone].

## 2022-01-28 ENCOUNTER — HOSPITAL ENCOUNTER (EMERGENCY)
Facility: HOSPITAL | Age: 72
Discharge: HOME | End: 2022-01-28
Attending: EMERGENCY MEDICINE
Payer: MEDICAID

## 2022-01-28 VITALS
HEART RATE: 75 BPM | WEIGHT: 172.5 LBS | HEIGHT: 72 IN | OXYGEN SATURATION: 100 % | SYSTOLIC BLOOD PRESSURE: 158 MMHG | TEMPERATURE: 98 F | BODY MASS INDEX: 23.36 KG/M2 | DIASTOLIC BLOOD PRESSURE: 77 MMHG | RESPIRATION RATE: 18 BRPM

## 2022-01-28 DIAGNOSIS — Z99.2 ESRD ON DIALYSIS: Primary | ICD-10-CM

## 2022-01-28 DIAGNOSIS — N18.6 ESRD ON DIALYSIS: Primary | ICD-10-CM

## 2022-01-28 LAB
ALBUMIN SERPL BCP-MCNC: 3.1 G/DL (ref 3.5–5.2)
ALP SERPL-CCNC: 75 U/L (ref 55–135)
ALT SERPL W/O P-5'-P-CCNC: 13 U/L (ref 10–44)
ANION GAP SERPL CALC-SCNC: 15 MMOL/L (ref 8–16)
AST SERPL-CCNC: 32 U/L (ref 10–40)
BASOPHILS # BLD AUTO: 0.11 K/UL (ref 0–0.2)
BASOPHILS NFR BLD: 1.5 % (ref 0–1.9)
BILIRUB SERPL-MCNC: 0.7 MG/DL (ref 0.1–1)
BUN SERPL-MCNC: 76 MG/DL (ref 8–23)
CALCIUM SERPL-MCNC: 8.4 MG/DL (ref 8.7–10.5)
CHLORIDE SERPL-SCNC: 98 MMOL/L (ref 95–110)
CO2 SERPL-SCNC: 21 MMOL/L (ref 23–29)
CREAT SERPL-MCNC: 6.7 MG/DL (ref 0.5–1.4)
CTP QC/QA: YES
DIFFERENTIAL METHOD: ABNORMAL
EOSINOPHIL # BLD AUTO: 1.8 K/UL (ref 0–0.5)
EOSINOPHIL NFR BLD: 24.6 % (ref 0–8)
ERYTHROCYTE [DISTWIDTH] IN BLOOD BY AUTOMATED COUNT: 13.6 % (ref 11.5–14.5)
EST. GFR  (AFRICAN AMERICAN): 9 ML/MIN/1.73 M^2
EST. GFR  (NON AFRICAN AMERICAN): 7 ML/MIN/1.73 M^2
GLUCOSE SERPL-MCNC: 130 MG/DL (ref 70–110)
HCT VFR BLD AUTO: 31 % (ref 40–54)
HCV AB SERPL QL IA: NEGATIVE
HEP C VIRUS HOLD SPECIMEN: NORMAL
HGB BLD-MCNC: 10.2 G/DL (ref 14–18)
IMM GRANULOCYTES # BLD AUTO: 0.02 K/UL (ref 0–0.04)
IMM GRANULOCYTES NFR BLD AUTO: 0.3 % (ref 0–0.5)
LYMPHOCYTES # BLD AUTO: 1.3 K/UL (ref 1–4.8)
LYMPHOCYTES NFR BLD: 17.8 % (ref 18–48)
MCH RBC QN AUTO: 30.3 PG (ref 27–31)
MCHC RBC AUTO-ENTMCNC: 32.9 G/DL (ref 32–36)
MCV RBC AUTO: 92 FL (ref 82–98)
MONOCYTES # BLD AUTO: 0.5 K/UL (ref 0.3–1)
MONOCYTES NFR BLD: 7.5 % (ref 4–15)
NEUTROPHILS # BLD AUTO: 3.5 K/UL (ref 1.8–7.7)
NEUTROPHILS NFR BLD: 48.3 % (ref 38–73)
NRBC BLD-RTO: 0 /100 WBC
PLATELET # BLD AUTO: 190 K/UL (ref 150–450)
PMV BLD AUTO: 9.7 FL (ref 9.2–12.9)
POTASSIUM SERPL-SCNC: 5.3 MMOL/L (ref 3.5–5.1)
PROT SERPL-MCNC: 8.1 G/DL (ref 6–8.4)
RBC # BLD AUTO: 3.37 M/UL (ref 4.6–6.2)
SARS-COV-2 RDRP RESP QL NAA+PROBE: NEGATIVE
SODIUM SERPL-SCNC: 134 MMOL/L (ref 136–145)
WBC # BLD AUTO: 7.19 K/UL (ref 3.9–12.7)

## 2022-01-28 PROCEDURE — 85025 COMPLETE CBC W/AUTO DIFF WBC: CPT | Performed by: EMERGENCY MEDICINE

## 2022-01-28 PROCEDURE — 99283 EMERGENCY DEPT VISIT LOW MDM: CPT | Mod: 25

## 2022-01-28 PROCEDURE — 86803 HEPATITIS C AB TEST: CPT | Performed by: EMERGENCY MEDICINE

## 2022-01-28 PROCEDURE — 80053 COMPREHEN METABOLIC PANEL: CPT | Performed by: EMERGENCY MEDICINE

## 2022-01-28 PROCEDURE — 25000003 PHARM REV CODE 250: Performed by: EMERGENCY MEDICINE

## 2022-01-28 PROCEDURE — U0002 COVID-19 LAB TEST NON-CDC: HCPCS | Performed by: EMERGENCY MEDICINE

## 2022-01-28 RX ADMIN — SODIUM ZIRCONIUM CYCLOSILICATE 10 G: 5 POWDER, FOR SUSPENSION ORAL at 01:01

## 2022-01-28 NOTE — ED PROVIDER NOTES
SCRIBE #1 NOTE: I, Ivan Arias, am scribing for, and in the presence of, Johan Ba MD. I have scribed the entire note.      History      Chief Complaint   Patient presents with    Emergency Dialysis     Pts daughter reports he is to be dialyzed on Mondays and Fridays. Last dialysis on 1/24. Requesting dialysis today       Review of patient's allergies indicates:  No Known Allergies     HPI   HPI    1/28/2022, 8:16 AM   History obtained from the patient and daughter      History of Present Illness: Lizz Alas is a 71 y.o. male patient with a PMHx of ESRD who presents to the Emergency Department for dialysis. Pt dialyzes every Monday and Friday, and last dialyzed 4 days ago (Monday). Pt has been unable to get established with outpatient dialysis due to insurance issues. Pt denies any sxs at this time. No associated sxs reported. Patient denies any fever, chills, n/v/d, SOB, CP, weakness, numbness, dizziness, headache, and all other sxs at this time. No further complaints or concerns at this time.     Arrival mode: Personal vehicle    PCP: Primary Doctor No       Past Medical History:  Past Medical History:   Diagnosis Date    ESRD (end stage renal disease)     ESRD (end stage renal disease) on dialysis     Hyperlipemia     Hypertension     Other lesions of oral mucosa     PAF (paroxysmal atrial fibrillation)     Renal disorder     Thrombocytopenia        Past Surgical History:  Past Surgical History:   Procedure Laterality Date    ERCP N/A 12/28/2021    Procedure: ERCP (ENDOSCOPIC RETROGRADE CHOLANGIOPANCREATOGRAPHY);  Surgeon: Melchor Sarmiento MD;  Location: [facility] OR;  Service: Endoscopy;  Laterality: N/A;    FRACTURE SURGERY      HERNIA REPAIR      REPLACEMENT OF DIALYSIS CATHETER OVER GUIDEWIRE THROUGH EXISTING VENOUS ACCESS N/A 1/10/2022    Procedure: REPLACEMENT, CATHETER, DIALYSIS, OVER GUIDEWIRE, USING EXISTING VENOUS ACCESS;  Surgeon: Olayinka Vivar MD;  Location: [facility] CATH LAB;   Service: Vascular;  Laterality: N/A;         Family History:  Family History   Problem Relation Age of Onset    No Known Problems Mother     No Known Problems Father        Social History:  Social History     Tobacco Use    Smoking status: Current Every Day Smoker     Packs/day: 1.50     Years: 45.00     Pack years: 67.50     Types: Cigarettes    Smokeless tobacco: Never Used   Substance and Sexual Activity    Alcohol use: Yes     Comment: 2-3 glasses of whiskey daily    Drug use: Never    Sexual activity: Not on file       ROS   Review of Systems   Constitutional: Negative for chills and fever.   HENT: Negative for sore throat.    Respiratory: Negative for shortness of breath.    Cardiovascular: Negative for chest pain.   Gastrointestinal: Negative for diarrhea, nausea and vomiting.   Genitourinary: Negative for dysuria.   Musculoskeletal: Negative for back pain.   Skin: Negative for rash.   Neurological: Negative for dizziness, weakness, light-headedness, numbness and headaches.   Hematological: Does not bruise/bleed easily.   All other systems reviewed and are negative.    Physical Exam      Initial Vitals [01/28/22 0811]   BP Pulse Resp Temp SpO2   119/61 82 20 97.7 °F (36.5 °C) 95 %      MAP       --          Physical Exam  Nursing Notes and Vital Signs Reviewed.  Constitutional: Patient is in no acute distress. Well-developed and well-nourished.  Head: Atraumatic. Normocephalic.  Eyes: PERRL. EOM intact. Conjunctivae are not pale. No scleral icterus.  ENT: Mucous membranes are moist. Oropharynx is clear and symmetric.    Neck: Supple. Full ROM.  Cardiovascular: Regular rate. Regular rhythm. No murmurs, rubs, or gallops. Distal pulses are 2+ and symmetric.  Pulmonary/Chest: No respiratory distress. Clear to auscultation bilaterally. No wheezing or rales.  Abdominal: Soft and non-distended.  There is no tenderness.  No rebound, guarding, or rigidity.   Musculoskeletal: Moves all extremities. No obvious  deformities. No edema.  Skin: Warm and dry.  Neurological:  Alert, awake, and appropriate.  Normal speech.  No acute focal neurological deficits are appreciated.  Psychiatric: Normal affect. Good eye contact. Appropriate in content.    ED Course    Procedures  ED Vital Signs:  Vitals:    01/28/22 0811 01/28/22 0821   BP: 119/61    Pulse: 82    Resp: 20    Temp: 97.7 °F (36.5 °C)    TempSrc: Oral    SpO2: 95%    Weight:  78.3 kg (172 lb 8.2 oz)   Height: 6' (1.829 m)        Abnormal Lab Results:  Labs Reviewed   CBC W/ AUTO DIFFERENTIAL - Abnormal; Notable for the following components:       Result Value    RBC 3.37 (*)     Hemoglobin 10.2 (*)     Hematocrit 31.0 (*)     Eos # 1.8 (*)     Lymph % 17.8 (*)     Eosinophil % 24.6 (*)     All other components within normal limits   COMPREHENSIVE METABOLIC PANEL - Abnormal; Notable for the following components:    Sodium 134 (*)     Potassium 5.3 (*)     CO2 21 (*)     Glucose 130 (*)     BUN 76 (*)     Creatinine 6.7 (*)     Calcium 8.4 (*)     Albumin 3.1 (*)     eGFR if  9 (*)     eGFR if non  7 (*)     All other components within normal limits   SARS-COV-2 RDRP GENE - Normal    Narrative:     This test utilizes isothermal nucleic acid amplification   technology to detect the SARS-CoV-2 RdRp nucleic acid segment.   The analytical sensitivity (limit of detection) is 125 genome   equivalents/mL.   A POSITIVE result implies infection with the SARS-CoV-2 virus;   the patient is presumed to be contagious.     A NEGATIVE result means that SARS-CoV-2 nucleic acids are not   present above the limit of detection. A NEGATIVE result should be   treated as presumptive. It does not rule out the possibility of   COVID-19 and should not be the sole basis for treatment decisions.   If COVID-19 is strongly suspected based on clinical and exposure   history, re-testing using an alternate molecular assay should be   considered.   This test is only for use  under the Food and Drug   Administration s Emergency Use Authorization (EUA).   Commercial kits are provided by [employer].   Performance characteristics of the EUA have been independently   verified by [facility] Department of   Pathology and Laboratory Medicine.   _________________________________________________________________   The authorized Fact Sheet for Healthcare Providers and the authorized Fact   Sheet for Patients of the ID NOW COVID-19 are available on the FDA   website:     https://www.fda.gov/media/127699/download  https://www.fda.gov/media/345162/download       HEPATITIS C ANTIBODY    Narrative:     Release to patient->Immediate   HEP C VIRUS HOLD SPECIMEN    Narrative:     Release to patient->Immediate        All Lab Results:  Results for orders placed or performed during the hospital encounter of 01/28/22   Hepatitis C Antibody   Result Value Ref Range    Hepatitis C Ab Negative Negative   HCV Virus Hold Specimen   Result Value Ref Range    HEP C Virus Hold Specimen Hold for HCV sendout    CBC Auto Differential   Result Value Ref Range    WBC 7.19 3.90 - 12.70 K/uL    RBC 3.37 (L) 4.60 - 6.20 M/uL    Hemoglobin 10.2 (L) 14.0 - 18.0 g/dL    Hematocrit 31.0 (L) 40.0 - 54.0 %    MCV 92 82 - 98 fL    MCH 30.3 27.0 - 31.0 pg    MCHC 32.9 32.0 - 36.0 g/dL    RDW 13.6 11.5 - 14.5 %    Platelets 190 150 - 450 K/uL    MPV 9.7 9.2 - 12.9 fL    Immature Granulocytes 0.3 0.0 - 0.5 %    Gran # (ANC) 3.5 1.8 - 7.7 K/uL    Immature Grans (Abs) 0.02 0.00 - 0.04 K/uL    Lymph # 1.3 1.0 - 4.8 K/uL    Mono # 0.5 0.3 - 1.0 K/uL    Eos # 1.8 (H) 0.0 - 0.5 K/uL    Baso # 0.11 0.00 - 0.20 K/uL    nRBC 0 0 /100 WBC    Gran % 48.3 38.0 - 73.0 %    Lymph % 17.8 (L) 18.0 - 48.0 %    Mono % 7.5 4.0 - 15.0 %    Eosinophil % 24.6 (H) 0.0 - 8.0 %    Basophil % 1.5 0.0 - 1.9 %    Differential Method Automated    Comprehensive Metabolic Panel   Result Value Ref Range    Sodium 134 (L) 136 - 145 mmol/L     Potassium 5.3 (H) 3.5 - 5.1 mmol/L    Chloride 98 95 - 110 mmol/L    CO2 21 (L) 23 - 29 mmol/L    Glucose 130 (H) 70 - 110 mg/dL    BUN 76 (H) 8 - 23 mg/dL    Creatinine 6.7 (H) 0.5 - 1.4 mg/dL    Calcium 8.4 (L) 8.7 - 10.5 mg/dL    Total Protein 8.1 6.0 - 8.4 g/dL    Albumin 3.1 (L) 3.5 - 5.2 g/dL    Total Bilirubin 0.7 0.1 - 1.0 mg/dL    Alkaline Phosphatase 75 55 - 135 U/L    AST 32 10 - 40 U/L    ALT 13 10 - 44 U/L    Anion Gap 15 8 - 16 mmol/L    eGFR if African American 9 (A) >60 mL/min/1.73 m^2    eGFR if non African American 7 (A) >60 mL/min/1.73 m^2   POCT COVID-19 Rapid Screening   Result Value Ref Range    POC Rapid COVID Negative Negative     Acceptable Yes      Imaging Results:  Imaging Results    None                 The Emergency Provider reviewed the vital signs and test results, which are outlined above.    ED Discussion     11:01 AM: Discussed pt's case with Dr. Ansari (Nephrology), who states that the pt has no acute indications for dialysis.    12:43 PM: Reassessed pt at this time. Discussed with pt all pertinent ED information and results. Discussed pt dx and plan of tx. Gave pt all f/u and return to the ED instructions. All questions and concerns were addressed at this time. Pt expresses understanding of information and instructions, and is comfortable with plan to discharge. Pt is stable for discharge.    I discussed with patient and/or family/caretaker that evaluation in the ED does not suggest any emergent or life threatening medical conditions requiring immediate intervention beyond what was provided in the ED, and I believe patient is safe for discharge.  Regardless, an unremarkable evaluation in the ED does not preclude the development or presence of a serious of life threatening condition. As such, patient was instructed to return immediately for any worsening or change in current symptoms.         ED Medication(s):  Medications   sodium zirconium cyclosilicate packet 10 g  (has no administration in time range)        Follow-up Information     [facility] In 2 days.    Contact information:  [address] [place] [address] [phone]                        New Prescriptions    No medications on file         Medical Decision Making    Medical Decision Making:   Clinical Tests:   Lab Tests: Ordered and Reviewed           Scribe Attestation:   Scribe #1: I performed the above scribed service and the documentation accurately describes the services I performed. I attest to the accuracy of the note.    Attending:   Physician Attestation Statement for Scribe #1: I, Johan Ba MD, personally performed the services described in this documentation, as scribed by Ivan Arias, in my presence, and it is both accurate and complete.          Clinical Impression       ICD-10-CM ICD-9-CM   1. ESRD on dialysis  N18.6 585.6    Z99.2 V45.11       Disposition:   Disposition: Discharged  Condition: Stable         Johan Ba MD  01/28/22 1300

## 2022-01-28 NOTE — PLAN OF CARE
Pt's current only choice of action is to rotate between hospitals for dialysis. Pt's daughter reported that he will be eligible for medicaid beginning some time in February when pt gets his green card.     Cj Parada LMSW 1/28/2022 1:27 PM

## 2022-01-31 ENCOUNTER — HOSPITAL ENCOUNTER (EMERGENCY)
Facility: HOSPITAL | Age: 72
Discharge: HOME | End: 2022-01-31
Attending: EMERGENCY MEDICINE
Payer: MEDICAID

## 2022-01-31 VITALS
BODY MASS INDEX: 24.05 KG/M2 | HEART RATE: 71 BPM | HEIGHT: 72 IN | SYSTOLIC BLOOD PRESSURE: 153 MMHG | TEMPERATURE: 98 F | RESPIRATION RATE: 20 BRPM | WEIGHT: 177.56 LBS | OXYGEN SATURATION: 95 % | DIASTOLIC BLOOD PRESSURE: 72 MMHG

## 2022-01-31 DIAGNOSIS — N18.6 ESRD ON HEMODIALYSIS: Primary | Chronic | ICD-10-CM

## 2022-01-31 DIAGNOSIS — Z99.2 ESRD ON HEMODIALYSIS: Primary | Chronic | ICD-10-CM

## 2022-01-31 LAB
ALBUMIN SERPL BCP-MCNC: 3.1 G/DL (ref 3.5–5.2)
ALP SERPL-CCNC: 71 U/L (ref 55–135)
ALT SERPL W/O P-5'-P-CCNC: 7 U/L (ref 10–44)
ANION GAP SERPL CALC-SCNC: 13 MMOL/L (ref 8–16)
AST SERPL-CCNC: 9 U/L (ref 10–40)
BASOPHILS # BLD AUTO: 0.1 K/UL (ref 0–0.2)
BASOPHILS NFR BLD: 1.4 % (ref 0–1.9)
BILIRUB SERPL-MCNC: 0.7 MG/DL (ref 0.1–1)
BUN SERPL-MCNC: 94 MG/DL (ref 8–23)
CALCIUM SERPL-MCNC: 8.6 MG/DL (ref 8.7–10.5)
CHLORIDE SERPL-SCNC: 101 MMOL/L (ref 95–110)
CO2 SERPL-SCNC: 23 MMOL/L (ref 23–29)
CREAT SERPL-MCNC: 7.3 MG/DL (ref 0.5–1.4)
DIFFERENTIAL METHOD: ABNORMAL
EOSINOPHIL # BLD AUTO: 1.4 K/UL (ref 0–0.5)
EOSINOPHIL NFR BLD: 18.9 % (ref 0–8)
ERYTHROCYTE [DISTWIDTH] IN BLOOD BY AUTOMATED COUNT: 13.4 % (ref 11.5–14.5)
EST. GFR  (AFRICAN AMERICAN): 8 ML/MIN/1.73 M^2
EST. GFR  (NON AFRICAN AMERICAN): 7 ML/MIN/1.73 M^2
GLUCOSE SERPL-MCNC: 149 MG/DL (ref 70–110)
HCT VFR BLD AUTO: 29.6 % (ref 40–54)
HGB BLD-MCNC: 9.7 G/DL (ref 14–18)
IMM GRANULOCYTES # BLD AUTO: 0.02 K/UL (ref 0–0.04)
IMM GRANULOCYTES NFR BLD AUTO: 0.3 % (ref 0–0.5)
LYMPHOCYTES # BLD AUTO: 1.3 K/UL (ref 1–4.8)
LYMPHOCYTES NFR BLD: 17.5 % (ref 18–48)
MCH RBC QN AUTO: 30.4 PG (ref 27–31)
MCHC RBC AUTO-ENTMCNC: 32.8 G/DL (ref 32–36)
MCV RBC AUTO: 93 FL (ref 82–98)
MONOCYTES # BLD AUTO: 0.5 K/UL (ref 0.3–1)
MONOCYTES NFR BLD: 7.5 % (ref 4–15)
NEUTROPHILS # BLD AUTO: 3.9 K/UL (ref 1.8–7.7)
NEUTROPHILS NFR BLD: 54.4 % (ref 38–73)
NRBC BLD-RTO: 0 /100 WBC
PLATELET # BLD AUTO: 194 K/UL (ref 150–450)
PMV BLD AUTO: 9.8 FL (ref 9.2–12.9)
POTASSIUM SERPL-SCNC: 4.4 MMOL/L (ref 3.5–5.1)
PROT SERPL-MCNC: 7.2 G/DL (ref 6–8.4)
RBC # BLD AUTO: 3.19 M/UL (ref 4.6–6.2)
SODIUM SERPL-SCNC: 137 MMOL/L (ref 136–145)
WBC # BLD AUTO: 7.19 K/UL (ref 3.9–12.7)

## 2022-01-31 PROCEDURE — 99285 EMERGENCY DEPT VISIT HI MDM: CPT | Mod: ,,, | Performed by: INTERNAL MEDICINE

## 2022-01-31 PROCEDURE — 99283 EMERGENCY DEPT VISIT LOW MDM: CPT | Mod: 25

## 2022-01-31 PROCEDURE — G0257 UNSCHED DIALYSIS ESRD PT HOS: HCPCS

## 2022-01-31 PROCEDURE — 63600175 PHARM REV CODE 636 W HCPCS: Performed by: INTERNAL MEDICINE

## 2022-01-31 PROCEDURE — 80053 COMPREHEN METABOLIC PANEL: CPT | Performed by: EMERGENCY MEDICINE

## 2022-01-31 PROCEDURE — 85025 COMPLETE CBC W/AUTO DIFF WBC: CPT | Performed by: EMERGENCY MEDICINE

## 2022-01-31 PROCEDURE — 99285 PR EMERGENCY DEPT VISIT,LEVEL V: ICD-10-PCS | Mod: ,,, | Performed by: INTERNAL MEDICINE

## 2022-01-31 RX ORDER — HEPARIN SODIUM 1000 [USP'U]/ML
4000 INJECTION, SOLUTION INTRAVENOUS; SUBCUTANEOUS
Status: DISCONTINUED | OUTPATIENT
Start: 2022-01-31 | End: 2022-01-31 | Stop reason: HOSPADM

## 2022-01-31 RX ADMIN — HEPARIN SODIUM 4000 UNITS: 1000 INJECTION, SOLUTION INTRAVENOUS; SUBCUTANEOUS at 08:01

## 2022-01-31 NOTE — HPI
Pt was seen and examined. Labs and meds reviewed. Discussed with other providers. Chart was reviewed. Pt is a 72 y/o male with LISET that occurred on 12/29/21, which has not resolved and has become chronic with Hd dependency, who presented to ER for SOB. Pt had presented in Dev 2021 with abdominal pain due to cholangitis. Pt has E coli sepsis. S Cr was normal on 12/28/21, and a day later it was 2.7, and has steadily worsened and has plateaued close to 8. It is unclear what caused pt's kidney failure. He also had used NSAIds extensively at home. He has been getting his HD as needed in the ER, as he has no insurance and he is not accepted by the HD unit.

## 2022-01-31 NOTE — ED PROVIDER NOTES
SCRIBE #1 NOTE: I, Ivan Arias, am scribing for, and in the presence of, Johan Ba MD. I have scribed the entire note.      History      Chief Complaint   Patient presents with    Dizziness     Dizziness, body aches and nausea. Was supposed to get dialysis last Friday but was unable, last dialysis was last Monday (1 week ago).       Review of patient's allergies indicates:  No Known Allergies     HPI   HPI    1/31/2022, 8:12 AM   History obtained from the patient and family member      History of Present Illness: Lizz Alas is a 71 y.o. male patient with a PMHx of ESRD, HTN who presents to the Emergency Department for dizziness, onset 1 day PTA. Pt is supposed to dialyze every Monday and Friday, but last dialyzed 1 week ago (Monday). Pt has been unable to get established with an outpatient dialysis center. Symptoms are constant and moderate in severity. No mitigating or exacerbating factors reported. Associated sxs include generalized weakness, SOB, and n/v. Patient denies any fever, chills, CP, numbness, headache, and all other sxs at this time. No prior Tx reported. No further complaints or concerns at this time.     Arrival mode: Personal vehicle    PCP: Primary Doctor No       Past Medical History:  Past Medical History:   Diagnosis Date    ESRD (end stage renal disease)     ESRD (end stage renal disease) on dialysis     Hyperlipemia     Hypertension     Other lesions of oral mucosa     PAF (paroxysmal atrial fibrillation)     Renal disorder     Thrombocytopenia        Past Surgical History:  Past Surgical History:   Procedure Laterality Date    ERCP N/A 12/28/2021    Procedure: ERCP (ENDOSCOPIC RETROGRADE CHOLANGIOPANCREATOGRAPHY);  Surgeon: Melchor Sarmiento MD;  [facility]: [facility];  Service: Endoscopy;  Laterality: N/A;    FRACTURE SURGERY      HERNIA REPAIR      REPLACEMENT OF DIALYSIS CATHETER OVER GUIDEWIRE THROUGH EXISTING VENOUS ACCESS N/A 1/10/2022    Procedure:  REPLACEMENT, CATHETER, DIALYSIS, OVER GUIDEWIRE, USING EXISTING VENOUS ACCESS;  Surgeon: Olayinka Vivar MD;  Location: [facility];  Service: Vascular;  Laterality: N/A;         Family History:  Family History   Problem Relation Age of Onset    No Known Problems Mother     No Known Problems Father        Social History:  Social History     Tobacco Use    Smoking status: Current Every Day Smoker     Packs/day: 1.50     Years: 45.00     Pack years: 67.50     Types: Cigarettes    Smokeless tobacco: Never Used   Substance and Sexual Activity    Alcohol use: Yes     Comment: 2-3 glasses of whiskey daily    Drug use: Never    Sexual activity: Not Currently     Partners: Female       ROS   Review of Systems   Constitutional: Negative for chills and fever.   HENT: Negative for sore throat.    Respiratory: Positive for shortness of breath.    Cardiovascular: Negative for chest pain.   Gastrointestinal: Positive for nausea and vomiting. Negative for diarrhea.   Genitourinary: Negative for dysuria.   Musculoskeletal: Negative for back pain.   Skin: Negative for rash.   Neurological: Positive for weakness (generalized). Negative for dizziness, light-headedness, numbness and headaches.   Hematological: Does not bruise/bleed easily.   All other systems reviewed and are negative.    Physical Exam      Initial Vitals [01/31/22 0808]   BP Pulse Resp Temp SpO2   (!) 145/75 69 20 97.7 °F (36.5 °C) 97 %      MAP       --          Physical Exam  Nursing Notes and Vital Signs Reviewed.  Constitutional: Patient is in no acute distress. Well-developed and well-nourished.  Head: Atraumatic. Normocephalic.  Eyes: PERRL. EOM intact. Conjunctivae are not pale. No scleral icterus.  ENT: Mucous membranes are moist. Oropharynx is clear and symmetric.    Neck: Supple. Full ROM.   Cardiovascular: Regular rate. Regular rhythm. No murmurs, rubs, or gallops. Distal pulses are 2+ and symmetric.  Pulmonary/Chest: No respiratory distress. Clear to  auscultation bilaterally. No wheezing or rales.  Abdominal: Soft and non-distended.  There is no tenderness.  No rebound, guarding, or rigidity.   Musculoskeletal: Moves all extremities. No obvious deformities. No edema.  Skin: Warm and dry.  Neurological:  Alert, awake, and appropriate.  Normal speech.  No acute focal neurological deficits are appreciated.  Psychiatric: Normal affect. Good eye contact. Appropriate in content.    ED Course    Procedures  ED Vital Signs:  Vitals:    01/31/22 0808 01/31/22 0900 01/31/22 1000   BP: (!) 145/75 (!) 152/74 (!) 153/74   Pulse: 69 (!) 59 62   Resp: 20 18 18   Temp: 97.7 °F (36.5 °C) 97.7 °F (36.5 °C) 97.7 °F (36.5 °C)   TempSrc: Oral Oral Oral   SpO2: 97%     Weight: 80.6 kg (177 lb 9.3 oz)     Height: 6' (1.829 m)         Abnormal Lab Results:  Labs Reviewed   COMPREHENSIVE METABOLIC PANEL - Abnormal; Notable for the following components:       Result Value    Glucose 149 (*)     BUN 94 (*)     Creatinine 7.3 (*)     Calcium 8.6 (*)     Albumin 3.1 (*)     AST 9 (*)     ALT 7 (*)     eGFR if  8 (*)     eGFR if non  7 (*)     All other components within normal limits   CBC W/ AUTO DIFFERENTIAL - Abnormal; Notable for the following components:    RBC 3.19 (*)     Hemoglobin 9.7 (*)     Hematocrit 29.6 (*)     Eos # 1.4 (*)     Lymph % 17.5 (*)     Eosinophil % 18.9 (*)     All other components within normal limits        All Lab Results:  Results for orders placed or performed during the hospital encounter of 01/31/22   Comprehensive Metabolic Panel   Result Value Ref Range    Sodium 137 136 - 145 mmol/L    Potassium 4.4 3.5 - 5.1 mmol/L    Chloride 101 95 - 110 mmol/L    CO2 23 23 - 29 mmol/L    Glucose 149 (H) 70 - 110 mg/dL    BUN 94 (H) 8 - 23 mg/dL    Creatinine 7.3 (H) 0.5 - 1.4 mg/dL    Calcium 8.6 (L) 8.7 - 10.5 mg/dL    Total Protein 7.2 6.0 - 8.4 g/dL    Albumin 3.1 (L) 3.5 - 5.2 g/dL    Total Bilirubin 0.7 0.1 - 1.0 mg/dL     Alkaline Phosphatase 71 55 - 135 U/L    AST 9 (L) 10 - 40 U/L    ALT 7 (L) 10 - 44 U/L    Anion Gap 13 8 - 16 mmol/L    eGFR if African American 8 (A) >60 mL/min/1.73 m^2    eGFR if non African American 7 (A) >60 mL/min/1.73 m^2   CBC Auto Differential   Result Value Ref Range    WBC 7.19 3.90 - 12.70 K/uL    RBC 3.19 (L) 4.60 - 6.20 M/uL    Hemoglobin 9.7 (L) 14.0 - 18.0 g/dL    Hematocrit 29.6 (L) 40.0 - 54.0 %    MCV 93 82 - 98 fL    MCH 30.4 27.0 - 31.0 pg    MCHC 32.8 32.0 - 36.0 g/dL    RDW 13.4 11.5 - 14.5 %    Platelets 194 150 - 450 K/uL    MPV 9.8 9.2 - 12.9 fL    Immature Granulocytes 0.3 0.0 - 0.5 %    Gran # (ANC) 3.9 1.8 - 7.7 K/uL    Immature Grans (Abs) 0.02 0.00 - 0.04 K/uL    Lymph # 1.3 1.0 - 4.8 K/uL    Mono # 0.5 0.3 - 1.0 K/uL    Eos # 1.4 (H) 0.0 - 0.5 K/uL    Baso # 0.10 0.00 - 0.20 K/uL    nRBC 0 0 /100 WBC    Gran % 54.4 38.0 - 73.0 %    Lymph % 17.5 (L) 18.0 - 48.0 %    Mono % 7.5 4.0 - 15.0 %    Eosinophil % 18.9 (H) 0.0 - 8.0 %    Basophil % 1.4 0.0 - 1.9 %    Differential Method Automated      Imaging Results:  Imaging Results    None                 The Emergency Provider reviewed the vital signs and test results, which are outlined above.    ED Discussion     9:54 AM: Discussed pt's case with Dr. Ansari (Nephrology), who will arrange for dialysis.    2:55 PM  Patient will be discharged following dialysis.         ED Medication(s):  Medications - No data to display     Follow-up Information     [facility] In 2 days.    Contact information:  [address] [facility] [address] [phone]                        New Prescriptions    No medications on file         Medical Decision Making    Medical Decision Making:   Clinical Tests:   Lab Tests: Ordered and Reviewed           Scribe Attestation:   Scribe #1: I performed the above scribed service and the documentation accurately describes the services I performed. I attest to the accuracy of the note.    Attending:    Physician Attestation Statement for Scribe #1: I, Johan Ba MD, personally performed the services described in this documentation, as scribed by Ivan Arias, in my presence, and it is both accurate and complete.          Clinical Impression       ICD-10-CM ICD-9-CM   1. ESRD on hemodialysis  N18.6 585.6    Z99.2 V45.11       Disposition:   Disposition: Discharged  Condition: Stable         Johan Ba MD  01/31/22 5496

## 2022-01-31 NOTE — CONSULTS
[facility] - Emergency Dept.  Nephrology  Consult Note      Patient Name: Lizz Alas  MRN: [id number]  Admission Date: 1/31/2022  Hospital Length of Stay: 0 days  Attending Provider: Johan Ba MD   Primary Care Physician: Primary Doctor No  Principal Problem:<principal problem not specified>    Reason for consult: LISET    Consults  Subjective:     HPI: Pt was seen and examined in ER this am. Labs and meds reviewed. Discussed with other providers. Chart was reviewed. Pt is a 70 y/o male with LISET that occurred on 12/29/21, which has not resolved and has become chronic with HD dependency, who presented to ER for SOB. Pt had presented in Dec 2021 with abdominal pain due to cholangitis. Pt has E coli sepsis. Received ERCP on 12/28/21 (no contrast is used with ERCP). S Cr was normal on 12/28/21, and a day later it was 2.7, and has steadily worsened and has plateaued close to 8. It is unclear what caused pt's kidney failure. He also had used NSAIds extensively at home. He has been getting his HD as needed in the ER, as he has no insurance and he is not accepted by the HD unit.       Past Medical History:   Diagnosis Date    ESRD (end stage renal disease)     ESRD (end stage renal disease) on dialysis     Hyperlipemia     Hypertension     Other lesions of oral mucosa     PAF (paroxysmal atrial fibrillation)     Renal disorder     Thrombocytopenia        Past Surgical History:   Procedure Laterality Date    ERCP N/A 12/28/2021    Procedure: ERCP (ENDOSCOPIC RETROGRADE CHOLANGIOPANCREATOGRAPHY);  Surgeon: Melchor Sarmiento MD;  Location: [facility] OR;  Service: Endoscopy;  Laterality: N/A;    FRACTURE SURGERY      HERNIA REPAIR      REPLACEMENT OF DIALYSIS CATHETER OVER GUIDEWIRE THROUGH EXISTING VENOUS ACCESS N/A 1/10/2022    Procedure: REPLACEMENT, CATHETER, DIALYSIS, OVER GUIDEWIRE, USING EXISTING VENOUS ACCESS;  Surgeon: Olayinka Vivar MD;  Location: [facility] CATH LAB;  Service: Vascular;  Laterality: N/A;        Review of patient's allergies indicates:  No Known Allergies  No current facility-administered medications for this encounter.     Current Outpatient Medications   Medication    amLODIPine (NORVASC) 5 MG tablet    folic acid (FOLVITE) 1 MG tablet    furosemide (LASIX) 40 MG tablet    metoprolol tartrate (LOPRESSOR) 25 MG tablet    rosuvastatin (CRESTOR) 20 MG tablet    sevelamer carbonate (RENVELA) 800 mg Tab    tamsulosin (FLOMAX) 0.4 mg Cap    thiamine 100 MG tablet     Family History     Problem Relation (Age of Onset)    No Known Problems Mother, Father        Tobacco Use    Smoking status: Current Every Day Smoker     Packs/day: 1.50     Years: 45.00     Pack years: 67.50     Types: Cigarettes    Smokeless tobacco: Never Used   Substance and Sexual Activity    Alcohol use: Yes     Comment: 2-3 glasses of whiskey daily    Drug use: Never    Sexual activity: Not Currently     Partners: Female     Review of Systems   HENT: Negative.    Respiratory: Positive for shortness of breath.    Cardiovascular: Negative.    Gastrointestinal: Negative.    Musculoskeletal: Negative.    Neurological: Negative.    Psychiatric/Behavioral: Negative.      Objective:     Vital Signs (Most Recent):  Temp: 97.7 °F (36.5 °C) (01/31/22 1000)  Pulse: 62 (01/31/22 1000)  Resp: 18 (01/31/22 1000)  BP: (!) 153/74 (01/31/22 1000)  SpO2: 97 % (01/31/22 0808)  O2 Device (Oxygen Therapy): room air (01/31/22 1000) Vital Signs (24h Range):  Temp:  [97.7 °F (36.5 °C)] 97.7 °F (36.5 °C)  Pulse:  [59-69] 62  Resp:  [18-20] 18  SpO2:  [97 %] 97 %  BP: (145-153)/(74-75) 153/74     Weight: 80.6 kg (177 lb 9.3 oz) (01/31/22 0808)  Body mass index is 24.08 kg/m².  Body surface area is 2.02 meters squared.    No intake/output data recorded.    Physical Exam  Vitals and nursing note reviewed.   Constitutional:       Appearance: Normal appearance.   HENT:      Head: Normocephalic and atraumatic.   Cardiovascular:      Rate and Rhythm:  Normal rate and regular rhythm.      Pulses: Normal pulses.      Heart sounds: Normal heart sounds.   Pulmonary:      Effort: Pulmonary effort is normal. No respiratory distress.      Breath sounds: Rales present. No wheezing.   Abdominal:      General: Abdomen is flat.      Palpations: Abdomen is soft.   Musculoskeletal:      Right lower leg: No edema.      Left lower leg: No edema.   Skin:     General: Skin is warm and dry.   Neurological:      General: No focal deficit present.      Mental Status: He is alert and oriented to person, place, and time.         Significant Labs: reviewed  BMP  Lab Results   Component Value Date     01/31/2022    K 4.4 01/31/2022     01/31/2022    CO2 23 01/31/2022    BUN 94 (H) 01/31/2022    CREATININE 7.3 (H) 01/31/2022    CALCIUM 8.6 (L) 01/31/2022    ANIONGAP 13 01/31/2022    ESTGFRAFRICA 8 (A) 01/31/2022    EGFRNONAA 7 (A) 01/31/2022     Lab Results   Component Value Date    WBC 7.19 01/31/2022    HGB 9.7 (L) 01/31/2022    HCT 29.6 (L) 01/31/2022    MCV 93 01/31/2022     01/31/2022       Lab Results   Component Value Date    CALCIUM 8.6 (L) 01/31/2022    PHOS 6.7 (H) 01/13/2022       Significant Imaging: raviewed    Assessment/Plan:     72 y/o male with LISET which has not resolved presented with dyspnea    LISET (acute kidney injury)  LISET occurred exactly between 1/28 and 1/29/21.  Renal failure has not resolved and pt has become HD dependent  Cause not clear.  DDX: E coli sepsis vs NSADs/analgesic nephropathy.    K normal  O2 sat good  Acid base stable  Hyperphosphatemia  Hypocalcemia   Has subjective sx's of dyspnea  Will provide HD today  Pt was revisited during HD, stable, will continue HD    Has been getting HD on urgent basis in ER, because does not have health insurance  Spoke with pt's daughter by phone  Advised her of getting him the medicaid card to be able to be admitted to a HD unit    Primary hypertension  Reviewed the chart and the meds    Paroxysmal  atrial fibrillation  Reviewed the chart    Tobacco use  Advised pt to quit smoking.      Plans and recommendations:  As discussed above  Total time spent 60 minutes including time needed to review the records, the   patient evaluation, documentation, face-to-face discussion with the patient,   more than 50% of the time was spent on coordination of care and counseling.    Level V visit.  Multiple medical issues were addressed, as documented. Medical care provided was in addition to providing dialysis. Pt received multiple visits and evaluations.    Jay Ansari MD   Nephrology  [facility] - Emergency Dept.

## 2022-01-31 NOTE — ASSESSMENT & PLAN NOTE
LISET occurred exactly between 1/28 and 1/29/21.  Renal failure has not resolved and pt has become HD dependent  Cause not clear.  DDX: E coli sepsis vs contrast nephropathy vs NSADs/analgesic nephropathy.    K normal  O2 sat good  Has subjective sx's of dyspnea  Will provide HD today    Has been getting HD on urgent basis in ER, because does not have health insurance  Spoke with pt's daughter by phone  Advised her of getting him the medicaid card to be able to be admitted to a HD unit

## 2022-01-31 NOTE — PHARMACY MED REC
"Admission Medication History     The home medication history was taken by Paco Shay.    You may go to "Admission" then "Reconcile Home Medications" tabs to review and/or act upon these items.      The home medication list has been updated by the Pharmacy department.    Please read ALL comments highlighted in yellow.    Please address this information as you see fit.     Feel free to contact us if you have any questions or require assistance.        Medications listed below were obtained from: Patient/family, Analytic software- [employer] and Medical records  (Not in a hospital admission)      Potential issues to be addressed PRIOR TO DISCHARGE: NONE      Paco Shay, CPhT  [employer] [phone]      Current Outpatient Medications on File Prior to Encounter   Medication Sig Dispense Refill Last Dose    amLODIPine (NORVASC) 5 MG tablet Take 5 mg by mouth once daily.   1/31/2022 at Unknown time    folic acid (FOLVITE) 1 MG tablet Take 1 tablet (1 mg total) by mouth once daily. 30 tablet 11 1/31/2022 at Unknown time    furosemide (LASIX) 40 MG tablet Take 1 tablet (40 mg total) by mouth 2 (two) times daily. 60 tablet 0 1/31/2022 at Unknown time    metoprolol tartrate (LOPRESSOR) 25 MG tablet Take 1 tablet (25 mg total) by mouth 2 (two) times daily. 60 tablet 11 1/31/2022 at Unknown time    rosuvastatin (CRESTOR) 20 MG tablet Take 20 mg by mouth once daily.   1/31/2022 at Unknown time    sevelamer carbonate (RENVELA) 800 mg Tab Take 2 tablets (1,600 mg total) by mouth 3 (three) times daily with meals. 180 tablet 11 1/31/2022 at Unknown time    tamsulosin (FLOMAX) 0.4 mg Cap Take 1 capsule (0.4 mg total) by mouth every evening. 30 capsule 11 1/30/2022 at Unknown time    thiamine 100 MG tablet Take 1 tablet (100 mg total) by mouth once daily. 30 tablet 11 1/31/2022 at Unknown time                           .          "

## 2022-01-31 NOTE — SUBJECTIVE & OBJECTIVE
Past Medical History:   Diagnosis Date    ESRD (end stage renal disease)     ESRD (end stage renal disease) on dialysis     Hyperlipemia     Hypertension     Other lesions of oral mucosa     PAF (paroxysmal atrial fibrillation)     Renal disorder     Thrombocytopenia        Past Surgical History:   Procedure Laterality Date    ERCP N/A 12/28/2021    Procedure: ERCP (ENDOSCOPIC RETROGRADE CHOLANGIOPANCREATOGRAPHY);  Surgeon: Melchor Sarmiento MD;  Location: [facility] OR;  Service: Endoscopy;  Laterality: N/A;    FRACTURE SURGERY      HERNIA REPAIR      REPLACEMENT OF DIALYSIS CATHETER OVER GUIDEWIRE THROUGH EXISTING VENOUS ACCESS N/A 1/10/2022    Procedure: REPLACEMENT, CATHETER, DIALYSIS, OVER GUIDEWIRE, USING EXISTING VENOUS ACCESS;  Surgeon: Olayinka Vivar MD;  Location: [facility] CATH LAB;  Service: Vascular;  Laterality: N/A;       Review of patient's allergies indicates:  No Known Allergies  No current facility-administered medications for this encounter.     Current Outpatient Medications   Medication    amLODIPine (NORVASC) 5 MG tablet    folic acid (FOLVITE) 1 MG tablet    furosemide (LASIX) 40 MG tablet    metoprolol tartrate (LOPRESSOR) 25 MG tablet    rosuvastatin (CRESTOR) 20 MG tablet    sevelamer carbonate (RENVELA) 800 mg Tab    tamsulosin (FLOMAX) 0.4 mg Cap    thiamine 100 MG tablet     Family History     Problem Relation (Age of Onset)    No Known Problems Mother, Father        Tobacco Use    Smoking status: Current Every Day Smoker     Packs/day: 1.50     Years: 45.00     Pack years: 67.50     Types: Cigarettes    Smokeless tobacco: Never Used   Substance and Sexual Activity    Alcohol use: Yes     Comment: 2-3 glasses of whiskey daily    Drug use: Never    Sexual activity: Not Currently     Partners: Female     Review of Systems   HENT: Negative.    Respiratory: Positive for shortness of breath.    Cardiovascular: Negative.    Gastrointestinal: Negative.    Musculoskeletal:  Negative.    Neurological: Negative.    Psychiatric/Behavioral: Negative.      Objective:     Vital Signs (Most Recent):  Temp: 97.7 °F (36.5 °C) (01/31/22 1000)  Pulse: 62 (01/31/22 1000)  Resp: 18 (01/31/22 1000)  BP: (!) 153/74 (01/31/22 1000)  SpO2: 97 % (01/31/22 0808)  O2 Device (Oxygen Therapy): room air (01/31/22 1000) Vital Signs (24h Range):  Temp:  [97.7 °F (36.5 °C)] 97.7 °F (36.5 °C)  Pulse:  [59-69] 62  Resp:  [18-20] 18  SpO2:  [97 %] 97 %  BP: (145-153)/(74-75) 153/74     Weight: 80.6 kg (177 lb 9.3 oz) (01/31/22 0808)  Body mass index is 24.08 kg/m².  Body surface area is 2.02 meters squared.    No intake/output data recorded.    Physical Exam  Vitals and nursing note reviewed.   Constitutional:       Appearance: Normal appearance.   HENT:      Head: Normocephalic and atraumatic.   Cardiovascular:      Rate and Rhythm: Normal rate and regular rhythm.      Pulses: Normal pulses.      Heart sounds: Normal heart sounds.   Pulmonary:      Effort: Pulmonary effort is normal. No respiratory distress.      Breath sounds: Rales present. No wheezing.   Abdominal:      General: Abdomen is flat.      Palpations: Abdomen is soft.   Musculoskeletal:      Right lower leg: No edema.      Left lower leg: No edema.   Skin:     General: Skin is warm and dry.   Neurological:      General: No focal deficit present.      Mental Status: He is alert and oriented to person, place, and time.         Significant Labs: reviewed  BMP  Lab Results   Component Value Date     01/31/2022    K 4.4 01/31/2022     01/31/2022    CO2 23 01/31/2022    BUN 94 (H) 01/31/2022    CREATININE 7.3 (H) 01/31/2022    CALCIUM 8.6 (L) 01/31/2022    ANIONGAP 13 01/31/2022    ESTGFRAFRICA 8 (A) 01/31/2022    EGFRNONAA 7 (A) 01/31/2022     Lab Results   Component Value Date    WBC 7.19 01/31/2022    HGB 9.7 (L) 01/31/2022    HCT 29.6 (L) 01/31/2022    MCV 93 01/31/2022     01/31/2022       Lab Results   Component Value Date     CALCIUM 8.6 (L) 01/31/2022    PHOS 6.7 (H) 01/13/2022       Significant Imaging: yohana

## 2022-02-01 NOTE — PROGRESS NOTES
01/31/22 2000   Vital Signs   Temp 98 °F (36.7 °C)   Temp src Axillary   Pulse 61   Heart Rate Source Monitor   Resp 20   SpO2 98 %   Pulse Oximetry Type Intermittent   O2 Device (Oxygen Therapy) room air   /77   MAP (mmHg) 89   BP Location Right arm   BP Method Automatic   Patient Position Lying   During Hemodialysis Assessment   Blood Flow Rate (mL/min) 400 mL/min   Dialysate Flow Rate (mL/min) 800 ml/min   Ultrafiltration Rate (mL/Hr) 50 mL/Hr   Arteriovenous Lines Secure Yes   Arterial Pressure (mmHg) -129 mmHg   Venous Pressure (mmHg) 110   Blood Volume Processed (Liters) 0 L   UF Removed (mL) 2703 mL   TMP 19   Venous Line in Air Detector Yes   Intake (mL) 250 mL   Transducer Dry Yes   Access Visible Yes    notified of access issue? N/A   Heparin given? N/A   Intra-Hemodialysis Comments hd tx completed   Post-Hemodialysis Assessment   Rinseback Volume (mL) 250 mL   Blood Volume Processed (Liters) 78.5 L   Dialyzer Clearance Moderately streaked   Duration of Treatment (minutes) 240 minutes   Hemodialysis Intake (mL) 800 mL   Total UF (mL) 2703 mL   Net Fluid Removal 1903   Patient Response to Treatment tolerated well for most part, but became hypotensive during last 30 min. 300 cc fluid challenge given , UFR adjusted. MD made aware. no uf for remainder of treatment. no other issues noted for remainder.   Post-Treatment Weight 78.7 kg (173 lb 7.9 oz)   Treatment Weight Change -1.9   Arterial bleeding stop time (min) 0 min   Venous bleeding stop time (min) 0 min   Post-Hemodialysis Comments blood reperfused and post hd tx line care done according to P&P. RTF with primary ER RN.

## 2022-02-01 NOTE — PROGRESS NOTES
01/31/22 1545   Vital Signs   Pulse (!) 54   BP (!) 148/83   MAP (mmHg) 109   BP Location Right arm   BP Method Automatic   Patient Position Lying   Pre-Hemodialysis Assessment   Treatment Status Started   Pre-Treatment Weight 80.6 kg (177 lb 11.1 oz)   Gross Bleach Negative Yes   Total Chlorine is less than 0.1 ppm Yes   Machine Number 5   Alarms Verified Yes   Reverse Osmosis Number main   Dialyzer Lot # [id number]   Tubing Lot # [id number]   Reverse Osmosis Machine Log Completed Yes   Extracorporeal Circuit Tested for Integrity Yes   pH 7   Machine Temperature 96.8 °F (36 °C)   Dialyzer F-160   Machine Conductivity 13.9   Meter Conductivity 14   Sodium Modeling 0   UF Profile 0   Dialysate Na (mEq/L) 140   Dialysate K (mEq/L) 2   Dialysate CA (mEq/L) 2.5   Dialysate HCO3 (mEq/L) 35   Prime Ordered (mL) 250 mL   Treatment Initiation with Dialysis Precautions All connections secured;Saline line double clamped;Venous parameters set;Arterial parameters set;Prime given;Air foam detector engaged   Prime Amount Given (mL) 250 mL   Net UF Goal 4000   Total UF Goal 4500   Pre-Hemodialysis Comments 4 hour I-HDTx initiated   UF Rate 1150   RO # / DI #  main   RO Rejection Within Limits? Yes   Timeout Performed 1542        Hemodialysis Catheter 01/10/22 1015 right internal jugular   Placement Date/Time: 01/10/22 1015   Present Prior to Hospital Arrival?: No  Hand Hygiene: Performed  Barrier Precautions: Performed  Skin Antisepsis: ChloraPrep  Hemodialysis Catheter Type: Tunneled catheter  Location: right internal jugular  Cathete...   Verification by X-ray Other (Comment)   Site Assessment No drainage;No redness;No swelling;No warmth   Line Securement Device Secured with sutureless device   Dressing Type Biopatch in place;Central line dressing   Dressing Status Clean;Dry;Intact   Dressing Intervention Integrity maintained   Date on Dressing 01/31/22   Dressing Due to be Changed 02/07/22   Venous Patency/Care flushed w/o  difficulty;heparin locked;blood return present   Arterial Patency/Care flushed w/o difficulty;heparin locked;blood return present   Waveform Other (Comments)  (n/a)   Line Necessity Review CRRT/HD   During Hemodialysis Assessment   Blood Flow Rate (mL/min) 400 mL/min   Dialysate Flow Rate (mL/min) 800 ml/min   Ultrafiltration Rate (mL/Hr) 1150 mL/Hr   Arteriovenous Lines Secure Yes   Arterial Pressure (mmHg) -144 mmHg   Venous Pressure (mmHg) 106   Blood Volume Processed (Liters) 0 L   UF Removed (mL) 0 mL   TMP 21   Venous Line in Air Detector Yes   Intake (mL) 250 mL   Transducer Dry Yes   Access Visible Yes    notified of access issue? N/A   Heparin given? N/A   Intra-Hemodialysis Comments hd tx started.

## 2022-02-02 NOTE — ASSESSMENT & PLAN NOTE
- Remains in sinus rhythm on admission. Monitor telemetry.  - Continue home BB.  - Patient is not on long-term AC. Will defer to his primary cardiologist.       1/5  Cont MTP    1/7  Cont MTP (dropped to 25 mg PO BID for bradycardia)

## 2022-02-02 NOTE — ASSESSMENT & PLAN NOTE
Worsening  Cr 3.3  Monitor    12/31  2 to shock  Nephro on board  Avoid nephrotoxins  Cont NaHCo3 gtt    1/1/22  On CRRT started today   Nephro on board  Mild hypokalemia and hyponatremia from LISET: CRRT    1/2  Net positive about 18L since admit  Ongoing CRRT  Avoid nephrotoxic agents    1/3  Getting RRT  scR 4.7  Oliguric UOP last 24 slightly over 227  Got CRRT past approx 48 hrs (-ve slightly over 2L)    1/4  Dialysis break yesterday to see intrinsic function: sCR up to 6.7, no dangerous electrolyte or acid imbalance today  uop 315 ml last 24 hour  Nephro on board    1/5  Increased sCR  To get RRT today      1/6  This is now the principle problem as Sepsis and shock have resolved  Nephro on board, duration of RRT not clear at this point. However so far in between HD his scr rises considerably. Whether intrinsic renal function will return or when remains to be seen  UOP last 24 documented as 1750 cc. This is a marked improvement  Cont Condom cath for strict monitoring UOP    1/7  Vasc consult for tunneled catheter  Social work consult to get him outpatient HD  MWF HD per nephro  Avoid nephrotoxins

## 2022-02-02 NOTE — PROGRESS NOTES
[facility] Medicine  Progress Note    Patient Name: Lizz Alas  MRN: [id number]  Patient Class: IP- Inpatient   Admission Date: 12/27/2021  Length of Stay: 15 days  Attending Physician: No att. providers found  Primary Care Provider: Primary Doctor No        Subjective:     Principal Problem:LISET (acute kidney injury)        HPI:  Lizz Alas is a 71 y.o. Danish speaking male patient with a PMHx of PAF, HLD, HTN, thrombocytopenia, alcohol abuse, and tobacco use who presents to the Emergency Department for intermittent epigastric abdominal pain which onset gradually 3 days PTA. Today, the pt reports that his pain has been constant. His symptoms are constant and moderate in severity. No mitigating or exacerbating factors reported. Associated sxs include fever (102 F), nausea, and vomiting. Patient denies any diarrhea, constipation, SOB, weakness, fever, chills, and all other sxs at this time. Initial work-up in the ED showed: Normal WBC and lactic, , , alk phos 184, T bili 5.1, lipase 11, glucose 180, plt 119, sodium 134, COVID negative. Abdominal US showed gallbladder distension with internal sludge but without definite stones, intra and extrahepatic biliary ductal dilation which may represent acute cholecystitis or choledocholithiasis. Blood cultures obtained in the ED, and 1.5 L IV fluids and IV Zosyn given. Case was discussed with GI per the ED, who recommend NPO, IV abx, and ERCP in AM. Hospital Medicine was contacted for admission and patient placed in Observation.       Overview/Hospital Course:  Patient 70 yo male admitted to hospital with suspected cholangitis. Patient initiated on IV abx, fluids, O2. Patient with a rapid decompensation shourtly after admit, rapid response called, patient hypoxic in the 60's placed on bipap with good effect. Patient transferred to ICU. Patient with a progressive decline, febrile episodes to 102.9. Severe septic shock, pressors and  fluids on board. Patient evaluated by GI who recommended ERCP - additional CT abdomen. Patient scheduled for CT and this has been delayed pending stability. Patient intubated for airway protection.  12/29- Patient remains intubated, sedated, on pressors. Discussed POC with family at bedside. Patient evaluated by surgery who recommend o/p surgical followup on recovery for elective cholecystectomy.. Repeat blood cultures in progress, abx infusing.  12/30 - Patient wbc declining but remains on pressors. He is intubated and sedated. Family at bedside. Worsening renal function with cr 4.4 today. Nephrology on consult. Discussed with CC Team  12/31: On sole pressor Norepinephrine 0.14mcg/kg/min, afebrile, leucocytosis is resolved, scR is bumped to 6.0. Patient is oliguric (118 cc urine in last 24 hour). On Fentanyl gtt and NaHCO3 gtt   Daughter Hortencia  is at bedside and was updated.   AC 26/450/40/5  1/1/22 patient seen, daughter Hortencia at bedside, currently on CRRT, Norepinephrine at 0.02mcg/kg/min, fentanyl 100mcg/Hr. AC 26/450/40/5. Patient more awake today, opens eyes spontaneously, is squeezing his daughter's hand  1/2 About afternoon yesterday he self extubated, did not require immediate re-intubation as he was doing rel well resp wise, he is currently on Vapo therm 35LPM 80% FIO2, precedex drip to help with agitation (history of ETOH misuse), getting CRRT, family at bedside, whilst confused he is oriented to selpf/family, conversing with family  1/3 patient seen, was sleeping calmly early this AM, family not at bedside, on Vapotherm 2oLPM at 45% FIO2, not dyspneic. CRRT off. Remains afebrile  1/4 patient seen, down to 4L oxygen, awake and alert, off Precedex gtt, been off pressors  1/5 seen in the ICU. Awake and alert. Not in distress.  PT/OT. D/C urethral catheter, place condom cath  1/6 Transferred from ICU, doing stable on the floor. Met on 4L this morning awake and alert, not particularly interested  in his breakfast. (Apparently the dialect on [place] is different than his, therefore with communication gaps he was able to tell me directly today that he is not in any pain).  1/7 Patient seen, he;d just returned from HD, daughter at bedside, mentation and cognition fully restored per daughter. HD is now MWF per nephro.  D/W Nephro who recommend placement of tunneled catheter, so far though good UOP renal function is slow to return, patient will likely need to be on HD for some time goingforward. This MD explained this to daughter who interpreted for patient  PT rec Home with PT.          Review of Systems   Constitutional: Negative for fatigue and fever.   HENT: Negative for drooling, rhinorrhea and tinnitus.    Gastrointestinal: Positive for diarrhea. Negative for abdominal pain, constipation, nausea and vomiting.   Genitourinary: Negative for dysuria, frequency and urgency.   Musculoskeletal: Negative for arthralgias and back pain.   Allergic/Immunologic: Negative for immunocompromised state.   Neurological: Negative for light-headedness and headaches.   Hematological: Negative for adenopathy.   Psychiatric/Behavioral: Negative for agitation, behavioral problems and confusion.     Physical Exam  Vitals reviewed.   Constitutional:       General: He is not in acute distress.     Appearance: Normal appearance.   HENT:      Head: Normocephalic and atraumatic.   Eyes:      Extraocular Movements: Extraocular movements intact.      Pupils: Pupils are equal, round, and reactive to light.   Cardiovascular:      Rate and Rhythm: Normal rate and regular rhythm.      Pulses: Normal pulses.   Pulmonary:      Effort: Pulmonary effort is normal. No respiratory distress.      Breath sounds: No wheezing.   Abdominal:      General: Abdomen is flat. Bowel sounds are normal. There is no distension.      Palpations: Abdomen is soft.      Tenderness: There is no abdominal tenderness. There is no guarding.   Musculoskeletal:       Cervical back: Normal range of motion.      Right lower leg: No edema.      Left lower leg: No edema.   Skin:     General: Skin is warm and dry  Neurological:      General: No focal deficit present.      Mental Status: He is alert and oriented to person, place, and time.   Psychiatric:         Mood and Affect: Mood normal.         Behavior: Behavior normal.        Assessment/Plan:      * LISET (acute kidney injury)  Worsening  Cr 3.3  Monitor    12/31  2 to shock  Nephro on board  Avoid nephrotoxins  Cont NaHCo3 gtt    1/1/22  On CRRT started today   Nephro on board  Mild hypokalemia and hyponatremia from LISET: CRRT    1/2  Net positive about 18L since admit  Ongoing CRRT  Avoid nephrotoxic agents    1/3  Getting RRT  scR 4.7  Oliguric UOP last 24 slightly over 227  Got CRRT past approx 48 hrs (-ve slightly over 2L)    1/4  Dialysis break yesterday to see intrinsic function: sCR up to 6.7, no dangerous electrolyte or acid imbalance today  uop 315 ml last 24 hour  Nephro on board    1/5  Increased sCR  To get RRT today      1/6  This is now the principle problem as Sepsis and shock have resolved  Nephro on board, duration of RRT not clear at this point. However so far in between HD his scr rises considerably. Whether intrinsic renal function will return or when remains to be seen  UOP last 24 documented as 1750 cc. This is a marked improvement  Cont Condom cath for strict monitoring UOP    1/7  Vasc consult for tunneled catheter  Social work consult to get him outpatient HD  MWF HD per nephro  Avoid nephrotoxins        Acute respiratory failure with hypoxia and hypercarbia  Patient with Hypoxic Respiratory failure which is Acute.  he is not on home oxygen. Supplemental oxygen was provided and noted- Oxygen Concentration (%):  [32] 32.   Signs/symptoms of respiratory failure include- tachypnea, increased work of breathing, respiratory distress, use of accessory muscles and cyanosis. Contributing diagnoses includes - COPD  Labs and images were reviewed. Patient Has recent ABG, which has been reviewed. Will treat underlying causes and adjust management of respiratory failure as indicated    12/31  On 40 % FIO2 and PEEP 5 [facility] ventilation  Stable.  Ongoing renal and circulatory failure being addressed prior to considering weaning trials    1/1  On Mech Vent  ICU on board    1/2  On Vapotherm  CXR in AM  Pulm toilet as tolerated    1/3  Being weaned down on Vapotherm  Diffuse interstitial opacities throughout lungs, will likely improve with RRT  Repeat CXR as needed    1/4  Improving  PT; OOB to chair as tolerated  Incentive spirometry  Breathing treatment  Wean supplemental O2 as toelrated    1/5  Cont 3L nasal canula and wean as tolerated    1/4  Cont supplemental O2  Incentive spirometry  PT/OT, OOB  HD&volume pulling per nephro    1/7, 8  Resolved    Thrombocytopenia  Stable-jyothi  2 to Sepsis  Hold chemcial DVTp    1/1  Stable      1/2  Likely combination of ETOH misuse and sepsis  Platelet count is stable 73K, not bleeding  No need for transfusion    1/3  Improved to 114K today    1/4, 5  Resolved    Paroxysmal atrial fibrillation  - Remains in sinus rhythm on admission. Monitor telemetry.  - Continue home BB.  - Patient is not on long-term AC. Will defer to his primary cardiologist.       1/5  Cont MTP    1/7  Cont MTP (dropped to 25 mg PO BID for bradycardia)    Primary hypertension  - Continue home antihypertensives.     12/31  On pressors    1/4  Lopressor 25 mg PO BID, adjust as needed   May have to resume home Amlodipine 5m g PO QD, will monitor    1/5  MTP 50 mg PO BID  Amlodipine 5 mg PO QHS    1/6, 7  Cont MTP and nightly Amlodipine        Tobacco use  - Assistance with smoking cessation was offered, including:  [x]  Medications  [x]  Counseling  [x]  Printed Information on Smoking Cessation  []  Referral to a Smoking Cessation Program  - Patient was counseled regarding smoking for 3-10 minutes.    Alcohol abuse  - Monitor  for withdrawal/DTs. IV Ativan if needed.     1/2  With some delirium likely from aute illness and ICU stay, in addition to possibly some withdrawal: Precedex gtt  Bedside swallow eval when possible: oral folate/thiamine +- oral Benzodiapines as needed    1/5  PRN Low dose Librium      1/9  Out of window for withdrawal    1/10  Cessation advised    Hypoxemia  On just 1L oxygen      VTE Risk Mitigation (From admission, onward)         Ordered     IP VTE HIGH RISK PATIENT  Once         12/27/21 2247                Discharge Planning   FLAQUITA: 1/11/2022     Code Status: Prior   Is the patient medically ready for discharge?:     Reason for patient still in hospital (select all that apply): Treatment and Consult recommendations  Discharge Plan A: Home with family   Discharge Delays: None known at this time              Julissa Thompson MD  Department of Hospital Medicine   [facility] - Telemetry ([facility])

## 2022-02-02 NOTE — ASSESSMENT & PLAN NOTE
- Continue home antihypertensives.     12/31  On pressors    1/4  Lopressor 25 mg PO BID, adjust as needed   May have to resume home Amlodipine 5m g PO QD, will monitor    1/5  MTP 50 mg PO BID  Amlodipine 5 mg PO QHS    1/6, 7  Cont MTP and nightly Amlodipine

## 2022-02-07 ENCOUNTER — OFFICE VISIT (OUTPATIENT)
Dept: NEPHROLOGY | Facility: CLINIC | Age: 72
End: 2022-02-07
Payer: MEDICAID

## 2022-02-07 VITALS
DIASTOLIC BLOOD PRESSURE: 70 MMHG | BODY MASS INDEX: 26.82 KG/M2 | SYSTOLIC BLOOD PRESSURE: 146 MMHG | HEART RATE: 60 BPM | HEIGHT: 66 IN | WEIGHT: 166.88 LBS

## 2022-02-07 DIAGNOSIS — N18.6 ESRD ON HEMODIALYSIS: Primary | ICD-10-CM

## 2022-02-07 DIAGNOSIS — Z99.2 ESRD ON HEMODIALYSIS: Primary | ICD-10-CM

## 2022-02-07 PROCEDURE — 3066F PR DOCUMENTATION OF TREATMENT FOR NEPHROPATHY: ICD-10-PCS | Mod: CPTII,,, | Performed by: INTERNAL MEDICINE

## 2022-02-07 PROCEDURE — 99212 PR OFFICE/OUTPT VISIT, EST, LEVL II, 10-19 MIN: ICD-10-PCS | Mod: S$PBB,,, | Performed by: INTERNAL MEDICINE

## 2022-02-07 PROCEDURE — 99212 OFFICE O/P EST SF 10 MIN: CPT | Mod: S$PBB,,, | Performed by: INTERNAL MEDICINE

## 2022-02-07 PROCEDURE — 3066F NEPHROPATHY DOC TX: CPT | Mod: CPTII,,, | Performed by: INTERNAL MEDICINE

## 2022-02-07 NOTE — PROGRESS NOTES
Renal note:  This pt requested admission to [facility] for HD outpt treatment. He has no insurance (madicaid pending), but is willing to pay privately.  Referral was made to [facility]. Spoke with .  Pt was accepted.  Pt needs to call [facility] admissions to start the admissions process. He can go to any Hd units he ants  He has received HD intermittently in ER. Will not need HD initiation (he is already on it).    Jay Ansari MD

## 2022-02-08 ENCOUNTER — HOSPITAL ENCOUNTER (EMERGENCY)
Facility: HOSPITAL | Age: 72
Discharge: HOME | End: 2022-02-08
Attending: EMERGENCY MEDICINE
Payer: MEDICAID

## 2022-02-08 VITALS
RESPIRATION RATE: 18 BRPM | BODY MASS INDEX: 26.63 KG/M2 | OXYGEN SATURATION: 97 % | HEIGHT: 66 IN | SYSTOLIC BLOOD PRESSURE: 144 MMHG | TEMPERATURE: 98 F | HEART RATE: 58 BPM | DIASTOLIC BLOOD PRESSURE: 70 MMHG | WEIGHT: 165.69 LBS

## 2022-02-08 DIAGNOSIS — N18.6 ESRD ON HEMODIALYSIS: Primary | Chronic | ICD-10-CM

## 2022-02-08 DIAGNOSIS — Z99.2 ESRD ON HEMODIALYSIS: Primary | Chronic | ICD-10-CM

## 2022-02-08 LAB
ALBUMIN SERPL BCP-MCNC: 3.4 G/DL (ref 3.5–5.2)
ALP SERPL-CCNC: 71 U/L (ref 55–135)
ALT SERPL W/O P-5'-P-CCNC: 24 U/L (ref 10–44)
ANION GAP SERPL CALC-SCNC: 12 MMOL/L (ref 8–16)
AST SERPL-CCNC: 20 U/L (ref 10–40)
BASOPHILS # BLD AUTO: 0.06 K/UL (ref 0–0.2)
BASOPHILS NFR BLD: 1 % (ref 0–1.9)
BILIRUB SERPL-MCNC: 0.7 MG/DL (ref 0.1–1)
BUN SERPL-MCNC: 86 MG/DL (ref 8–23)
CALCIUM SERPL-MCNC: 8.9 MG/DL (ref 8.7–10.5)
CHLORIDE SERPL-SCNC: 103 MMOL/L (ref 95–110)
CO2 SERPL-SCNC: 22 MMOL/L (ref 23–29)
CREAT SERPL-MCNC: 5 MG/DL (ref 0.5–1.4)
CTP QC/QA: YES
DIFFERENTIAL METHOD: ABNORMAL
EOSINOPHIL # BLD AUTO: 1.2 K/UL (ref 0–0.5)
EOSINOPHIL NFR BLD: 19.2 % (ref 0–8)
ERYTHROCYTE [DISTWIDTH] IN BLOOD BY AUTOMATED COUNT: 13.6 % (ref 11.5–14.5)
EST. GFR  (AFRICAN AMERICAN): 12 ML/MIN/1.73 M^2
EST. GFR  (NON AFRICAN AMERICAN): 11 ML/MIN/1.73 M^2
GLUCOSE SERPL-MCNC: 107 MG/DL (ref 70–110)
HCT VFR BLD AUTO: 30.2 % (ref 40–54)
HGB BLD-MCNC: 9.8 G/DL (ref 14–18)
IMM GRANULOCYTES # BLD AUTO: 0.02 K/UL (ref 0–0.04)
IMM GRANULOCYTES NFR BLD AUTO: 0.3 % (ref 0–0.5)
LYMPHOCYTES # BLD AUTO: 1.4 K/UL (ref 1–4.8)
LYMPHOCYTES NFR BLD: 22.5 % (ref 18–48)
MCH RBC QN AUTO: 29.7 PG (ref 27–31)
MCHC RBC AUTO-ENTMCNC: 32.5 G/DL (ref 32–36)
MCV RBC AUTO: 92 FL (ref 82–98)
MONOCYTES # BLD AUTO: 0.5 K/UL (ref 0.3–1)
MONOCYTES NFR BLD: 8.8 % (ref 4–15)
NEUTROPHILS # BLD AUTO: 2.9 K/UL (ref 1.8–7.7)
NEUTROPHILS NFR BLD: 48.2 % (ref 38–73)
NRBC BLD-RTO: 0 /100 WBC
PLATELET # BLD AUTO: 188 K/UL (ref 150–450)
PMV BLD AUTO: 9.6 FL (ref 9.2–12.9)
POTASSIUM SERPL-SCNC: 4.5 MMOL/L (ref 3.5–5.1)
PROT SERPL-MCNC: 8.6 G/DL (ref 6–8.4)
RBC # BLD AUTO: 3.3 M/UL (ref 4.6–6.2)
SARS-COV-2 RDRP RESP QL NAA+PROBE: NEGATIVE
SODIUM SERPL-SCNC: 137 MMOL/L (ref 136–145)
WBC # BLD AUTO: 6.05 K/UL (ref 3.9–12.7)

## 2022-02-08 PROCEDURE — 63600175 PHARM REV CODE 636 W HCPCS: Performed by: INTERNAL MEDICINE

## 2022-02-08 PROCEDURE — 90935 PR HEMODIALYSIS, ONE EVALUATION: ICD-10-PCS | Mod: ,,, | Performed by: INTERNAL MEDICINE

## 2022-02-08 PROCEDURE — 85025 COMPLETE CBC W/AUTO DIFF WBC: CPT | Performed by: EMERGENCY MEDICINE

## 2022-02-08 PROCEDURE — 99283 EMERGENCY DEPT VISIT LOW MDM: CPT | Mod: 25

## 2022-02-08 PROCEDURE — 90935 HEMODIALYSIS ONE EVALUATION: CPT | Mod: ,,, | Performed by: INTERNAL MEDICINE

## 2022-02-08 PROCEDURE — U0002 COVID-19 LAB TEST NON-CDC: HCPCS | Performed by: EMERGENCY MEDICINE

## 2022-02-08 PROCEDURE — 80053 COMPREHEN METABOLIC PANEL: CPT | Performed by: EMERGENCY MEDICINE

## 2022-02-08 RX ORDER — HEPARIN SODIUM 1000 [USP'U]/ML
4000 INJECTION, SOLUTION INTRAVENOUS; SUBCUTANEOUS ONCE
Status: COMPLETED | OUTPATIENT
Start: 2022-02-08 | End: 2022-02-08

## 2022-02-08 RX ADMIN — HEPARIN SODIUM 4000 UNITS: 1000 INJECTION, SOLUTION INTRAVENOUS; SUBCUTANEOUS at 02:02

## 2022-02-08 NOTE — PROGRESS NOTES
NEPHROLOGY HEMODIALYSIS NOTE    Lizz Alas is a 71 y.o. male currently on hemodialysis for removal of uremic toxins and volume.    Blood pressure is stable    Ultrafiltration goal is 0 L.    Labs have been reviewed and the dialysate bath has been adjusted.    There are no symptoms of hypotension, chest pain, dyspnea, nausea or vomiting.    Labs:      Recent Labs   Lab 02/08/22  0852      K 4.5      CO2 22*   BUN 86*   CREATININE 5.0*   CALCIUM 8.9       Recent Labs   Lab 02/08/22  0852   WBC 6.05   HGB 9.8*   HCT 30.2*          Assessment/Plan:    HD today for removal of uremic toxins   No UF as on exam euvolemic and still urinating  Per chart review appears pt will be accepted to [facility]

## 2022-02-08 NOTE — NURSING
Dialysis tx in progress, noted with high venous pressures , >400mmhg, setting off alarms. attempted trouble shooting and reversing flows, now hypotensive to 86/60,  UF off , 350cc fluid challenge given, UFR adjusted. Venous and arterial treatment pressures now wnl.  MD made aware, agreed with changes.

## 2022-02-08 NOTE — NURSING
Duration:3 hours    Stable/unstable:Stable    Ultrafiltration volume: 0    Notes: tolerated hd tx well. Not clinically indicated for fluid removal. No UF today.

## 2022-02-08 NOTE — NURSING
Presented to the [facility] room for treatment from the ER. 3 hour I-HDtx ordered per Dr Arnett. Will continue with plan.

## 2022-02-08 NOTE — ED PROVIDER NOTES
SCRIBE #1 NOTE: I, Ivan Arias, am scribing for, and in the presence of, Johan Ba MD. I have scribed the entire note.      History      Chief Complaint   Patient presents with    Emergency Dialysis     PT reports needing dialysis, last dialysis last Monday 1/31/22       Review of patient's allergies indicates:  No Known Allergies     HPI   HPI    2/8/2022, 8:07 AM   History obtained from the patient      History of Present Illness: Lizz Alas is a 71 y.o. male patient with a PMHx of ESRD, HTN who presents to the Emergency Department for dialysis. Pt dialyzes every Monday and Friday, and last dialyzed 8 days ago (Monday). Pt has been unable to get established with outpatient dialysis. Symptoms are constant and moderate in severity. No mitigating or exacerbating factors reported. Associated sxs include generalized weakness, fatigue, and nausea. Patient denies any fever, chills, vomiting, SOB, CP, numbness, dizziness, headache, and all other sxs at this time. No prior Tx reported. No further complaints or concerns at this time.     Arrival mode: Personal vehicle    PCP: Primary Doctor No       Past Medical History:  Past Medical History:   Diagnosis Date    ESRD (end stage renal disease)     ESRD (end stage renal disease) on dialysis     Hyperlipemia     Hypertension     Other lesions of oral mucosa     PAF (paroxysmal atrial fibrillation)     Renal disorder     Thrombocytopenia        Past Surgical History:  Past Surgical History:   Procedure Laterality Date    ERCP N/A 12/28/2021    Procedure: ERCP (ENDOSCOPIC RETROGRADE CHOLANGIOPANCREATOGRAPHY);  Surgeon: Melchor Sarmiento MD;  Location: [facility];  Service: Endoscopy;  Laterality: N/A;    FRACTURE SURGERY      HERNIA REPAIR      REPLACEMENT OF DIALYSIS CATHETER OVER GUIDEWIRE THROUGH EXISTING VENOUS ACCESS N/A 1/10/2022    Procedure: REPLACEMENT, CATHETER, DIALYSIS, OVER GUIDEWIRE, USING EXISTING VENOUS ACCESS;  Surgeon: Olayinka Vivar  MD;  Location: [facility] CATH LAB;  Service: Vascular;  Laterality: N/A;         Family History:  Family History   Problem Relation Age of Onset    No Known Problems Mother     No Known Problems Father        Social History:  Social History     Tobacco Use    Smoking status: Current Every Day Smoker     Packs/day: 1.50     Years: 45.00     Pack years: 67.50     Types: Cigarettes    Smokeless tobacco: Never Used   Substance and Sexual Activity    Alcohol use: Yes     Comment: 2-3 glasses of whiskey daily    Drug use: Never    Sexual activity: Not Currently     Partners: Female       ROS   Review of Systems   Constitutional: Positive for fatigue. Negative for chills and fever.   HENT: Negative for sore throat.    Respiratory: Negative for shortness of breath.    Cardiovascular: Negative for chest pain.   Gastrointestinal: Positive for nausea. Negative for diarrhea and vomiting.   Genitourinary: Negative for dysuria.   Musculoskeletal: Negative for back pain.   Skin: Negative for rash.   Neurological: Positive for weakness (generalized). Negative for dizziness, light-headedness, numbness and headaches.   Hematological: Does not bruise/bleed easily.   All other systems reviewed and are negative.    Physical Exam      Initial Vitals [02/08/22 0808]   BP Pulse Resp Temp SpO2   128/67 65 20 97.8 °F (36.6 °C) 98 %      MAP       --          Physical Exam  Nursing Notes and Vital Signs Reviewed.  Constitutional: Patient is in no acute distress. Well-developed and well-nourished.  Head: Atraumatic. Normocephalic.  Eyes: PERRL. EOM intact. Conjunctivae are not pale. No scleral icterus.  ENT: Mucous membranes are moist. Oropharynx is clear and symmetric.    Neck: Supple. Full ROM.  Cardiovascular: Regular rate. Regular rhythm. No murmurs, rubs, or gallops. Distal pulses are 2+ and symmetric.  Pulmonary/Chest: No respiratory distress. Clear to auscultation bilaterally. No wheezing or rales.  Abdominal: Soft and non-distended.   "There is no tenderness.  No rebound, guarding, or rigidity.   Musculoskeletal: Moves all extremities. No obvious deformities. No edema.  Skin: Warm and dry.  Neurological:  Alert, awake, and appropriate.  Normal speech.  No acute focal neurological deficits are appreciated.  Psychiatric: Normal affect. Good eye contact. Appropriate in content.    ED Course    Procedures  ED Vital Signs:  Vitals:    02/08/22 0808 02/08/22 0900 02/08/22 1000 02/08/22 1030   BP: 128/67 123/73 112/69 118/64   Pulse: 65 (!) 59 60 (!) 59   Resp: 20 16 16 18   Temp: 97.8 °F (36.6 °C)      SpO2: 98% 96% 98% 98%   Weight: 75.1 kg (165 lb 10.8 oz)      Height: 5' 6" (1.676 m)       02/08/22 1100   BP: 123/71   Pulse: 62   Resp: 18   Temp:    SpO2: 97%   Weight:    Height:        Abnormal Lab Results:  Labs Reviewed   CBC W/ AUTO DIFFERENTIAL - Abnormal; Notable for the following components:       Result Value    RBC 3.30 (*)     Hemoglobin 9.8 (*)     Hematocrit 30.2 (*)     Eos # 1.2 (*)     Eosinophil % 19.2 (*)     All other components within normal limits   COMPREHENSIVE METABOLIC PANEL - Abnormal; Notable for the following components:    CO2 22 (*)     BUN 86 (*)     Creatinine 5.0 (*)     Total Protein 8.6 (*)     Albumin 3.4 (*)     eGFR if  12 (*)     eGFR if non  11 (*)     All other components within normal limits   SARS-COV-2 RDRP GENE        All Lab Results:  Results for orders placed or performed during the hospital encounter of 02/08/22   CBC Auto Differential   Result Value Ref Range    WBC 6.05 3.90 - 12.70 K/uL    RBC 3.30 (L) 4.60 - 6.20 M/uL    Hemoglobin 9.8 (L) 14.0 - 18.0 g/dL    Hematocrit 30.2 (L) 40.0 - 54.0 %    MCV 92 82 - 98 fL    MCH 29.7 27.0 - 31.0 pg    MCHC 32.5 32.0 - 36.0 g/dL    RDW 13.6 11.5 - 14.5 %    Platelets 188 150 - 450 K/uL    MPV 9.6 9.2 - 12.9 fL    Immature Granulocytes 0.3 0.0 - 0.5 %    Gran # (ANC) 2.9 1.8 - 7.7 K/uL    Immature Grans (Abs) 0.02 0.00 - 0.04 " K/uL    Lymph # 1.4 1.0 - 4.8 K/uL    Mono # 0.5 0.3 - 1.0 K/uL    Eos # 1.2 (H) 0.0 - 0.5 K/uL    Baso # 0.06 0.00 - 0.20 K/uL    nRBC 0 0 /100 WBC    Gran % 48.2 38.0 - 73.0 %    Lymph % 22.5 18.0 - 48.0 %    Mono % 8.8 4.0 - 15.0 %    Eosinophil % 19.2 (H) 0.0 - 8.0 %    Basophil % 1.0 0.0 - 1.9 %    Differential Method Automated    Comprehensive Metabolic Panel   Result Value Ref Range    Sodium 137 136 - 145 mmol/L    Potassium 4.5 3.5 - 5.1 mmol/L    Chloride 103 95 - 110 mmol/L    CO2 22 (L) 23 - 29 mmol/L    Glucose 107 70 - 110 mg/dL    BUN 86 (H) 8 - 23 mg/dL    Creatinine 5.0 (H) 0.5 - 1.4 mg/dL    Calcium 8.9 8.7 - 10.5 mg/dL    Total Protein 8.6 (H) 6.0 - 8.4 g/dL    Albumin 3.4 (L) 3.5 - 5.2 g/dL    Total Bilirubin 0.7 0.1 - 1.0 mg/dL    Alkaline Phosphatase 71 55 - 135 U/L    AST 20 10 - 40 U/L    ALT 24 10 - 44 U/L    Anion Gap 12 8 - 16 mmol/L    eGFR if African American 12 (A) >60 mL/min/1.73 m^2    eGFR if non African American 11 (A) >60 mL/min/1.73 m^2   POCT COVID-19 Rapid Screening   Result Value Ref Range    POC Rapid COVID Negative Negative     Acceptable Yes      Imaging Results:  Imaging Results    None                 The Emergency Provider reviewed the vital signs and test results, which are outlined above.    ED Discussion     9:35 AM: Discussed pt's case with Dr. Arnett (Nephrology), who will arrange for dialysis.    11:45 AM: Reassessed pt at this time. Discussed with pt all pertinent ED information and results. Discussed pt dx and plan of tx. Gave pt all f/u and return to the ED instructions. All questions and concerns were addressed at this time. Pt expresses understanding of information and instructions, and is comfortable with plan to discharge. Pt is stable for discharge.    I discussed with patient and/or family/caretaker that evaluation in the ED does not suggest any emergent or life threatening medical conditions requiring immediate intervention beyond what was  provided in the ED, and I believe patient is safe for discharge.  Regardless, an unremarkable evaluation in the ED does not preclude the development or presence of a serious of life threatening condition. As such, patient was instructed to return immediately for any worsening or change in current symptoms.         ED Medication(s):  Medications   sodium chloride 0.9% bolus 250 mL (has no administration in time range)   heparin (porcine) injection 4,000 Units (has no administration in time range)        Follow-up Information     [facility] In 2 days.    Contact information:  [address] [facility] [address] [phone]                        New Prescriptions    No medications on file         Medical Decision Making    Medical Decision Making:   Clinical Tests:   Lab Tests: Ordered and Reviewed           Scribe Attestation:   Scribe #1: I performed the above scribed service and the documentation accurately describes the services I performed. I attest to the accuracy of the note.    Attending:   Physician Attestation Statement for Scribe #1: I, Johan Ba MD, personally performed the services described in this documentation, as scribed by Ivan Arias, in my presence, and it is both accurate and complete.          Clinical Impression       ICD-10-CM ICD-9-CM   1. ESRD on hemodialysis  N18.6 585.6    Z99.2 V45.11       Disposition:   Disposition: Discharged  Condition: Stable         Johan Ba MD  02/08/22 1220

## 2022-02-09 ENCOUNTER — TELEPHONE (OUTPATIENT)
Dept: GASTROENTEROLOGY | Facility: CLINIC | Age: 72
End: 2022-02-09
Payer: MEDICAID

## 2022-02-09 NOTE — PROGRESS NOTES
See prior documentation. This pt was seen briefly in the clinic on the stated date and was referred to [facility] HD unit.

## 2022-02-09 NOTE — TELEPHONE ENCOUNTER
----- Message from Melchor Sarmiento MD sent at 1/1/2022 12:54 PM CST -----  Please schedule him for repeat ERCP in 2 months, currently in ICU will need call in a week to assess when he is discharged from ICU.    Melchor Sarmiento MD  Gastroenterology  Director of Advanced Endoscopy at [facility]

## 2022-02-11 ENCOUNTER — TELEPHONE (OUTPATIENT)
Dept: NEPHROLOGY | Facility: CLINIC | Age: 72
End: 2022-02-11
Payer: MEDICAID

## 2022-02-11 NOTE — TELEPHONE ENCOUNTER
----- Message from Francine Mandel sent at 2/11/2022  4:22 PM CST -----  Contact: self  Lizz Alas would like a call back at [phone], in regards to the patient Dialysis.

## 2022-02-11 NOTE — PROGRESS NOTES
"Communication with nephrology nurse today, which I will document.    Advise ho to go to ER if he has any medical symptoms. [facility]. He did not show up today.  I called the pt to find out what is going on at the number below. His mail box is full and no one answered.    Dr. Ansari    ===View-only below this line===  ----- Message -----  From: Cortney Lira LPN  Sent: 2/11/2022   4:31 PM CST  To: Jay Ansari MD    Pt has still not getting admitted to [facility] but has to submit more and more paperwork each day. He will need hd this weekend and son wants to know if you speak to someone in ED to speed up that process when he goes to ED for hd as they take so long to get him in unless its a big emergency" situation..they are waiting to hear from you  ----- Message -----  From: Francine Mandel  Sent: 2/11/2022   4:23 PM CST  To: Elan Thompson Staff    [facility] would like a call back at [phone], in regards to the patient Dialysis.          "

## 2022-02-11 NOTE — TELEPHONE ENCOUNTER
"S/w son and he wants help with the ED to speed u the process to get hd this weekend since [facility] has put him off asking for papers again. Hopefully they will have him admitted by Monday but  He will need hd til then.". sent message to dr chong. 2/11/22/sf   "

## 2022-02-12 ENCOUNTER — HOSPITAL ENCOUNTER (EMERGENCY)
Facility: HOSPITAL | Age: 72
Discharge: HOME | End: 2022-02-12
Attending: EMERGENCY MEDICINE
Payer: MEDICAID

## 2022-02-12 VITALS
HEIGHT: 66 IN | RESPIRATION RATE: 26 BRPM | OXYGEN SATURATION: 98 % | TEMPERATURE: 98 F | SYSTOLIC BLOOD PRESSURE: 132 MMHG | HEART RATE: 72 BPM | DIASTOLIC BLOOD PRESSURE: 60 MMHG | BODY MASS INDEX: 26.77 KG/M2 | WEIGHT: 166.56 LBS

## 2022-02-12 DIAGNOSIS — R06.02 SHORTNESS OF BREATH: ICD-10-CM

## 2022-02-12 DIAGNOSIS — N17.9 AKI (ACUTE KIDNEY INJURY): Primary | ICD-10-CM

## 2022-02-12 LAB
ALBUMIN SERPL BCP-MCNC: 3.3 G/DL (ref 3.5–5.2)
ALP SERPL-CCNC: 71 U/L (ref 55–135)
ALT SERPL W/O P-5'-P-CCNC: 19 U/L (ref 10–44)
ANION GAP SERPL CALC-SCNC: 9 MMOL/L (ref 8–16)
AST SERPL-CCNC: 21 U/L (ref 10–40)
BASOPHILS # BLD AUTO: 0.05 K/UL (ref 0–0.2)
BASOPHILS NFR BLD: 1 % (ref 0–1.9)
BILIRUB SERPL-MCNC: 0.7 MG/DL (ref 0.1–1)
BNP SERPL-MCNC: 89 PG/ML (ref 0–99)
BUN SERPL-MCNC: 56 MG/DL (ref 8–23)
CALCIUM SERPL-MCNC: 9.2 MG/DL (ref 8.7–10.5)
CHLORIDE SERPL-SCNC: 103 MMOL/L (ref 95–110)
CO2 SERPL-SCNC: 22 MMOL/L (ref 23–29)
CREAT SERPL-MCNC: 3.7 MG/DL (ref 0.5–1.4)
CTP QC/QA: YES
DIFFERENTIAL METHOD: ABNORMAL
EOSINOPHIL # BLD AUTO: 0.7 K/UL (ref 0–0.5)
EOSINOPHIL NFR BLD: 14.1 % (ref 0–8)
ERYTHROCYTE [DISTWIDTH] IN BLOOD BY AUTOMATED COUNT: 13.7 % (ref 11.5–14.5)
EST. GFR  (AFRICAN AMERICAN): 18 ML/MIN/1.73 M^2
EST. GFR  (NON AFRICAN AMERICAN): 15 ML/MIN/1.73 M^2
GLUCOSE SERPL-MCNC: 108 MG/DL (ref 70–110)
HCT VFR BLD AUTO: 28.4 % (ref 40–54)
HGB BLD-MCNC: 9.3 G/DL (ref 14–18)
IMM GRANULOCYTES # BLD AUTO: 0.01 K/UL (ref 0–0.04)
IMM GRANULOCYTES NFR BLD AUTO: 0.2 % (ref 0–0.5)
LYMPHOCYTES # BLD AUTO: 1.4 K/UL (ref 1–4.8)
LYMPHOCYTES NFR BLD: 26 % (ref 18–48)
MAGNESIUM SERPL-MCNC: 2 MG/DL (ref 1.6–2.6)
MCH RBC QN AUTO: 30 PG (ref 27–31)
MCHC RBC AUTO-ENTMCNC: 32.7 G/DL (ref 32–36)
MCV RBC AUTO: 92 FL (ref 82–98)
MONOCYTES # BLD AUTO: 0.5 K/UL (ref 0.3–1)
MONOCYTES NFR BLD: 10.1 % (ref 4–15)
NEUTROPHILS # BLD AUTO: 2.5 K/UL (ref 1.8–7.7)
NEUTROPHILS NFR BLD: 48.6 % (ref 38–73)
NRBC BLD-RTO: 0 /100 WBC
PHOSPHATE SERPL-MCNC: 3.6 MG/DL (ref 2.7–4.5)
PLATELET # BLD AUTO: 172 K/UL (ref 150–450)
PMV BLD AUTO: 9.7 FL (ref 9.2–12.9)
POTASSIUM SERPL-SCNC: 4.3 MMOL/L (ref 3.5–5.1)
PROT SERPL-MCNC: 8.3 G/DL (ref 6–8.4)
RBC # BLD AUTO: 3.1 M/UL (ref 4.6–6.2)
SARS-COV-2 RDRP RESP QL NAA+PROBE: NEGATIVE
SODIUM SERPL-SCNC: 134 MMOL/L (ref 136–145)
TROPONIN I SERPL DL<=0.01 NG/ML-MCNC: 0.01 NG/ML (ref 0–0.03)
WBC # BLD AUTO: 5.23 K/UL (ref 3.9–12.7)

## 2022-02-12 PROCEDURE — 99285 EMERGENCY DEPT VISIT HI MDM: CPT | Mod: 25

## 2022-02-12 PROCEDURE — 84100 ASSAY OF PHOSPHORUS: CPT | Performed by: EMERGENCY MEDICINE

## 2022-02-12 PROCEDURE — 99285 PR EMERGENCY DEPT VISIT,LEVEL V: ICD-10-PCS | Mod: ,,, | Performed by: INTERNAL MEDICINE

## 2022-02-12 PROCEDURE — 96372 THER/PROPH/DIAG INJ SC/IM: CPT | Mod: 59

## 2022-02-12 PROCEDURE — 96374 THER/PROPH/DIAG INJ IV PUSH: CPT

## 2022-02-12 PROCEDURE — 83735 ASSAY OF MAGNESIUM: CPT | Performed by: EMERGENCY MEDICINE

## 2022-02-12 PROCEDURE — 83880 ASSAY OF NATRIURETIC PEPTIDE: CPT | Performed by: NURSE PRACTITIONER

## 2022-02-12 PROCEDURE — 93005 ELECTROCARDIOGRAM TRACING: CPT

## 2022-02-12 PROCEDURE — 80053 COMPREHEN METABOLIC PANEL: CPT | Performed by: NURSE PRACTITIONER

## 2022-02-12 PROCEDURE — 63600175 PHARM REV CODE 636 W HCPCS: Mod: JG | Performed by: INTERNAL MEDICINE

## 2022-02-12 PROCEDURE — 99285 EMERGENCY DEPT VISIT HI MDM: CPT | Mod: ,,, | Performed by: INTERNAL MEDICINE

## 2022-02-12 PROCEDURE — 85025 COMPLETE CBC W/AUTO DIFF WBC: CPT | Performed by: NURSE PRACTITIONER

## 2022-02-12 PROCEDURE — 93010 ELECTROCARDIOGRAM REPORT: CPT | Mod: ,,, | Performed by: INTERNAL MEDICINE

## 2022-02-12 PROCEDURE — 84484 ASSAY OF TROPONIN QUANT: CPT | Performed by: NURSE PRACTITIONER

## 2022-02-12 PROCEDURE — 93010 EKG 12-LEAD: ICD-10-PCS | Mod: ,,, | Performed by: INTERNAL MEDICINE

## 2022-02-12 PROCEDURE — U0002 COVID-19 LAB TEST NON-CDC: HCPCS | Performed by: NURSE PRACTITIONER

## 2022-02-12 RX ORDER — FUROSEMIDE 20 MG/1
20 TABLET ORAL 2 TIMES DAILY
Qty: 120 TABLET | Refills: 0 | OUTPATIENT
Start: 2022-02-12 | End: 2022-02-12 | Stop reason: SDUPTHER

## 2022-02-12 RX ORDER — FUROSEMIDE 10 MG/ML
60 INJECTION INTRAMUSCULAR; INTRAVENOUS ONCE
Status: COMPLETED | OUTPATIENT
Start: 2022-02-12 | End: 2022-02-12

## 2022-02-12 RX ORDER — FUROSEMIDE 20 MG/1
20 TABLET ORAL 2 TIMES DAILY
Qty: 120 TABLET | Refills: 0 | Status: SHIPPED | OUTPATIENT
Start: 2022-02-12 | End: 2022-04-13

## 2022-02-12 RX ADMIN — EPOETIN ALFA-EPBX 4000 UNITS: 4000 INJECTION, SOLUTION INTRAVENOUS; SUBCUTANEOUS at 03:02

## 2022-02-12 RX ADMIN — FUROSEMIDE 60 MG: 10 INJECTION, SOLUTION INTRAMUSCULAR; INTRAVENOUS at 12:02

## 2022-02-12 NOTE — PHARMACY MED REC
"Admission Medication History     The home medication history was taken by Paco Shay.    You may go to "Admission" then "Reconcile Home Medications" tabs to review and/or act upon these items.      The home medication list has been updated by the Pharmacy department.    Please read ALL comments highlighted in yellow.    Please address this information as you see fit.     Feel free to contact us if you have any questions or require assistance.        Medications listed below were obtained from: Patient/family and Analytic software- [employer]  (Not in a hospital admission)      Potential issues to be addressed PRIOR TO DISCHARGE: NONE      Paco Shay, CPhT  Spectral[employer]w [phone]      Current Outpatient Medications on File Prior to Encounter   Medication Sig Dispense Refill Last Dose    amLODIPine (NORVASC) 5 MG tablet Take 5 mg by mouth once daily.   2/12/2022 at Unknown time    folic acid (FOLVITE) 1 MG tablet Take 1 tablet (1 mg total) by mouth once daily. 30 tablet 11 2/12/2022 at Unknown time    furosemide (LASIX) 40 MG tablet Take 1 tablet (40 mg total) by mouth 2 (two) times daily. 60 tablet 0 2/12/2022 at Unknown time    metoprolol tartrate (LOPRESSOR) 25 MG tablet Take 1 tablet (25 mg total) by mouth 2 (two) times daily. 60 tablet 11 2/12/2022 at Unknown time    rosuvastatin (CRESTOR) 20 MG tablet Take 20 mg by mouth once daily.   2/12/2022 at Unknown time    sevelamer carbonate (RENVELA) 800 mg Tab Take 2 tablets (1,600 mg total) by mouth 3 (three) times daily with meals. 180 tablet 11 2/12/2022 at Unknown time    tamsulosin (FLOMAX) 0.4 mg Cap Take 1 capsule (0.4 mg total) by mouth every evening. 30 capsule 11 2/12/2022 at Unknown time    thiamine 100 MG tablet Take 1 tablet (100 mg total) by mouth once daily. 30 tablet 11 2/12/2022 at Unknown time                             .          "

## 2022-02-12 NOTE — SUBJECTIVE & OBJECTIVE
Past Medical History:   Diagnosis Date    ESRD (end stage renal disease)     ESRD (end stage renal disease) on dialysis     Hyperlipemia     Hypertension     Other lesions of oral mucosa     PAF (paroxysmal atrial fibrillation)     Renal disorder     Thrombocytopenia        Past Surgical History:   Procedure Laterality Date    ERCP N/A 12/28/2021    Procedure: ERCP (ENDOSCOPIC RETROGRADE CHOLANGIOPANCREATOGRAPHY);  Surgeon: Melchor Sarmiento MD;  Location: [facility] OR;  Service: Endoscopy;  Laterality: N/A;    FRACTURE SURGERY      HERNIA REPAIR      REPLACEMENT OF DIALYSIS CATHETER OVER GUIDEWIRE THROUGH EXISTING VENOUS ACCESS N/A 1/10/2022    Procedure: REPLACEMENT, CATHETER, DIALYSIS, OVER GUIDEWIRE, USING EXISTING VENOUS ACCESS;  Surgeon: Olayinka Vivar MD;  Location: [facility] CATH LAB;  Service: Vascular;  Laterality: N/A;       Review of patient's allergies indicates:  No Known Allergies  Current Facility-Administered Medications   Medication Frequency    epoetin rhona-epbx injection 4,000 Units Once     Current Outpatient Medications   Medication    amLODIPine (NORVASC) 5 MG tablet    folic acid (FOLVITE) 1 MG tablet    metoprolol tartrate (LOPRESSOR) 25 MG tablet    rosuvastatin (CRESTOR) 20 MG tablet    sevelamer carbonate (RENVELA) 800 mg Tab    tamsulosin (FLOMAX) 0.4 mg Cap    thiamine 100 MG tablet    furosemide (LASIX) 20 MG tablet     Family History     Problem Relation (Age of Onset)    No Known Problems Mother, Father        Tobacco Use    Smoking status: Current Every Day Smoker     Packs/day: 1.50     Years: 45.00     Pack years: 67.50     Types: Cigarettes    Smokeless tobacco: Never Used   Substance and Sexual Activity    Alcohol use: Yes     Comment: 2-3 glasses of whiskey daily    Drug use: Never    Sexual activity: Not Currently     Partners: Female     Review of Systems   HENT: Negative.    Respiratory: Positive for shortness of breath.    Gastrointestinal: Negative.     Genitourinary: Negative.    Musculoskeletal: Negative.    Psychiatric/Behavioral: Negative.      Objective:     Vital Signs (Most Recent):  Temp: 97.7 °F (36.5 °C) (02/12/22 0729)  Pulse: 64 (02/12/22 0936)  Resp: (!) 24 (02/12/22 0729)  BP: 117/64 (02/12/22 0936)  SpO2: 97 % (02/12/22 0936)  O2 Device (Oxygen Therapy): room air (02/12/22 0729) Vital Signs (24h Range):  Temp:  [97.7 °F (36.5 °C)] 97.7 °F (36.5 °C)  Pulse:  [64-66] 64  Resp:  [24] 24  SpO2:  [97 %] 97 %  BP: (117-119)/(64-65) 117/64     Weight: 75.5 kg (166 lb 8.9 oz) (02/12/22 0729)  Body mass index is 26.88 kg/m².  Body surface area is 1.88 meters squared.    No intake/output data recorded.    Physical Exam  Vitals and nursing note reviewed.   Constitutional:       Appearance: Normal appearance.   HENT:      Head: Normocephalic and atraumatic.   Cardiovascular:      Rate and Rhythm: Normal rate and regular rhythm.      Pulses: Normal pulses.      Heart sounds: Normal heart sounds. No friction rub.      Comments: Has R IJ vas cath  Pulmonary:      Effort: Pulmonary effort is normal. No respiratory distress.      Breath sounds: Normal breath sounds. No wheezing or rales.   Abdominal:      General: Abdomen is flat.      Palpations: Abdomen is soft.   Musculoskeletal:      Right lower leg: No edema.      Left lower leg: No edema.   Skin:     General: Skin is warm and dry.   Neurological:      General: No focal deficit present.      Mental Status: He is alert and oriented to person, place, and time.   Psychiatric:         Mood and Affect: Mood normal.         Behavior: Behavior normal.         Significant Labs: reviewed  BMP  Lab Results   Component Value Date     (L) 02/12/2022    K 4.3 02/12/2022     02/12/2022    CO2 22 (L) 02/12/2022    BUN 56 (H) 02/12/2022    CREATININE 3.7 (H) 02/12/2022    CALCIUM 9.2 02/12/2022    ANIONGAP 9 02/12/2022    ESTGFRAFRICA 18 (A) 02/12/2022    EGFRNONAA 15 (A) 02/12/2022     Lab Results   Component  Value Date    WBC 5.23 02/12/2022    HGB 9.3 (L) 02/12/2022    HCT 28.4 (L) 02/12/2022    MCV 92 02/12/2022     02/12/2022     Lab Results   Component Value Date    CALCIUM 9.2 02/12/2022    PHOS 3.6 02/12/2022         Significant Imaging: CXR reviewed from today's visit: clear lung fields

## 2022-02-12 NOTE — CONSULTS
[facility] - Emergency Dept.  Nephrology  Consult Note    Patient Name: Lizz Alas  MRN: [id number]  Admission Date: 2/12/2022  Hospital Length of Stay: 0 days  Attending Provider: Frank Gresham MD   Primary Care Physician: Primary Doctor No  Principal Problem: SOB. LISET, HD dependent    Reason for consult: LISET, SOB. HD dependent    Consults  Subjective:     HPI: Pt was seen and examined in ER this afternoon. Chart, h/o, Labs, and meds reviewed with pt's and his nephew. Discussed with ER physician. Pt is a 70 y/o male with LISET that occurred on 12/29/21, who has become HD dependent, who presents to ER with SOB. The cause of LISET was likely E coli sepsis (had been admitted for cholangitis) and possibly NSAIds at home prior to Dec 2021 admit. Due to lack of insurance, it has not been possible to place pt in a HD unit, though pt agreed on self-pay and arrangement with [facility] is being made at present time, but has not been finalized.     Pt's last HD in ER was 4 days ago. Prior to that, the last HD was 7 days back (that is 11 days ago). Pt has mild SOB. He feels like he is urinating more. He has no other c/o's, no CP, no SOB, no discomfort. His nephew (Brian) translated.       Past Medical History:   Diagnosis Date    ESRD (end stage renal disease)     ESRD (end stage renal disease) on dialysis     Hyperlipemia     Hypertension     Other lesions of oral mucosa     PAF (paroxysmal atrial fibrillation)     Renal disorder     Thrombocytopenia        Past Surgical History:   Procedure Laterality Date    ERCP N/A 12/28/2021    Procedure: ERCP (ENDOSCOPIC RETROGRADE CHOLANGIOPANCREATOGRAPHY);  Surgeon: Melchor Sarmiento MD;  Location: [facility];  Service: Endoscopy;  Laterality: N/A;    FRACTURE SURGERY      HERNIA REPAIR      REPLACEMENT OF DIALYSIS CATHETER OVER GUIDEWIRE THROUGH EXISTING VENOUS ACCESS N/A 1/10/2022    Procedure: REPLACEMENT, CATHETER, DIALYSIS, OVER GUIDEWIRE, USING EXISTING VENOUS  Per mom pt starting vomiting this am. Pt admitted 10 days ago for clean out. ACCESS;  Surgeon: Olayinka Vivar MD;  Location: [facility] CATH LAB;  Service: Vascular;  Laterality: N/A;       Review of patient's allergies indicates:  No Known Allergies  Current Facility-Administered Medications   Medication Frequency    epoetin rhona-epbx injection 4,000 Units Once     Current Outpatient Medications   Medication    amLODIPine (NORVASC) 5 MG tablet    folic acid (FOLVITE) 1 MG tablet    metoprolol tartrate (LOPRESSOR) 25 MG tablet    rosuvastatin (CRESTOR) 20 MG tablet    sevelamer carbonate (RENVELA) 800 mg Tab    tamsulosin (FLOMAX) 0.4 mg Cap    thiamine 100 MG tablet    furosemide (LASIX) 20 MG tablet     Family History     Problem Relation (Age of Onset)    No Known Problems Mother, Father        Tobacco Use    Smoking status: Current Every Day Smoker     Packs/day: 1.50     Years: 45.00     Pack years: 67.50     Types: Cigarettes    Smokeless tobacco: Never Used   Substance and Sexual Activity    Alcohol use: Yes     Comment: 2-3 glasses of whiskey daily    Drug use: Never    Sexual activity: Not Currently     Partners: Female     Review of Systems   HENT: Negative.    Respiratory: Positive for shortness of breath.    Gastrointestinal: Negative.    Genitourinary: Negative.    Musculoskeletal: Negative.    Psychiatric/Behavioral: Negative.      Objective:     Vital Signs (Most Recent):  Temp: 97.7 °F (36.5 °C) (02/12/22 0729)  Pulse: 64 (02/12/22 0936)  Resp: (!) 24 (02/12/22 0729)  BP: 117/64 (02/12/22 0936)  SpO2: 97 % (02/12/22 0936)  O2 Device (Oxygen Therapy): room air (02/12/22 0729) Vital Signs (24h Range):  Temp:  [97.7 °F (36.5 °C)] 97.7 °F (36.5 °C)  Pulse:  [64-66] 64  Resp:  [24] 24  SpO2:  [97 %] 97 %  BP: (117-119)/(64-65) 117/64     Weight: 75.5 kg (166 lb 8.9 oz) (02/12/22 0729)  Body mass index is 26.88 kg/m².  Body surface area is 1.88 meters squared.    No intake/output data recorded.    Physical Exam  Vitals and nursing note reviewed.   Constitutional:        Appearance: Normal appearance.   HENT:      Head: Normocephalic and atraumatic.   Cardiovascular:      Rate and Rhythm: Normal rate and regular rhythm.      Pulses: Normal pulses.      Heart sounds: Normal heart sounds. No friction rub.      Comments: Has R IJ vas cath  Pulmonary:      Effort: Pulmonary effort is normal. No respiratory distress.      Breath sounds: Normal breath sounds. No wheezing or rales.   Abdominal:      General: Abdomen is flat.      Palpations: Abdomen is soft.   Musculoskeletal:      Right lower leg: No edema.      Left lower leg: No edema.   Skin:     General: Skin is warm and dry.   Neurological:      General: No focal deficit present.      Mental Status: He is alert and oriented to person, place, and time.   Psychiatric:         Mood and Affect: Mood normal.         Behavior: Behavior normal.         Significant Labs: reviewed  BMP  Lab Results   Component Value Date     (L) 02/12/2022    K 4.3 02/12/2022     02/12/2022    CO2 22 (L) 02/12/2022    BUN 56 (H) 02/12/2022    CREATININE 3.7 (H) 02/12/2022    CALCIUM 9.2 02/12/2022    ANIONGAP 9 02/12/2022    ESTGFRAFRICA 18 (A) 02/12/2022    EGFRNONAA 15 (A) 02/12/2022     Lab Results   Component Value Date    WBC 5.23 02/12/2022    HGB 9.3 (L) 02/12/2022    HCT 28.4 (L) 02/12/2022    MCV 92 02/12/2022     02/12/2022     Lab Results   Component Value Date    CALCIUM 9.2 02/12/2022    PHOS 3.6 02/12/2022         Significant Imaging: CXR reviewed from today's visit: clear lung fields    Assessment/Plan:     72 y/o male with LISET due to E coli sepsis who has become HD dependent presented with SOB:    LISET (acute kidney injury)  LISET occurred exactly between 12/28 and 12/29/21.  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     Last HD was 4 days ago; prior to that, 11 days ago  Cr appears not higher without HD  Subjectively, pt reports more UOP  K has remained  normal  Hyperphosphatemia has resolved  Hypocalcemia has improved  Metabolic acidosis has improved,   O2 sat good    Pt was given a dose of lasix 60 mg IV x 1 which resulted in large UOP in ER  SOB resolved post lasix    Pt appears to be recovering from E coli induced LISET  No indications for HD today  Spoke with pt's nephew in details, who trasnplated  Recommend:  D/c from ER today  Return to ER for repeat blood work in 3-4 days time  Return to ER o/w anytime if not feeling well or if SOB  Lasix 20 mg po qd; and if SOB, bid  Moderate fluid intake  Epogen 4000 u sc x 1 in ER before d/c  If by next week, s Cr has continued to improve, IJ vas cath will need to be removed.  No NSAIDs at home     Primary hypertension  BP controlled     Paroxysmal atrial fibrillation  Reviewed the chart     Tobacco use  Advised pt to quit smoking.        Plans and recommendations:  As discussed above  Total time spent 70 minutes including time needed to review the records, the   patient evaluation, documentation, face-to-face discussion with the patient,   more than 50% of the time was spent on coordination of care and counseling.    Level V visit.        Jay Ansari MD   Nephrology  [facility] - Emergency Dept.

## 2022-02-12 NOTE — HPI
Pt was seen and examined in ER this afternoon. Chart, h/o, Labs, and meds reviewed with pt's and his nephew. Discussed with ER physician. Pt is a 72 y/o male with LISET that occurred on 12/29/21, who has become HD dependent, who presents to ER with SOB. The cause of LISET was likely E coli sepsis (had been admitted for cholangitis) and possibly NSAIds at home prior to Dec 2021 admit. Due to lack of insurance, it has not been possible to place pt in a HD unit, though pt agreed on self-pay and arrangement with [facility] is being made at present time, but has not been finalized.     Pt's last HD in ER was 4 days ago. Prior to that, the last HD was 7 days back (that is 11 days ago). Pt has mild SOB. He feels like he is urinating more. He has no other c/o's, no CP, no SOB, no discomfort. His nephew (Brian) translated.

## 2022-02-12 NOTE — ASSESSMENT & PLAN NOTE
70 y/o male with LISET which has not resolved presented with dyspnea     LISET (acute kidney injury)  LISET occurred exactly between 1/28 and 1/29/21.  Renal failure has not resolved and pt has become HD dependent  Cause not clear.  DDX: E coli sepsis vs NSADs/analgesic nephropathy.     K normal  O2 sat good  Acid base stable  Hyperphosphatemia  Hypocalcemia   Has subjective sx's of dyspnea  Will provide HD today  Pt was revisited during HD, stable, will continue HD     Has been getting HD on urgent basis in ER, because does not have health insurance  Spoke with pt's daughter by phone  Advised her of getting him the medicaid card to be able to be admitted to a HD unit     Primary hypertension  Reviewed the chart and the meds     Paroxysmal atrial fibrillation  Reviewed the chart     Tobacco use  Advised pt to quit smoking.        Plans and recommendations:  As discussed above  Total time spent 60 minutes including time needed to review the records, the   patient evaluation, documentation, face-to-face discussion with the patient,   more than 50% of the time was spent on coordination of care and counseling.    Level V visit.  Multiple medical issues were addressed, as documented. Medical care provided was in addition to providing dialysis. Pt received multiple visits and evaluations.

## 2022-02-12 NOTE — ED PROVIDER NOTES
SCRIBE #1 NOTE: I, Jenn Ochoa, am scribing for, and in the presence of, Frank Gresham MD. I have scribed the entire note.       History     Chief Complaint   Patient presents with    Shortness of Breath     Pt reports shortness of breath and needs dialysis.     Review of patient's allergies indicates:  No Known Allergies      History of Present Illness     HPI    2/12/2022, 9:02 AM  History obtained from the patient      History of Present Illness: Lizz Alas is a 71 y.o. male patient with a PMHx of ESRD, HTN, and renal disorder who presents to the Emergency Department for evaluation of SOB which onset several days pta. Pt stated that he has not had dialysis since 2/7, and has been getting dialysis at this facility. He is being set up for dialysis at [facility], but has not been able to complete extensive paperwork to pay for this with cash.  Symptoms are constant and moderate in severity. No mitigating or exacerbating factors reported. No associated sxs reported. Patient denies any weakness, fatigue, CP, fever, HA, and all other sxs at this time. No further complaints or concerns at this time.       Arrival mode: Personal vehicle     PCP: Primary Doctor No        Past Medical History:  Past Medical History:   Diagnosis Date    ESRD (end stage renal disease)     ESRD (end stage renal disease) on dialysis     Hyperlipemia     Hypertension     Other lesions of oral mucosa     PAF (paroxysmal atrial fibrillation)     Renal disorder     Thrombocytopenia        Past Surgical History:  Past Surgical History:   Procedure Laterality Date    ERCP N/A 12/28/2021    Procedure: ERCP (ENDOSCOPIC RETROGRADE CHOLANGIOPANCREATOGRAPHY);  Surgeon: Melchor Sarmiento MD;  Location: [facility];  Service: Endoscopy;  Laterality: N/A;    FRACTURE SURGERY      HERNIA REPAIR      REPLACEMENT OF DIALYSIS CATHETER OVER GUIDEWIRE THROUGH EXISTING VENOUS ACCESS N/A 1/10/2022    Procedure: REPLACEMENT, CATHETER, DIALYSIS,  OVER GUIDEWIRE, USING EXISTING VENOUS ACCESS;  Surgeon: Olayinka Vivar MD;  Location: [facility] CATH LAB;  Service: Vascular;  Laterality: N/A;         Family History:  Family History   Problem Relation Age of Onset    No Known Problems Mother     No Known Problems Father        Social History:  Social History     Tobacco Use    Smoking status: Current Every Day Smoker     Packs/day: 1.50     Years: 45.00     Pack years: 67.50     Types: Cigarettes    Smokeless tobacco: Never Used   Substance and Sexual Activity    Alcohol use: Yes     Comment: 2-3 glasses of whiskey daily    Drug use: Never    Sexual activity: Not Currently     Partners: Female        Review of Systems     Review of Systems   Constitutional: Negative for fatigue and fever.   HENT: Negative for sore throat.    Respiratory: Positive for shortness of breath.    Cardiovascular: Negative for chest pain.   Gastrointestinal: Negative for nausea.   Genitourinary: Negative for dysuria.   Musculoskeletal: Negative for back pain.   Skin: Negative for rash.   Neurological: Negative for weakness.   Hematological: Does not bruise/bleed easily.   All other systems reviewed and are negative.       Physical Exam     Initial Vitals [02/12/22 0729]   BP Pulse Resp Temp SpO2   119/65 66 (!) 24 97.7 °F (36.5 °C) 97 %      MAP       --          Physical Exam  Nursing Notes and Vital Signs Reviewed.  Constitutional: Patient is in no acute distress. Well-developed and well-nourished.  Head: Atraumatic. Normocephalic.  Eyes: PERRL. EOM intact. Conjunctivae are not pale. No scleral icterus.  ENT: Mucous membranes are moist. Oropharynx is clear and symmetric.    Neck: Supple. Full ROM. No lymphadenopathy.  Cardiovascular: Regular rate. Regular rhythm. No murmurs, rubs, or gallops. Distal pulses are 2+ and symmetric.  Pulmonary/Chest: No respiratory distress. Clear to auscultation bilaterally. No wheezing or rales. Dialysis catheter in right upper chest wall, appears normal.  "  Abdominal: Soft and non-distended.  There is no tenderness.  No rebound, guarding, or rigidity. Good bowel sounds.  Genitourinary: No CVA tenderness  Musculoskeletal: Moves all extremities. No obvious deformities. No edema. No calf tenderness.  Skin: Warm and dry.  Neurological:  Alert, awake, and appropriate.  Normal speech.  No acute focal neurological deficits are appreciated.  Psychiatric: Normal affect. Good eye contact. Appropriate in content.     ED Course   Procedures  ED Vital Signs:  Vitals:    02/12/22 0729 02/12/22 0936 02/12/22 1502   BP: 119/65 117/64 132/60   Pulse: 66 64 72   Resp: (!) 24  (!) 26   Temp: 97.7 °F (36.5 °C)     TempSrc: Oral     SpO2: 97% 97% 98%   Weight: 75.5 kg (166 lb 8.9 oz)     Height: 5' 6" (1.676 m)         Abnormal Lab Results:  Labs Reviewed   CBC W/ AUTO DIFFERENTIAL - Abnormal; Notable for the following components:       Result Value    RBC 3.10 (*)     Hemoglobin 9.3 (*)     Hematocrit 28.4 (*)     Eos # 0.7 (*)     Eosinophil % 14.1 (*)     All other components within normal limits   COMPREHENSIVE METABOLIC PANEL - Abnormal; Notable for the following components:    Sodium 134 (*)     CO2 22 (*)     BUN 56 (*)     Creatinine 3.7 (*)     Albumin 3.3 (*)     eGFR if  18 (*)     eGFR if non  15 (*)     All other components within normal limits   B-TYPE NATRIURETIC PEPTIDE   TROPONIN I   MAGNESIUM   PHOSPHORUS   SARS-COV-2 RDRP GENE    Narrative:     This test utilizes isothermal nucleic acid amplification   technology to detect the SARS-CoV-2 RdRp nucleic acid segment.   The analytical sensitivity (limit of detection) is 125 genome   equivalents/mL.   A POSITIVE result implies infection with the SARS-CoV-2 virus;   the patient is presumed to be contagious.     A NEGATIVE result means that SARS-CoV-2 nucleic acids are not   present above the limit of detection. A NEGATIVE result should be   treated as presumptive. It does not rule out the " possibility of   COVID-19 and should not be the sole basis for treatment decisions.   If COVID-19 is strongly suspected based on clinical and exposure   history, re-testing using an alternate molecular assay should be   considered.   This test is only for use under the Food and Drug   Administration s Emergency Use Authorization (EUA).   Commercial kits are provided by [employer].   Performance characteristics of the EUA have been independently   verified by [facility] Department of   Pathology and Laboratory Medicine.   _________________________________________________________________   The authorized Fact Sheet for Healthcare Providers and the authorized Fact   Sheet for Patients of the ID NOW COVID-19 are available on the FDA   website:     https://www.fda.gov/media/352734/download  https://www.fda.gov/media/697649/download            All Lab Results:  Results for orders placed or performed during the hospital encounter of 02/12/22   CBC Auto Differential   Result Value Ref Range    WBC 5.23 3.90 - 12.70 K/uL    RBC 3.10 (L) 4.60 - 6.20 M/uL    Hemoglobin 9.3 (L) 14.0 - 18.0 g/dL    Hematocrit 28.4 (L) 40.0 - 54.0 %    MCV 92 82 - 98 fL    MCH 30.0 27.0 - 31.0 pg    MCHC 32.7 32.0 - 36.0 g/dL    RDW 13.7 11.5 - 14.5 %    Platelets 172 150 - 450 K/uL    MPV 9.7 9.2 - 12.9 fL    Immature Granulocytes 0.2 0.0 - 0.5 %    Gran # (ANC) 2.5 1.8 - 7.7 K/uL    Immature Grans (Abs) 0.01 0.00 - 0.04 K/uL    Lymph # 1.4 1.0 - 4.8 K/uL    Mono # 0.5 0.3 - 1.0 K/uL    Eos # 0.7 (H) 0.0 - 0.5 K/uL    Baso # 0.05 0.00 - 0.20 K/uL    nRBC 0 0 /100 WBC    Gran % 48.6 38.0 - 73.0 %    Lymph % 26.0 18.0 - 48.0 %    Mono % 10.1 4.0 - 15.0 %    Eosinophil % 14.1 (H) 0.0 - 8.0 %    Basophil % 1.0 0.0 - 1.9 %    Differential Method Automated    Comprehensive Metabolic Panel   Result Value Ref Range    Sodium 134 (L) 136 - 145 mmol/L    Potassium 4.3 3.5 - 5.1 mmol/L    Chloride 103 95 - 110 mmol/L    CO2 22 (L) 23 -  29 mmol/L    Glucose 108 70 - 110 mg/dL    BUN 56 (H) 8 - 23 mg/dL    Creatinine 3.7 (H) 0.5 - 1.4 mg/dL    Calcium 9.2 8.7 - 10.5 mg/dL    Total Protein 8.3 6.0 - 8.4 g/dL    Albumin 3.3 (L) 3.5 - 5.2 g/dL    Total Bilirubin 0.7 0.1 - 1.0 mg/dL    Alkaline Phosphatase 71 55 - 135 U/L    AST 21 10 - 40 U/L    ALT 19 10 - 44 U/L    Anion Gap 9 8 - 16 mmol/L    eGFR if African American 18 (A) >60 mL/min/1.73 m^2    eGFR if non African American 15 (A) >60 mL/min/1.73 m^2   Brain natriuretic peptide   Result Value Ref Range    BNP 89 0 - 99 pg/mL   Troponin I   Result Value Ref Range    Troponin I 0.006 0.000 - 0.026 ng/mL   Magnesium   Result Value Ref Range    Magnesium 2.0 1.6 - 2.6 mg/dL   Phosphorus   Result Value Ref Range    Phosphorus 3.6 2.7 - 4.5 mg/dL   POCT COVID-19 Rapid Screening   Result Value Ref Range    POC Rapid COVID Negative Negative     Acceptable Yes        Imaging Results:  Imaging Results          X-Ray Chest 1 View (Final result)  Result time 02/12/22 09:16:35    Final result by Finn Deutsch MD (Timothy) (02/12/22 09:16:35)                 Impression:      No infiltrates.      Electronically signed by: Finn Deutsch MD  Date:    02/12/2022  Time:    09:16             Narrative:    EXAMINATION:  XR CHEST 1 VIEW    CLINICAL HISTORY:  , Shortness of breath;    COMPARISON:  Comparison 01/24/2022    FINDINGS:  Heart size is normal. The lung fields are clear. No acute cardiopulmonary infiltrate.  Stable central line in good position.                                 The EKG was ordered, reviewed, and independently interpreted by the ED provider.  Interpretation time: 0848  Rate: 66 BPM  Rhythm: Sinus rhythm with premature atrial complexes with aberrant conduction.  Interpretation: Incomplete right bundle branch block. Borderline ECG. No STEMI.           The Emergency Provider reviewed the vital signs and test results, which are outlined above.     ED Discussion     11:36 PM:  Discussed pt's case with Dr. Jay Ansari MD (Nephrology) who recommends giving him lasix 60 mg IV x 1 and see if sxs improve.    2:58 PM: Reassessed pt at this time. Discussed again with james Rdz with discharge. Discussed with pt all pertinent ED information and results. Discussed pt dx and plan of tx. Gave pt all f/u and return to the ED instructions. All questions and concerns were addressed at this time. Pt expresses understanding of information and instructions, and is comfortable with plan to discharge. Pt is stable for discharge.    I discussed with patient and/or family/caretaker that evaluation in the ED does not suggest any emergent or life threatening medical conditions requiring immediate intervention beyond what was provided in the ED, and I believe patient is safe for discharge.  Regardless, an unremarkable evaluation in the ED does not preclude the development or presence of a serious of life threatening condition. As such, patient was instructed to return immediately for any worsening or change in current symptoms.         Medical Decision Making:   Clinical Tests:   Lab Tests: Ordered and Reviewed  Radiological Study: Ordered and Reviewed  Medical Tests: Ordered and Reviewed           ED Medication(s):  Medications   furosemide injection 60 mg (60 mg Intravenous Given 2/12/22 1247)   epoetin rhona-epbx injection 4,000 Units (4,000 Units Subcutaneous Given 2/12/22 1532)       Discharge Medication List as of 2/12/2022  2:54 PM           Follow-up Information     Jay Ansari MD. Schedule an appointment as soon as possible for a visit in 2 days.    Specialty: Nephrology  Why: For re-evaluation and further treatment  Contact information:  [address]  [place] [address]  [phone]             [facility] - Emergency Dept.. Go today.    Specialty: Emergency Medicine  Why: If symptoms worsen, For re-evaluation and further treatment, As needed  Contact information:  [address]  [facility] [address]  [phone]                           Scribe Attestation:   Scribe #1: I performed the above scribed service and the documentation accurately describes the services I performed. I attest to the accuracy of the note.     Attending:   Physician Attestation Statement for Scribe #1: I, Frank Gresham MD, personally performed the services described in this documentation, as scribed by Jenn Ochoa, in my presence, and it is both accurate and complete.           Clinical Impression       ICD-10-CM ICD-9-CM   1. LISET (acute kidney injury)  N17.9 584.9   2. Shortness of breath  R06.02 786.05       Disposition:   Disposition: Discharged  Condition: Stable       Frank Gresham MD  02/12/22 7983

## 2022-02-14 ENCOUNTER — TELEPHONE (OUTPATIENT)
Dept: NEPHROLOGY | Facility: CLINIC | Age: 72
End: 2022-02-14
Payer: MEDICAID

## 2022-02-14 ENCOUNTER — PATIENT MESSAGE (OUTPATIENT)
Dept: PHARMACY | Facility: CLINIC | Age: 72
End: 2022-02-14
Payer: MEDICAID

## 2022-02-14 NOTE — TELEPHONE ENCOUNTER
"L/m that pt has chair time at [facility] but he didn't show on Friday." per dr chong. 2/14/22/sf   " no JVD/normal/no LAD/supple

## 2022-02-14 NOTE — TELEPHONE ENCOUNTER
"----- Message from Jay Ansari MD sent at 2/11/2022  5:54 PM CST -----  Contact: self  FYI: his family dropped off some paper work just 30 min ago, according to Prachi at [facility].  ----- Message -----  From: Cortney Lira LPN  Sent: 2/11/2022   4:31 PM CST  To: Jay Ansari MD    Pt has still not getting admitted to [facility] but has to submit more and more paperwork each day. He will need hd this weekend and son wants to know if you speak to someone in ED to speed up that process when he goes to ED for hd as they take so long to get him in unless its a big emergency" situation..they are waiting to hear from you  ----- Message -----  From: Francine Mandel  Sent: 2/11/2022   4:23 PM CST  To: Elan Thompson Staff    Lizz Alas would like a call back at [phone], in regards to the patient Dialysis.        "

## 2022-02-14 NOTE — TELEPHONE ENCOUNTER
"----- Message from Jay Ansari MD sent at 2/11/2022  5:36 PM CST -----  Contact: self  I have done all I could. I don't know who to believe any more. Stephan [facility] me [facility] He did not show up.     ----- Message -----  From: Cortney Lira LPN  Sent: 2/11/2022   4:31 PM CST  To: Jay Ansari MD    Pt has still not getting admitted to [facility] but has to submit more and more paperwork each day. He will need hd this weekend and son wants to know if you speak to someone in ED to speed up that process when he goes to ED for hd as they take so long to get him in unless its a big emergency" situation..they are waiting to hear from you  ----- Message -----  From: Francine Mandel  Sent: 2/11/2022   4:23 PM CST  To: Elan Thompson Staff    Lizz Alas would like a call back at [phone], in regards to the patient Dialysis.        "

## 2022-02-14 NOTE — TELEPHONE ENCOUNTER
"----- Message from Jay Ansari MD sent at 2/11/2022  5:37 PM CST -----  Contact: self  Advise ho to go to ER if he has any medical symptoms. Can informed me that he has a chair time q MWF. He did not show up today.    ----- Message -----  From: Cortney Lira LPN  Sent: 2/11/2022   4:31 PM CST  To: Jay Ansari MD    Pt has still not getting admitted to [facility] but has to submit more and more paperwork each day. He will need hd this weekend and son wants to know if you speak to someone in ED to speed up that process when he goes to ED for hd as they take so long to get him in unless its a big emergency" situation..they are waiting to hear from you  ----- Message -----  From: Francine Mandel  Sent: 2/11/2022   4:23 PM CST  To: Elan Thompson Staff    Lizz Alas would like a call back at [phone], in regards to the patient Dialysis.        "

## 2022-02-17 ENCOUNTER — HOSPITAL ENCOUNTER (EMERGENCY)
Facility: HOSPITAL | Age: 72
Discharge: HOME | End: 2022-02-17
Attending: EMERGENCY MEDICINE
Payer: MEDICAID

## 2022-02-17 VITALS
BODY MASS INDEX: 27.03 KG/M2 | TEMPERATURE: 99 F | SYSTOLIC BLOOD PRESSURE: 135 MMHG | HEART RATE: 60 BPM | DIASTOLIC BLOOD PRESSURE: 65 MMHG | RESPIRATION RATE: 18 BRPM | OXYGEN SATURATION: 99 % | WEIGHT: 167.44 LBS

## 2022-02-17 DIAGNOSIS — N18.9 CHRONIC KIDNEY DISEASE, UNSPECIFIED CKD STAGE: Primary | ICD-10-CM

## 2022-02-17 LAB
ALBUMIN SERPL BCP-MCNC: 3.5 G/DL (ref 3.5–5.2)
ALP SERPL-CCNC: 72 U/L (ref 55–135)
ALT SERPL W/O P-5'-P-CCNC: 27 U/L (ref 10–44)
ANION GAP SERPL CALC-SCNC: 9 MMOL/L (ref 8–16)
AST SERPL-CCNC: 18 U/L (ref 10–40)
BASOPHILS # BLD AUTO: 0.04 K/UL (ref 0–0.2)
BASOPHILS NFR BLD: 0.7 % (ref 0–1.9)
BILIRUB SERPL-MCNC: 0.7 MG/DL (ref 0.1–1)
BUN SERPL-MCNC: 62 MG/DL (ref 8–23)
CALCIUM SERPL-MCNC: 9.2 MG/DL (ref 8.7–10.5)
CHLORIDE SERPL-SCNC: 102 MMOL/L (ref 95–110)
CO2 SERPL-SCNC: 25 MMOL/L (ref 23–29)
CREAT SERPL-MCNC: 3.7 MG/DL (ref 0.5–1.4)
DIFFERENTIAL METHOD: ABNORMAL
EOSINOPHIL # BLD AUTO: 0.7 K/UL (ref 0–0.5)
EOSINOPHIL NFR BLD: 11.9 % (ref 0–8)
ERYTHROCYTE [DISTWIDTH] IN BLOOD BY AUTOMATED COUNT: 14.1 % (ref 11.5–14.5)
EST. GFR  (AFRICAN AMERICAN): 18 ML/MIN/1.73 M^2
EST. GFR  (NON AFRICAN AMERICAN): 15 ML/MIN/1.73 M^2
GLUCOSE SERPL-MCNC: 116 MG/DL (ref 70–110)
HCT VFR BLD AUTO: 29.2 % (ref 40–54)
HGB BLD-MCNC: 9.6 G/DL (ref 14–18)
IMM GRANULOCYTES # BLD AUTO: 0.02 K/UL (ref 0–0.04)
IMM GRANULOCYTES NFR BLD AUTO: 0.3 % (ref 0–0.5)
LYMPHOCYTES # BLD AUTO: 1.7 K/UL (ref 1–4.8)
LYMPHOCYTES NFR BLD: 27.3 % (ref 18–48)
MCH RBC QN AUTO: 30 PG (ref 27–31)
MCHC RBC AUTO-ENTMCNC: 32.9 G/DL (ref 32–36)
MCV RBC AUTO: 91 FL (ref 82–98)
MONOCYTES # BLD AUTO: 0.6 K/UL (ref 0.3–1)
MONOCYTES NFR BLD: 10.4 % (ref 4–15)
NEUTROPHILS # BLD AUTO: 3 K/UL (ref 1.8–7.7)
NEUTROPHILS NFR BLD: 49.4 % (ref 38–73)
NRBC BLD-RTO: 0 /100 WBC
PLATELET # BLD AUTO: 168 K/UL (ref 150–450)
PMV BLD AUTO: 9.5 FL (ref 9.2–12.9)
POTASSIUM SERPL-SCNC: 4.7 MMOL/L (ref 3.5–5.1)
PROT SERPL-MCNC: 8.5 G/DL (ref 6–8.4)
RBC # BLD AUTO: 3.2 M/UL (ref 4.6–6.2)
SODIUM SERPL-SCNC: 136 MMOL/L (ref 136–145)
WBC # BLD AUTO: 6.05 K/UL (ref 3.9–12.7)

## 2022-02-17 PROCEDURE — 85025 COMPLETE CBC W/AUTO DIFF WBC: CPT | Performed by: EMERGENCY MEDICINE

## 2022-02-17 PROCEDURE — 99283 EMERGENCY DEPT VISIT LOW MDM: CPT | Mod: 25

## 2022-02-17 PROCEDURE — 80053 COMPREHEN METABOLIC PANEL: CPT | Performed by: EMERGENCY MEDICINE

## 2022-02-17 NOTE — ED NOTES
Patient identifiers verified and correct for Lizz Alas.  Pt has been on dialysis for 2 months, after being in the ICU, pt was also diagnosed with E coli infection in gut. Pt has been coming to the ER to receive dialysis. Pt wants labs drawn to see if he needs dialysis tx.  LOC: The patient is awake, alert and aware of environment with an appropriate affect, the patient is oriented x 3 and speaking appropriately.  APPEARANCE: Patient resting comfortably and in no acute distress, patient is clean and well groomed, patient's clothing is properly fastened.  SKIN: The skin is warm and dry, color consistent with ethnicity, patient has normal skin turgor and moist mucus membranes, skin intact, no breakdown or bruising noted.  MUSCULOSKELETAL: Patient moving all extremities spontaneously.  RESPIRATORY: Airway is open and patent, respirations are spontaneous.  CARDIAC: Patient has a normal rate, no periphreal edema noted, capillary refill < 3 seconds.  ABDOMEN: Soft and non tender to palpation.

## 2022-02-17 NOTE — ED PROVIDER NOTES
SCRIBE #1 NOTE: I, Anjel Rodrigues, am scribing for, and in the presence of, Johan Ba MD. I have scribed the entire note.      History     Chief Complaint   Patient presents with    Emergency Dialysis     States Saturday his blood work was normal and told he may no longer need dialysis. Here to get new blood work to see if he needs dialysis      Review of patient's allergies indicates:  No Known Allergies      History of Present Illness     HPI    2/17/2022, 9:22 AM  History obtained from the patient      History of Present Illness: Lizz Alas is a 71 y.o. male patient with a PMHx of ESRD who presents to the Emergency Department for evaluation of emergency dialysis. Pt states that on Saturday his blood work was normal and that he may no longer need dialysis, but presents today for additional blood work to confirm. Symptoms are constant and moderate in severity. No mitigating or exacerbating factors reported. No associated symptoms reported. Patient denies any fever, cough, congestion, CP, SOB, N/V, abdominal pain, dizziness, weakness, numbness, and all other sxs at this time. No prior Tx reported. No further complaints or concerns at this time.       Arrival mode: Personal vehicle     PCP: Primary Doctor No        Past Medical History:  Past Medical History:   Diagnosis Date    ESRD (end stage renal disease)     ESRD (end stage renal disease) on dialysis     Hyperlipemia     Hypertension     Other lesions of oral mucosa     PAF (paroxysmal atrial fibrillation)     Renal disorder     Thrombocytopenia        Past Surgical History:  Past Surgical History:   Procedure Laterality Date    ERCP N/A 12/28/2021    Procedure: ERCP (ENDOSCOPIC RETROGRADE CHOLANGIOPANCREATOGRAPHY);  Surgeon: Melchor Sarmiento MD;  Location: [facility];  Service: Endoscopy;  Laterality: N/A;    FRACTURE SURGERY      HERNIA REPAIR      REPLACEMENT OF DIALYSIS CATHETER OVER GUIDEWIRE THROUGH EXISTING VENOUS ACCESS N/A  1/10/2022    Procedure: REPLACEMENT, CATHETER, DIALYSIS, OVER GUIDEWIRE, USING EXISTING VENOUS ACCESS;  Surgeon: Olayinka Vivar MD;  Location: [facility] CATH LAB;  Service: Vascular;  Laterality: N/A;         Family History:  Family History   Problem Relation Age of Onset    No Known Problems Mother     No Known Problems Father        Social History:  Social History     Tobacco Use    Smoking status: Current Every Day Smoker     Packs/day: 1.50     Years: 45.00     Pack years: 67.50     Types: Cigarettes    Smokeless tobacco: Never Used   Substance and Sexual Activity    Alcohol use: Yes     Comment: 2-3 glasses of whiskey daily    Drug use: Never    Sexual activity: Not Currently     Partners: Female        Review of Systems     Review of Systems   Constitutional: Negative for fever.   HENT: Negative for congestion and sore throat.    Respiratory: Negative for cough and shortness of breath.    Cardiovascular: Negative for chest pain.   Gastrointestinal: Negative for abdominal pain, diarrhea, nausea and vomiting.   Genitourinary: Negative for dysuria.   Musculoskeletal: Negative for back pain.   Skin: Negative for rash.   Neurological: Negative for dizziness, weakness and numbness.   Hematological: Does not bruise/bleed easily.   All other systems reviewed and are negative.     Physical Exam     Initial Vitals [02/17/22 0836]   BP Pulse Resp Temp SpO2   132/61 62 18 98.9 °F (37.2 °C) 98 %      MAP       --          Physical Exam  Nursing Notes and Vital Signs Reviewed.  Constitutional: Patient is in no acute distress. Well-developed and well-nourished.  Head: Atraumatic. Normocephalic.  Eyes: EOM intact. Conjunctivae are not pale. No scleral icterus.  ENT: Mucous membranes are moist. Oropharynx is clear and symmetric.    Neck: Supple. Full ROM. No lymphadenopathy.  Cardiovascular: Regular rate. Regular rhythm. No murmurs, rubs, or gallops. Distal pulses are 2+ and symmetric.  Pulmonary/Chest: No respiratory  distress. Clear to auscultation bilaterally. No wheezing or rales.  Abdominal: Soft and non-distended.  There is no tenderness.  No rebound, guarding, or rigidity.  Musculoskeletal: Moves all extremities. No obvious deformities. No edema.  Skin: Warm and dry.  Neurological:  Alert, awake, and appropriate.  Normal speech.  No acute focal neurological deficits are appreciated.  Psychiatric: Normal affect. Good eye contact. Appropriate in content.     ED Course   Procedures  ED Vital Signs:  Vitals:    02/17/22 0836 02/17/22 0954   BP: 132/61 135/65   Pulse: 62 60   Resp: 18 18   Temp: 98.9 °F (37.2 °C) 98.6 °F (37 °C)   TempSrc: Oral Oral   SpO2: 98% 99%   Weight: 76 kg (167 lb 7 oz)        Abnormal Lab Results:  Labs Reviewed   CBC W/ AUTO DIFFERENTIAL - Abnormal; Notable for the following components:       Result Value    RBC 3.20 (*)     Hemoglobin 9.6 (*)     Hematocrit 29.2 (*)     Eos # 0.7 (*)     Eosinophil % 11.9 (*)     All other components within normal limits   COMPREHENSIVE METABOLIC PANEL - Abnormal; Notable for the following components:    Glucose 116 (*)     BUN 62 (*)     Creatinine 3.7 (*)     Total Protein 8.5 (*)     eGFR if  18 (*)     eGFR if non  15 (*)     All other components within normal limits      All Lab Results:  Results for orders placed or performed during the hospital encounter of 02/17/22   CBC Auto Differential   Result Value Ref Range    WBC 6.05 3.90 - 12.70 K/uL    RBC 3.20 (L) 4.60 - 6.20 M/uL    Hemoglobin 9.6 (L) 14.0 - 18.0 g/dL    Hematocrit 29.2 (L) 40.0 - 54.0 %    MCV 91 82 - 98 fL    MCH 30.0 27.0 - 31.0 pg    MCHC 32.9 32.0 - 36.0 g/dL    RDW 14.1 11.5 - 14.5 %    Platelets 168 150 - 450 K/uL    MPV 9.5 9.2 - 12.9 fL    Immature Granulocytes 0.3 0.0 - 0.5 %    Gran # (ANC) 3.0 1.8 - 7.7 K/uL    Immature Grans (Abs) 0.02 0.00 - 0.04 K/uL    Lymph # 1.7 1.0 - 4.8 K/uL    Mono # 0.6 0.3 - 1.0 K/uL    Eos # 0.7 (H) 0.0 - 0.5 K/uL    Baso #  0.04 0.00 - 0.20 K/uL    nRBC 0 0 /100 WBC    Gran % 49.4 38.0 - 73.0 %    Lymph % 27.3 18.0 - 48.0 %    Mono % 10.4 4.0 - 15.0 %    Eosinophil % 11.9 (H) 0.0 - 8.0 %    Basophil % 0.7 0.0 - 1.9 %    Differential Method Automated    Comprehensive Metabolic Panel   Result Value Ref Range    Sodium 136 136 - 145 mmol/L    Potassium 4.7 3.5 - 5.1 mmol/L    Chloride 102 95 - 110 mmol/L    CO2 25 23 - 29 mmol/L    Glucose 116 (H) 70 - 110 mg/dL    BUN 62 (H) 8 - 23 mg/dL    Creatinine 3.7 (H) 0.5 - 1.4 mg/dL    Calcium 9.2 8.7 - 10.5 mg/dL    Total Protein 8.5 (H) 6.0 - 8.4 g/dL    Albumin 3.5 3.5 - 5.2 g/dL    Total Bilirubin 0.7 0.1 - 1.0 mg/dL    Alkaline Phosphatase 72 55 - 135 U/L    AST 18 10 - 40 U/L    ALT 27 10 - 44 U/L    Anion Gap 9 8 - 16 mmol/L    eGFR if African American 18 (A) >60 mL/min/1.73 m^2    eGFR if non African American 15 (A) >60 mL/min/1.73 m^2                The Emergency Provider reviewed the vital signs and test results, which are outlined above.     ED Discussion     9:30 AM: Discussed pt's case with Dr. Ansari (Nephrology) who recommends removal of IJ catheter either in the ED today or as an outpatient in Dr. Oh's clinic. Recommends salt and fluid limitation.    9:38 AM: Reassessed pt at this time. Discussed with pt all pertinent ED information and results. Discussed pt dx and plan of tx. Gave pt all f/u and return to the ED instructions. All questions and concerns were addressed at this time. Pt expresses understanding of information and instructions, and is comfortable with plan to discharge. Pt is stable for discharge.    I discussed with patient and/or family/caretaker that evaluation in the ED does not suggest any emergent or life threatening medical conditions requiring immediate intervention beyond what was provided in the ED, and I believe patient is safe for discharge.  Regardless, an unremarkable evaluation in the ED does not preclude the development or presence of a serious of  life threatening condition. As such, patient was instructed to return immediately for any worsening or change in current symptoms.       Medical Decision Making:   Clinical Tests:   Lab Tests: Ordered and Reviewed           ED Medication(s):  Medications - No data to display    Discharge Medication List as of 2/17/2022  9:36 AM           Follow-up Information     [facility] In 2 days.    Contact information:  [address] [facility] [address] [phone]                             Scribe Attestation:   Scribe #1: I performed the above scribed service and the documentation accurately describes the services I performed. I attest to the accuracy of the note.     Attending:   Physician Attestation Statement for Scribe #1: I, Johan Ba MD, personally performed the services described in this documentation, as scribed by Anjel Rodrigues, in my presence, and it is both accurate and complete.          Clinical Impression       ICD-10-CM ICD-9-CM   1. Chronic kidney disease, unspecified CKD stage  N18.9 585.9       Disposition:   Disposition: Discharged  Condition: Stable         Johan Ba MD  02/17/22 1042

## 2022-02-22 NOTE — PROGRESS NOTES
"Renal note:  Called pt's nephew, Brian, to check on the pt. Pt is "doing well", no c/o's, no SOB  Pt has appt with vascular to remove the IJ vas cath on 3/1/22.    A/P Pt has recovered from E coli induced LISET  Advised to see vascular to remove vas cath  Advised to see PCP for check-up, and repeat blood work.    Jay Ansari MD    "

## 2022-02-23 ENCOUNTER — TELEPHONE (OUTPATIENT)
Dept: VASCULAR SURGERY | Facility: CLINIC | Age: 72
End: 2022-02-23
Payer: MEDICAID

## 2022-02-23 NOTE — TELEPHONE ENCOUNTER
----- Message from Dilcia Pham sent at 2/22/2022  4:49 PM CST -----  Contact: Lizz Zamudio would like a call back in regards to a missed call from thisoffice about scheduling. Please call him at [phone]

## 2022-03-02 ENCOUNTER — PATIENT MESSAGE (OUTPATIENT)
Dept: RESEARCH | Facility: HOSPITAL | Age: 72
End: 2022-03-02
Payer: MEDICAID

## 2022-03-04 ENCOUNTER — HOSPITAL ENCOUNTER (EMERGENCY)
Facility: HOSPITAL | Age: 72
Discharge: HOME | End: 2022-03-04
Attending: EMERGENCY MEDICINE
Payer: MEDICAID

## 2022-03-04 VITALS
BODY MASS INDEX: 27.04 KG/M2 | DIASTOLIC BLOOD PRESSURE: 60 MMHG | HEART RATE: 60 BPM | OXYGEN SATURATION: 98 % | WEIGHT: 167.56 LBS | RESPIRATION RATE: 16 BRPM | SYSTOLIC BLOOD PRESSURE: 106 MMHG | TEMPERATURE: 98 F

## 2022-03-04 DIAGNOSIS — R10.13 EPIGASTRIC PAIN: ICD-10-CM

## 2022-03-04 DIAGNOSIS — R11.2 NON-INTRACTABLE VOMITING WITH NAUSEA, UNSPECIFIED VOMITING TYPE: Primary | ICD-10-CM

## 2022-03-04 LAB
ALBUMIN SERPL BCP-MCNC: 3.6 G/DL (ref 3.5–5.2)
ALP SERPL-CCNC: 64 U/L (ref 55–135)
ALT SERPL W/O P-5'-P-CCNC: 11 U/L (ref 10–44)
ANION GAP SERPL CALC-SCNC: 11 MMOL/L (ref 8–16)
AST SERPL-CCNC: 12 U/L (ref 10–40)
BASOPHILS # BLD AUTO: 0.09 K/UL (ref 0–0.2)
BASOPHILS NFR BLD: 1.3 % (ref 0–1.9)
BILIRUB SERPL-MCNC: 0.5 MG/DL (ref 0.1–1)
BILIRUB UR QL STRIP: NEGATIVE
BUN SERPL-MCNC: 55 MG/DL (ref 8–23)
CALCIUM SERPL-MCNC: 9.2 MG/DL (ref 8.7–10.5)
CHLORIDE SERPL-SCNC: 106 MMOL/L (ref 95–110)
CLARITY UR: CLEAR
CO2 SERPL-SCNC: 20 MMOL/L (ref 23–29)
COLOR UR: YELLOW
CREAT SERPL-MCNC: 2.5 MG/DL (ref 0.5–1.4)
DIFFERENTIAL METHOD: ABNORMAL
EOSINOPHIL # BLD AUTO: 1 K/UL (ref 0–0.5)
EOSINOPHIL NFR BLD: 15 % (ref 0–8)
ERYTHROCYTE [DISTWIDTH] IN BLOOD BY AUTOMATED COUNT: 14.5 % (ref 11.5–14.5)
EST. GFR  (AFRICAN AMERICAN): 28 ML/MIN/1.73 M^2
EST. GFR  (NON AFRICAN AMERICAN): 25 ML/MIN/1.73 M^2
GLUCOSE SERPL-MCNC: 108 MG/DL (ref 70–110)
GLUCOSE UR QL STRIP: NEGATIVE
HCT VFR BLD AUTO: 29.4 % (ref 40–54)
HGB BLD-MCNC: 9.6 G/DL (ref 14–18)
HGB UR QL STRIP: NEGATIVE
IMM GRANULOCYTES # BLD AUTO: 0.03 K/UL (ref 0–0.04)
IMM GRANULOCYTES NFR BLD AUTO: 0.4 % (ref 0–0.5)
KETONES UR QL STRIP: NEGATIVE
LEUKOCYTE ESTERASE UR QL STRIP: NEGATIVE
LIPASE SERPL-CCNC: 47 U/L (ref 4–60)
LYMPHOCYTES # BLD AUTO: 2.2 K/UL (ref 1–4.8)
LYMPHOCYTES NFR BLD: 33 % (ref 18–48)
MCH RBC QN AUTO: 30.4 PG (ref 27–31)
MCHC RBC AUTO-ENTMCNC: 32.7 G/DL (ref 32–36)
MCV RBC AUTO: 93 FL (ref 82–98)
MONOCYTES # BLD AUTO: 0.6 K/UL (ref 0.3–1)
MONOCYTES NFR BLD: 8.7 % (ref 4–15)
NEUTROPHILS # BLD AUTO: 2.8 K/UL (ref 1.8–7.7)
NEUTROPHILS NFR BLD: 41.6 % (ref 38–73)
NITRITE UR QL STRIP: NEGATIVE
NRBC BLD-RTO: 0 /100 WBC
PH UR STRIP: 6 [PH] (ref 5–8)
PLATELET # BLD AUTO: 171 K/UL (ref 150–450)
PMV BLD AUTO: 9.8 FL (ref 9.2–12.9)
POTASSIUM SERPL-SCNC: 4.3 MMOL/L (ref 3.5–5.1)
PROT SERPL-MCNC: 8.3 G/DL (ref 6–8.4)
PROT UR QL STRIP: NEGATIVE
RBC # BLD AUTO: 3.16 M/UL (ref 4.6–6.2)
SODIUM SERPL-SCNC: 137 MMOL/L (ref 136–145)
SP GR UR STRIP: 1.01 (ref 1–1.03)
TROPONIN I SERPL DL<=0.01 NG/ML-MCNC: 0.01 NG/ML (ref 0–0.03)
URN SPEC COLLECT METH UR: NORMAL
UROBILINOGEN UR STRIP-ACNC: NEGATIVE EU/DL
WBC # BLD AUTO: 6.67 K/UL (ref 3.9–12.7)

## 2022-03-04 PROCEDURE — 83690 ASSAY OF LIPASE: CPT | Performed by: NURSE PRACTITIONER

## 2022-03-04 PROCEDURE — 84484 ASSAY OF TROPONIN QUANT: CPT | Performed by: NURSE PRACTITIONER

## 2022-03-04 PROCEDURE — 63600175 PHARM REV CODE 636 W HCPCS: Performed by: EMERGENCY MEDICINE

## 2022-03-04 PROCEDURE — 25000003 PHARM REV CODE 250: Performed by: EMERGENCY MEDICINE

## 2022-03-04 PROCEDURE — 80053 COMPREHEN METABOLIC PANEL: CPT | Performed by: NURSE PRACTITIONER

## 2022-03-04 PROCEDURE — 96375 TX/PRO/DX INJ NEW DRUG ADDON: CPT

## 2022-03-04 PROCEDURE — 81003 URINALYSIS AUTO W/O SCOPE: CPT | Performed by: NURSE PRACTITIONER

## 2022-03-04 PROCEDURE — 96374 THER/PROPH/DIAG INJ IV PUSH: CPT

## 2022-03-04 PROCEDURE — 99285 EMERGENCY DEPT VISIT HI MDM: CPT | Mod: 25

## 2022-03-04 PROCEDURE — C9113 INJ PANTOPRAZOLE SODIUM, VIA: HCPCS | Performed by: EMERGENCY MEDICINE

## 2022-03-04 PROCEDURE — 85025 COMPLETE CBC W/AUTO DIFF WBC: CPT | Performed by: NURSE PRACTITIONER

## 2022-03-04 RX ORDER — ONDANSETRON 4 MG/1
4 TABLET, ORALLY DISINTEGRATING ORAL EVERY 8 HOURS PRN
Qty: 11 TABLET | Refills: 0 | Status: SHIPPED | OUTPATIENT
Start: 2022-03-04 | End: 2022-03-07

## 2022-03-04 RX ORDER — ONDANSETRON 2 MG/ML
4 INJECTION INTRAMUSCULAR; INTRAVENOUS
Status: COMPLETED | OUTPATIENT
Start: 2022-03-04 | End: 2022-03-04

## 2022-03-04 RX ORDER — PANTOPRAZOLE SODIUM 40 MG/10ML
40 INJECTION, POWDER, LYOPHILIZED, FOR SOLUTION INTRAVENOUS
Status: COMPLETED | OUTPATIENT
Start: 2022-03-04 | End: 2022-03-04

## 2022-03-04 RX ORDER — LIDOCAINE HYDROCHLORIDE 20 MG/ML
10 SOLUTION OROPHARYNGEAL ONCE
Status: COMPLETED | OUTPATIENT
Start: 2022-03-04 | End: 2022-03-04

## 2022-03-04 RX ORDER — MAG HYDROX/ALUMINUM HYD/SIMETH 200-200-20
30 SUSPENSION, ORAL (FINAL DOSE FORM) ORAL ONCE
Status: COMPLETED | OUTPATIENT
Start: 2022-03-04 | End: 2022-03-04

## 2022-03-04 RX ADMIN — MAGNESIUM HYDROXIDE/ALUMINUM HYDROXICE/SIMETHICONE 30 ML: 120; 1200; 1200 SUSPENSION ORAL at 09:03

## 2022-03-04 RX ADMIN — PANTOPRAZOLE SODIUM 40 MG: 40 INJECTION, POWDER, FOR SOLUTION INTRAVENOUS at 09:03

## 2022-03-04 RX ADMIN — ONDANSETRON 4 MG: 2 INJECTION INTRAMUSCULAR; INTRAVENOUS at 09:03

## 2022-03-04 RX ADMIN — LIDOCAINE HYDROCHLORIDE 10 ML: 20 SOLUTION ORAL; TOPICAL at 09:03

## 2022-03-05 NOTE — ED PROVIDER NOTES
SCRIBE #1 NOTE: I, Ivan Arias, am scribing for, and in the presence of, Frank Gresham MD. I have scribed the entire note.      History      Chief Complaint   Patient presents with    Abdominal Pain     Son acts as . Son reports epigastric pain and N/V x 3 days. Recent hx of infection per son.        Review of patient's allergies indicates:  No Known Allergies     HPI   HPI    3/4/2022, 9:05 PM   History obtained from the patient and son. Son translated for patient. Pt declined interpretation services      History of Present Illness: Lizz Alas is a 71 y.o. male patient who presents to the Emergency Department for epigastric abdominal pain, onset 3 days PTA. Symptoms are constant and moderate in severity. No mitigating or exacerbating factors reported. Associated sxs include n/v. Patient denies any fever, chills, SOB, CP, weakness, numbness, dizziness, headache, and all other sxs at this time. Son states that the pt had last dialyzed 2 weeks ago, but no longer needs dialysis and is to have his catheter removed soon. No further complaints or concerns at this time.       Arrival mode: Personal vehicle    PCP: Primary Doctor No       Past Medical History:  Past Medical History:   Diagnosis Date    ESRD (end stage renal disease)     ESRD (end stage renal disease) on dialysis     Hyperlipemia     Hypertension     Other lesions of oral mucosa     PAF (paroxysmal atrial fibrillation)     Renal disorder     Thrombocytopenia        Past Surgical History:  Past Surgical History:   Procedure Laterality Date    ERCP N/A 12/28/2021    Procedure: ERCP (ENDOSCOPIC RETROGRADE CHOLANGIOPANCREATOGRAPHY);  Surgeon: Melchor Sarmiento MD;  Location: [facility];  Service: Endoscopy;  Laterality: N/A;    FRACTURE SURGERY      HERNIA REPAIR      REPLACEMENT OF DIALYSIS CATHETER OVER GUIDEWIRE THROUGH EXISTING VENOUS ACCESS N/A 1/10/2022    Procedure: REPLACEMENT, CATHETER, DIALYSIS, OVER GUIDEWIRE, USING  EXISTING VENOUS ACCESS;  Surgeon: Olayinka Vivar MD;  Location: [facility] CATH LAB;  Service: Vascular;  Laterality: N/A;         Family History:  Family History   Problem Relation Age of Onset    No Known Problems Mother     No Known Problems Father        Social History:  Social History     Tobacco Use    Smoking status: Current Every Day Smoker     Packs/day: 1.50     Years: 45.00     Pack years: 67.50     Types: Cigarettes    Smokeless tobacco: Never Used   Substance and Sexual Activity    Alcohol use: Yes     Comment: 2-3 glasses of whiskey daily    Drug use: Never    Sexual activity: Not Currently     Partners: Female       ROS   Review of Systems   Constitutional: Negative for chills and fever.   HENT: Negative for sore throat.    Respiratory: Negative for shortness of breath.    Cardiovascular: Negative for chest pain.   Gastrointestinal: Positive for abdominal pain (epigastric), nausea and vomiting. Negative for diarrhea.   Genitourinary: Negative for dysuria.   Musculoskeletal: Negative for back pain.   Skin: Negative for rash.   Neurological: Negative for dizziness, weakness, light-headedness, numbness and headaches.   Hematological: Does not bruise/bleed easily.   All other systems reviewed and are negative.    Physical Exam      Initial Vitals [03/04/22 1900]   BP Pulse Resp Temp SpO2   133/66 60 16 97.8 °F (36.6 °C) 98 %      MAP       --          Physical Exam  Nursing Notes and Vital Signs Reviewed.  Constitutional: Patient is in no acute distress. Well-developed and well-nourished.  Head: Atraumatic. Normocephalic.  Eyes: PERRL. EOM intact. Conjunctivae are not pale. No scleral icterus.  ENT: Mucous membranes are moist. Oropharynx is clear and symmetric.    Neck: Supple. Full ROM. No lymphadenopathy.  Cardiovascular: Regular rate. Regular rhythm. No murmurs, rubs, or gallops. Distal pulses are 2+ and symmetric.  Pulmonary/Chest: No respiratory distress. Clear to auscultation bilaterally. No wheezing  or rales.  Abdominal: Soft and non-distended.  There is no tenderness.  No rebound, guarding, or rigidity.   Musculoskeletal: Moves all extremities. No obvious deformities. No edema.  Skin: Warm and dry.  Neurological:  Alert, awake, and appropriate.  Normal speech.  No acute focal neurological deficits are appreciated.  Psychiatric: Normal affect. Good eye contact. Appropriate in content.    ED Course    Procedures  ED Vital Signs:  Vitals:    03/04/22 1900 03/04/22 2045 03/04/22 2145 03/04/22 2245   BP: 133/66 133/64 (!) 110/55 106/60   Pulse: 60  (!) 56 60   Resp: 16      Temp: 97.8 °F (36.6 °C)   98 °F (36.7 °C)   TempSrc:    Oral   SpO2: 98%  96% 98%   Weight: 76 kg (167 lb 8.8 oz)          Abnormal Lab Results:  Labs Reviewed   CBC W/ AUTO DIFFERENTIAL - Abnormal; Notable for the following components:       Result Value    RBC 3.16 (*)     Hemoglobin 9.6 (*)     Hematocrit 29.4 (*)     Eos # 1.0 (*)     Eosinophil % 15.0 (*)     All other components within normal limits   COMPREHENSIVE METABOLIC PANEL - Abnormal; Notable for the following components:    CO2 20 (*)     BUN 55 (*)     Creatinine 2.5 (*)     eGFR if  28 (*)     eGFR if non  25 (*)     All other components within normal limits   LIPASE   URINALYSIS, REFLEX TO URINE CULTURE    Narrative:     Specimen Source->Urine   TROPONIN I   TROPONIN I        All Lab Results:  Results for orders placed or performed during the hospital encounter of 03/04/22   CBC W/ AUTO DIFFERENTIAL   Result Value Ref Range    WBC 6.67 3.90 - 12.70 K/uL    RBC 3.16 (L) 4.60 - 6.20 M/uL    Hemoglobin 9.6 (L) 14.0 - 18.0 g/dL    Hematocrit 29.4 (L) 40.0 - 54.0 %    MCV 93 82 - 98 fL    MCH 30.4 27.0 - 31.0 pg    MCHC 32.7 32.0 - 36.0 g/dL    RDW 14.5 11.5 - 14.5 %    Platelets 171 150 - 450 K/uL    MPV 9.8 9.2 - 12.9 fL    Immature Granulocytes 0.4 0.0 - 0.5 %    Gran # (ANC) 2.8 1.8 - 7.7 K/uL    Immature Grans (Abs) 0.03 0.00 - 0.04 K/uL    Lymph  # 2.2 1.0 - 4.8 K/uL    Mono # 0.6 0.3 - 1.0 K/uL    Eos # 1.0 (H) 0.0 - 0.5 K/uL    Baso # 0.09 0.00 - 0.20 K/uL    nRBC 0 0 /100 WBC    Gran % 41.6 38.0 - 73.0 %    Lymph % 33.0 18.0 - 48.0 %    Mono % 8.7 4.0 - 15.0 %    Eosinophil % 15.0 (H) 0.0 - 8.0 %    Basophil % 1.3 0.0 - 1.9 %    Differential Method Automated    Comp. Metabolic Panel   Result Value Ref Range    Sodium 137 136 - 145 mmol/L    Potassium 4.3 3.5 - 5.1 mmol/L    Chloride 106 95 - 110 mmol/L    CO2 20 (L) 23 - 29 mmol/L    Glucose 108 70 - 110 mg/dL    BUN 55 (H) 8 - 23 mg/dL    Creatinine 2.5 (H) 0.5 - 1.4 mg/dL    Calcium 9.2 8.7 - 10.5 mg/dL    Total Protein 8.3 6.0 - 8.4 g/dL    Albumin 3.6 3.5 - 5.2 g/dL    Total Bilirubin 0.5 0.1 - 1.0 mg/dL    Alkaline Phosphatase 64 55 - 135 U/L    AST 12 10 - 40 U/L    ALT 11 10 - 44 U/L    Anion Gap 11 8 - 16 mmol/L    eGFR if African American 28 (A) >60 mL/min/1.73 m^2    eGFR if non African American 25 (A) >60 mL/min/1.73 m^2   Lipase   Result Value Ref Range    Lipase 47 4 - 60 U/L   Urinalysis, Reflex to Urine Culture Urine, Clean Catch    Specimen: Urine   Result Value Ref Range    Specimen UA Urine, Clean Catch     Color, UA Yellow Yellow, Straw, Azra    Appearance, UA Clear Clear    pH, UA 6.0 5.0 - 8.0    Specific Gravity, UA 1.010 1.005 - 1.030    Protein, UA Negative Negative    Glucose, UA Negative Negative    Ketones, UA Negative Negative    Bilirubin (UA) Negative Negative    Occult Blood UA Negative Negative    Nitrite, UA Negative Negative    Urobilinogen, UA Negative <2.0 EU/dL    Leukocytes, UA Negative Negative   Troponin I   Result Value Ref Range    Troponin I 0.009 0.000 - 0.026 ng/mL     Imaging Results:  Imaging Results          X-Ray Chest AP Portable (Final result)  Result time 03/04/22 22:03:36    Final result by Gabo Becerra MD (03/04/22 22:03:36)                 Impression:      Similar findings to prior exam.  No acute osseous injury.      Electronically signed  by: Hernan Ayon  Date:    03/04/2022  Time:    22:03             Narrative:    EXAMINATION:  XR CHEST AP PORTABLE    CLINICAL HISTORY:  Epigastric pain    TECHNIQUE:  Single frontal view of the chest was performed.    COMPARISON:  Prior    FINDINGS:  Right-sided central venous catheter.The lungs are clear, with normal appearance of pulmonary vasculature and no pleural effusion or pneumothorax.    The cardiac silhouette is normal in size. The hilar and mediastinal contours are unremarkable.    Bones are intact.                               US Abdomen Limited (Final result)  Result time 03/04/22 21:50:49    Final result by Gabo Becerra MD (03/04/22 21:50:49)                 Impression:      Contracted gallbladder.  Biliary stent with pneumobilia.  Recommend correlation to clinical history and follow-up as indicated.  The      Electronically signed by: Hernan Ayon  Date:    03/04/2022  Time:    21:50             Narrative:    EXAMINATION:  US ABDOMEN LIMITED    CLINICAL HISTORY:  Epigastric pain, known gallbladder issue;    TECHNIQUE:  Limited ultrasound of the right upper quadrant of the abdomen (including pancreas, liver, gallbladder, common bile duct, and spleen) was performed.    COMPARISON:  None.    FINDINGS:  Liver: Normal in size, measuring 16.7 cm. Homogeneous echotexture. No focal hepatic lesions.    Gallbladder: Contracted gallbladder.    Biliary system: A biliary stent.  Pneumobilia noted.    Right kidney measures up to 12.2 cm.    Miscellaneous: No upper abdominal ascites.                                        The Emergency Provider reviewed the vital signs and test results, which are outlined above.    ED Discussion     10:14 PM: Reassessed pt at this time. Discussed with pt all pertinent ED information and results. Discussed pt dx and plan of tx. Gave pt all f/u and return to the ED instructions. All questions and concerns were addressed at this time. Pt expresses understanding of information and  instructions, and is comfortable with plan to discharge. Pt is stable for discharge.    I discussed with patient and/or family/caretaker that evaluation in the ED does not suggest any emergent or life threatening medical conditions requiring immediate intervention beyond what was provided in the ED, and I believe patient is safe for discharge.  Regardless, an unremarkable evaluation in the ED does not preclude the development or presence of a serious of life threatening condition. As such, patient was instructed to return immediately for any worsening or change in current symptoms.         ED Medication(s):  Medications   pantoprazole injection 40 mg (40 mg Intravenous Given 3/4/22 2114)   ondansetron injection 4 mg (4 mg Intravenous Given 3/4/22 2114)   aluminum-magnesium hydroxide-simethicone 200-200-20 mg/5 mL suspension 30 mL (30 mLs Oral Given 3/4/22 2109)     And   LIDOcaine HCl 2% oral solution 10 mL (10 mLs Oral Given 3/4/22 2109)     Discharge Medication List as of 3/4/2022 10:14 PM      START taking these medications    Details   ondansetron (ZOFRAN-ODT) 4 MG TbDL Take 1 tablet (4 mg total) by mouth every 8 (eight) hours as needed., Starting Fri 3/4/2022, Until Mon 3/7/2022 at 2359, Print            Follow-up Information     Can Montgomery IV, MD. Call in 1 day.    Specialty: Vascular Surgery  Why: For re-evaluation and further treatment  Contact information:  [address]  3RD FLOOR  [facility]  [facility] [address] [phone]             [facility] - Emergency Dept.. Go today.    Specialty: Emergency Medicine  Why: If symptoms worsen, For re-evaluation and further treatment, As needed  Contact information:  [address]  [facility] [address] [phone]           Your Primary Care Provider. Schedule an appointment as soon as possible for a visit in 2 days.    Why: For re-evaluation and further treatment, As needed                         Medical Decision  Making    Medical Decision Making:   Clinical Tests:   Lab Tests: Ordered and Reviewed  Radiological Study: Ordered and Reviewed           Scribe Attestation:   Scribe #1: I performed the above scribed service and the documentation accurately describes the services I performed. I attest to the accuracy of the note.    Attending:   Physician Attestation Statement for Scribe #1: I, Frank Gresham MD, personally performed the services described in this documentation, as scribed by Ivan Arias, in my presence, and it is both accurate and complete.          Clinical Impression       ICD-10-CM ICD-9-CM   1. Non-intractable vomiting with nausea, unspecified vomiting type  R11.2 787.01   2. Epigastric pain  R10.13 789.06       Disposition:   Disposition: Discharged  Condition: Stable         Frank Gresham MD  03/05/22 0650

## 2022-03-05 NOTE — FIRST PROVIDER EVALUATION
Medical screening exam completed.  I have conducted a focused provider triage encounter, findings are as follows:    Brief history of present illness:  Abdominal pain according to son  Vitals:    03/04/22 1900   BP: 133/66   Pulse: 60   Resp: 16   Temp: 97.8 °F (36.6 °C)   SpO2: 98%   Weight: 76 kg (167 lb 8.8 oz)       Pertinent physical exam:  NAD VSS    Brief workup plan:  Blood work    Preliminary workup initiated; this workup will be continued and followed by the physician or advanced practice provider that is assigned to the patient when roomed.

## 2022-03-09 ENCOUNTER — INITIAL CONSULT (OUTPATIENT)
Dept: VASCULAR SURGERY | Facility: CLINIC | Age: 72
End: 2022-03-09
Payer: MEDICAID

## 2022-03-09 VITALS
TEMPERATURE: 97 F | SYSTOLIC BLOOD PRESSURE: 106 MMHG | BODY MASS INDEX: 27.58 KG/M2 | DIASTOLIC BLOOD PRESSURE: 64 MMHG | HEART RATE: 63 BPM | WEIGHT: 170.88 LBS

## 2022-03-09 DIAGNOSIS — N17.9 ACUTE KIDNEY INJURY: Primary | ICD-10-CM

## 2022-03-09 PROCEDURE — 99213 OFFICE O/P EST LOW 20 MIN: CPT | Mod: PBBFAC,PN | Performed by: SURGERY

## 2022-03-09 PROCEDURE — 99204 PR OFFICE/OUTPT VISIT, NEW, LEVL IV, 45-59 MIN: ICD-10-PCS | Mod: S$PBB,,, | Performed by: SURGERY

## 2022-03-09 PROCEDURE — 99999 PR PBB SHADOW E&M-EST. PATIENT-LVL III: CPT | Mod: PBBFAC,,, | Performed by: SURGERY

## 2022-03-09 PROCEDURE — 99999 PR PBB SHADOW E&M-EST. PATIENT-LVL III: ICD-10-PCS | Mod: PBBFAC,,, | Performed by: SURGERY

## 2022-03-09 PROCEDURE — 99204 OFFICE O/P NEW MOD 45 MIN: CPT | Mod: S$PBB,,, | Performed by: SURGERY

## 2022-03-09 NOTE — PROGRESS NOTES
[facility] Vascular Surgery  Congenital Cardiovascular Surgery  Consult Note    Patient Name: Lizz Alas  MRN: [id number]  Admission Date: (Not on file)  Hospital Length of Stay: 0 days   Attending Physician: No att. providers found  Primary Care Provider: Primary Doctor No    Patient information was obtained from patient and ER records.     Consults  Subjective:     Chief Complaint Vas-Cath removal    History of Present Illness:  71-year-old male here today for tunneled right IJ Vas-Cath removal.  Patient recently hospitalized with acute kidney injury requiring temporary dialysis.  Patient is no longer dialysis dependent.  Ready for Vas-Cath to be removed    Current Outpatient Medications   Medication    amLODIPine (NORVASC) 5 MG tablet    folic acid (FOLVITE) 1 MG tablet    furosemide (LASIX) 20 MG tablet    metoprolol tartrate (LOPRESSOR) 25 MG tablet    rosuvastatin (CRESTOR) 20 MG tablet    sevelamer carbonate (RENVELA) 800 mg Tab    tamsulosin (FLOMAX) 0.4 mg Cap    thiamine 100 MG tablet     No current facility-administered medications for this visit.       Review of patient's allergies indicates:  No Known Allergies    Past Medical History:   Diagnosis Date    ESRD (end stage renal disease)     ESRD (end stage renal disease) on dialysis     Hyperlipemia     Hypertension     Other lesions of oral mucosa     PAF (paroxysmal atrial fibrillation)     Renal disorder     Thrombocytopenia      Past Surgical History:   Procedure Laterality Date    ERCP N/A 12/28/2021    Procedure: ERCP (ENDOSCOPIC RETROGRADE CHOLANGIOPANCREATOGRAPHY);  Surgeon: Melchor Sarmiento MD;  Location: [facility] OR;  Service: Endoscopy;  Laterality: N/A;    FRACTURE SURGERY      HERNIA REPAIR      REPLACEMENT OF DIALYSIS CATHETER OVER GUIDEWIRE THROUGH EXISTING VENOUS ACCESS N/A 1/10/2022    Procedure: REPLACEMENT, CATHETER, DIALYSIS, OVER GUIDEWIRE, USING EXISTING VENOUS ACCESS;  Surgeon: Olayinka Vivar MD;  Location:  [facility] [place] CATH LAB;  Service: Vascular;  Laterality: N/A;     Family History     Problem Relation (Age of Onset)    No Known Problems Mother, Father        Tobacco Use    Smoking status: Current Every Day Smoker     Packs/day: 1.50     Years: 45.00     Pack years: 67.50     Types: Cigarettes    Smokeless tobacco: Never Used   Substance and Sexual Activity    Alcohol use: Yes     Comment: 2-3 glasses of whiskey daily    Drug use: Never    Sexual activity: Not Currently     Partners: Female     Review of Systems   Constitutional: Negative for activity change, appetite change, fatigue and fever.   HENT: Negative for congestion.    Eyes: Negative for photophobia, redness and visual disturbance.   Respiratory: Negative for apnea, cough, chest tightness and shortness of breath.    Cardiovascular: Negative for chest pain and leg swelling.   Gastrointestinal: Negative for abdominal pain, nausea and vomiting.   Genitourinary: Negative for difficulty urinating.   Musculoskeletal: Negative for gait problem and myalgias.   Skin: Negative for color change, rash and wound.   Neurological: Negative for syncope, facial asymmetry, speech difficulty, weakness and numbness.   All other systems reviewed and are negative.    Objective:     Vital Signs (Most Recent):  Temp: 97.1 °F (36.2 °C) (03/09/22 1005)  Pulse: 63 (03/09/22 1005)  BP: 106/64 (03/09/22 1005) Vital Signs (24h Range):  [unfilled]     Weight: 77.5 kg (170 lb 13.7 oz)  Body mass index is 27.58 kg/m².            [unfilled]    Physical Exam  Vitals and nursing note reviewed.   Constitutional:       Appearance: Normal appearance. He is normal weight.   HENT:      Head: Normocephalic and atraumatic.      Mouth/Throat:      Mouth: Mucous membranes are moist.   Eyes:      Extraocular Movements: Extraocular movements intact.      Conjunctiva/sclera: Conjunctivae normal.      Pupils: Pupils are equal, round, and reactive to light.   Neck:      Vascular: No carotid bruit.    Cardiovascular:      Rate and Rhythm: Normal rate and regular rhythm.      Pulses: Normal pulses.   Pulmonary:      Effort: Pulmonary effort is normal.      Breath sounds: Normal breath sounds.   Abdominal:      General: Abdomen is flat. Bowel sounds are normal.      Palpations: Abdomen is soft.   Musculoskeletal:      Cervical back: Neck supple.   Skin:     General: Skin is warm.      Capillary Refill: Capillary refill takes 2 to 3 seconds.   Neurological:      General: No focal deficit present.      Mental Status: He is alert and oriented to person, place, and time. Mental status is at baseline.      Cranial Nerves: No cranial nerve deficit.      Sensory: No sensory deficit.      Motor: No weakness.   Psychiatric:         Mood and Affect: Mood normal.         Behavior: Behavior normal.         Significant Labs:  I have reviewed all pertinent lab results within the past 24 hours.    Significant Diagnostics:  I have reviewed all pertinent imaging results/findings within the past 24 hours.    Assessment/Plan:     There are no hospital problems to display for this patient.      Tunneled right IJ Vas-Cath removed.    Thank you for your consult. I will sign off. Please contact us if you have any additional questions.    Can Montgomery IV, MD  Vascular Surgery  [facility] Vascular Surgery

## 2022-04-04 ENCOUNTER — TELEPHONE (OUTPATIENT)
Dept: GASTROENTEROLOGY | Facility: CLINIC | Age: 72
End: 2022-04-04
Payer: MEDICAID

## 2022-04-04 NOTE — TELEPHONE ENCOUNTER
----- Message from Rosemarie Long sent at 4/4/2022  1:57 PM CDT -----  Contact: Self   I had originally tried to schedule when he first called, which I had to send a message. I do know he left the number [phone] to call back at.   ----- Message -----  From: Vianey Whitt LPN  Sent: 4/4/2022   1:50 PM CDT  To: Rosemarie Ethan    Have you been able to reach him by phone?  If so what number are you using?    ----- Message -----  From: Rosemarie Long  Sent: 4/4/2022   1:37 PM CDT  To: Torsten Stone    No, I am not able to schedule. I do get any dates to pull up for the patient.   ----- Message -----  From: Vianey Whitt LPN  Sent: 4/4/2022   1:31 PM CDT  To: Rosemarie Long    He can be scheduled with Dr. Sarmiento's PA Abigail Murillo for f/u.     Are you able to contact the patient?  If so could you make that appt?      I have tried on multiple occasions and been unable to reach him and he has not called me back.    Thank you    ----- Message -----  From: Rosemarie Long  Sent: 4/4/2022  11:42 AM CDT  To: Torsten Roche Staff    Pt is requesting to schedule f/u appt after see provider in ED. Pt states he needs to have his tubes checked. Pt is medicaid. Please advise

## 2022-04-04 NOTE — TELEPHONE ENCOUNTER
Phoned patient and spoke with ugo Torres who scheduled patient for office visit with Dr. Sarmiento on his first available of 5/26.

## 2022-05-02 NOTE — ASSESSMENT & PLAN NOTE
Called patient and confirmed that it was the orthoplast splint and told him to use ice and told him he can remove it and use ice on it - and then replace the splint.  But he needs to be extremely careful when the splint is not in place (no getting up and walking around, no use of the hand, just gentle motion exercises.)  I reminded him to keep his hand elevated.   Also I  made sure that the strap that holds the brace on can be too tight and restrict venous return making it swell more.   He said the strap was not too tight and he would be careful and ice and elevate.    VAP prophylaxis  Daily SAT

## 2022-09-20 ENCOUNTER — PATIENT MESSAGE (OUTPATIENT)
Dept: RESEARCH | Facility: HOSPITAL | Age: 72
End: 2022-09-20
Payer: MEDICAID

## 2022-11-05 ENCOUNTER — HOSPITAL ENCOUNTER (EMERGENCY)
Facility: HOSPITAL | Age: 72
Discharge: HOME | End: 2022-11-05
Attending: EMERGENCY MEDICINE
Payer: MEDICAID

## 2022-11-05 VITALS
WEIGHT: 178.56 LBS | DIASTOLIC BLOOD PRESSURE: 73 MMHG | RESPIRATION RATE: 16 BRPM | BODY MASS INDEX: 28.82 KG/M2 | OXYGEN SATURATION: 93 % | HEART RATE: 50 BPM | TEMPERATURE: 98 F | SYSTOLIC BLOOD PRESSURE: 124 MMHG

## 2022-11-05 DIAGNOSIS — R10.9 LEFT FLANK PAIN: Primary | ICD-10-CM

## 2022-11-05 LAB
ALBUMIN SERPL BCP-MCNC: 4.2 G/DL (ref 3.5–5.2)
ALP SERPL-CCNC: 60 U/L (ref 55–135)
ALT SERPL W/O P-5'-P-CCNC: 19 U/L (ref 10–44)
ANION GAP SERPL CALC-SCNC: 11 MMOL/L (ref 8–16)
AST SERPL-CCNC: 14 U/L (ref 10–40)
BASOPHILS # BLD AUTO: 0.05 K/UL (ref 0–0.2)
BASOPHILS NFR BLD: 0.9 % (ref 0–1.9)
BILIRUB SERPL-MCNC: 0.8 MG/DL (ref 0.1–1)
BILIRUB UR QL STRIP: NEGATIVE
BUN SERPL-MCNC: 30 MG/DL (ref 8–23)
CALCIUM SERPL-MCNC: 9.6 MG/DL (ref 8.7–10.5)
CHLORIDE SERPL-SCNC: 105 MMOL/L (ref 95–110)
CLARITY UR: CLEAR
CO2 SERPL-SCNC: 24 MMOL/L (ref 23–29)
COLOR UR: COLORLESS
CREAT SERPL-MCNC: 1.4 MG/DL (ref 0.5–1.4)
DIFFERENTIAL METHOD: ABNORMAL
EOSINOPHIL # BLD AUTO: 0.1 K/UL (ref 0–0.5)
EOSINOPHIL NFR BLD: 1.6 % (ref 0–8)
ERYTHROCYTE [DISTWIDTH] IN BLOOD BY AUTOMATED COUNT: 14.5 % (ref 11.5–14.5)
EST. GFR  (NO RACE VARIABLE): 53 ML/MIN/1.73 M^2
GLUCOSE SERPL-MCNC: 94 MG/DL (ref 70–110)
GLUCOSE UR QL STRIP: NEGATIVE
HCT VFR BLD AUTO: 44.2 % (ref 40–54)
HCV AB SERPL QL IA: NEGATIVE
HEP C VIRUS HOLD SPECIMEN: NORMAL
HGB BLD-MCNC: 14.7 G/DL (ref 14–18)
HGB UR QL STRIP: NEGATIVE
HIV 1+2 AB+HIV1 P24 AG SERPL QL IA: NEGATIVE
IMM GRANULOCYTES # BLD AUTO: 0.01 K/UL (ref 0–0.04)
IMM GRANULOCYTES NFR BLD AUTO: 0.2 % (ref 0–0.5)
KETONES UR QL STRIP: NEGATIVE
LEUKOCYTE ESTERASE UR QL STRIP: NEGATIVE
LIPASE SERPL-CCNC: 22 U/L (ref 4–60)
LYMPHOCYTES # BLD AUTO: 2.1 K/UL (ref 1–4.8)
LYMPHOCYTES NFR BLD: 36.4 % (ref 18–48)
MCH RBC QN AUTO: 30.6 PG (ref 27–31)
MCHC RBC AUTO-ENTMCNC: 33.3 G/DL (ref 32–36)
MCV RBC AUTO: 92 FL (ref 82–98)
MONOCYTES # BLD AUTO: 0.5 K/UL (ref 0.3–1)
MONOCYTES NFR BLD: 9.4 % (ref 4–15)
NEUTROPHILS # BLD AUTO: 3 K/UL (ref 1.8–7.7)
NEUTROPHILS NFR BLD: 51.5 % (ref 38–73)
NITRITE UR QL STRIP: NEGATIVE
NRBC BLD-RTO: 0 /100 WBC
PH UR STRIP: 5 [PH] (ref 5–8)
PLATELET # BLD AUTO: 140 K/UL (ref 150–450)
PMV BLD AUTO: 10.2 FL (ref 9.2–12.9)
POTASSIUM SERPL-SCNC: 4.5 MMOL/L (ref 3.5–5.1)
PROT SERPL-MCNC: 7.8 G/DL (ref 6–8.4)
PROT UR QL STRIP: NEGATIVE
RBC # BLD AUTO: 4.8 M/UL (ref 4.6–6.2)
SODIUM SERPL-SCNC: 140 MMOL/L (ref 136–145)
SP GR UR STRIP: 1.01 (ref 1–1.03)
URN SPEC COLLECT METH UR: ABNORMAL
UROBILINOGEN UR STRIP-ACNC: NEGATIVE EU/DL
WBC # BLD AUTO: 5.72 K/UL (ref 3.9–12.7)

## 2022-11-05 PROCEDURE — 63600175 PHARM REV CODE 636 W HCPCS: Performed by: EMERGENCY MEDICINE

## 2022-11-05 PROCEDURE — 85025 COMPLETE CBC W/AUTO DIFF WBC: CPT | Performed by: EMERGENCY MEDICINE

## 2022-11-05 PROCEDURE — 99285 EMERGENCY DEPT VISIT HI MDM: CPT | Mod: 25

## 2022-11-05 PROCEDURE — 80053 COMPREHEN METABOLIC PANEL: CPT | Performed by: EMERGENCY MEDICINE

## 2022-11-05 PROCEDURE — 96374 THER/PROPH/DIAG INJ IV PUSH: CPT

## 2022-11-05 PROCEDURE — 86803 HEPATITIS C AB TEST: CPT | Performed by: EMERGENCY MEDICINE

## 2022-11-05 PROCEDURE — 83690 ASSAY OF LIPASE: CPT | Performed by: EMERGENCY MEDICINE

## 2022-11-05 PROCEDURE — 81003 URINALYSIS AUTO W/O SCOPE: CPT | Performed by: EMERGENCY MEDICINE

## 2022-11-05 PROCEDURE — 87389 HIV-1 AG W/HIV-1&-2 AB AG IA: CPT | Performed by: EMERGENCY MEDICINE

## 2022-11-05 PROCEDURE — 96375 TX/PRO/DX INJ NEW DRUG ADDON: CPT

## 2022-11-05 RX ORDER — MORPHINE SULFATE 4 MG/ML
4 INJECTION, SOLUTION INTRAMUSCULAR; INTRAVENOUS
Status: COMPLETED | OUTPATIENT
Start: 2022-11-05 | End: 2022-11-05

## 2022-11-05 RX ORDER — ONDANSETRON 2 MG/ML
4 INJECTION INTRAMUSCULAR; INTRAVENOUS
Status: COMPLETED | OUTPATIENT
Start: 2022-11-05 | End: 2022-11-05

## 2022-11-05 RX ORDER — HYDROCODONE BITARTRATE AND ACETAMINOPHEN 5; 325 MG/1; MG/1
1 TABLET ORAL EVERY 6 HOURS PRN
Qty: 9 TABLET | Refills: 0 | Status: SHIPPED | OUTPATIENT
Start: 2022-11-05

## 2022-11-05 RX ORDER — DOCUSATE CALCIUM 240 MG
240 CAPSULE ORAL DAILY
Qty: 30 CAPSULE | Refills: 0 | Status: SHIPPED | OUTPATIENT
Start: 2022-11-05 | End: 2022-12-05

## 2022-11-05 RX ADMIN — MORPHINE SULFATE 4 MG: 4 INJECTION INTRAVENOUS at 11:11

## 2022-11-05 RX ADMIN — ONDANSETRON 4 MG: 2 INJECTION INTRAMUSCULAR; INTRAVENOUS at 11:11

## 2022-11-05 NOTE — ED PROVIDER NOTES
Encounter Date: 2022       History     Chief Complaint   Patient presents with    Flank Pain     Left flank pain, vomiting     HPI  72-year-old air about male presenting with left flank pain of sudden onset yesterday radiating to the front with associated nausea and vomiting.  He has no testicular pain.  Denies any dysuria or frequency.  He has no blood in his urine.  He does have a history of sepsis secondary to infected gallbladder remotely however.  He has taken no medications for this.  Is not positional worse with movement.  He notes that he is constipated his stools are very hard.    Review of patient's allergies indicates:  No Known Allergies  Past Medical History:   Diagnosis Date    ESRD (end stage renal disease)     ESRD (end stage renal disease) on dialysis     Hyperlipemia     Hypertension     Other lesions of oral mucosa     PAF (paroxysmal atrial fibrillation)     Renal disorder     Thrombocytopenia      Past Surgical History:   Procedure Laterality Date    ERCP N/A 2021    Procedure: ERCP (ENDOSCOPIC RETROGRADE CHOLANGIOPANCREATOGRAPHY);  Surgeon: Melchor Sarmiento MD;  Location: [facility] OR;  Service: Endoscopy;  Laterality: N/A;    FRACTURE SURGERY      HERNIA REPAIR      REPLACEMENT OF DIALYSIS CATHETER OVER GUIDEWIRE THROUGH EXISTING VENOUS ACCESS N/A 1/10/2022    Procedure: REPLACEMENT, CATHETER, DIALYSIS, OVER GUIDEWIRE, USING EXISTING VENOUS ACCESS;  Surgeon: Olayinka Vivar MD;  Location: [facility] CATH LAB;  Service: Vascular;  Laterality: N/A;     Family History   Problem Relation Age of Onset    No Known Problems Mother     No Known Problems Father      Social History     Tobacco Use    Smoking status: Former     Packs/day: 1.50     Years: 45.00     Pack years: 67.50     Types: Cigarettes     Quit date:      Years since quittin.8    Smokeless tobacco: Never   Substance Use Topics    Alcohol use: Yes     Comment: beer    Drug use: Never     Review of Systems   Constitutional:   Negative for chills and fever.   Respiratory:  Negative for cough and shortness of breath.    Cardiovascular:  Negative for chest pain and palpitations.   Gastrointestinal:  Positive for abdominal pain, constipation, nausea and vomiting. Negative for diarrhea.   Genitourinary:  Positive for flank pain. Negative for dysuria, frequency, testicular pain and urgency.   Musculoskeletal:  Negative for arthralgias and myalgias.   All other systems reviewed and are negative.    Physical Exam     Initial Vitals [11/05/22 1119]   BP Pulse Resp Temp SpO2   136/73 (!) 51 18 98.2 °F (36.8 °C) 97 %      MAP       --         Physical Exam  Nursing Notes and Vital Signs Reviewed.  Constitutional: Patient is in no acute distress. Well-developed and well-nourished.  Head: Atraumatic. Normocephalic.  Eyes:  EOM intact.  No scleral icterus.  ENT: Mucous membranes are moist.  Nares clear   Neck:  Full ROM. No JVD.  Cardiovascular: Regular rate. Regular rhythm No murmurs, rubs, or gallops. Distal pulses are 2+ and symmetric  Pulmonary/Chest: No respiratory distress. Clear to auscultation bilaterally. No wheezing or rales.  Equal chest wall rise bilaterally  Abdominal: Soft and non-distended.  Left upper quadrant abdominal tenderness.  Left flank tenderness.  There is some guarding but no rebound..  Good bowel sounds.  Genitourinary:  Left CVA tenderness.  No suprapubic tenderness  Musculoskeletal: Moves all extremities. No obvious deformities.  5 x 5 strength in all extremities   Skin: Warm and dry.  Neurological:  Alert, awake, and appropriate.  Normal speech.  No acute focal neurological deficits are appreciated.  Two through 12 intact bilaterally.  Psychiatric: Normal affect. Good eye contact. Appropriate in content.    ED Course   Procedures  Labs Reviewed   CBC W/ AUTO DIFFERENTIAL - Abnormal; Notable for the following components:       Result Value    Platelets 140 (*)     All other components within normal limits    Narrative:      Release to patient->Immediate   COMPREHENSIVE METABOLIC PANEL - Abnormal; Notable for the following components:    BUN 30 (*)     eGFR 53 (*)     All other components within normal limits    Narrative:     Release to patient->Immediate   URINALYSIS, REFLEX TO URINE CULTURE - Abnormal; Notable for the following components:    Color, UA Colorless (*)     All other components within normal limits    Narrative:     Specimen Source->Urine   LIPASE    Narrative:     Release to patient->Immediate   HIV 1 / 2 ANTIBODY    Narrative:     Release to patient->Immediate   HEPATITIS C ANTIBODY    Narrative:     Release to patient->Immediate   HEP C VIRUS HOLD SPECIMEN    Narrative:     Release to patient->Immediate          Imaging Results              CT Renal Stone Study ABD Pelvis WO (Final result)  Result time 11/05/22 13:36:19      Final result by LACHELLE Olivera Sr., MD (11/05/22 13:36:19)                   Impression:      1. There is no nephrolithiasis.  There are hypodense masses associated with both kidneys. One of the larger ones measures 33 mm and is located off of the medial aspect of the midpole of the left kidney.  This mass has a Hounsfield measurement of -3.  This is characteristic of a cyst.  2. There are old appearing bilateral pars interarticularis fractures of L5. There is grade 1 anterolisthesis of L5 on S1.  3. There are 2 stents visualized within the common bile duct. The longer 1 goes from the millie hepatis to the 3rd portion of the duodenum. There is a large amount of pneumobilia.  4. There are noncalcified pulmonary nodules in both lungs. One of the larger ones measures 5 mm and is located in the left lower lobe.  If additional imaging evaluation is clinically indicated, I recommend consideration of a follow-up CT examination of the thorax without IV contrast in 4 months.  All CT scans at this facility use dose modulation, iterative reconstruction, and/or weight base dosing when appropriate to reduce  radiation dose when appropriate to reduce radiation dose to as low as reasonably achievable.      Electronically signed by: Cordell Olivera MD  Date:    11/05/2022  Time:    13:36               Narrative:    EXAMINATION:  CT RENAL STONE STUDY ABD PELVIS WO    CLINICAL HISTORY:  Flank pain, kidney stone suspected;    TECHNIQUE:  Standard abdomen and pelvis CT protocol without oral or IV contrast was performed.    COMPARISON:  An ultrasound examination of the abdomen performed on 03/04/2022.    FINDINGS:  Finding: The size of the heart is within normal limits.  There are noncalcified pulmonary nodules in both lungs.  One of the larger ones measures 5 mm and is located in the left lower lobe.  There is no pneumothorax or pleural effusion.    There are 2 stents visualized within the common bile duct.  The longer 1 goes from the millie hepatis to the 3rd portion of the duodenum.  There is a large amount of pneumobilia.  The gallbladder is absent.  Excluding the stents in the common bile duct the pancreas is normal in appearance.  The spleen and adrenals are normal in appearance.  There are hypodense masses associated with both kidneys.  One of the larger ones measures 33 mm and is located off of the medial aspect of the midpole of the left kidney.  This mass has a Hounsfield measurement of -3.  There is no hydronephrosis or nephrolithiasis.  The ureters and the urinary bladder are normal in appearance.  The prostate is prominent in size.  There is a mild amount of calcification within the prostate.  The appendix is normal in appearance.  There is a moderate amount of diverticulosis in the sigmoid colon.  There is no free fluid within the abdomen or pelvis. There is no pneumoperitoneum.  There is a moderate amount of atherosclerosis.  There are old appearing bilateral pars interarticularis fractures of L5.  There is grade 1 anterolisthesis of L5 on S1.                                       Medications   morphine injection  4 mg (4 mg Intravenous Given 11/5/22 1147)   ondansetron injection 4 mg (4 mg Intravenous Given 11/5/22 1147)         2:29 PM  Patient is stable and nontoxic.  I discussed with the patient and his family all findings.  He has multiple cysts in his kidneys as well as a small nodule in the lung.  They are aware of the need for repeat CT scanning for the nodule in 3-4 months.  Has left flank pain of unknown cause.  His workup today is benign and stable.  Will treat with pain control and close follow-up.  This may be musculoskeletal in nature.  Patient is feeling fine now.  Stable safe for discharge my opinion.  Discussed with him all findings well as plan of care.  They verbalized agreement understanding with all instructions reliable.                     Clinical Impression:   Final diagnoses:  [R10.9] Left flank pain (Primary)      ED Disposition Condition    Discharge Stable          ED Prescriptions       Medication Sig Dispense Start Date End Date Auth. Provider    docusate calcium (SURFAK) 240 mg capsule Take 1 capsule (240 mg total) by mouth once daily. 30 capsule 11/5/2022 12/5/2022 Colin Krishnamurthy Jr., MD    HYDROcodone-acetaminophen (NORCO) 5-325 mg per tablet Take 1 tablet by mouth every 6 (six) hours as needed for Pain. 9 tablet 11/5/2022 -- Colin Krishnamurthy Jr., MD          Follow-up Information       Follow up With Specialties Details Why Contact Info    ALBINA Fairbanks Jr., MD Internal Medicine, Pediatrics   [address]  [place] [address]  [phone]               Colin Krishnamurthy Jr., MD  11/05/22 7928

## 2022-11-05 NOTE — DISCHARGE INSTRUCTIONS
Your workup today is negative.  There is a small cyst in the kidneys as well as a nodule in the lungs.  Please follow-up with primary care provider for imaging of the nodule in the next 3-4 months.  Use ibuprofen for pain, Norco for breakthrough pain.  Surfak as stool softener.  Follow up with primary care provider 1-2 days for re-evaluation.  Return as needed for any worsening symptoms, problems, questions concerns.

## 2024-07-09 ENCOUNTER — HOSPITAL ENCOUNTER (EMERGENCY)
Facility: HOSPITAL | Age: 74
Discharge: HOME | End: 2024-07-09
Attending: EMERGENCY MEDICINE
Payer: MEDICAID

## 2024-07-09 VITALS
HEART RATE: 52 BPM | SYSTOLIC BLOOD PRESSURE: 140 MMHG | RESPIRATION RATE: 18 BRPM | DIASTOLIC BLOOD PRESSURE: 75 MMHG | TEMPERATURE: 98 F | BODY MASS INDEX: 28.34 KG/M2 | OXYGEN SATURATION: 97 % | WEIGHT: 187 LBS | HEIGHT: 68 IN
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
## 2024-07-09 DIAGNOSIS — S62.102A CLOSED FRACTURE OF LEFT WRIST, INITIAL ENCOUNTER: Primary | ICD-10-CM

## 2024-07-09 PROCEDURE — 99283 EMERGENCY DEPT VISIT LOW MDM: CPT | Mod: 25

## 2024-07-09 PROCEDURE — 29105 APPLICATION LONG ARM SPLINT: CPT | Mod: LT

## 2024-07-09 PROCEDURE — 25000003 PHARM REV CODE 250: Performed by: PHYSICIAN ASSISTANT

## 2024-07-09 RX ORDER — HYDROCODONE BITARTRATE AND ACETAMINOPHEN 7.5; 325 MG/1; MG/1
1 TABLET ORAL
Status: COMPLETED | OUTPATIENT
Start: 2024-07-09 | End: 2024-07-09

## 2024-07-09 RX ORDER — HYDROCODONE BITARTRATE AND ACETAMINOPHEN 5; 325 MG/1; MG/1
1 TABLET ORAL EVERY 4 HOURS PRN
Qty: 15 TABLET | Refills: 0 | Status: SHIPPED | OUTPATIENT
Start: 2024-07-09

## 2024-07-09 RX ADMIN — HYDROCODONE BITARTRATE AND ACETAMINOPHEN 1 TABLET: 7.5; 325 TABLET ORAL at 11:07

## 2024-07-09 NOTE — ED PROVIDER NOTES
History      Chief Complaint   Patient presents with    Fall     Pt slipped on the wet floor and fell on the left arm. +guarding in triage. -loc/hitting head. -any other pain       Review of patient's allergies indicates:  No Known Allergies     HPI   HPI    7/9/2024, 11:00 AM   History obtained from the patient, family      History of Present Illness: Lizz Alas is a 74 y.o. male patient who presents to the Emergency Department for left wrist pain since slipping on wet floor pta.  Denies head injury or pain elsewhere.    No further complaints or concerns at this time.           PCP: Shruthi, Primary Doctor       Past Medical History:  Past Medical History:   Diagnosis Date    ESRD (end stage renal disease)     ESRD (end stage renal disease) on dialysis     Hyperlipemia     Hypertension     Other lesions of oral mucosa     PAF (paroxysmal atrial fibrillation)     Renal disorder     Thrombocytopenia          Past Surgical History:  Past Surgical History:   Procedure Laterality Date    ERCP N/A 12/28/2021    Procedure: ERCP (ENDOSCOPIC RETROGRADE CHOLANGIOPANCREATOGRAPHY);  Surgeon: Melchor Sarmiento MD;  Location: [facility] OR;  Service: Endoscopy;  Laterality: N/A;    FRACTURE SURGERY      HERNIA REPAIR      REPLACEMENT OF DIALYSIS CATHETER OVER GUIDEWIRE THROUGH EXISTING VENOUS ACCESS N/A 1/10/2022    Procedure: REPLACEMENT, CATHETER, DIALYSIS, OVER GUIDEWIRE, USING EXISTING VENOUS ACCESS;  Surgeon: Olayinka Vivar MD;  Location: [facility] CATH LAB;  Service: Vascular;  Laterality: N/A;           Family History:  Family History   Problem Relation Name Age of Onset    No Known Problems Mother      No Known Problems Father             Social History:  Social History     Tobacco Use    Smoking status: Former     Current packs/day: 0.00     Average packs/day: 1.5 packs/day for 45.0 years (67.5 ttl pk-yrs)     Types: Cigarettes     Start date: 1976     Quit date: 2021     Years since quitting: 3.5    Smokeless tobacco: Never    Substance and Sexual Activity    Alcohol use: Yes     Comment: beer    Drug use: Never    Sexual activity: Not Currently     Partners: Female       ROS     Review of Systems   Musculoskeletal:  Positive for arthralgias.   Skin:  Negative for wound.       Physical Exam      Initial Vitals [07/09/24 0836]   BP Pulse Resp Temp SpO2   (!) 147/68 (!) 55 19 97.5 °F (36.4 °C) 97 %      MAP       --         Physical Exam  Vital signs and nursing notes reviewed.  Constitutional: Patient is in NAD. Awake and alert. Well-developed and well-nourished.  Head: Atraumatic. Normocephalic.  Eyes:  EOM intact. Conjunctivae nl. No scleral icterus.  ENT: Mucous membranes are moist.   Neck: Supple.  No meningismus  Cardiovascular: Regular rate and rhythm. No murmurs, rubs, or gallops.   Pulmonary/Chest: No respiratory distress. Clear to auscultation bilaterally. No wheezing, rales, or rhonchi.  Abdominal: Soft. Non-distended. No TTP. No rebound, guarding, or rigidity. Good bowel sounds.  Genitourinary: No CVA tenderness  Musculoskeletal: Moves all extremities. No edema. Left wrist edema, tenderness.  2+radial pulse. Cap refill less than 2 sec  Skin: Warm and dry.  Neurological: Awake and alert. No acute focal neurological deficits are appreciated.  Psychiatric: Normal affect. Good eye contact. Appropriate in content.      ED Course          Splint Application    Date/Time: 7/9/2024 4:23 PM    Performed by: Jennifer Shore PA-C  Authorized by: Enriqueta Brizuela MD  Location details: left wrist  Splint type: sugar tong  Supplies used: Ortho-Glass  Post-procedure: The splinted body part was neurovascularly unchanged following the procedure.  Patient tolerance: Patient tolerated the procedure well with no immediate complications        ED Vital Signs:  Vitals:    07/09/24 0835 07/09/24 0836 07/09/24 1052 07/09/24 1107   BP:  (!) 147/68 (!) 147/71    Pulse:  (!) 55 (!) 49    Resp:  19 20 18   Temp:  97.5 °F (36.4 °C) 97.8 °F (36.6 °C)   "  TempSrc:  Oral Oral    SpO2:  97% 97%    Weight: 84.8 kg (187 lb)      Height: 5' 8" (1.727 m)       07/09/24 1121 07/09/24 1153 07/09/24 1230   BP:   (!) 140/75   Pulse: (!) 53 (!) 49 (!) 52   Resp:   18   Temp:   97.8 °F (36.6 °C)   TempSrc:   Oral   SpO2:      Weight:      Height:                    Imaging Results:  Imaging Results              X-Ray Wrist Complete Left (Final result)  Result time 07/09/24 08:54:25      Final result by Danyel Hogue MD (07/09/24 08:54:25)                   Impression:      As above      Electronically signed by: Danyel Hogue MD  Date:    07/09/2024  Time:    08:54               Narrative:    EXAMINATION:  XR WRIST COMPLETE 3 VIEWS LEFT    CLINICAL HISTORY:  Fall on same level from slipping, tripping and stumbling without subsequent striking against object, initial encounter    TECHNIQUE:  PA, lateral, and oblique views of the left wrist were performed.    COMPARISON:  None    FINDINGS:  Comminuted, displaced and angulated distal radius fracture.  No other acute fractures are seen.    There is a calcification in the TFCC.  Marked degenerative changes of the 1st carpometacarpal joint with subluxation of the joint.                                         The Emergency Provider reviewed the vital signs and test results, which are outlined above.    ED Discussion             Medication(s) given in the ER:  Medications   HYDROcodone-acetaminophen 7.5-325 mg per tablet 1 tablet (1 tablet Oral Given 7/9/24 1107)            Follow-up Information       Clinic, [facility]. Schedule an appointment as soon as possible for a visit in 3 days.    Contact information:  [address] [place] [address] [address]  [place] [address] [phone]                                    Medication List        CHANGE how you take these medications      * HYDROcodone-acetaminophen 5-325 mg per tablet  Commonly known as: NORCO  Take 1 tablet by mouth every 6 (six) hours as needed for Pain.  What " changed: Another medication with the same name was added. Make sure you understand how and when to take each.     * HYDROcodone-acetaminophen 5-325 mg per tablet  Commonly known as: NORCO  Take 1 tablet by mouth every 4 (four) hours as needed for Pain.  What changed: You were already taking a medication with the same name, and this prescription was added. Make sure you understand how and when to take each.           * This list has 2 medication(s) that are the same as other medications prescribed for you. Read the directions carefully, and ask your doctor or other care provider to review them with you.                ASK your doctor about these medications      amLODIPine 5 MG tablet  Commonly known as: NORVASC     folic acid 1 MG tablet  Commonly known as: FOLVITE  Take 1 tablet (1 mg total) by mouth once daily.     furosemide 20 MG tablet  Commonly known as: LASIX  Take 1 tablet (20 mg total) by mouth 2 (two) times a day.     metoprolol tartrate 25 MG tablet  Commonly known as: LOPRESSOR  Take 1 tablet (25 mg total) by mouth 2 (two) times daily.     rosuvastatin 20 MG tablet  Commonly known as: CRESTOR     sevelamer carbonate 800 mg Tab  Commonly known as: RENVELA  Take 2 tablets (1,600 mg total) by mouth 3 (three) times daily with meals.     tamsulosin 0.4 mg Cap  Commonly known as: FLOMAX  Take 1 capsule (0.4 mg total) by mouth every evening.     thiamine 100 MG tablet  Take 1 tablet (100 mg total) by mouth once daily.               Where to Get Your Medications        These medications were sent to [facility] [address]  [place] - [address] [facility]  [address] [facility] [place] [address]      Phone: [phone]   HYDROcodone-acetaminophen 5-325 mg per tablet             Medical Decision Making        All findings were reviewed with the patient/family in detail.   All remaining questions and concerns were addressed at that time.  Patient/family has been counseled regarding the need for  follow-up as well as the indication to return to the emergency room should new or worrisome developments occur.        MDM     Amount and/or Complexity of Data Reviewed  Tests in the radiology section of CPT®: ordered and reviewed  Discuss the patient with other providers: yes (Dr Lewis agrees with sugar tong, fu in clinic)                   Clinical Impression:        ICD-10-CM ICD-9-CM   1. Closed fracture of left wrist, initial encounter  S62.102A 814.00   2. Fall from slip, trip, or stumble, initial encounter  W01.0XXA E885.9               Jennifer Shore, PA-C  07/09/24 1624

## 2024-07-15 DIAGNOSIS — M25.532 LEFT WRIST PAIN: Primary | ICD-10-CM

## 2024-07-19 ENCOUNTER — OFFICE VISIT (OUTPATIENT)
Dept: ORTHOPEDICS | Facility: CLINIC | Age: 74
End: 2024-07-19
Payer: MEDICAID

## 2024-07-19 ENCOUNTER — HOSPITAL ENCOUNTER (OUTPATIENT)
Dept: RADIOLOGY | Facility: HOSPITAL | Age: 74
Discharge: HOME | End: 2024-07-19
Attending: ORTHOPAEDIC SURGERY
Payer: MEDICAID

## 2024-07-19 VITALS — BODY MASS INDEX: 28.33 KG/M2 | HEIGHT: 68 IN | WEIGHT: 186.94 LBS

## 2024-07-19 DIAGNOSIS — S52.572A OTHER CLOSED INTRA-ARTICULAR FRACTURE OF DISTAL END OF LEFT RADIUS, INITIAL ENCOUNTER: Primary | ICD-10-CM

## 2024-07-19 DIAGNOSIS — M25.532 LEFT WRIST PAIN: Primary | ICD-10-CM

## 2024-07-19 DIAGNOSIS — M25.532 LEFT WRIST PAIN: ICD-10-CM

## 2024-07-19 PROCEDURE — 1101F PT FALLS ASSESS-DOCD LE1/YR: CPT | Mod: CPTII,,, | Performed by: ORTHOPAEDIC SURGERY

## 2024-07-19 PROCEDURE — 99203 OFFICE O/P NEW LOW 30 MIN: CPT | Mod: S$PBB,,, | Performed by: ORTHOPAEDIC SURGERY

## 2024-07-19 PROCEDURE — 1159F MED LIST DOCD IN RCRD: CPT | Mod: CPTII,,, | Performed by: ORTHOPAEDIC SURGERY

## 2024-07-19 PROCEDURE — 99999 PR PBB SHADOW E&M-EST. PATIENT-LVL III: CPT | Mod: PBBFAC,,, | Performed by: ORTHOPAEDIC SURGERY

## 2024-07-19 PROCEDURE — 99213 OFFICE O/P EST LOW 20 MIN: CPT | Mod: PBBFAC,25 | Performed by: ORTHOPAEDIC SURGERY

## 2024-07-19 PROCEDURE — 73110 X-RAY EXAM OF WRIST: CPT | Mod: TC,LT

## 2024-07-19 PROCEDURE — 73110 X-RAY EXAM OF WRIST: CPT | Mod: 26,LT,, | Performed by: RADIOLOGY

## 2024-07-19 PROCEDURE — 1160F RVW MEDS BY RX/DR IN RCRD: CPT | Mod: CPTII,,, | Performed by: ORTHOPAEDIC SURGERY

## 2024-07-19 PROCEDURE — 1125F AMNT PAIN NOTED PAIN PRSNT: CPT | Mod: CPTII,,, | Performed by: ORTHOPAEDIC SURGERY

## 2024-07-19 PROCEDURE — 3288F FALL RISK ASSESSMENT DOCD: CPT | Mod: CPTII,,, | Performed by: ORTHOPAEDIC SURGERY

## 2024-07-19 RX ORDER — HYDROCODONE BITARTRATE AND ACETAMINOPHEN 5; 325 MG/1; MG/1
1 TABLET ORAL EVERY 6 HOURS PRN
Qty: 28 TABLET | Refills: 0 | Status: SHIPPED | OUTPATIENT
Start: 2024-07-19

## 2024-07-19 NOTE — PROGRESS NOTES
Subjective:     Patient ID: Lizz Alas is a 74 y.o. male.    Chief Complaint: Pain and Injury of the Left Wrist      HPI:  The patient is a 74-year-old male who fell and injured his left wrist 07/09/2024.  He was seen in the emergency room and splinted.    Past Medical History:   Diagnosis Date    ESRD (end stage renal disease)     ESRD (end stage renal disease) on dialysis     Hyperlipemia     Hypertension     Other lesions of oral mucosa     PAF (paroxysmal atrial fibrillation)     Renal disorder     Thrombocytopenia      Past Surgical History:   Procedure Laterality Date    ERCP N/A 12/28/2021    Procedure: ERCP (ENDOSCOPIC RETROGRADE CHOLANGIOPANCREATOGRAPHY);  Surgeon: Melchor Sarmiento MD;  Location: [facility] OR;  Service: Endoscopy;  Laterality: N/A;    FRACTURE SURGERY      HERNIA REPAIR      REPLACEMENT OF DIALYSIS CATHETER OVER GUIDEWIRE THROUGH EXISTING VENOUS ACCESS N/A 1/10/2022    Procedure: REPLACEMENT, CATHETER, DIALYSIS, OVER GUIDEWIRE, USING EXISTING VENOUS ACCESS;  Surgeon: Olayinka Vivar MD;  Location: [facility] CATH LAB;  Service: Vascular;  Laterality: N/A;     Family History   Problem Relation Name Age of Onset    No Known Problems Mother      No Known Problems Father       Social History     Socioeconomic History    Marital status:    Tobacco Use    Smoking status: Former     Current packs/day: 0.00     Average packs/day: 1.5 packs/day for 45.0 years (67.5 ttl pk-yrs)     Types: Cigarettes     Start date: 1976     Quit date: 2021     Years since quitting: 3.5    Smokeless tobacco: Never   Substance and Sexual Activity    Alcohol use: Yes     Comment: beer    Drug use: Never    Sexual activity: Not Currently     Partners: Female     Medication List with Changes/Refills   Current Medications    AMLODIPINE (NORVASC) 5 MG TABLET    Take 5 mg by mouth once daily.    FOLIC ACID (FOLVITE) 1 MG TABLET    Take 1 tablet (1 mg total) by mouth once daily.    FUROSEMIDE (LASIX) 20 MG TABLET    Take  1 tablet (20 mg total) by mouth 2 (two) times a day.    HYDROCODONE-ACETAMINOPHEN (NORCO) 5-325 MG PER TABLET    Take 1 tablet by mouth every 4 (four) hours as needed for Pain.    METOPROLOL TARTRATE (LOPRESSOR) 25 MG TABLET    Take 1 tablet (25 mg total) by mouth 2 (two) times daily.    ROSUVASTATIN (CRESTOR) 20 MG TABLET    Take 20 mg by mouth once daily.    SEVELAMER CARBONATE (RENVELA) 800 MG TAB    Take 2 tablets (1,600 mg total) by mouth 3 (three) times daily with meals.    TAMSULOSIN (FLOMAX) 0.4 MG CAP    Take 1 capsule (0.4 mg total) by mouth every evening.    THIAMINE 100 MG TABLET    Take 1 tablet (100 mg total) by mouth once daily.   Discontinued Medications    HYDROCODONE-ACETAMINOPHEN (NORCO) 5-325 MG PER TABLET    Take 1 tablet by mouth every 6 (six) hours as needed for Pain.     Review of patient's allergies indicates:  No Known Allergies  Review of Systems   Constitutional: Negative for malaise/fatigue.   HENT:  Negative for hearing loss.    Eyes:  Negative for double vision and visual disturbance.   Cardiovascular:  Positive for irregular heartbeat. Negative for chest pain.   Respiratory:  Negative for shortness of breath.    Endocrine: Negative for cold intolerance.   Hematologic/Lymphatic: Does not bruise/bleed easily.   Skin:  Negative for poor wound healing and suspicious lesions.   Musculoskeletal:  Negative for gout, joint pain and joint swelling.   Gastrointestinal:  Negative for nausea and vomiting.   Genitourinary:  Negative for dysuria.   Neurological:  Negative for numbness, paresthesias and sensory change.   Psychiatric/Behavioral:  Positive for substance abuse. Negative for depression and memory loss. The patient is not nervous/anxious.    Allergic/Immunologic: Negative for persistent infections.       Objective:   Body mass index is 28.43 kg/m².  There were no vitals filed for this visit.             General    Constitutional: He is oriented to person, place, and time. He appears  well-developed and well-nourished. No distress.   HENT:   Head: Normocephalic.   Eyes: EOM are normal.   Pulmonary/Chest: Effort normal.   Neurological: He is oriented to person, place, and time.   Psychiatric: He has a normal mood and affect.         Left Hand/Wrist Exam     Inspection   Scars: Wrist - absent Hand -  absent  Effusion: Wrist - absent Hand -  absent    Pain   Wrist - The patient exhibits pain of the scapholunate/lunate ECU.    Other     Sensory Exam  Median Distribution: normal  Ulnar Distribution: normal  Radial Distribution: normal    Comments:  The patient has tenderness and swelling about the fracture site.  There is no deformity.  There are no motor or sensory deficits.          Vascular Exam       Capillary Refill  Left Hand: normal capillary refill          Relevant imaging results reviewed and interpreted by me, discussed with the patient and / or family today radiographs left wrist showed intra-articular nondisplaced fracture left distal radius  Assessment:     Encounter Diagnosis   Name Primary?    Other closed intra-articular fracture of distal end of left radius, initial encounter Yes        Plan:       The patient was counseled regarding advantages and disadvantages of operative open reduction internal fixation versus Exos splint treatment.  He wishes to avoid surgery.  An Exos splint was applied.  Activity limitations were discussed.  He will return in 1 week for suture removal.              Disclaimer: This note was prepared using a voice recognition system and is likely to have sound alike errors within the text.

## 2024-08-30 ENCOUNTER — OFFICE VISIT (OUTPATIENT)
Dept: ORTHOPEDICS | Facility: CLINIC | Age: 74
End: 2024-08-30
Payer: MEDICAID

## 2024-08-30 ENCOUNTER — HOSPITAL ENCOUNTER (OUTPATIENT)
Dept: RADIOLOGY | Facility: HOSPITAL | Age: 74
Discharge: HOME | End: 2024-08-30
Attending: ORTHOPAEDIC SURGERY
Payer: MEDICAID

## 2024-08-30 VITALS — HEIGHT: 68 IN | BODY MASS INDEX: 28.33 KG/M2 | WEIGHT: 186.94 LBS

## 2024-08-30 DIAGNOSIS — S52.572D OTHER CLOSED INTRA-ARTICULAR FRACTURE OF DISTAL END OF LEFT RADIUS WITH ROUTINE HEALING, SUBSEQUENT ENCOUNTER: Primary | ICD-10-CM

## 2024-08-30 DIAGNOSIS — M25.532 LEFT WRIST PAIN: ICD-10-CM

## 2024-08-30 PROCEDURE — 73110 X-RAY EXAM OF WRIST: CPT | Mod: TC,LT

## 2024-08-30 PROCEDURE — 73110 X-RAY EXAM OF WRIST: CPT | Mod: 26,LT,, | Performed by: RADIOLOGY

## 2024-08-30 PROCEDURE — 99999 PR PBB SHADOW E&M-EST. PATIENT-LVL III: CPT | Mod: PBBFAC,,, | Performed by: ORTHOPAEDIC SURGERY

## 2024-08-30 PROCEDURE — 99213 OFFICE O/P EST LOW 20 MIN: CPT | Mod: PBBFAC,25 | Performed by: ORTHOPAEDIC SURGERY

## 2024-08-30 RX ORDER — TRIAMCINOLONE ACETONIDE 40 MG/ML
40 INJECTION, SUSPENSION INTRA-ARTICULAR; INTRAMUSCULAR
Status: DISCONTINUED | OUTPATIENT
Start: 2024-08-30 | End: 2024-08-30 | Stop reason: HOSPADM

## 2024-08-30 RX ADMIN — TRIAMCINOLONE ACETONIDE 40 MG: 40 INJECTION, SUSPENSION INTRA-ARTICULAR; INTRAMUSCULAR at 01:08

## 2024-08-30 NOTE — PROCEDURES
Small Joint Aspiration/Injection: L thumb CMC    Date/Time: 8/30/2024 1:15 PM    Performed by: Thor Lares MD  Authorized by: Tohr Lares MD    Consent Done?:  Yes (Verbal)  Indications:  Pain  Site marked: the procedure site was marked    Timeout: prior to procedure the correct patient, procedure, and site was verified    Prep: patient was prepped and draped in usual sterile fashion      Local anesthesia used?: Yes    Local anesthetic:  Lidocaine 2% without epinephrine  Anesthetic total (ml):  1    Location:  Thumb  Site:  L thumb CMC  Needle size:  25 G  Approach:  Dorsal  Medications:  40 mg triamcinolone acetonide 40 mg/mL

## 2024-08-30 NOTE — PROGRESS NOTES
Subjective:     Patient ID: Lizz Alas is a 74 y.o. male.    Chief Complaint: Pain and Injury of the Left Wrist      HPI:  The patient is a 74-year-old male seen in follow-up after left distal radius fracture date of injury was 07/09/2024.  He was treated an Exos wrist splint    Past Medical History:   Diagnosis Date    ESRD (end stage renal disease)     ESRD (end stage renal disease) on dialysis     Hyperlipemia     Hypertension     Other lesions of oral mucosa     PAF (paroxysmal atrial fibrillation)     Renal disorder     Thrombocytopenia      Past Surgical History:   Procedure Laterality Date    ERCP N/A 12/28/2021    Procedure: ERCP (ENDOSCOPIC RETROGRADE CHOLANGIOPANCREATOGRAPHY);  Surgeon: Melchor Sarmiento MD;  Location: [facility] OR;  Service: Endoscopy;  Laterality: N/A;    FRACTURE SURGERY      HERNIA REPAIR      REPLACEMENT OF DIALYSIS CATHETER OVER GUIDEWIRE THROUGH EXISTING VENOUS ACCESS N/A 1/10/2022    Procedure: REPLACEMENT, CATHETER, DIALYSIS, OVER GUIDEWIRE, USING EXISTING VENOUS ACCESS;  Surgeon: Olayinka Vivar MD;  Location: [facility] CATH LAB;  Service: Vascular;  Laterality: N/A;     Family History   Problem Relation Name Age of Onset    No Known Problems Mother      No Known Problems Father       Social History     Socioeconomic History    Marital status:    Tobacco Use    Smoking status: Former     Current packs/day: 0.00     Average packs/day: 1.5 packs/day for 45.0 years (67.5 ttl pk-yrs)     Types: Cigarettes     Start date: 1976     Quit date: 2021     Years since quitting: 3.6    Smokeless tobacco: Never   Substance and Sexual Activity    Alcohol use: Yes     Comment: beer    Drug use: Never    Sexual activity: Not Currently     Partners: Female     Medication List with Changes/Refills   Current Medications    AMLODIPINE (NORVASC) 5 MG TABLET    Take 5 mg by mouth once daily.    FOLIC ACID (FOLVITE) 1 MG TABLET    Take 1 tablet (1 mg total) by mouth once daily.    FUROSEMIDE  (LASIX) 20 MG TABLET    Take 1 tablet (20 mg total) by mouth 2 (two) times a day.    HYDROCODONE-ACETAMINOPHEN (NORCO) 5-325 MG PER TABLET    Take 1 tablet by mouth every 4 (four) hours as needed for Pain.    HYDROCODONE-ACETAMINOPHEN (NORCO) 5-325 MG PER TABLET    Take 1 tablet by mouth every 6 (six) hours as needed for Pain.    METOPROLOL TARTRATE (LOPRESSOR) 25 MG TABLET    Take 1 tablet (25 mg total) by mouth 2 (two) times daily.    ROSUVASTATIN (CRESTOR) 20 MG TABLET    Take 20 mg by mouth once daily.    SEVELAMER CARBONATE (RENVELA) 800 MG TAB    Take 2 tablets (1,600 mg total) by mouth 3 (three) times daily with meals.    TAMSULOSIN (FLOMAX) 0.4 MG CAP    Take 1 capsule (0.4 mg total) by mouth every evening.    THIAMINE 100 MG TABLET    Take 1 tablet (100 mg total) by mouth once daily.     Review of patient's allergies indicates:  No Known Allergies  Review of Systems   Constitutional: Negative for malaise/fatigue.   HENT:  Negative for hearing loss.    Eyes:  Negative for double vision and visual disturbance.   Cardiovascular:  Positive for irregular heartbeat. Negative for chest pain.   Respiratory:  Positive for shortness of breath.    Endocrine: Negative for cold intolerance.   Hematologic/Lymphatic: Does not bruise/bleed easily.   Skin:  Negative for poor wound healing and suspicious lesions.   Musculoskeletal:  Negative for gout, joint pain and joint swelling.   Gastrointestinal:  Negative for nausea and vomiting.   Genitourinary:  Negative for dysuria.   Neurological:  Negative for numbness, paresthesias and sensory change.   Psychiatric/Behavioral:  Positive for substance abuse. Negative for depression and memory loss. The patient is not nervous/anxious.    Allergic/Immunologic: Negative for persistent infections.       Objective:   Body mass index is 28.43 kg/m².  There were no vitals filed for this visit.             General    Constitutional: He is oriented to person, place, and time. He appears  well-developed and well-nourished. No distress.   HENT:   Head: Normocephalic.   Eyes: EOM are normal.   Pulmonary/Chest: Effort normal.   Neurological: He is oriented to person, place, and time.   Psychiatric: He has a normal mood and affect.         Left Hand/Wrist Exam     Inspection   Scars: Wrist - absent Hand -  absent  Effusion: Wrist - absent Hand -  absent    Pain   Wrist - The patient exhibits pain of the CMC and TFCC.    Other     Sensory Exam  Median Distribution: normal  Ulnar Distribution: normal  Radial Distribution: normal    Comments:  The patient has no tenderness about the fracture site.  There is tenderness about the left thumb basal joint with a positive axial circumduction grind test.          Vascular Exam       Capillary Refill  Left Hand: normal capillary refill          Relevant imaging results reviewed and interpreted by me, discussed with the patient and / or family today re-x-ray left wrist showed distal radius fracture in good position with interval signs of healing and arthritis basal joint left thumb.  There is slight settling of the distal radius fracture with positive ulnar variance  Assessment:     Encounter Diagnosis   Name Primary?    Other closed intra-articular fracture of distal end of left radius with routine healing, subsequent encounter Yes        Plan:     The patient was given a smaller wrist splint.  He will discontinue the Exos wrist splint.  He will wean himself away from that splint.  He will work on exercises.  He declines referral to physical therapy.  He was injected left thumb basal joint with 1 cc 2% plain lidocaine and 1 cc Kenalog.  He will wait at least 3 months between injections.                Disclaimer: This note was prepared using a voice recognition system and is likely to have sound alike errors within the text.

## (undated) DEVICE — DRESSING SORBAVIEW ULTIMATE IJ

## (undated) DEVICE — PACK CATH LAB CUSTOM BR

## (undated) DEVICE — WIRE GUIDE TEFLON 3CM .035 145

## (undated) DEVICE — DRESSING ANTIMICROBIAL 1 INCH

## (undated) DEVICE — GUIDEWIRE STF .035X150CM ANG

## (undated) DEVICE — SUT PROLENE 2-0 KS BL MONO